# Patient Record
Sex: FEMALE | Race: BLACK OR AFRICAN AMERICAN | NOT HISPANIC OR LATINO | Employment: FULL TIME | URBAN - METROPOLITAN AREA
[De-identification: names, ages, dates, MRNs, and addresses within clinical notes are randomized per-mention and may not be internally consistent; named-entity substitution may affect disease eponyms.]

---

## 2017-01-24 ENCOUNTER — HISTORICAL (OUTPATIENT)
Dept: LAB | Facility: HOSPITAL | Age: 57
End: 2017-01-24

## 2017-01-26 ENCOUNTER — HISTORICAL (OUTPATIENT)
Dept: ADMINISTRATIVE | Facility: HOSPITAL | Age: 57
End: 2017-01-26

## 2017-02-07 ENCOUNTER — HISTORICAL (OUTPATIENT)
Dept: RADIATION THERAPY | Facility: HOSPITAL | Age: 57
End: 2017-02-07

## 2017-02-15 ENCOUNTER — HISTORICAL (OUTPATIENT)
Dept: RADIATION THERAPY | Facility: HOSPITAL | Age: 57
End: 2017-02-15

## 2017-02-24 ENCOUNTER — HISTORICAL (OUTPATIENT)
Dept: ANESTHESIOLOGY | Facility: HOSPITAL | Age: 57
End: 2017-02-24

## 2017-03-01 ENCOUNTER — HISTORICAL (OUTPATIENT)
Dept: RADIATION THERAPY | Facility: HOSPITAL | Age: 57
End: 2017-03-01

## 2017-03-06 ENCOUNTER — HISTORICAL (OUTPATIENT)
Dept: CARDIOLOGY | Facility: HOSPITAL | Age: 57
End: 2017-03-06

## 2017-03-23 ENCOUNTER — HISTORICAL (OUTPATIENT)
Dept: ADMINISTRATIVE | Facility: HOSPITAL | Age: 57
End: 2017-03-23

## 2017-04-03 ENCOUNTER — HISTORICAL (OUTPATIENT)
Dept: INFUSION THERAPY | Facility: HOSPITAL | Age: 57
End: 2017-04-03

## 2017-04-17 ENCOUNTER — HISTORICAL (OUTPATIENT)
Dept: INFUSION THERAPY | Facility: HOSPITAL | Age: 57
End: 2017-04-17

## 2017-05-01 ENCOUNTER — HISTORICAL (OUTPATIENT)
Dept: INFUSION THERAPY | Facility: HOSPITAL | Age: 57
End: 2017-05-01

## 2017-05-01 LAB
ABS NEUT (OLG): 7.8 X10(3)/MCL (ref 2.1–9.2)
ANION GAP SERPL CALC-SCNC: 18 MMOL/L
BASOPHILS # BLD AUTO: 0.1 X10(3)/MCL (ref 0–0.2)
BASOPHILS NFR BLD AUTO: 0.9 %
BUN SERPL-MCNC: 10 MG/DL (ref 7–18)
CHLORIDE SERPL-SCNC: 98 MMOL/L (ref 98–109)
CREAT SERPL-MCNC: 0.6 MG/DL (ref 0.6–1.3)
EOSINOPHIL # BLD AUTO: 0 X10(3)/MCL (ref 0–0.9)
EOSINOPHIL NFR BLD AUTO: 0.1 %
ERYTHROCYTE [DISTWIDTH] IN BLOOD BY AUTOMATED COUNT: 12.7 % (ref 11.5–17)
GLUCOSE SERPL-MCNC: 360 MG/DL (ref 70–105)
HCT VFR BLD AUTO: 30.6 % (ref 37–47)
HCT VFR BLD CALC: 31 % (ref 38–51)
HGB BLD-MCNC: 10.5 MG/DL (ref 12–17)
HGB BLD-MCNC: 9.7 GM/DL (ref 12–16)
LYMPHOCYTES # BLD AUTO: 0.9 X10(3)/MCL (ref 0.6–4.6)
LYMPHOCYTES NFR BLD AUTO: 9.6 %
MCH RBC QN AUTO: 30.9 PG (ref 27–31)
MCHC RBC AUTO-ENTMCNC: 31.7 GM/DL (ref 33–36)
MCV RBC AUTO: 97.5 FL (ref 80–94)
MONOCYTES # BLD AUTO: 0.6 X10(3)/MCL (ref 0.1–1.3)
MONOCYTES NFR BLD AUTO: 6.2 %
NEUTROPHILS # BLD AUTO: 7.8 X10(3)/MCL (ref 2.1–9.2)
NEUTROPHILS NFR BLD AUTO: 83.2 %
PLATELET # BLD AUTO: 152 X10(3)/MCL (ref 130–400)
PMV BLD AUTO: 11 FL (ref 9.4–12.4)
POC IONIZED CALCIUM: 1.17 MMOL/L (ref 1.12–1.32)
POC TCO2: 27 MMOL/L (ref 22–27)
POTASSIUM BLD-SCNC: 4.1 MMOL/L (ref 3.5–4.9)
RBC # BLD AUTO: 3.14 X10(6)/MCL (ref 4.2–5.4)
SODIUM BLD-SCNC: 138 MMOL/L (ref 138–146)
WBC # SPEC AUTO: 9.4 X10(3)/MCL (ref 4.5–11.5)

## 2017-05-08 ENCOUNTER — HISTORICAL (OUTPATIENT)
Dept: ADMINISTRATIVE | Facility: HOSPITAL | Age: 57
End: 2017-05-08

## 2017-05-08 LAB
ABS NEUT (OLG): 5.27 X10(3)/MCL (ref 2.1–9.2)
ALBUMIN SERPL-MCNC: 3.4 GM/DL (ref 3.4–5)
ALBUMIN/GLOB SERPL: 1.1 RATIO (ref 1.1–2)
ALP SERPL-CCNC: 222 UNIT/L (ref 38–126)
ALT SERPL-CCNC: 21 UNIT/L (ref 12–78)
AST SERPL-CCNC: 8 UNIT/L (ref 15–37)
BASOPHILS # BLD AUTO: 0 X10(3)/MCL (ref 0–0.2)
BASOPHILS NFR BLD AUTO: 0.3 %
BILIRUB SERPL-MCNC: 0.4 MG/DL (ref 0.2–1)
BILIRUBIN DIRECT+TOT PNL SERPL-MCNC: 0.1 MG/DL (ref 0–0.5)
BILIRUBIN DIRECT+TOT PNL SERPL-MCNC: 0.3 MG/DL (ref 0–0.8)
BUN SERPL-MCNC: 14 MG/DL (ref 7–18)
CALCIUM SERPL-MCNC: 9.2 MG/DL (ref 8.5–10.1)
CHLORIDE SERPL-SCNC: 102 MMOL/L (ref 98–107)
CO2 SERPL-SCNC: 31 MMOL/L (ref 21–32)
CREAT SERPL-MCNC: 0.73 MG/DL (ref 0.55–1.02)
EOSINOPHIL # BLD AUTO: 0 X10(3)/MCL (ref 0–0.9)
EOSINOPHIL NFR BLD AUTO: 0.5 %
ERYTHROCYTE [DISTWIDTH] IN BLOOD BY AUTOMATED COUNT: 12.7 % (ref 11.5–17)
GLOBULIN SER-MCNC: 3.1 GM/DL (ref 2.4–3.5)
GLUCOSE SERPL-MCNC: 326 MG/DL (ref 74–106)
HCT VFR BLD AUTO: 30.2 % (ref 37–47)
HGB BLD-MCNC: 9.8 GM/DL (ref 12–16)
LYMPHOCYTES # BLD AUTO: 0.6 X10(3)/MCL (ref 0.6–4.6)
LYMPHOCYTES NFR BLD AUTO: 9.6 %
MCH RBC QN AUTO: 31.2 PG (ref 27–31)
MCHC RBC AUTO-ENTMCNC: 32.5 GM/DL (ref 33–36)
MCV RBC AUTO: 96.2 FL (ref 80–94)
MONOCYTES # BLD AUTO: 0.5 X10(3)/MCL (ref 0.1–1.3)
MONOCYTES NFR BLD AUTO: 7.6 %
NEUTROPHILS # BLD AUTO: 5.3 X10(3)/MCL (ref 2.1–9.2)
NEUTROPHILS NFR BLD AUTO: 82 %
PLATELET # BLD AUTO: 88 X10(3)/MCL (ref 130–400)
PMV BLD AUTO: 12 FL (ref 9.4–12.4)
POTASSIUM SERPL-SCNC: 4.2 MMOL/L (ref 3.5–5.1)
PROT SERPL-MCNC: 6.5 GM/DL (ref 6.4–8.2)
RBC # BLD AUTO: 3.14 X10(6)/MCL (ref 4.2–5.4)
SODIUM SERPL-SCNC: 141 MMOL/L (ref 136–145)
WBC # SPEC AUTO: 6.4 X10(3)/MCL (ref 4.5–11.5)

## 2017-05-15 ENCOUNTER — HISTORICAL (OUTPATIENT)
Dept: INFUSION THERAPY | Facility: HOSPITAL | Age: 57
End: 2017-05-15

## 2017-05-15 LAB
ABS NEUT (OLG): 5.96 X10(3)/MCL (ref 2.1–9.2)
ALBUMIN SERPL-MCNC: 3.7 GM/DL (ref 3.4–5)
ALP SERPL-CCNC: 191 UNIT/L (ref 38–126)
ALT SERPL-CCNC: 23 UNIT/L (ref 12–78)
ANION GAP SERPL CALC-SCNC: 19 MMOL/L
AST SERPL-CCNC: 14 UNIT/L (ref 15–37)
BASOPHILS # BLD AUTO: 0.1 X10(3)/MCL (ref 0–0.2)
BASOPHILS NFR BLD AUTO: 1.1 %
BILIRUB SERPL-MCNC: 0.5 MG/DL (ref 0.2–1)
BILIRUBIN DIRECT+TOT PNL SERPL-MCNC: 0.1 MG/DL (ref 0–0.5)
BILIRUBIN DIRECT+TOT PNL SERPL-MCNC: 0.4 MG/DL (ref 0–0.8)
BUN SERPL-MCNC: 7 MG/DL (ref 7–18)
CHLORIDE SERPL-SCNC: 102 MMOL/L (ref 98–109)
CREAT SERPL-MCNC: 0.5 MG/DL (ref 0.6–1.3)
EOSINOPHIL # BLD AUTO: 0 X10(3)/MCL (ref 0–0.9)
EOSINOPHIL NFR BLD AUTO: 0.1 %
ERYTHROCYTE [DISTWIDTH] IN BLOOD BY AUTOMATED COUNT: 14 % (ref 11.5–17)
GLUCOSE SERPL-MCNC: 333 MG/DL (ref 70–105)
HCT VFR BLD AUTO: 29.9 % (ref 37–47)
HCT VFR BLD CALC: 30 % (ref 38–51)
HGB BLD-MCNC: 10.2 MG/DL (ref 12–17)
HGB BLD-MCNC: 9.6 GM/DL (ref 12–16)
LIVER PROFILE INTERP: ABNORMAL
LYMPHOCYTES # BLD AUTO: 0.7 X10(3)/MCL (ref 0.6–4.6)
LYMPHOCYTES NFR BLD AUTO: 9.4 %
MCH RBC QN AUTO: 31.4 PG (ref 27–31)
MCHC RBC AUTO-ENTMCNC: 32.1 GM/DL (ref 33–36)
MCV RBC AUTO: 97.7 FL (ref 80–94)
MONOCYTES # BLD AUTO: 0.6 X10(3)/MCL (ref 0.1–1.3)
MONOCYTES NFR BLD AUTO: 8.2 %
NEUTROPHILS # BLD AUTO: 6 X10(3)/MCL (ref 2.1–9.2)
NEUTROPHILS NFR BLD AUTO: 81.2 %
PLATELET # BLD AUTO: 180 X10(3)/MCL (ref 130–400)
PMV BLD AUTO: 11.2 FL (ref 9.4–12.4)
POC IONIZED CALCIUM: 1.15 MMOL/L (ref 1.12–1.32)
POC TCO2: 24 MMOL/L (ref 22–27)
POTASSIUM BLD-SCNC: 4 MMOL/L (ref 3.5–4.9)
PROT SERPL-MCNC: 6.5 GM/DL (ref 6.4–8.2)
RBC # BLD AUTO: 3.06 X10(6)/MCL (ref 4.2–5.4)
SODIUM BLD-SCNC: 139 MMOL/L (ref 138–146)
WBC # SPEC AUTO: 7.3 X10(3)/MCL (ref 4.5–11.5)

## 2017-05-22 ENCOUNTER — HISTORICAL (OUTPATIENT)
Dept: ADMINISTRATIVE | Facility: HOSPITAL | Age: 57
End: 2017-05-22

## 2017-05-22 LAB
ABS NEUT (OLG): 6.28 X10(3)/MCL (ref 2.1–9.2)
ALBUMIN SERPL-MCNC: 3.5 GM/DL (ref 3.4–5)
ALBUMIN/GLOB SERPL: 1.1 RATIO (ref 1.1–2)
ALP SERPL-CCNC: 212 UNIT/L (ref 38–126)
ALT SERPL-CCNC: 17 UNIT/L (ref 12–78)
AST SERPL-CCNC: 9 UNIT/L (ref 15–37)
BASOPHILS # BLD AUTO: 0 X10(3)/MCL (ref 0–0.2)
BASOPHILS NFR BLD AUTO: 0.4 %
BILIRUB SERPL-MCNC: 0.3 MG/DL (ref 0.2–1)
BILIRUBIN DIRECT+TOT PNL SERPL-MCNC: 0.1 MG/DL (ref 0–0.5)
BILIRUBIN DIRECT+TOT PNL SERPL-MCNC: 0.2 MG/DL (ref 0–0.8)
BUN SERPL-MCNC: 7 MG/DL (ref 7–18)
CALCIUM SERPL-MCNC: 9.7 MG/DL (ref 8.5–10.1)
CHLORIDE SERPL-SCNC: 101 MMOL/L (ref 98–107)
CO2 SERPL-SCNC: 29 MMOL/L (ref 21–32)
CREAT SERPL-MCNC: 0.67 MG/DL (ref 0.55–1.02)
EOSINOPHIL # BLD AUTO: 0 X10(3)/MCL (ref 0–0.9)
EOSINOPHIL NFR BLD AUTO: 0.4 %
ERYTHROCYTE [DISTWIDTH] IN BLOOD BY AUTOMATED COUNT: 13.5 % (ref 11.5–17)
GLOBULIN SER-MCNC: 3.1 GM/DL (ref 2.4–3.5)
GLUCOSE SERPL-MCNC: 223 MG/DL (ref 74–106)
HCT VFR BLD AUTO: 29.1 % (ref 37–47)
HGB BLD-MCNC: 9.5 GM/DL (ref 12–16)
LYMPHOCYTES # BLD AUTO: 0.5 X10(3)/MCL (ref 0.6–4.6)
LYMPHOCYTES NFR BLD AUTO: 6.5 %
MCH RBC QN AUTO: 31.4 PG (ref 27–31)
MCHC RBC AUTO-ENTMCNC: 32.6 GM/DL (ref 33–36)
MCV RBC AUTO: 96 FL (ref 80–94)
MONOCYTES # BLD AUTO: 0.7 X10(3)/MCL (ref 0.1–1.3)
MONOCYTES NFR BLD AUTO: 8.9 %
NEUTROPHILS # BLD AUTO: 6.3 X10(3)/MCL (ref 2.1–9.2)
NEUTROPHILS NFR BLD AUTO: 83.8 %
PLATELET # BLD AUTO: 180 X10(3)/MCL (ref 130–400)
PMV BLD AUTO: 10.6 FL (ref 9.4–12.4)
POTASSIUM SERPL-SCNC: 4.4 MMOL/L (ref 3.5–5.1)
PROT SERPL-MCNC: 6.6 GM/DL (ref 6.4–8.2)
RBC # BLD AUTO: 3.03 X10(6)/MCL (ref 4.2–5.4)
SODIUM SERPL-SCNC: 141 MMOL/L (ref 136–145)
WBC # SPEC AUTO: 7.5 X10(3)/MCL (ref 4.5–11.5)

## 2017-05-30 ENCOUNTER — HISTORICAL (OUTPATIENT)
Dept: INFUSION THERAPY | Facility: HOSPITAL | Age: 57
End: 2017-05-30

## 2017-05-30 LAB
ABS NEUT (OLG): 8.11 X10(3)/MCL (ref 2.1–9.2)
ANION GAP SERPL CALC-SCNC: 20 MMOL/L
BASOPHILS # BLD AUTO: 0.1 X10(3)/MCL (ref 0–0.2)
BASOPHILS NFR BLD AUTO: 1 %
BUN SERPL-MCNC: 7 MG/DL (ref 7–18)
CHLORIDE SERPL-SCNC: 99 MMOL/L (ref 98–109)
CREAT SERPL-MCNC: 0.6 MG/DL (ref 0.6–1.3)
EOSINOPHIL # BLD AUTO: 0 X10(3)/MCL (ref 0–0.9)
EOSINOPHIL NFR BLD AUTO: 0.1 %
ERYTHROCYTE [DISTWIDTH] IN BLOOD BY AUTOMATED COUNT: 14.2 % (ref 11.5–17)
GLUCOSE SERPL-MCNC: 350 MG/DL (ref 70–105)
HCT VFR BLD AUTO: 28.5 % (ref 37–47)
HCT VFR BLD CALC: 27 % (ref 38–51)
HGB BLD-MCNC: 9.1 GM/DL (ref 12–16)
HGB BLD-MCNC: 9.2 MG/DL (ref 12–17)
LYMPHOCYTES # BLD AUTO: 0.7 X10(3)/MCL (ref 0.6–4.6)
LYMPHOCYTES NFR BLD AUTO: 6.8 %
MCH RBC QN AUTO: 31.5 PG (ref 27–31)
MCHC RBC AUTO-ENTMCNC: 31.9 GM/DL (ref 33–36)
MCV RBC AUTO: 98.6 FL (ref 80–94)
MONOCYTES # BLD AUTO: 0.9 X10(3)/MCL (ref 0.1–1.3)
MONOCYTES NFR BLD AUTO: 9.1 %
NEUTROPHILS # BLD AUTO: 8.1 X10(3)/MCL (ref 2.1–9.2)
NEUTROPHILS NFR BLD AUTO: 83 %
PLATELET # BLD AUTO: 159 X10(3)/MCL (ref 130–400)
PMV BLD AUTO: 11 FL (ref 9.4–12.4)
POC IONIZED CALCIUM: 1.22 MMOL/L (ref 1.12–1.32)
POC TCO2: 24 MMOL/L (ref 22–27)
POTASSIUM BLD-SCNC: 4.2 MMOL/L (ref 3.5–4.9)
RBC # BLD AUTO: 2.89 X10(6)/MCL (ref 4.2–5.4)
SODIUM BLD-SCNC: 138 MMOL/L (ref 138–146)
WBC # SPEC AUTO: 9.8 X10(3)/MCL (ref 4.5–11.5)

## 2017-06-06 ENCOUNTER — HISTORICAL (OUTPATIENT)
Dept: INFUSION THERAPY | Facility: HOSPITAL | Age: 57
End: 2017-06-06

## 2017-06-06 LAB
ABS NEUT (OLG): 5.07 X10(3)/MCL (ref 2.1–9.2)
ALBUMIN SERPL-MCNC: 3.9 GM/DL (ref 3.4–5)
ALBUMIN/GLOB SERPL: 1.4 {RATIO}
ALP SERPL-CCNC: 154 UNIT/L (ref 38–126)
ALT SERPL-CCNC: 36 UNIT/L (ref 12–78)
AST SERPL-CCNC: 21 UNIT/L (ref 15–37)
BASOPHILS # BLD AUTO: 0.1 X10(3)/MCL (ref 0–0.2)
BASOPHILS NFR BLD AUTO: 1.2 %
BILIRUB SERPL-MCNC: 0.7 MG/DL (ref 0.2–1)
BILIRUBIN DIRECT+TOT PNL SERPL-MCNC: 0.1 MG/DL (ref 0–0.2)
BILIRUBIN DIRECT+TOT PNL SERPL-MCNC: 0.6 MG/DL (ref 0–0.8)
BUN SERPL-MCNC: 14 MG/DL (ref 7–18)
CALCIUM SERPL-MCNC: 9.3 MG/DL (ref 8.5–10.1)
CHLORIDE SERPL-SCNC: 101 MMOL/L (ref 98–107)
CO2 SERPL-SCNC: 26 MMOL/L (ref 21–32)
CREAT SERPL-MCNC: 0.74 MG/DL (ref 0.55–1.02)
EOSINOPHIL # BLD AUTO: 0.1 X10(3)/MCL (ref 0–0.9)
EOSINOPHIL NFR BLD AUTO: 1.2 %
ERYTHROCYTE [DISTWIDTH] IN BLOOD BY AUTOMATED COUNT: 14.6 % (ref 11.5–17)
GLOBULIN SER-MCNC: 2.7 GM/DL (ref 2.4–3.5)
GLUCOSE SERPL-MCNC: 178 MG/DL (ref 74–106)
HCT VFR BLD AUTO: 27.1 % (ref 37–47)
HGB BLD-MCNC: 8.7 GM/DL (ref 12–16)
LYMPHOCYTES # BLD AUTO: 0.3 X10(3)/MCL (ref 0.6–4.6)
LYMPHOCYTES NFR BLD AUTO: 4.4 %
MCH RBC QN AUTO: 31.5 PG (ref 27–31)
MCHC RBC AUTO-ENTMCNC: 32.1 GM/DL (ref 33–36)
MCV RBC AUTO: 98.2 FL (ref 80–94)
MONOCYTES # BLD AUTO: 1 X10(3)/MCL (ref 0.1–1.3)
MONOCYTES NFR BLD AUTO: 15.7 %
NEUTROPHILS # BLD AUTO: 5.1 X10(3)/MCL (ref 2.1–9.2)
NEUTROPHILS NFR BLD AUTO: 77.5 %
PLATELET # BLD AUTO: 357 X10(3)/MCL (ref 130–400)
PMV BLD AUTO: 9.7 FL (ref 9.4–12.4)
POTASSIUM SERPL-SCNC: 4.2 MMOL/L (ref 3.5–5.1)
PROT SERPL-MCNC: 6.6 GM/DL (ref 6.4–8.2)
RBC # BLD AUTO: 2.76 X10(6)/MCL (ref 4.2–5.4)
SODIUM SERPL-SCNC: 137 MMOL/L (ref 136–145)
WBC # SPEC AUTO: 6.6 X10(3)/MCL (ref 4.5–11.5)

## 2017-06-12 LAB
ABS NEUT (OLG): 3.93 X10(3)/MCL (ref 2.1–9.2)
ALBUMIN SERPL-MCNC: 3.6 GM/DL (ref 3.4–5)
ALBUMIN/GLOB SERPL: 1.2 RATIO (ref 1.1–2)
ALP SERPL-CCNC: 122 UNIT/L (ref 38–126)
ALT SERPL-CCNC: 33 UNIT/L (ref 12–78)
AST SERPL-CCNC: 21 UNIT/L (ref 15–37)
BASOPHILS # BLD AUTO: 0.1 X10(3)/MCL (ref 0–0.2)
BASOPHILS NFR BLD AUTO: 1.1 %
BILIRUB SERPL-MCNC: 0.3 MG/DL (ref 0.2–1)
BILIRUBIN DIRECT+TOT PNL SERPL-MCNC: 0.1 MG/DL (ref 0–0.5)
BILIRUBIN DIRECT+TOT PNL SERPL-MCNC: 0.2 MG/DL (ref 0–0.8)
BUN SERPL-MCNC: 13 MG/DL (ref 7–18)
CALCIUM SERPL-MCNC: 9.5 MG/DL (ref 8.5–10.1)
CHLORIDE SERPL-SCNC: 107 MMOL/L (ref 98–107)
CO2 SERPL-SCNC: 28 MMOL/L (ref 21–32)
CREAT SERPL-MCNC: 0.6 MG/DL (ref 0.55–1.02)
CROSSMATCH INTERPRETATION: NORMAL
EOSINOPHIL # BLD AUTO: 0.1 X10(3)/MCL (ref 0–0.9)
EOSINOPHIL NFR BLD AUTO: 2.1 %
ERYTHROCYTE [DISTWIDTH] IN BLOOD BY AUTOMATED COUNT: 15 % (ref 11.5–17)
GLOBULIN SER-MCNC: 3 GM/DL (ref 2.4–3.5)
GLUCOSE SERPL-MCNC: 117 MG/DL (ref 74–106)
GROUP & RH: NORMAL
HCT VFR BLD AUTO: 25.9 % (ref 37–47)
HGB BLD-MCNC: 8.2 GM/DL (ref 12–16)
LYMPHOCYTES # BLD AUTO: 0.4 X10(3)/MCL (ref 0.6–4.6)
LYMPHOCYTES NFR BLD AUTO: 8.5 %
MCH RBC QN AUTO: 32 PG (ref 27–31)
MCHC RBC AUTO-ENTMCNC: 31.7 GM/DL (ref 33–36)
MCV RBC AUTO: 101.2 FL (ref 80–94)
MONOCYTES # BLD AUTO: 0.8 X10(3)/MCL (ref 0.1–1.3)
MONOCYTES NFR BLD AUTO: 14.2 %
NEUTROPHILS # BLD AUTO: 3.9 X10(3)/MCL (ref 2.1–9.2)
NEUTROPHILS NFR BLD AUTO: 74.1 %
PLATELET # BLD AUTO: 320 X10(3)/MCL (ref 130–400)
PMV BLD AUTO: 9.7 FL (ref 9.4–12.4)
POTASSIUM SERPL-SCNC: 4 MMOL/L (ref 3.5–5.1)
PRODUCT READY: NORMAL
PROT SERPL-MCNC: 6.6 GM/DL (ref 6.4–8.2)
RBC # BLD AUTO: 2.56 X10(6)/MCL (ref 4.2–5.4)
SODIUM SERPL-SCNC: 144 MMOL/L (ref 136–145)
WBC # SPEC AUTO: 5.3 X10(3)/MCL (ref 4.5–11.5)

## 2017-06-13 ENCOUNTER — HISTORICAL (OUTPATIENT)
Dept: INFUSION THERAPY | Facility: HOSPITAL | Age: 57
End: 2017-06-13

## 2017-06-20 ENCOUNTER — HISTORICAL (OUTPATIENT)
Dept: INFUSION THERAPY | Facility: HOSPITAL | Age: 57
End: 2017-06-20

## 2017-06-20 LAB
ABS NEUT (OLG): 5.46 X10(3)/MCL (ref 2.1–9.2)
ANION GAP SERPL CALC-SCNC: 17 MMOL/L
BASOPHILS # BLD AUTO: 0.1 X10(3)/MCL (ref 0–0.2)
BASOPHILS NFR BLD AUTO: 1 %
BUN SERPL-MCNC: 10 MG/DL (ref 7–18)
CHLORIDE SERPL-SCNC: 106 MMOL/L (ref 98–109)
CREAT SERPL-MCNC: 0.5 MG/DL (ref 0.6–1.3)
EOSINOPHIL # BLD AUTO: 0.3 X10(3)/MCL (ref 0–0.9)
EOSINOPHIL NFR BLD AUTO: 3.8 %
ERYTHROCYTE [DISTWIDTH] IN BLOOD BY AUTOMATED COUNT: 14.8 % (ref 11.5–17)
GLUCOSE SERPL-MCNC: 153 MG/DL (ref 70–105)
HCT VFR BLD AUTO: 34.5 % (ref 37–47)
HCT VFR BLD CALC: 33 % (ref 38–51)
HGB BLD-MCNC: 11.1 GM/DL (ref 12–16)
HGB BLD-MCNC: 11.2 MG/DL (ref 12–17)
LYMPHOCYTES # BLD AUTO: 0.4 X10(3)/MCL (ref 0.6–4.6)
LYMPHOCYTES NFR BLD AUTO: 5.3 %
MCH RBC QN AUTO: 31.4 PG (ref 27–31)
MCHC RBC AUTO-ENTMCNC: 32.2 GM/DL (ref 33–36)
MCV RBC AUTO: 97.7 FL (ref 80–94)
MONOCYTES # BLD AUTO: 0.6 X10(3)/MCL (ref 0.1–1.3)
MONOCYTES NFR BLD AUTO: 9.3 %
NEUTROPHILS # BLD AUTO: 5.5 X10(3)/MCL (ref 2.1–9.2)
NEUTROPHILS NFR BLD AUTO: 80.6 %
PLATELET # BLD AUTO: 272 X10(3)/MCL (ref 130–400)
PMV BLD AUTO: 10.2 FL (ref 9.4–12.4)
POC IONIZED CALCIUM: 1.23 MMOL/L (ref 1.12–1.32)
POC TCO2: 25 MMOL/L (ref 22–27)
POTASSIUM BLD-SCNC: 4 MMOL/L (ref 3.5–4.9)
RBC # BLD AUTO: 3.53 X10(6)/MCL (ref 4.2–5.4)
SODIUM BLD-SCNC: 142 MMOL/L (ref 138–146)
WBC # SPEC AUTO: 6.8 X10(3)/MCL (ref 4.5–11.5)

## 2017-06-26 ENCOUNTER — HISTORICAL (OUTPATIENT)
Dept: ADMINISTRATIVE | Facility: HOSPITAL | Age: 57
End: 2017-06-26

## 2017-06-27 ENCOUNTER — HISTORICAL (OUTPATIENT)
Dept: INFUSION THERAPY | Facility: HOSPITAL | Age: 57
End: 2017-06-27

## 2017-06-27 LAB
ABS NEUT (OLG): 5.42 X10(3)/MCL (ref 2.1–9.2)
ALBUMIN SERPL-MCNC: 3.7 GM/DL (ref 3.4–5)
ALBUMIN/GLOB SERPL: 1.2 RATIO (ref 1.1–2)
ALP SERPL-CCNC: 119 UNIT/L (ref 38–126)
ALT SERPL-CCNC: 31 UNIT/L (ref 12–78)
AST SERPL-CCNC: 23 UNIT/L (ref 15–37)
BASOPHILS # BLD AUTO: 0 X10(3)/MCL (ref 0–0.2)
BASOPHILS NFR BLD AUTO: 0.6 %
BILIRUB SERPL-MCNC: 0.4 MG/DL (ref 0.2–1)
BILIRUBIN DIRECT+TOT PNL SERPL-MCNC: 0.1 MG/DL (ref 0–0.5)
BILIRUBIN DIRECT+TOT PNL SERPL-MCNC: 0.3 MG/DL (ref 0–0.8)
BUN SERPL-MCNC: 10 MG/DL (ref 7–18)
CALCIUM SERPL-MCNC: 9.7 MG/DL (ref 8.5–10.1)
CHLORIDE SERPL-SCNC: 104 MMOL/L (ref 98–107)
CO2 SERPL-SCNC: 27 MMOL/L (ref 21–32)
CREAT SERPL-MCNC: 0.74 MG/DL (ref 0.55–1.02)
EOSINOPHIL # BLD AUTO: 0.2 X10(3)/MCL (ref 0–0.9)
EOSINOPHIL NFR BLD AUTO: 3.6 %
ERYTHROCYTE [DISTWIDTH] IN BLOOD BY AUTOMATED COUNT: 14.7 % (ref 11.5–17)
GLOBULIN SER-MCNC: 3.2 GM/DL (ref 2.4–3.5)
GLUCOSE SERPL-MCNC: 160 MG/DL (ref 74–106)
HCT VFR BLD AUTO: 33.1 % (ref 37–47)
HGB BLD-MCNC: 10.5 GM/DL (ref 12–16)
LYMPHOCYTES # BLD AUTO: 0.4 X10(3)/MCL (ref 0.6–4.6)
LYMPHOCYTES NFR BLD AUTO: 5.5 %
MCH RBC QN AUTO: 31.5 PG (ref 27–31)
MCHC RBC AUTO-ENTMCNC: 31.7 GM/DL (ref 33–36)
MCV RBC AUTO: 99.4 FL (ref 80–94)
MONOCYTES # BLD AUTO: 0.6 X10(3)/MCL (ref 0.1–1.3)
MONOCYTES NFR BLD AUTO: 9.1 %
NEUTROPHILS # BLD AUTO: 5.4 X10(3)/MCL (ref 2.1–9.2)
NEUTROPHILS NFR BLD AUTO: 81.2 %
PLATELET # BLD AUTO: 273 X10(3)/MCL (ref 130–400)
PMV BLD AUTO: 10.3 FL (ref 9.4–12.4)
POTASSIUM SERPL-SCNC: 4.4 MMOL/L (ref 3.5–5.1)
PROT SERPL-MCNC: 6.9 GM/DL (ref 6.4–8.2)
RBC # BLD AUTO: 3.33 X10(6)/MCL (ref 4.2–5.4)
SODIUM SERPL-SCNC: 142 MMOL/L (ref 136–145)
WBC # SPEC AUTO: 6.7 X10(3)/MCL (ref 4.5–11.5)

## 2017-07-03 ENCOUNTER — HISTORICAL (OUTPATIENT)
Dept: ADMINISTRATIVE | Facility: HOSPITAL | Age: 57
End: 2017-07-03

## 2017-07-03 LAB
ABS NEUT (OLG): 3.85 X10(3)/MCL (ref 2.1–9.2)
ALBUMIN SERPL-MCNC: 3.7 GM/DL (ref 3.4–5)
ALBUMIN/GLOB SERPL: 1.1 RATIO (ref 1.1–2)
ALP SERPL-CCNC: 121 UNIT/L (ref 38–126)
ALT SERPL-CCNC: 34 UNIT/L (ref 12–78)
AST SERPL-CCNC: 26 UNIT/L (ref 15–37)
BASOPHILS # BLD AUTO: 0 X10(3)/MCL (ref 0–0.2)
BASOPHILS NFR BLD AUTO: 1 %
BILIRUB SERPL-MCNC: 0.5 MG/DL (ref 0.2–1)
BILIRUBIN DIRECT+TOT PNL SERPL-MCNC: 0.1 MG/DL (ref 0–0.5)
BILIRUBIN DIRECT+TOT PNL SERPL-MCNC: 0.4 MG/DL (ref 0–0.8)
BUN SERPL-MCNC: 13 MG/DL (ref 7–18)
CALCIUM SERPL-MCNC: 9.5 MG/DL (ref 8.5–10.1)
CHLORIDE SERPL-SCNC: 105 MMOL/L (ref 98–107)
CO2 SERPL-SCNC: 28 MMOL/L (ref 21–32)
CREAT SERPL-MCNC: 0.71 MG/DL (ref 0.55–1.02)
EOSINOPHIL # BLD AUTO: 0.2 X10(3)/MCL (ref 0–0.9)
EOSINOPHIL NFR BLD AUTO: 4.2 %
ERYTHROCYTE [DISTWIDTH] IN BLOOD BY AUTOMATED COUNT: 14.5 % (ref 11.5–17)
GLOBULIN SER-MCNC: 3.4 GM/DL (ref 2.4–3.5)
GLUCOSE SERPL-MCNC: 116 MG/DL (ref 74–106)
HCT VFR BLD AUTO: 33.4 % (ref 37–47)
HGB BLD-MCNC: 10.5 GM/DL (ref 12–16)
LYMPHOCYTES # BLD AUTO: 0.4 X10(3)/MCL (ref 0.6–4.6)
LYMPHOCYTES NFR BLD AUTO: 8.4 %
MCH RBC QN AUTO: 31.1 PG (ref 27–31)
MCHC RBC AUTO-ENTMCNC: 31.4 GM/DL (ref 33–36)
MCV RBC AUTO: 98.8 FL (ref 80–94)
MONOCYTES # BLD AUTO: 0.5 X10(3)/MCL (ref 0.1–1.3)
MONOCYTES NFR BLD AUTO: 9.2 %
NEUTROPHILS # BLD AUTO: 3.8 X10(3)/MCL (ref 2.1–9.2)
NEUTROPHILS NFR BLD AUTO: 77.2 %
PLATELET # BLD AUTO: 357 X10(3)/MCL (ref 130–400)
PMV BLD AUTO: 9.8 FL (ref 9.4–12.4)
POTASSIUM SERPL-SCNC: 4.7 MMOL/L (ref 3.5–5.1)
PROT SERPL-MCNC: 7.1 GM/DL (ref 6.4–8.2)
RBC # BLD AUTO: 3.38 X10(6)/MCL (ref 4.2–5.4)
SODIUM SERPL-SCNC: 141 MMOL/L (ref 136–145)
WBC # SPEC AUTO: 5 X10(3)/MCL (ref 4.5–11.5)

## 2017-07-05 ENCOUNTER — HISTORICAL (OUTPATIENT)
Dept: INFUSION THERAPY | Facility: HOSPITAL | Age: 57
End: 2017-07-05

## 2017-07-11 ENCOUNTER — HISTORICAL (OUTPATIENT)
Dept: INFUSION THERAPY | Facility: HOSPITAL | Age: 57
End: 2017-07-11

## 2017-07-11 LAB
ABS NEUT (OLG): 4.42 X10(3)/MCL (ref 2.1–9.2)
ANION GAP SERPL CALC-SCNC: 15 MMOL/L
BASOPHILS # BLD AUTO: 0 X10(3)/MCL (ref 0–0.2)
BASOPHILS NFR BLD AUTO: 0.9 %
BUN SERPL-MCNC: 11 MG/DL (ref 7–18)
CHLORIDE SERPL-SCNC: 103 MMOL/L (ref 98–109)
CREAT SERPL-MCNC: 0.7 MG/DL (ref 0.6–1.3)
EOSINOPHIL # BLD AUTO: 0.2 X10(3)/MCL (ref 0–0.9)
EOSINOPHIL NFR BLD AUTO: 3.7 %
ERYTHROCYTE [DISTWIDTH] IN BLOOD BY AUTOMATED COUNT: 14.1 % (ref 11.5–17)
GLUCOSE SERPL-MCNC: 161 MG/DL (ref 70–105)
HCT VFR BLD AUTO: 31.9 % (ref 37–47)
HCT VFR BLD CALC: 30 % (ref 38–51)
HGB BLD-MCNC: 10 GM/DL (ref 12–16)
HGB BLD-MCNC: 10.2 MG/DL (ref 12–17)
LYMPHOCYTES # BLD AUTO: 0.5 X10(3)/MCL (ref 0.6–4.6)
LYMPHOCYTES NFR BLD AUTO: 8.9 %
MCH RBC QN AUTO: 31.1 PG (ref 27–31)
MCHC RBC AUTO-ENTMCNC: 31.3 GM/DL (ref 33–36)
MCV RBC AUTO: 99.1 FL (ref 80–94)
MONOCYTES # BLD AUTO: 0.4 X10(3)/MCL (ref 0.1–1.3)
MONOCYTES NFR BLD AUTO: 7.7 %
NEUTROPHILS # BLD AUTO: 4.4 X10(3)/MCL (ref 2.1–9.2)
NEUTROPHILS NFR BLD AUTO: 78.8 %
PLATELET # BLD AUTO: 361 X10(3)/MCL (ref 130–400)
PMV BLD AUTO: 10.1 FL (ref 9.4–12.4)
POC IONIZED CALCIUM: 1.16 MMOL/L (ref 1.12–1.32)
POC TCO2: 27 MMOL/L (ref 22–27)
POTASSIUM BLD-SCNC: 4.6 MMOL/L (ref 3.5–4.9)
RBC # BLD AUTO: 3.22 X10(6)/MCL (ref 4.2–5.4)
SODIUM BLD-SCNC: 139 MMOL/L (ref 138–146)
WBC # SPEC AUTO: 5.6 X10(3)/MCL (ref 4.5–11.5)

## 2017-07-17 ENCOUNTER — HISTORICAL (OUTPATIENT)
Dept: ADMINISTRATIVE | Facility: HOSPITAL | Age: 57
End: 2017-07-17

## 2017-07-17 LAB
ABS NEUT (OLG): 3.66 X10(3)/MCL (ref 2.1–9.2)
ALBUMIN SERPL-MCNC: 3.6 GM/DL (ref 3.4–5)
ALBUMIN/GLOB SERPL: 1.2 {RATIO}
ALP SERPL-CCNC: 123 UNIT/L (ref 38–126)
ALT SERPL-CCNC: 29 UNIT/L (ref 12–78)
AST SERPL-CCNC: 20 UNIT/L (ref 15–37)
BASOPHILS # BLD AUTO: 0 X10(3)/MCL (ref 0–0.2)
BASOPHILS NFR BLD AUTO: 1.1 %
BILIRUB SERPL-MCNC: 0.4 MG/DL (ref 0.2–1)
BILIRUBIN DIRECT+TOT PNL SERPL-MCNC: 0.1 MG/DL (ref 0–0.2)
BILIRUBIN DIRECT+TOT PNL SERPL-MCNC: 0.3 MG/DL (ref 0–0.8)
BUN SERPL-MCNC: 10 MG/DL (ref 7–18)
CALCIUM SERPL-MCNC: 9.5 MG/DL (ref 8.5–10.1)
CHLORIDE SERPL-SCNC: 105 MMOL/L (ref 98–107)
CO2 SERPL-SCNC: 26 MMOL/L (ref 21–32)
CREAT SERPL-MCNC: 0.66 MG/DL (ref 0.55–1.02)
EOSINOPHIL # BLD AUTO: 0.1 X10(3)/MCL (ref 0–0.9)
EOSINOPHIL NFR BLD AUTO: 2.4 %
ERYTHROCYTE [DISTWIDTH] IN BLOOD BY AUTOMATED COUNT: 14.3 % (ref 11.5–17)
GLOBULIN SER-MCNC: 3.1 GM/DL (ref 2.4–3.5)
GLUCOSE SERPL-MCNC: 151 MG/DL (ref 74–106)
HCT VFR BLD AUTO: 30.5 % (ref 37–47)
HGB BLD-MCNC: 9.7 GM/DL (ref 12–16)
LYMPHOCYTES # BLD AUTO: 0.4 X10(3)/MCL (ref 0.6–4.6)
LYMPHOCYTES NFR BLD AUTO: 8.5 %
MCH RBC QN AUTO: 31.6 PG (ref 27–31)
MCHC RBC AUTO-ENTMCNC: 31.8 GM/DL (ref 33–36)
MCV RBC AUTO: 99.3 FL (ref 80–94)
MONOCYTES # BLD AUTO: 0.4 X10(3)/MCL (ref 0.1–1.3)
MONOCYTES NFR BLD AUTO: 8.1 %
NEUTROPHILS # BLD AUTO: 3.7 X10(3)/MCL (ref 2.1–9.2)
NEUTROPHILS NFR BLD AUTO: 79.9 %
PLATELET # BLD AUTO: 352 X10(3)/MCL (ref 130–400)
PMV BLD AUTO: 9.9 FL (ref 9.4–12.4)
POTASSIUM SERPL-SCNC: 4.2 MMOL/L (ref 3.5–5.1)
PROT SERPL-MCNC: 6.7 GM/DL (ref 6.4–8.2)
RBC # BLD AUTO: 3.07 X10(6)/MCL (ref 4.2–5.4)
SODIUM SERPL-SCNC: 140 MMOL/L (ref 136–145)
WBC # SPEC AUTO: 4.6 X10(3)/MCL (ref 4.5–11.5)

## 2017-07-18 ENCOUNTER — HISTORICAL (OUTPATIENT)
Dept: INFUSION THERAPY | Facility: HOSPITAL | Age: 57
End: 2017-07-18

## 2017-07-24 ENCOUNTER — HISTORICAL (OUTPATIENT)
Dept: ADMINISTRATIVE | Facility: HOSPITAL | Age: 57
End: 2017-07-24

## 2017-07-24 LAB
ABS NEUT (OLG): 2.34 X10(3)/MCL (ref 2.1–9.2)
ALBUMIN SERPL-MCNC: 3.5 GM/DL (ref 3.4–5)
ALBUMIN/GLOB SERPL: 1.1 RATIO (ref 1.1–2)
ALP SERPL-CCNC: 112 UNIT/L (ref 38–126)
ALT SERPL-CCNC: 27 UNIT/L (ref 12–78)
ANISOCYTOSIS BLD QL SMEAR: 1
AST SERPL-CCNC: 19 UNIT/L (ref 15–37)
BASOPHILS NFR BLD MANUAL: 6 % (ref 0–2)
BILIRUB SERPL-MCNC: 0.4 MG/DL (ref 0.2–1)
BILIRUBIN DIRECT+TOT PNL SERPL-MCNC: 0.1 MG/DL (ref 0–0.5)
BILIRUBIN DIRECT+TOT PNL SERPL-MCNC: 0.3 MG/DL (ref 0–0.8)
BUN SERPL-MCNC: 11 MG/DL (ref 7–18)
CALCIUM SERPL-MCNC: 9.1 MG/DL (ref 8.5–10.1)
CHLORIDE SERPL-SCNC: 107 MMOL/L (ref 98–107)
CHOLEST SERPL-MCNC: 182 MG/DL (ref 0–200)
CHOLEST/HDLC SERPL: 3.4 {RATIO} (ref 0–4)
CO2 SERPL-SCNC: 25 MMOL/L (ref 21–32)
CREAT SERPL-MCNC: 0.6 MG/DL (ref 0.55–1.02)
CREAT UR-MCNC: 288 MG/DL
ERYTHROCYTE [DISTWIDTH] IN BLOOD BY AUTOMATED COUNT: 14.6 % (ref 11.5–17)
EST. AVERAGE GLUCOSE BLD GHB EST-MCNC: 137 MG/DL
GLOBULIN SER-MCNC: 3.1 GM/DL (ref 2.4–3.5)
GLUCOSE SERPL-MCNC: 95 MG/DL (ref 74–106)
HBA1C MFR BLD: 6.4 % (ref 4.2–6.3)
HCT VFR BLD AUTO: 29.2 % (ref 37–47)
HDLC SERPL-MCNC: 54 MG/DL (ref 35–60)
HGB BLD-MCNC: 9.2 GM/DL (ref 12–16)
LDLC SERPL CALC-MCNC: 93 MG/DL (ref 0–129)
LYMPHOCYTES NFR BLD MANUAL: 10 % (ref 13–40)
LYMPHOCYTES NFR BLD MANUAL: 2 %
MCH RBC QN AUTO: 31.3 PG (ref 27–31)
MCHC RBC AUTO-ENTMCNC: 31.5 GM/DL (ref 33–36)
MCV RBC AUTO: 99.3 FL (ref 80–94)
MICROALBUMIN UR-MCNC: 2.8 MG/DL
MICROALBUMIN/CREAT RATIO PNL UR: 9.7 MG/GM CR (ref 0–30)
MONOCYTES NFR BLD MANUAL: 9 % (ref 2–11)
NEUTROPHILS NFR BLD MANUAL: 73 % (ref 47–80)
PLATELET # BLD AUTO: 296 X10(3)/MCL (ref 130–400)
PLATELET # BLD EST: NORMAL 10*3/UL
PMV BLD AUTO: 10.6 FL (ref 7.4–10.4)
POIKILOCYTOSIS BLD QL SMEAR: 1
POTASSIUM SERPL-SCNC: 4.4 MMOL/L (ref 3.5–5.1)
PROT SERPL-MCNC: 6.6 GM/DL (ref 6.4–8.2)
RBC # BLD AUTO: 2.94 X10(6)/MCL (ref 4.2–5.4)
SODIUM SERPL-SCNC: 142 MMOL/L (ref 136–145)
TRIGL SERPL-MCNC: 176 MG/DL (ref 30–150)
VLDLC SERPL CALC-MCNC: 35 MG/DL
WBC # SPEC AUTO: 3.3 X10(3)/MCL (ref 4.5–11.5)

## 2017-07-25 ENCOUNTER — HISTORICAL (OUTPATIENT)
Dept: INFUSION THERAPY | Facility: HOSPITAL | Age: 57
End: 2017-07-25

## 2017-08-01 ENCOUNTER — HISTORICAL (OUTPATIENT)
Dept: RADIOLOGY | Facility: HOSPITAL | Age: 57
End: 2017-08-01

## 2017-08-01 ENCOUNTER — HISTORICAL (OUTPATIENT)
Dept: INFUSION THERAPY | Facility: HOSPITAL | Age: 57
End: 2017-08-01

## 2017-08-01 LAB
ABS NEUT (OLG): 2.12 X10(3)/MCL (ref 2.1–9.2)
ALBUMIN SERPL-MCNC: 3.9 GM/DL (ref 3.4–5)
ALBUMIN/GLOB SERPL: 1.3 {RATIO}
ALP SERPL-CCNC: 118 UNIT/L (ref 38–126)
ALT SERPL-CCNC: 27 UNIT/L (ref 12–78)
AST SERPL-CCNC: 17 UNIT/L (ref 15–37)
BASOPHILS # BLD AUTO: 0 X10(3)/MCL (ref 0–0.2)
BASOPHILS NFR BLD AUTO: 1.3 %
BILIRUB SERPL-MCNC: 0.3 MG/DL (ref 0.2–1)
BILIRUBIN DIRECT+TOT PNL SERPL-MCNC: 0.1 MG/DL (ref 0–0.2)
BILIRUBIN DIRECT+TOT PNL SERPL-MCNC: 0.2 MG/DL (ref 0–0.8)
BUN SERPL-MCNC: 13 MG/DL (ref 7–18)
CALCIUM SERPL-MCNC: 9.5 MG/DL (ref 8.5–10.1)
CHLORIDE SERPL-SCNC: 105 MMOL/L (ref 98–107)
CO2 SERPL-SCNC: 30 MMOL/L (ref 21–32)
CREAT SERPL-MCNC: 0.56 MG/DL (ref 0.55–1.02)
EOSINOPHIL # BLD AUTO: 0 X10(3)/MCL (ref 0–0.9)
EOSINOPHIL NFR BLD AUTO: 1.6 %
ERYTHROCYTE [DISTWIDTH] IN BLOOD BY AUTOMATED COUNT: 15.3 % (ref 11.5–17)
GLOBULIN SER-MCNC: 2.9 GM/DL (ref 2.4–3.5)
GLUCOSE SERPL-MCNC: 114 MG/DL (ref 74–106)
HCT VFR BLD AUTO: 31.5 % (ref 37–47)
HGB BLD-MCNC: 9.9 GM/DL (ref 12–16)
LYMPHOCYTES # BLD AUTO: 0.5 X10(3)/MCL (ref 0.6–4.6)
LYMPHOCYTES NFR BLD AUTO: 15.8 %
MCH RBC QN AUTO: 31.7 PG (ref 27–31)
MCHC RBC AUTO-ENTMCNC: 31.4 GM/DL (ref 33–36)
MCV RBC AUTO: 101 FL (ref 80–94)
MONOCYTES # BLD AUTO: 0.5 X10(3)/MCL (ref 0.1–1.3)
MONOCYTES NFR BLD AUTO: 14.5 %
NEUTROPHILS # BLD AUTO: 2.1 X10(3)/MCL (ref 2.1–9.2)
NEUTROPHILS NFR BLD AUTO: 66.8 %
PLATELET # BLD AUTO: 312 X10(3)/MCL (ref 130–400)
PMV BLD AUTO: 9.9 FL (ref 9.4–12.4)
POTASSIUM SERPL-SCNC: 4.2 MMOL/L (ref 3.5–5.1)
PROT SERPL-MCNC: 6.8 GM/DL (ref 6.4–8.2)
RBC # BLD AUTO: 3.12 X10(6)/MCL (ref 4.2–5.4)
SODIUM SERPL-SCNC: 140 MMOL/L (ref 136–145)
WBC # SPEC AUTO: 3.2 X10(3)/MCL (ref 4.5–11.5)

## 2017-08-07 ENCOUNTER — HISTORICAL (OUTPATIENT)
Dept: ADMINISTRATIVE | Facility: HOSPITAL | Age: 57
End: 2017-08-07

## 2017-08-07 LAB
ABS NEUT (OLG): 2.96 X10(3)/MCL (ref 2.1–9.2)
ALBUMIN SERPL-MCNC: 4.1 GM/DL (ref 3.4–5)
ALBUMIN/GLOB SERPL: 1.2 {RATIO}
ALP SERPL-CCNC: 117 UNIT/L (ref 38–126)
ALT SERPL-CCNC: 29 UNIT/L (ref 12–78)
AST SERPL-CCNC: 17 UNIT/L (ref 15–37)
BASOPHILS # BLD AUTO: 0 X10(3)/MCL (ref 0–0.2)
BASOPHILS NFR BLD AUTO: 1 %
BILIRUB SERPL-MCNC: 0.4 MG/DL (ref 0.2–1)
BILIRUBIN DIRECT+TOT PNL SERPL-MCNC: 0.1 MG/DL (ref 0–0.2)
BILIRUBIN DIRECT+TOT PNL SERPL-MCNC: 0.3 MG/DL (ref 0–0.8)
BUN SERPL-MCNC: 14 MG/DL (ref 7–18)
CALCIUM SERPL-MCNC: 9.9 MG/DL (ref 8.5–10.1)
CHLORIDE SERPL-SCNC: 105 MMOL/L (ref 98–107)
CO2 SERPL-SCNC: 29 MMOL/L (ref 21–32)
CREAT SERPL-MCNC: 0.68 MG/DL (ref 0.55–1.02)
EOSINOPHIL # BLD AUTO: 0.1 X10(3)/MCL (ref 0–0.9)
EOSINOPHIL NFR BLD AUTO: 2 %
ERYTHROCYTE [DISTWIDTH] IN BLOOD BY AUTOMATED COUNT: 14.9 % (ref 11.5–17)
GLOBULIN SER-MCNC: 3.3 GM/DL (ref 2.4–3.5)
GLUCOSE SERPL-MCNC: 97 MG/DL (ref 74–106)
HCT VFR BLD AUTO: 32.7 % (ref 37–47)
HGB BLD-MCNC: 10.5 GM/DL (ref 12–16)
LYMPHOCYTES # BLD AUTO: 0.6 X10(3)/MCL (ref 0.6–4.6)
LYMPHOCYTES NFR BLD AUTO: 14.4 %
MCH RBC QN AUTO: 32.2 PG (ref 27–31)
MCHC RBC AUTO-ENTMCNC: 32.1 GM/DL (ref 33–36)
MCV RBC AUTO: 100.3 FL (ref 80–94)
MONOCYTES # BLD AUTO: 0.3 X10(3)/MCL (ref 0.1–1.3)
MONOCYTES NFR BLD AUTO: 8.1 %
NEUTROPHILS # BLD AUTO: 3 X10(3)/MCL (ref 2.1–9.2)
NEUTROPHILS NFR BLD AUTO: 74.5 %
PLATELET # BLD AUTO: 314 X10(3)/MCL (ref 130–400)
PMV BLD AUTO: 9.8 FL (ref 9.4–12.4)
POTASSIUM SERPL-SCNC: 4.6 MMOL/L (ref 3.5–5.1)
PROT SERPL-MCNC: 7.4 GM/DL (ref 6.4–8.2)
RBC # BLD AUTO: 3.26 X10(6)/MCL (ref 4.2–5.4)
SODIUM SERPL-SCNC: 140 MMOL/L (ref 136–145)
WBC # SPEC AUTO: 4 X10(3)/MCL (ref 4.5–11.5)

## 2017-08-08 ENCOUNTER — HISTORICAL (OUTPATIENT)
Dept: INFUSION THERAPY | Facility: HOSPITAL | Age: 57
End: 2017-08-08

## 2017-08-15 ENCOUNTER — HISTORICAL (OUTPATIENT)
Dept: INFUSION THERAPY | Facility: HOSPITAL | Age: 57
End: 2017-08-15

## 2017-08-15 LAB
ABS NEUT (OLG): 2.81 X10(3)/MCL (ref 2.1–9.2)
ALBUMIN SERPL-MCNC: 4 GM/DL (ref 3.4–5)
ALBUMIN/GLOB SERPL: 1.4 {RATIO}
ALP SERPL-CCNC: 104 UNIT/L (ref 38–126)
ALT SERPL-CCNC: 24 UNIT/L (ref 12–78)
AST SERPL-CCNC: 15 UNIT/L (ref 15–37)
BASOPHILS # BLD AUTO: 0 X10(3)/MCL (ref 0–0.2)
BASOPHILS NFR BLD AUTO: 1 %
BILIRUB SERPL-MCNC: 0.3 MG/DL (ref 0.2–1)
BILIRUBIN DIRECT+TOT PNL SERPL-MCNC: 0.1 MG/DL (ref 0–0.2)
BILIRUBIN DIRECT+TOT PNL SERPL-MCNC: 0.2 MG/DL (ref 0–0.8)
BUN SERPL-MCNC: 13 MG/DL (ref 7–18)
CALCIUM SERPL-MCNC: 9.8 MG/DL (ref 8.5–10.1)
CHLORIDE SERPL-SCNC: 106 MMOL/L (ref 98–107)
CO2 SERPL-SCNC: 29 MMOL/L (ref 21–32)
CREAT SERPL-MCNC: 0.65 MG/DL (ref 0.55–1.02)
EOSINOPHIL # BLD AUTO: 0.1 X10(3)/MCL (ref 0–0.9)
EOSINOPHIL NFR BLD AUTO: 1.8 %
ERYTHROCYTE [DISTWIDTH] IN BLOOD BY AUTOMATED COUNT: 15.5 % (ref 11.5–17)
GLOBULIN SER-MCNC: 2.9 GM/DL (ref 2.4–3.5)
GLUCOSE SERPL-MCNC: 116 MG/DL (ref 74–106)
HCT VFR BLD AUTO: 32.4 % (ref 37–47)
HGB BLD-MCNC: 10.3 GM/DL (ref 12–16)
LYMPHOCYTES # BLD AUTO: 0.6 X10(3)/MCL (ref 0.6–4.6)
LYMPHOCYTES NFR BLD AUTO: 15.3 %
MCH RBC QN AUTO: 32.2 PG (ref 27–31)
MCHC RBC AUTO-ENTMCNC: 31.8 GM/DL (ref 33–36)
MCV RBC AUTO: 101.3 FL (ref 80–94)
MONOCYTES # BLD AUTO: 0.4 X10(3)/MCL (ref 0.1–1.3)
MONOCYTES NFR BLD AUTO: 10.2 %
NEUTROPHILS # BLD AUTO: 2.8 X10(3)/MCL (ref 2.1–9.2)
NEUTROPHILS NFR BLD AUTO: 71.7 %
PLATELET # BLD AUTO: 295 X10(3)/MCL (ref 130–400)
PMV BLD AUTO: 10.6 FL (ref 9.4–12.4)
POTASSIUM SERPL-SCNC: 4.4 MMOL/L (ref 3.5–5.1)
PROT SERPL-MCNC: 6.9 GM/DL (ref 6.4–8.2)
RBC # BLD AUTO: 3.2 X10(6)/MCL (ref 4.2–5.4)
SODIUM SERPL-SCNC: 142 MMOL/L (ref 136–145)
WBC # SPEC AUTO: 3.9 X10(3)/MCL (ref 4.5–11.5)

## 2017-08-16 ENCOUNTER — HISTORICAL (OUTPATIENT)
Dept: RADIATION THERAPY | Facility: HOSPITAL | Age: 57
End: 2017-08-16

## 2017-08-22 ENCOUNTER — HISTORICAL (OUTPATIENT)
Dept: HEMATOLOGY/ONCOLOGY | Facility: CLINIC | Age: 57
End: 2017-08-22

## 2017-08-22 LAB
ABS NEUT (OLG): 2.98 X10(3)/MCL (ref 2.1–9.2)
ANION GAP SERPL CALC-SCNC: 16 MMOL/L
BASOPHILS # BLD AUTO: 0 X10(3)/MCL (ref 0–0.2)
BASOPHILS NFR BLD AUTO: 1 %
BUN SERPL-MCNC: 5 MG/DL (ref 7–18)
CHLORIDE SERPL-SCNC: 103 MMOL/L (ref 98–109)
CREAT SERPL-MCNC: 0.6 MG/DL (ref 0.6–1.3)
EOSINOPHIL # BLD AUTO: 0 X10(3)/MCL (ref 0–0.9)
EOSINOPHIL NFR BLD AUTO: 0.8 %
ERYTHROCYTE [DISTWIDTH] IN BLOOD BY AUTOMATED COUNT: 15.3 % (ref 11.5–17)
GLUCOSE SERPL-MCNC: 177 MG/DL (ref 70–105)
HCT VFR BLD AUTO: 31.4 % (ref 37–47)
HCT VFR BLD CALC: 30 % (ref 38–51)
HGB BLD-MCNC: 10.1 GM/DL (ref 12–16)
HGB BLD-MCNC: 10.2 MG/DL (ref 12–17)
LYMPHOCYTES # BLD AUTO: 0.6 X10(3)/MCL (ref 0.6–4.6)
LYMPHOCYTES NFR BLD AUTO: 14.6 %
MCH RBC QN AUTO: 32.3 PG (ref 27–31)
MCHC RBC AUTO-ENTMCNC: 32.2 GM/DL (ref 33–36)
MCV RBC AUTO: 100.3 FL (ref 80–94)
MONOCYTES # BLD AUTO: 0.2 X10(3)/MCL (ref 0.1–1.3)
MONOCYTES NFR BLD AUTO: 6 %
NEUTROPHILS # BLD AUTO: 3 X10(3)/MCL (ref 2.1–9.2)
NEUTROPHILS NFR BLD AUTO: 77.6 %
PLATELET # BLD AUTO: 274 X10(3)/MCL (ref 130–400)
PMV BLD AUTO: 10.1 FL (ref 9.4–12.4)
POC IONIZED CALCIUM: 1.3 MMOL/L (ref 1.12–1.32)
POC TCO2: 27 MMOL/L (ref 22–27)
POTASSIUM BLD-SCNC: 4.1 MMOL/L (ref 3.5–4.9)
RBC # BLD AUTO: 3.13 X10(6)/MCL (ref 4.2–5.4)
SODIUM BLD-SCNC: 141 MMOL/L (ref 138–146)
WBC # SPEC AUTO: 3.8 X10(3)/MCL (ref 4.5–11.5)

## 2017-08-24 ENCOUNTER — HISTORICAL (OUTPATIENT)
Dept: RADIATION THERAPY | Facility: HOSPITAL | Age: 57
End: 2017-08-24

## 2017-08-28 ENCOUNTER — HISTORICAL (OUTPATIENT)
Dept: ADMINISTRATIVE | Facility: HOSPITAL | Age: 57
End: 2017-08-28

## 2017-08-28 LAB
ABS NEUT (OLG): 2.79 X10(3)/MCL (ref 2.1–9.2)
ALBUMIN SERPL-MCNC: 3.9 GM/DL (ref 3.4–5)
ALP SERPL-CCNC: 119 UNIT/L (ref 38–126)
ALT SERPL-CCNC: 39 UNIT/L (ref 12–78)
ANION GAP SERPL CALC-SCNC: 16 MMOL/L
AST SERPL-CCNC: 28 UNIT/L (ref 15–37)
BASOPHILS # BLD AUTO: 0 X10(3)/MCL (ref 0–0.2)
BASOPHILS NFR BLD AUTO: 0.7 %
BILIRUB SERPL-MCNC: 0.2 MG/DL (ref 0.2–1)
BILIRUBIN DIRECT+TOT PNL SERPL-MCNC: 0.1 MG/DL (ref 0–0.5)
BILIRUBIN DIRECT+TOT PNL SERPL-MCNC: 0.1 MG/DL (ref 0–0.8)
BUN SERPL-MCNC: 6 MG/DL (ref 7–18)
CHLORIDE SERPL-SCNC: 102 MMOL/L (ref 98–109)
CREAT SERPL-MCNC: 0.6 MG/DL (ref 0.6–1.3)
EOSINOPHIL # BLD AUTO: 0.1 X10(3)/MCL (ref 0–0.9)
EOSINOPHIL NFR BLD AUTO: 1.9 %
ERYTHROCYTE [DISTWIDTH] IN BLOOD BY AUTOMATED COUNT: 14.8 % (ref 11.5–17)
GLUCOSE SERPL-MCNC: 128 MG/DL (ref 70–105)
HCT VFR BLD AUTO: 33.5 % (ref 37–47)
HCT VFR BLD CALC: 34 % (ref 38–51)
HGB BLD-MCNC: 10.9 GM/DL (ref 12–16)
HGB BLD-MCNC: 11.6 MG/DL (ref 12–17)
LIVER PROFILE INTERP: NORMAL
LYMPHOCYTES # BLD AUTO: 0.8 X10(3)/MCL (ref 0.6–4.6)
LYMPHOCYTES NFR BLD AUTO: 19.4 %
MCH RBC QN AUTO: 32.3 PG (ref 27–31)
MCHC RBC AUTO-ENTMCNC: 32.5 GM/DL (ref 33–36)
MCV RBC AUTO: 99.4 FL (ref 80–94)
MONOCYTES # BLD AUTO: 0.5 X10(3)/MCL (ref 0.1–1.3)
MONOCYTES NFR BLD AUTO: 11.8 %
NEUTROPHILS # BLD AUTO: 2.8 X10(3)/MCL (ref 2.1–9.2)
NEUTROPHILS NFR BLD AUTO: 66.2 %
PLATELET # BLD AUTO: 265 X10(3)/MCL (ref 130–400)
PMV BLD AUTO: 10.4 FL (ref 9.4–12.4)
POC IONIZED CALCIUM: 1.28 MMOL/L (ref 1.12–1.32)
POC TCO2: 27 MMOL/L (ref 22–27)
POTASSIUM BLD-SCNC: 4.2 MMOL/L (ref 3.5–4.9)
PROT SERPL-MCNC: 6.9 GM/DL (ref 6.4–8.2)
RBC # BLD AUTO: 3.37 X10(6)/MCL (ref 4.2–5.4)
SODIUM BLD-SCNC: 140 MMOL/L (ref 138–146)
WBC # SPEC AUTO: 4.2 X10(3)/MCL (ref 4.5–11.5)

## 2017-08-30 ENCOUNTER — HISTORICAL (OUTPATIENT)
Dept: RADIATION THERAPY | Facility: HOSPITAL | Age: 57
End: 2017-08-30

## 2017-08-31 ENCOUNTER — HISTORICAL (OUTPATIENT)
Dept: RADIATION THERAPY | Facility: HOSPITAL | Age: 57
End: 2017-08-31

## 2017-09-01 ENCOUNTER — HISTORICAL (OUTPATIENT)
Dept: RADIATION THERAPY | Facility: HOSPITAL | Age: 57
End: 2017-09-01

## 2017-09-05 ENCOUNTER — HISTORICAL (OUTPATIENT)
Dept: RADIATION THERAPY | Facility: HOSPITAL | Age: 57
End: 2017-09-05

## 2017-09-06 ENCOUNTER — HISTORICAL (OUTPATIENT)
Dept: RADIATION THERAPY | Facility: HOSPITAL | Age: 57
End: 2017-09-06

## 2017-09-07 ENCOUNTER — HISTORICAL (OUTPATIENT)
Dept: RADIATION THERAPY | Facility: HOSPITAL | Age: 57
End: 2017-09-07

## 2017-09-08 ENCOUNTER — HISTORICAL (OUTPATIENT)
Dept: RADIATION THERAPY | Facility: HOSPITAL | Age: 57
End: 2017-09-08

## 2017-09-11 ENCOUNTER — HISTORICAL (OUTPATIENT)
Dept: RADIATION THERAPY | Facility: HOSPITAL | Age: 57
End: 2017-09-11

## 2017-09-12 ENCOUNTER — HISTORICAL (OUTPATIENT)
Dept: RADIATION THERAPY | Facility: HOSPITAL | Age: 57
End: 2017-09-12

## 2017-09-13 ENCOUNTER — HISTORICAL (OUTPATIENT)
Dept: RADIATION THERAPY | Facility: HOSPITAL | Age: 57
End: 2017-09-13

## 2017-09-14 ENCOUNTER — HISTORICAL (OUTPATIENT)
Dept: RADIATION THERAPY | Facility: HOSPITAL | Age: 57
End: 2017-09-14

## 2017-09-15 ENCOUNTER — HISTORICAL (OUTPATIENT)
Dept: RADIATION THERAPY | Facility: HOSPITAL | Age: 57
End: 2017-09-15

## 2017-09-18 ENCOUNTER — HISTORICAL (OUTPATIENT)
Dept: RADIATION THERAPY | Facility: HOSPITAL | Age: 57
End: 2017-09-18

## 2017-09-19 ENCOUNTER — HISTORICAL (OUTPATIENT)
Dept: RADIATION THERAPY | Facility: HOSPITAL | Age: 57
End: 2017-09-19

## 2017-09-20 ENCOUNTER — HISTORICAL (OUTPATIENT)
Dept: RADIATION THERAPY | Facility: HOSPITAL | Age: 57
End: 2017-09-20

## 2017-09-21 ENCOUNTER — HISTORICAL (OUTPATIENT)
Dept: RADIATION THERAPY | Facility: HOSPITAL | Age: 57
End: 2017-09-21

## 2017-09-22 ENCOUNTER — HISTORICAL (OUTPATIENT)
Dept: RADIATION THERAPY | Facility: HOSPITAL | Age: 57
End: 2017-09-22

## 2017-09-25 ENCOUNTER — HISTORICAL (OUTPATIENT)
Dept: RADIATION THERAPY | Facility: HOSPITAL | Age: 57
End: 2017-09-25

## 2017-09-26 ENCOUNTER — HISTORICAL (OUTPATIENT)
Dept: RADIATION THERAPY | Facility: HOSPITAL | Age: 57
End: 2017-09-26

## 2017-09-27 ENCOUNTER — HISTORICAL (OUTPATIENT)
Dept: RADIATION THERAPY | Facility: HOSPITAL | Age: 57
End: 2017-09-27

## 2017-09-28 ENCOUNTER — HISTORICAL (OUTPATIENT)
Dept: RADIATION THERAPY | Facility: HOSPITAL | Age: 57
End: 2017-09-28

## 2017-10-09 ENCOUNTER — HISTORICAL (OUTPATIENT)
Dept: INFUSION THERAPY | Facility: HOSPITAL | Age: 57
End: 2017-10-09

## 2017-10-09 LAB
ABS NEUT (OLG): 2.39 X10(3)/MCL (ref 2.1–9.2)
ALBUMIN SERPL-MCNC: 3.7 GM/DL (ref 3.4–5)
ALBUMIN/GLOB SERPL: 1.2 {RATIO}
ALP SERPL-CCNC: 118 UNIT/L (ref 38–126)
ALT SERPL-CCNC: 22 UNIT/L (ref 12–78)
AST SERPL-CCNC: 17 UNIT/L (ref 15–37)
BASOPHILS # BLD AUTO: 0 X10(3)/MCL (ref 0–0.2)
BASOPHILS NFR BLD AUTO: 0.8 %
BILIRUB SERPL-MCNC: 0.3 MG/DL (ref 0.2–1)
BILIRUBIN DIRECT+TOT PNL SERPL-MCNC: 0.1 MG/DL (ref 0–0.2)
BILIRUBIN DIRECT+TOT PNL SERPL-MCNC: 0.2 MG/DL (ref 0–0.8)
BUN SERPL-MCNC: 12 MG/DL (ref 7–18)
CALCIUM SERPL-MCNC: 9.4 MG/DL (ref 8.5–10.1)
CHLORIDE SERPL-SCNC: 108 MMOL/L (ref 98–107)
CO2 SERPL-SCNC: 26 MMOL/L (ref 21–32)
CREAT SERPL-MCNC: 0.55 MG/DL (ref 0.55–1.02)
EOSINOPHIL # BLD AUTO: 0.1 X10(3)/MCL (ref 0–0.9)
EOSINOPHIL NFR BLD AUTO: 2.2 %
EOSINOPHIL NFR BLD MANUAL: 2 % (ref 0–8)
ERYTHROCYTE [DISTWIDTH] IN BLOOD BY AUTOMATED COUNT: 12.8 % (ref 11.5–17)
GLOBULIN SER-MCNC: 3 GM/DL (ref 2.4–3.5)
GLUCOSE SERPL-MCNC: 81 MG/DL (ref 74–106)
HCT VFR BLD AUTO: 33.3 % (ref 37–47)
HGB BLD-MCNC: 10.7 GM/DL (ref 12–16)
LYMPHOCYTES # BLD AUTO: 0.7 X10(3)/MCL (ref 0.6–4.6)
LYMPHOCYTES NFR BLD AUTO: 20.2 %
LYMPHOCYTES NFR BLD MANUAL: 1 %
LYMPHOCYTES NFR BLD MANUAL: 20 % (ref 13–40)
MCH RBC QN AUTO: 32.1 PG (ref 27–31)
MCHC RBC AUTO-ENTMCNC: 32.1 GM/DL (ref 33–36)
MCV RBC AUTO: 100 FL (ref 80–94)
MONOCYTES # BLD AUTO: 0.4 X10(3)/MCL (ref 0.1–1.3)
MONOCYTES NFR BLD AUTO: 10.5 %
MONOCYTES NFR BLD MANUAL: 7 % (ref 2–11)
NEUTROPHILS # BLD AUTO: 2.4 X10(3)/MCL (ref 2.1–9.2)
NEUTROPHILS NFR BLD AUTO: 66.3 %
NEUTROPHILS NFR BLD MANUAL: 71 % (ref 47–80)
PLATELET # BLD AUTO: 208 X10(3)/MCL (ref 130–400)
PLATELET # BLD EST: NORMAL 10*3/UL
PMV BLD AUTO: 10.5 FL (ref 9.4–12.4)
POTASSIUM SERPL-SCNC: 4 MMOL/L (ref 3.5–5.1)
PROT SERPL-MCNC: 6.7 GM/DL (ref 6.4–8.2)
RBC # BLD AUTO: 3.33 X10(6)/MCL (ref 4.2–5.4)
RBC MORPH BLD: NORMAL
SODIUM SERPL-SCNC: 143 MMOL/L (ref 136–145)
WBC # SPEC AUTO: 3.6 X10(3)/MCL (ref 4.5–11.5)

## 2017-10-24 ENCOUNTER — HISTORICAL (OUTPATIENT)
Dept: RADIOLOGY | Facility: HOSPITAL | Age: 57
End: 2017-10-24

## 2017-11-01 ENCOUNTER — HISTORICAL (OUTPATIENT)
Dept: RADIOLOGY | Facility: HOSPITAL | Age: 57
End: 2017-11-01

## 2017-11-17 ENCOUNTER — HISTORICAL (OUTPATIENT)
Dept: HEMATOLOGY/ONCOLOGY | Facility: CLINIC | Age: 57
End: 2017-11-17

## 2017-11-17 LAB
ABS NEUT (OLG): 2.27 X10(3)/MCL (ref 2.1–9.2)
ALBUMIN SERPL-MCNC: 3.9 GM/DL (ref 3.4–5)
ALP SERPL-CCNC: 139 UNIT/L (ref 38–126)
ALT SERPL-CCNC: 33 UNIT/L (ref 12–78)
ANION GAP SERPL CALC-SCNC: 15 MMOL/L
AST SERPL-CCNC: 23 UNIT/L (ref 15–37)
BASOPHILS # BLD AUTO: 0 X10(3)/MCL (ref 0–0.2)
BASOPHILS NFR BLD AUTO: 0.5 %
BILIRUB SERPL-MCNC: 0.3 MG/DL (ref 0.2–1)
BILIRUBIN DIRECT+TOT PNL SERPL-MCNC: 0.1 MG/DL (ref 0–0.5)
BILIRUBIN DIRECT+TOT PNL SERPL-MCNC: 0.2 MG/DL (ref 0–0.8)
BUN SERPL-MCNC: 13 MG/DL (ref 7–18)
CHLORIDE SERPL-SCNC: 102 MMOL/L (ref 98–109)
CREAT SERPL-MCNC: 0.7 MG/DL (ref 0.6–1.3)
EOSINOPHIL # BLD AUTO: 0.1 X10(3)/MCL (ref 0–0.9)
EOSINOPHIL NFR BLD AUTO: 3 %
ERYTHROCYTE [DISTWIDTH] IN BLOOD BY AUTOMATED COUNT: 12 % (ref 11.5–17)
FERRITIN SERPL-MCNC: 236.5 NG/ML (ref 8–388)
FOLATE SERPL-MCNC: 41.9 NG/ML (ref 3.1–17.5)
GLUCOSE SERPL-MCNC: 116 MG/DL (ref 70–105)
HCT VFR BLD AUTO: 34.9 % (ref 37–47)
HCT VFR BLD CALC: 30 % (ref 38–51)
HGB BLD-MCNC: 10.2 MG/DL (ref 12–17)
HGB BLD-MCNC: 11.6 GM/DL (ref 12–16)
IRON SATN MFR SERPL: 22.3 % (ref 20–50)
IRON SERPL-MCNC: 75 MCG/DL (ref 50–175)
LIVER PROFILE INTERP: ABNORMAL
LYMPHOCYTES # BLD AUTO: 0.9 X10(3)/MCL (ref 0.6–4.6)
LYMPHOCYTES NFR BLD AUTO: 25.5 %
MCH RBC QN AUTO: 32 PG (ref 27–31)
MCHC RBC AUTO-ENTMCNC: 33.2 GM/DL (ref 33–36)
MCV RBC AUTO: 96.1 FL (ref 80–94)
MONOCYTES # BLD AUTO: 0.3 X10(3)/MCL (ref 0.1–1.3)
MONOCYTES NFR BLD AUTO: 8.8 %
NEUTROPHILS # BLD AUTO: 2.3 X10(3)/MCL (ref 2.1–9.2)
NEUTROPHILS NFR BLD AUTO: 62.2 %
PLATELET # BLD AUTO: 220 X10(3)/MCL (ref 130–400)
PMV BLD AUTO: 10.4 FL (ref 9.4–12.4)
POC IONIZED CALCIUM: 1.29 MMOL/L (ref 1.12–1.32)
POC TCO2: 28 MMOL/L (ref 22–27)
POTASSIUM BLD-SCNC: 4.4 MMOL/L (ref 3.5–4.9)
PROT SERPL-MCNC: 7.5 GM/DL (ref 6.4–8.2)
RBC # BLD AUTO: 3.63 X10(6)/MCL (ref 4.2–5.4)
SODIUM BLD-SCNC: 140 MMOL/L (ref 138–146)
TIBC SERPL-MCNC: 337 MCG/DL (ref 250–450)
TRANSFERRIN SERPL-MCNC: 256 MG/DL (ref 200–360)
VIT B12 SERPL-MCNC: 1121 PG/ML (ref 193–986)
WBC # SPEC AUTO: 3.6 X10(3)/MCL (ref 4.5–11.5)

## 2017-12-04 ENCOUNTER — HISTORICAL (OUTPATIENT)
Dept: INFUSION THERAPY | Facility: HOSPITAL | Age: 57
End: 2017-12-04

## 2018-01-08 ENCOUNTER — HISTORICAL (OUTPATIENT)
Dept: RADIATION THERAPY | Facility: HOSPITAL | Age: 58
End: 2018-01-08

## 2018-01-30 ENCOUNTER — HISTORICAL (OUTPATIENT)
Dept: INFUSION THERAPY | Facility: HOSPITAL | Age: 58
End: 2018-01-30

## 2018-02-16 ENCOUNTER — HISTORICAL (OUTPATIENT)
Dept: HEMATOLOGY/ONCOLOGY | Facility: CLINIC | Age: 58
End: 2018-02-16

## 2018-02-16 LAB
ABS NEUT (OLG): 2.28 X10(3)/MCL (ref 2.1–9.2)
ALBUMIN SERPL-MCNC: 3.8 GM/DL (ref 3.4–5)
ALBUMIN/GLOB SERPL: 1.1 {RATIO}
ALP SERPL-CCNC: 133 UNIT/L (ref 38–126)
ALT SERPL-CCNC: 21 UNIT/L (ref 12–78)
AST SERPL-CCNC: 12 UNIT/L (ref 15–37)
BASOPHILS # BLD AUTO: 0 X10(3)/MCL (ref 0–0.2)
BASOPHILS NFR BLD AUTO: 0.7 %
BILIRUB SERPL-MCNC: 0.6 MG/DL (ref 0.2–1)
BILIRUBIN DIRECT+TOT PNL SERPL-MCNC: 0.1 MG/DL (ref 0–0.2)
BILIRUBIN DIRECT+TOT PNL SERPL-MCNC: 0.5 MG/DL (ref 0–0.8)
BUN SERPL-MCNC: 17 MG/DL (ref 7–18)
CALCIUM SERPL-MCNC: 9.9 MG/DL (ref 8.5–10.1)
CHLORIDE SERPL-SCNC: 105 MMOL/L (ref 98–107)
CO2 SERPL-SCNC: 29 MMOL/L (ref 21–32)
CREAT SERPL-MCNC: 0.68 MG/DL (ref 0.55–1.02)
EOSINOPHIL # BLD AUTO: 0.1 X10(3)/MCL (ref 0–0.9)
EOSINOPHIL NFR BLD AUTO: 2.7 %
ERYTHROCYTE [DISTWIDTH] IN BLOOD BY AUTOMATED COUNT: 11.9 % (ref 11.5–17)
GLOBULIN SER-MCNC: 3.5 GM/DL (ref 2.4–3.5)
GLUCOSE SERPL-MCNC: 116 MG/DL (ref 74–106)
HCT VFR BLD AUTO: 33.6 % (ref 37–47)
HGB BLD-MCNC: 11 GM/DL (ref 12–16)
LYMPHOCYTES # BLD AUTO: 1.3 X10(3)/MCL (ref 0.6–4.6)
LYMPHOCYTES NFR BLD AUTO: 30.8 %
MCH RBC QN AUTO: 32.6 PG (ref 27–31)
MCHC RBC AUTO-ENTMCNC: 32.7 GM/DL (ref 33–36)
MCV RBC AUTO: 99.7 FL (ref 80–94)
MONOCYTES # BLD AUTO: 0.4 X10(3)/MCL (ref 0.1–1.3)
MONOCYTES NFR BLD AUTO: 10.4 %
NEUTROPHILS # BLD AUTO: 2.3 X10(3)/MCL (ref 2.1–9.2)
NEUTROPHILS NFR BLD AUTO: 55.4 %
PLATELET # BLD AUTO: 198 X10(3)/MCL (ref 130–400)
PMV BLD AUTO: 10.5 FL (ref 9.4–12.4)
POTASSIUM SERPL-SCNC: 4.1 MMOL/L (ref 3.5–5.1)
PROT SERPL-MCNC: 7.3 GM/DL (ref 6.4–8.2)
RBC # BLD AUTO: 3.37 X10(6)/MCL (ref 4.2–5.4)
SODIUM SERPL-SCNC: 141 MMOL/L (ref 136–145)
WBC # SPEC AUTO: 4.1 X10(3)/MCL (ref 4.5–11.5)

## 2018-03-29 ENCOUNTER — HISTORICAL (OUTPATIENT)
Dept: INFUSION THERAPY | Facility: HOSPITAL | Age: 58
End: 2018-03-29

## 2018-03-29 ENCOUNTER — HISTORICAL (OUTPATIENT)
Dept: ADMINISTRATIVE | Facility: HOSPITAL | Age: 58
End: 2018-03-29

## 2018-03-29 LAB
ALBUMIN SERPL-MCNC: 4 GM/DL (ref 3.4–5)
ALBUMIN/GLOB SERPL: 1.2 RATIO (ref 1.1–2)
ALP SERPL-CCNC: 144 UNIT/L (ref 38–126)
ALT SERPL-CCNC: 24 UNIT/L (ref 12–78)
AST SERPL-CCNC: 17 UNIT/L (ref 15–37)
BILIRUB SERPL-MCNC: 0.3 MG/DL (ref 0.2–1)
BILIRUBIN DIRECT+TOT PNL SERPL-MCNC: 0.1 MG/DL (ref 0–0.5)
BILIRUBIN DIRECT+TOT PNL SERPL-MCNC: 0.2 MG/DL (ref 0–0.8)
BUN SERPL-MCNC: 16 MG/DL (ref 7–18)
CALCIUM SERPL-MCNC: 9.9 MG/DL (ref 8.5–10.1)
CHLORIDE SERPL-SCNC: 106 MMOL/L (ref 98–107)
CHOLEST SERPL-MCNC: 225 MG/DL (ref 0–200)
CHOLEST/HDLC SERPL: 2.8 {RATIO} (ref 0–4)
CO2 SERPL-SCNC: 29 MMOL/L (ref 21–32)
CREAT SERPL-MCNC: 0.74 MG/DL (ref 0.55–1.02)
CREAT UR-MCNC: 136 MG/DL
EST. AVERAGE GLUCOSE BLD GHB EST-MCNC: 134 MG/DL
GLOBULIN SER-MCNC: 3.4 GM/DL (ref 2.4–3.5)
GLUCOSE SERPL-MCNC: 124 MG/DL (ref 74–106)
HBA1C MFR BLD: 6.3 % (ref 4.2–6.3)
HDLC SERPL-MCNC: 80 MG/DL (ref 35–60)
LDLC SERPL CALC-MCNC: 133 MG/DL (ref 0–129)
MICROALBUMIN UR-MCNC: 1.2 MG/DL
MICROALBUMIN/CREAT RATIO PNL UR: 8.8 MG/GM CR (ref 0–30)
POTASSIUM SERPL-SCNC: 4.2 MMOL/L (ref 3.5–5.1)
PROT SERPL-MCNC: 7.4 GM/DL (ref 6.4–8.2)
SODIUM SERPL-SCNC: 141 MMOL/L (ref 136–145)
TRIGL SERPL-MCNC: 60 MG/DL (ref 30–150)
VLDLC SERPL CALC-MCNC: 12 MG/DL

## 2018-05-07 ENCOUNTER — HISTORICAL (OUTPATIENT)
Dept: RADIOLOGY | Facility: HOSPITAL | Age: 58
End: 2018-05-07

## 2018-05-25 ENCOUNTER — HISTORICAL (OUTPATIENT)
Dept: INFUSION THERAPY | Facility: HOSPITAL | Age: 58
End: 2018-05-25

## 2018-05-25 LAB
ABS NEUT (OLG): 2.16 X10(3)/MCL (ref 2.1–9.2)
ALBUMIN SERPL-MCNC: 3.9 GM/DL (ref 3.4–5)
ALP SERPL-CCNC: 147 UNIT/L (ref 38–126)
ALT SERPL-CCNC: 28 UNIT/L (ref 12–78)
ANION GAP SERPL CALC-SCNC: 14 MMOL/L
AST SERPL-CCNC: 20 UNIT/L (ref 15–37)
BASOPHILS # BLD AUTO: 0 X10(3)/MCL (ref 0–0.2)
BASOPHILS NFR BLD AUTO: 0.5 %
BILIRUB SERPL-MCNC: 0.3 MG/DL (ref 0.2–1)
BILIRUBIN DIRECT+TOT PNL SERPL-MCNC: 0.1 MG/DL (ref 0–0.5)
BILIRUBIN DIRECT+TOT PNL SERPL-MCNC: 0.2 MG/DL (ref 0–0.8)
BUN SERPL-MCNC: 17 MG/DL (ref 7–18)
CHLORIDE SERPL-SCNC: 104 MMOL/L (ref 98–109)
CREAT SERPL-MCNC: 0.7 MG/DL (ref 0.6–1.3)
EOSINOPHIL # BLD AUTO: 0.1 X10(3)/MCL (ref 0–0.9)
EOSINOPHIL NFR BLD AUTO: 3.4 %
ERYTHROCYTE [DISTWIDTH] IN BLOOD BY AUTOMATED COUNT: 12.1 % (ref 11.5–17)
GLUCOSE SERPL-MCNC: 118 MG/DL (ref 70–105)
HCT VFR BLD AUTO: 35.2 % (ref 37–47)
HCT VFR BLD CALC: 33 % (ref 38–51)
HGB BLD-MCNC: 11.2 MG/DL (ref 12–17)
HGB BLD-MCNC: 11.6 GM/DL (ref 12–16)
LIVER PROFILE INTERP: ABNORMAL
LYMPHOCYTES # BLD AUTO: 1.4 X10(3)/MCL (ref 0.6–4.6)
LYMPHOCYTES NFR BLD AUTO: 34.9 %
MCH RBC QN AUTO: 33 PG (ref 27–31)
MCHC RBC AUTO-ENTMCNC: 33 GM/DL (ref 33–36)
MCV RBC AUTO: 100.3 FL (ref 80–94)
MONOCYTES # BLD AUTO: 0.4 X10(3)/MCL (ref 0.1–1.3)
MONOCYTES NFR BLD AUTO: 9.2 %
NEUTROPHILS # BLD AUTO: 2.2 X10(3)/MCL (ref 2.1–9.2)
NEUTROPHILS NFR BLD AUTO: 52 %
PLATELET # BLD AUTO: 197 X10(3)/MCL (ref 130–400)
PMV BLD AUTO: 10.7 FL (ref 9.4–12.4)
POC IONIZED CALCIUM: 1.23 MMOL/L (ref 1.12–1.32)
POC TCO2: 29 MMOL/L (ref 22–27)
POTASSIUM BLD-SCNC: 4.5 MMOL/L (ref 3.5–4.9)
PROT SERPL-MCNC: 7.4 GM/DL (ref 6.4–8.2)
RBC # BLD AUTO: 3.51 X10(6)/MCL (ref 4.2–5.4)
SODIUM BLD-SCNC: 141 MMOL/L (ref 138–146)
WBC # SPEC AUTO: 4.2 X10(3)/MCL (ref 4.5–11.5)

## 2018-06-15 ENCOUNTER — HISTORICAL (OUTPATIENT)
Dept: INFUSION THERAPY | Facility: HOSPITAL | Age: 58
End: 2018-06-15

## 2018-07-11 ENCOUNTER — HISTORICAL (OUTPATIENT)
Dept: RADIATION THERAPY | Facility: HOSPITAL | Age: 58
End: 2018-07-11

## 2018-08-01 ENCOUNTER — HISTORICAL (OUTPATIENT)
Dept: INFUSION THERAPY | Facility: HOSPITAL | Age: 58
End: 2018-08-01

## 2018-08-09 ENCOUNTER — HISTORICAL (OUTPATIENT)
Dept: ADMINISTRATIVE | Facility: HOSPITAL | Age: 58
End: 2018-08-09

## 2018-08-09 LAB
ABS NEUT (OLG): 2.49 X10(3)/MCL (ref 2.1–9.2)
ALBUMIN SERPL-MCNC: 3.9 GM/DL (ref 3.4–5)
ALBUMIN/GLOB SERPL: 1.1 RATIO (ref 1.1–2)
ALP SERPL-CCNC: 106 UNIT/L (ref 38–126)
ALT SERPL-CCNC: 31 UNIT/L (ref 12–78)
AST SERPL-CCNC: 18 UNIT/L (ref 15–37)
BASOPHILS # BLD AUTO: 0 X10(3)/MCL (ref 0–0.2)
BASOPHILS NFR BLD AUTO: 0.4 %
BILIRUB SERPL-MCNC: 0.8 MG/DL (ref 0.2–1)
BILIRUBIN DIRECT+TOT PNL SERPL-MCNC: 0.1 MG/DL (ref 0–0.5)
BILIRUBIN DIRECT+TOT PNL SERPL-MCNC: 0.7 MG/DL (ref 0–0.8)
BUN SERPL-MCNC: 16 MG/DL (ref 7–18)
CALCIUM SERPL-MCNC: 9.5 MG/DL (ref 8.5–10.1)
CHLORIDE SERPL-SCNC: 105 MMOL/L (ref 98–107)
CO2 SERPL-SCNC: 29 MMOL/L (ref 21–32)
CREAT SERPL-MCNC: 0.8 MG/DL (ref 0.55–1.02)
EOSINOPHIL # BLD AUTO: 0.2 X10(3)/MCL (ref 0–0.9)
EOSINOPHIL NFR BLD AUTO: 3.3 %
ERYTHROCYTE [DISTWIDTH] IN BLOOD BY AUTOMATED COUNT: 11.8 % (ref 11.5–17)
FERRITIN SERPL-MCNC: 160.9 NG/ML (ref 8–388)
FOLATE SERPL-MCNC: 41.9 NG/ML (ref 3.1–17.5)
GLOBULIN SER-MCNC: 3.5 GM/DL (ref 2.4–3.5)
GLUCOSE SERPL-MCNC: 188 MG/DL (ref 74–106)
HCT VFR BLD AUTO: 35.4 % (ref 37–47)
HGB BLD-MCNC: 11.5 GM/DL (ref 12–16)
IRON SATN MFR SERPL: 27.2 % (ref 20–50)
IRON SERPL-MCNC: 90 MCG/DL (ref 50–175)
LYMPHOCYTES # BLD AUTO: 1.4 X10(3)/MCL (ref 0.6–4.6)
LYMPHOCYTES NFR BLD AUTO: 31.3 %
MCH RBC QN AUTO: 32.2 PG (ref 27–31)
MCHC RBC AUTO-ENTMCNC: 32.5 GM/DL (ref 33–36)
MCV RBC AUTO: 99.2 FL (ref 80–94)
MONOCYTES # BLD AUTO: 0.4 X10(3)/MCL (ref 0.1–1.3)
MONOCYTES NFR BLD AUTO: 9.4 %
NEUTROPHILS # BLD AUTO: 2.5 X10(3)/MCL (ref 2.1–9.2)
NEUTROPHILS NFR BLD AUTO: 55.6 %
PLATELET # BLD AUTO: 187 X10(3)/MCL (ref 130–400)
PMV BLD AUTO: 10.9 FL (ref 9.4–12.4)
POTASSIUM SERPL-SCNC: 5 MMOL/L (ref 3.5–5.1)
PROT SERPL-MCNC: 7.4 GM/DL (ref 6.4–8.2)
RBC # BLD AUTO: 3.57 X10(6)/MCL (ref 4.2–5.4)
SODIUM SERPL-SCNC: 141 MMOL/L (ref 136–145)
TIBC SERPL-MCNC: 331 MCG/DL (ref 250–450)
TRANSFERRIN SERPL-MCNC: 281 MG/DL (ref 200–360)
VIT B12 SERPL-MCNC: 1415 PG/ML (ref 193–986)
WBC # SPEC AUTO: 4.5 X10(3)/MCL (ref 4.5–11.5)

## 2018-09-26 ENCOUNTER — HISTORICAL (OUTPATIENT)
Dept: INFUSION THERAPY | Facility: HOSPITAL | Age: 58
End: 2018-09-26

## 2018-11-09 ENCOUNTER — HISTORICAL (OUTPATIENT)
Dept: INFUSION THERAPY | Facility: HOSPITAL | Age: 58
End: 2018-11-09

## 2018-11-13 ENCOUNTER — HISTORICAL (OUTPATIENT)
Dept: RADIOLOGY | Facility: HOSPITAL | Age: 58
End: 2018-11-13

## 2018-11-19 ENCOUNTER — HISTORICAL (OUTPATIENT)
Dept: ADMINISTRATIVE | Facility: HOSPITAL | Age: 58
End: 2018-11-19

## 2018-11-19 LAB
ABS NEUT (OLG): 2.68 X10(3)/MCL (ref 2.1–9.2)
ALBUMIN SERPL-MCNC: 3.8 GM/DL (ref 3.4–5)
ALP SERPL-CCNC: 109 UNIT/L (ref 38–126)
ALT SERPL-CCNC: 30 UNIT/L (ref 12–78)
ANION GAP SERPL CALC-SCNC: 15 MMOL/L
ANISOCYTOSIS BLD QL SMEAR: 0
AST SERPL-CCNC: 16 UNIT/L (ref 15–37)
BASOPHILS # BLD AUTO: 0 X10(3)/MCL (ref 0–0.2)
BASOPHILS NFR BLD AUTO: 0.8 %
BILIRUB SERPL-MCNC: 0.3 MG/DL (ref 0.2–1)
BILIRUBIN DIRECT+TOT PNL SERPL-MCNC: 0.1 MG/DL (ref 0–0.2)
BILIRUBIN DIRECT+TOT PNL SERPL-MCNC: 0.2 MG/DL (ref 0–0.8)
BUN SERPL-MCNC: 12 MG/DL (ref 8–26)
CHLORIDE SERPL-SCNC: 101 MMOL/L (ref 98–109)
CREAT SERPL-MCNC: 0.7 MG/DL (ref 0.6–1.3)
EOSINOPHIL # BLD AUTO: 0.2 X10(3)/MCL (ref 0–0.9)
EOSINOPHIL NFR BLD AUTO: 3.2 %
EOSINOPHIL NFR BLD MANUAL: 3 % (ref 0–8)
ERYTHROCYTE [DISTWIDTH] IN BLOOD BY AUTOMATED COUNT: 11.6 % (ref 11.5–17)
GLUCOSE SERPL-MCNC: 248 MG/DL (ref 70–105)
HCT VFR BLD AUTO: 36.8 % (ref 37–47)
HCT VFR BLD CALC: 36 % (ref 38–51)
HGB BLD-MCNC: 11.6 GM/DL (ref 12–16)
HGB BLD-MCNC: 12.2 MG/DL (ref 12–17)
HYPOCHROMIA BLD QL SMEAR: 0
LIVER PROFILE INTERP: NORMAL
LYMPHOCYTES # BLD AUTO: 1.7 X10(3)/MCL (ref 0.6–4.6)
LYMPHOCYTES NFR BLD AUTO: 34.4 %
LYMPHOCYTES NFR BLD MANUAL: 34 % (ref 13–40)
MACROCYTES BLD QL SMEAR: 0
MCH RBC QN AUTO: 32.1 PG (ref 27–31)
MCHC RBC AUTO-ENTMCNC: 31.5 GM/DL (ref 33–36)
MCV RBC AUTO: 101.9 FL (ref 80–94)
MICROCYTES BLD QL SMEAR: 0
MONOCYTES # BLD AUTO: 0.4 X10(3)/MCL (ref 0.1–1.3)
MONOCYTES NFR BLD AUTO: 7.2 %
MONOCYTES NFR BLD MANUAL: 2 % (ref 2–11)
NEUTROPHILS # BLD AUTO: 2.7 X10(3)/MCL (ref 2.1–9.2)
NEUTROPHILS NFR BLD AUTO: 54 %
NEUTROPHILS NFR BLD MANUAL: 60 % (ref 47–80)
PLATELET # BLD AUTO: 216 X10(3)/MCL (ref 130–400)
PLATELET # BLD EST: NORMAL 10*3/UL
PMV BLD AUTO: 11 FL (ref 9.4–12.4)
POC IONIZED CALCIUM: 1.25 MMOL/L (ref 1.12–1.32)
POC TCO2: 29 MMOL/L (ref 24–29)
POIKILOCYTOSIS BLD QL SMEAR: 0
POLYCHROMASIA BLD QL SMEAR: 0
POTASSIUM BLD-SCNC: 4.3 MMOL/L (ref 3.5–4.9)
PROT SERPL-MCNC: 7.1 GM/DL (ref 6.4–8.2)
RBC # BLD AUTO: 3.61 X10(6)/MCL (ref 4.2–5.4)
SCHISTOCYTES BLD QL AUTO: 0
SODIUM BLD-SCNC: 140 MMOL/L (ref 138–146)
SPHEROCYTES BLD QL SMEAR: 0
TARGETS BLD QL SMEAR: 0
WBC # SPEC AUTO: 5 X10(3)/MCL (ref 4.5–11.5)

## 2018-12-17 ENCOUNTER — HISTORICAL (OUTPATIENT)
Dept: INFUSION THERAPY | Facility: HOSPITAL | Age: 58
End: 2018-12-17

## 2019-01-04 ENCOUNTER — HISTORICAL (OUTPATIENT)
Dept: INFUSION THERAPY | Facility: HOSPITAL | Age: 59
End: 2019-01-04

## 2019-01-16 ENCOUNTER — HISTORICAL (OUTPATIENT)
Dept: ADMINISTRATIVE | Facility: HOSPITAL | Age: 59
End: 2019-01-16

## 2019-01-16 LAB
CHOLEST SERPL-MCNC: 202 MG/DL (ref 0–200)
CHOLEST/HDLC SERPL: 2.5 {RATIO} (ref 0–4)
HDLC SERPL-MCNC: 80 MG/DL (ref 35–60)
LDLC SERPL CALC-MCNC: 94 MG/DL (ref 0–129)
TRIGL SERPL-MCNC: 141 MG/DL (ref 30–150)
VLDLC SERPL CALC-MCNC: 28 MG/DL

## 2019-02-05 LAB — RAPID GROUP A STREP (OHS): NEGATIVE

## 2019-02-19 ENCOUNTER — HISTORICAL (OUTPATIENT)
Dept: ADMINISTRATIVE | Facility: HOSPITAL | Age: 59
End: 2019-02-19

## 2019-02-19 LAB
ABS NEUT (OLG): 1.95 X10(3)/MCL (ref 2.1–9.2)
ALBUMIN SERPL-MCNC: 3.7 GM/DL (ref 3.4–5)
ALP SERPL-CCNC: 107 UNIT/L (ref 38–126)
ALT SERPL-CCNC: 27 UNIT/L (ref 12–78)
ANION GAP SERPL CALC-SCNC: 19 MMOL/L
AST SERPL-CCNC: 17 UNIT/L (ref 15–37)
BASOPHILS # BLD AUTO: 0 X10(3)/MCL (ref 0–0.2)
BASOPHILS NFR BLD AUTO: 0.7 %
BILIRUB SERPL-MCNC: 0.3 MG/DL (ref 0.2–1)
BILIRUBIN DIRECT+TOT PNL SERPL-MCNC: 0.1 MG/DL (ref 0–0.5)
BILIRUBIN DIRECT+TOT PNL SERPL-MCNC: 0.2 MG/DL (ref 0–0.8)
BUN SERPL-MCNC: 15 MG/DL (ref 8–26)
CHLORIDE SERPL-SCNC: 97 MMOL/L (ref 98–109)
CREAT SERPL-MCNC: 0.7 MG/DL (ref 0.6–1.3)
EOSINOPHIL # BLD AUTO: 0.1 X10(3)/MCL (ref 0–0.9)
EOSINOPHIL NFR BLD AUTO: 2.1 %
ERYTHROCYTE [DISTWIDTH] IN BLOOD BY AUTOMATED COUNT: 11.2 % (ref 11.5–17)
GLUCOSE SERPL-MCNC: 285 MG/DL (ref 70–105)
HCT VFR BLD AUTO: 33.9 % (ref 37–47)
HCT VFR BLD CALC: 34 % (ref 38–51)
HGB BLD-MCNC: 11.1 GM/DL (ref 12–16)
HGB BLD-MCNC: 11.6 MG/DL (ref 12–17)
LIVER PROFILE INTERP: NORMAL
LYMPHOCYTES # BLD AUTO: 1.9 X10(3)/MCL (ref 0.6–4.6)
LYMPHOCYTES NFR BLD AUTO: 44.3 %
MCH RBC QN AUTO: 31.9 PG (ref 27–31)
MCHC RBC AUTO-ENTMCNC: 32.7 GM/DL (ref 33–36)
MCV RBC AUTO: 97.4 FL (ref 80–94)
MONOCYTES # BLD AUTO: 0.4 X10(3)/MCL (ref 0.1–1.3)
MONOCYTES NFR BLD AUTO: 8.2 %
NEUTROPHILS # BLD AUTO: 2 X10(3)/MCL (ref 2.1–9.2)
NEUTROPHILS NFR BLD AUTO: 44.5 %
PLATELET # BLD AUTO: 196 X10(3)/MCL (ref 130–400)
PMV BLD AUTO: 10.7 FL (ref 9.4–12.4)
POC IONIZED CALCIUM: 1.25 MMOL/L (ref 1.12–1.32)
POC TCO2: 26 MMOL/L (ref 24–29)
POTASSIUM BLD-SCNC: 4.5 MMOL/L (ref 3.5–4.9)
PROT SERPL-MCNC: 6.9 GM/DL (ref 6.4–8.2)
RBC # BLD AUTO: 3.48 X10(6)/MCL (ref 4.2–5.4)
SODIUM BLD-SCNC: 136 MMOL/L (ref 138–146)
WBC # SPEC AUTO: 4.4 X10(3)/MCL (ref 4.5–11.5)

## 2019-04-10 ENCOUNTER — HISTORICAL (OUTPATIENT)
Dept: RADIATION THERAPY | Facility: HOSPITAL | Age: 59
End: 2019-04-10

## 2019-05-17 ENCOUNTER — HISTORICAL (OUTPATIENT)
Dept: HEMATOLOGY/ONCOLOGY | Facility: CLINIC | Age: 59
End: 2019-05-17

## 2019-05-17 LAB
ABS NEUT (OLG): 1.14 X10(3)/MCL (ref 2.1–9.2)
ALBUMIN SERPL-MCNC: 4 GM/DL (ref 3.4–5)
ALBUMIN/GLOB SERPL: 1.1 RATIO (ref 1.1–2)
ALP SERPL-CCNC: 121 UNIT/L (ref 38–126)
ALT SERPL-CCNC: 38 UNIT/L (ref 12–78)
AST SERPL-CCNC: 20 UNIT/L (ref 15–37)
BASOPHILS # BLD AUTO: 0 X10(3)/MCL (ref 0–0.2)
BASOPHILS NFR BLD AUTO: 0.4 %
BILIRUB SERPL-MCNC: 0.4 MG/DL (ref 0.2–1)
BILIRUBIN DIRECT+TOT PNL SERPL-MCNC: 0.2 MG/DL (ref 0–0.5)
BILIRUBIN DIRECT+TOT PNL SERPL-MCNC: 0.2 MG/DL (ref 0–0.8)
BUN SERPL-MCNC: 17 MG/DL (ref 7–18)
CALCIUM SERPL-MCNC: 9.9 MG/DL (ref 8.5–10.1)
CHLORIDE SERPL-SCNC: 106 MMOL/L (ref 98–107)
CO2 SERPL-SCNC: 30 MMOL/L (ref 21–32)
CREAT SERPL-MCNC: 0.92 MG/DL (ref 0.55–1.02)
EOSINOPHIL # BLD AUTO: 0.1 X10(3)/MCL (ref 0–0.9)
EOSINOPHIL NFR BLD AUTO: 1.3 %
ERYTHROCYTE [DISTWIDTH] IN BLOOD BY AUTOMATED COUNT: 11.9 % (ref 11.5–17)
GLOBULIN SER-MCNC: 3.7 GM/DL (ref 2.4–3.5)
GLUCOSE SERPL-MCNC: 172 MG/DL (ref 74–106)
HCT VFR BLD AUTO: 35.5 % (ref 37–47)
HGB BLD-MCNC: 11.3 GM/DL (ref 12–16)
LYMPHOCYTES # BLD AUTO: 2.8 X10(3)/MCL (ref 0.6–4.6)
LYMPHOCYTES NFR BLD AUTO: 63.3 %
MCH RBC QN AUTO: 31.3 PG (ref 27–31)
MCHC RBC AUTO-ENTMCNC: 31.8 GM/DL (ref 33–36)
MCV RBC AUTO: 98.3 FL (ref 80–94)
MONOCYTES # BLD AUTO: 0.4 X10(3)/MCL (ref 0.1–1.3)
MONOCYTES NFR BLD AUTO: 9.4 %
NEUTROPHILS # BLD AUTO: 1.1 X10(3)/MCL (ref 2.1–9.2)
NEUTROPHILS NFR BLD AUTO: 25.4 %
PLATELET # BLD AUTO: 184 X10(3)/MCL (ref 130–400)
PMV BLD AUTO: 10.1 FL (ref 9.4–12.4)
POTASSIUM SERPL-SCNC: 4.7 MMOL/L (ref 3.5–5.1)
PROT SERPL-MCNC: 7.7 GM/DL (ref 6.4–8.2)
RBC # BLD AUTO: 3.61 X10(6)/MCL (ref 4.2–5.4)
SODIUM SERPL-SCNC: 142 MMOL/L (ref 136–145)
WBC # SPEC AUTO: 4.5 X10(3)/MCL (ref 4.5–11.5)

## 2019-05-28 ENCOUNTER — HISTORICAL (OUTPATIENT)
Dept: INFUSION THERAPY | Facility: HOSPITAL | Age: 59
End: 2019-05-28

## 2019-06-10 ENCOUNTER — HISTORICAL (OUTPATIENT)
Dept: ADMINISTRATIVE | Facility: HOSPITAL | Age: 59
End: 2019-06-10

## 2019-06-10 LAB
CHOLEST SERPL-MCNC: 197 MG/DL (ref 0–200)
CHOLEST/HDLC SERPL: 2.9 {RATIO} (ref 0–4)
HDLC SERPL-MCNC: 68 MG/DL (ref 35–60)
LDLC SERPL CALC-MCNC: 107 MG/DL (ref 0–129)
TRIGL SERPL-MCNC: 111 MG/DL (ref 30–150)
VLDLC SERPL CALC-MCNC: 22 MG/DL

## 2019-06-17 ENCOUNTER — HISTORICAL (OUTPATIENT)
Dept: INFUSION THERAPY | Facility: HOSPITAL | Age: 59
End: 2019-06-17

## 2019-07-18 ENCOUNTER — HISTORICAL (OUTPATIENT)
Dept: INFUSION THERAPY | Facility: HOSPITAL | Age: 59
End: 2019-07-18

## 2019-08-16 ENCOUNTER — HISTORICAL (OUTPATIENT)
Dept: HEMATOLOGY/ONCOLOGY | Facility: CLINIC | Age: 59
End: 2019-08-16

## 2019-08-16 LAB
ABS NEUT (OLG): 1.8 X10(3)/MCL (ref 2.1–9.2)
ALBUMIN SERPL-MCNC: 4.1 GM/DL (ref 3.4–5)
ALBUMIN/GLOB SERPL: 1.1 {RATIO}
ALP SERPL-CCNC: 116 UNIT/L (ref 38–126)
ALT SERPL-CCNC: 22 UNIT/L (ref 12–78)
AST SERPL-CCNC: 16 UNIT/L (ref 15–37)
BASOPHILS # BLD AUTO: 0 X10(3)/MCL (ref 0–0.2)
BASOPHILS NFR BLD AUTO: 0.6 %
BILIRUB SERPL-MCNC: 0.3 MG/DL (ref 0.2–1)
BILIRUBIN DIRECT+TOT PNL SERPL-MCNC: 0.1 MG/DL (ref 0–0.2)
BILIRUBIN DIRECT+TOT PNL SERPL-MCNC: 0.2 MG/DL (ref 0–0.8)
BUN SERPL-MCNC: 17 MG/DL (ref 7–18)
CALCIUM SERPL-MCNC: 10.4 MG/DL (ref 8.5–10.1)
CHLORIDE SERPL-SCNC: 108 MMOL/L (ref 98–107)
CO2 SERPL-SCNC: 29 MMOL/L (ref 21–32)
CREAT SERPL-MCNC: 1.12 MG/DL (ref 0.55–1.02)
EOSINOPHIL # BLD AUTO: 0.2 X10(3)/MCL (ref 0–0.9)
EOSINOPHIL NFR BLD AUTO: 2.9 %
ERYTHROCYTE [DISTWIDTH] IN BLOOD BY AUTOMATED COUNT: 12.2 % (ref 11.5–17)
FERRITIN SERPL-MCNC: 116.3 NG/ML (ref 8–388)
GLOBULIN SER-MCNC: 3.9 GM/DL (ref 2.4–3.5)
GLUCOSE SERPL-MCNC: 115 MG/DL (ref 74–106)
HCT VFR BLD AUTO: 39.8 % (ref 37–47)
HGB BLD-MCNC: 12.5 GM/DL (ref 12–16)
IRON SATN MFR SERPL: 19.6 % (ref 20–50)
IRON SERPL-MCNC: 67 MCG/DL (ref 50–175)
LYMPHOCYTES # BLD AUTO: 2.7 X10(3)/MCL (ref 0.6–4.6)
LYMPHOCYTES NFR BLD AUTO: 53.6 %
MCH RBC QN AUTO: 31.3 PG (ref 27–31)
MCHC RBC AUTO-ENTMCNC: 31.4 GM/DL (ref 33–36)
MCV RBC AUTO: 99.5 FL (ref 80–94)
MONOCYTES # BLD AUTO: 0.4 X10(3)/MCL (ref 0.1–1.3)
MONOCYTES NFR BLD AUTO: 7.6 %
NEUTROPHILS # BLD AUTO: 1.8 X10(3)/MCL (ref 2.1–9.2)
NEUTROPHILS NFR BLD AUTO: 35.3 %
PLATELET # BLD AUTO: 209 X10(3)/MCL (ref 130–400)
PMV BLD AUTO: 11.2 FL (ref 9.4–12.4)
POTASSIUM SERPL-SCNC: 4.5 MMOL/L (ref 3.5–5.1)
PROT SERPL-MCNC: 8 GM/DL (ref 6.4–8.2)
RBC # BLD AUTO: 4 X10(6)/MCL (ref 4.2–5.4)
SODIUM SERPL-SCNC: 141 MMOL/L (ref 136–145)
TIBC SERPL-MCNC: 341 MCG/DL (ref 250–450)
TRANSFERRIN SERPL-MCNC: 290 MG/DL (ref 200–360)
VIT B12 SERPL-MCNC: 2716 PG/ML (ref 193–986)
WBC # SPEC AUTO: 5.1 X10(3)/MCL (ref 4.5–11.5)

## 2019-09-12 ENCOUNTER — HISTORICAL (OUTPATIENT)
Dept: INFUSION THERAPY | Facility: HOSPITAL | Age: 59
End: 2019-09-12

## 2019-11-07 ENCOUNTER — HISTORICAL (OUTPATIENT)
Dept: INFUSION THERAPY | Facility: HOSPITAL | Age: 59
End: 2019-11-07

## 2019-11-21 ENCOUNTER — HISTORICAL (OUTPATIENT)
Dept: RADIOLOGY | Facility: HOSPITAL | Age: 59
End: 2019-11-21

## 2019-11-21 LAB — BMD RECOMMENDATION EXT: NORMAL

## 2019-11-26 ENCOUNTER — HISTORICAL (OUTPATIENT)
Dept: ADMINISTRATIVE | Facility: HOSPITAL | Age: 59
End: 2019-11-26

## 2019-11-26 LAB
ABS NEUT (OLG): 2.53 X10(3)/MCL (ref 2.1–9.2)
ALBUMIN SERPL-MCNC: 3.9 GM/DL (ref 3.4–5)
ALBUMIN/GLOB SERPL: 1 RATIO (ref 1.1–2)
ALP SERPL-CCNC: 106 UNIT/L (ref 38–126)
ALT SERPL-CCNC: 28 UNIT/L (ref 12–78)
AST SERPL-CCNC: 17 UNIT/L (ref 15–37)
BASOPHILS # BLD AUTO: 0 X10(3)/MCL (ref 0–0.2)
BASOPHILS NFR BLD AUTO: 0.7 %
BILIRUB SERPL-MCNC: 0.3 MG/DL (ref 0.2–1)
BILIRUBIN DIRECT+TOT PNL SERPL-MCNC: 0.1 MG/DL (ref 0–0.5)
BILIRUBIN DIRECT+TOT PNL SERPL-MCNC: 0.2 MG/DL (ref 0–0.8)
BUN SERPL-MCNC: 17 MG/DL (ref 7–18)
CALCIUM SERPL-MCNC: 10 MG/DL (ref 8.5–10.1)
CHLORIDE SERPL-SCNC: 108 MMOL/L (ref 98–107)
CO2 SERPL-SCNC: 30 MMOL/L (ref 21–32)
CREAT SERPL-MCNC: 0.81 MG/DL (ref 0.55–1.02)
EOSINOPHIL # BLD AUTO: 0.1 X10(3)/MCL (ref 0–0.9)
EOSINOPHIL NFR BLD AUTO: 1.9 %
ERYTHROCYTE [DISTWIDTH] IN BLOOD BY AUTOMATED COUNT: 11.8 % (ref 11.5–17)
GLOBULIN SER-MCNC: 3.8 GM/DL (ref 2.4–3.5)
GLUCOSE SERPL-MCNC: 103 MG/DL (ref 74–106)
HCT VFR BLD AUTO: 36.2 % (ref 37–47)
HGB BLD-MCNC: 11.7 GM/DL (ref 12–16)
LYMPHOCYTES # BLD AUTO: 2.5 X10(3)/MCL (ref 0.6–4.6)
LYMPHOCYTES NFR BLD AUTO: 43.9 %
MCH RBC QN AUTO: 32.1 PG (ref 27–31)
MCHC RBC AUTO-ENTMCNC: 32.3 GM/DL (ref 33–36)
MCV RBC AUTO: 99.5 FL (ref 80–94)
MONOCYTES # BLD AUTO: 0.5 X10(3)/MCL (ref 0.1–1.3)
MONOCYTES NFR BLD AUTO: 8.5 %
NEUTROPHILS # BLD AUTO: 2.5 X10(3)/MCL (ref 2.1–9.2)
NEUTROPHILS NFR BLD AUTO: 44.8 %
PLATELET # BLD AUTO: 205 X10(3)/MCL (ref 130–400)
PMV BLD AUTO: 10.7 FL (ref 9.4–12.4)
POTASSIUM SERPL-SCNC: 4.4 MMOL/L (ref 3.5–5.1)
PROT SERPL-MCNC: 7.7 GM/DL (ref 6.4–8.2)
RBC # BLD AUTO: 3.64 X10(6)/MCL (ref 4.2–5.4)
SODIUM SERPL-SCNC: 142 MMOL/L (ref 136–145)
WBC # SPEC AUTO: 5.6 X10(3)/MCL (ref 4.5–11.5)

## 2019-12-19 ENCOUNTER — HISTORICAL (OUTPATIENT)
Dept: INFUSION THERAPY | Facility: HOSPITAL | Age: 59
End: 2019-12-19

## 2020-05-05 ENCOUNTER — HISTORICAL (OUTPATIENT)
Dept: LAB | Facility: HOSPITAL | Age: 60
End: 2020-05-05

## 2020-05-26 ENCOUNTER — HISTORICAL (OUTPATIENT)
Dept: HEMATOLOGY/ONCOLOGY | Facility: CLINIC | Age: 60
End: 2020-05-26

## 2020-05-26 LAB
ABS NEUT (OLG): 3.02 X10(3)/MCL (ref 2.1–9.2)
ALBUMIN SERPL-MCNC: 4.4 GM/DL (ref 3.4–4.8)
ALBUMIN/GLOB SERPL: 1.3 RATIO (ref 1.1–2)
ALP SERPL-CCNC: 108 UNIT/L (ref 40–150)
ALT SERPL-CCNC: 19 UNIT/L (ref 0–55)
AST SERPL-CCNC: 20 UNIT/L (ref 5–34)
BASOPHILS # BLD AUTO: 0.1 X10(3)/MCL (ref 0–0.2)
BASOPHILS NFR BLD AUTO: 1 %
BILIRUB SERPL-MCNC: 0.4 MG/DL
BILIRUBIN DIRECT+TOT PNL SERPL-MCNC: 0.2 MG/DL (ref 0–0.5)
BILIRUBIN DIRECT+TOT PNL SERPL-MCNC: 0.2 MG/DL (ref 0–0.8)
BUN SERPL-MCNC: 12.2 MG/DL (ref 9.8–20.1)
CALCIUM SERPL-MCNC: 9.8 MG/DL (ref 8.4–10.2)
CHLORIDE SERPL-SCNC: 104 MMOL/L (ref 98–107)
CO2 SERPL-SCNC: 29 MMOL/L (ref 23–31)
CREAT SERPL-MCNC: 0.88 MG/DL (ref 0.55–1.02)
EOSINOPHIL # BLD AUTO: 0.1 X10(3)/MCL (ref 0–0.9)
EOSINOPHIL NFR BLD AUTO: 1.4 %
ERYTHROCYTE [DISTWIDTH] IN BLOOD BY AUTOMATED COUNT: 11.8 % (ref 11.5–17)
GLOBULIN SER-MCNC: 3.3 GM/DL (ref 2.4–3.5)
GLUCOSE SERPL-MCNC: 125 MG/DL (ref 82–115)
HCT VFR BLD AUTO: 38.3 % (ref 37–47)
HGB BLD-MCNC: 12.3 GM/DL (ref 12–16)
LYMPHOCYTES # BLD AUTO: 3.5 X10(3)/MCL (ref 0.6–4.6)
LYMPHOCYTES NFR BLD AUTO: 48.9 %
MCH RBC QN AUTO: 32.1 PG (ref 27–31)
MCHC RBC AUTO-ENTMCNC: 32.1 GM/DL (ref 33–36)
MCV RBC AUTO: 100 FL (ref 80–94)
MONOCYTES # BLD AUTO: 0.4 X10(3)/MCL (ref 0.1–1.3)
MONOCYTES NFR BLD AUTO: 5.7 %
NEUTROPHILS # BLD AUTO: 3 X10(3)/MCL (ref 2.1–9.2)
NEUTROPHILS NFR BLD AUTO: 42.7 %
PLATELET # BLD AUTO: 242 X10(3)/MCL (ref 130–400)
PMV BLD AUTO: 11.1 FL (ref 9.4–12.4)
POTASSIUM SERPL-SCNC: 4.6 MMOL/L (ref 3.5–5.1)
PROT SERPL-MCNC: 7.7 GM/DL (ref 5.8–7.6)
RBC # BLD AUTO: 3.83 X10(6)/MCL (ref 4.2–5.4)
SODIUM SERPL-SCNC: 141 MMOL/L (ref 136–145)
WBC # SPEC AUTO: 7.1 X10(3)/MCL (ref 4.5–11.5)

## 2020-06-25 ENCOUNTER — HISTORICAL (OUTPATIENT)
Dept: INFUSION THERAPY | Facility: HOSPITAL | Age: 60
End: 2020-06-25

## 2020-09-04 ENCOUNTER — HISTORICAL (OUTPATIENT)
Dept: ADMINISTRATIVE | Facility: HOSPITAL | Age: 60
End: 2020-09-04

## 2020-09-04 LAB
ABS NEUT (OLG): 2.1 X10(3)/MCL (ref 2.1–9.2)
ALBUMIN SERPL-MCNC: 4 GM/DL (ref 3.4–4.8)
ALBUMIN/GLOB SERPL: 1.3 RATIO (ref 1.1–2)
ALP SERPL-CCNC: 102 UNIT/L (ref 40–150)
ALT SERPL-CCNC: 17 UNIT/L (ref 0–55)
AST SERPL-CCNC: 17 UNIT/L (ref 5–34)
BASOPHILS # BLD AUTO: 0.1 X10(3)/MCL (ref 0–0.2)
BASOPHILS NFR BLD AUTO: 1 %
BILIRUB SERPL-MCNC: 0.4 MG/DL
BILIRUBIN DIRECT+TOT PNL SERPL-MCNC: 0.2 MG/DL (ref 0–0.5)
BILIRUBIN DIRECT+TOT PNL SERPL-MCNC: 0.2 MG/DL (ref 0–0.8)
BUN SERPL-MCNC: 12.4 MG/DL (ref 9.8–20.1)
CALCIUM SERPL-MCNC: 9.8 MG/DL (ref 8.4–10.2)
CHLORIDE SERPL-SCNC: 100 MMOL/L (ref 98–107)
CO2 SERPL-SCNC: 30 MMOL/L (ref 23–31)
CREAT SERPL-MCNC: 0.72 MG/DL (ref 0.55–1.02)
EOSINOPHIL # BLD AUTO: 0.1 X10(3)/MCL (ref 0–0.9)
EOSINOPHIL NFR BLD AUTO: 2 %
ERYTHROCYTE [DISTWIDTH] IN BLOOD BY AUTOMATED COUNT: 12 % (ref 11.5–17)
EST. AVERAGE GLUCOSE BLD GHB EST-MCNC: 154.2 MG/DL
GLOBULIN SER-MCNC: 3.1 GM/DL (ref 2.4–3.5)
GLUCOSE SERPL-MCNC: 122 MG/DL (ref 82–115)
HBA1C MFR BLD: 7 %
HCT VFR BLD AUTO: 36.8 % (ref 37–47)
HGB BLD-MCNC: 11.8 GM/DL (ref 12–16)
LYMPHOCYTES # BLD AUTO: 2.8 X10(3)/MCL (ref 0.6–4.6)
LYMPHOCYTES NFR BLD AUTO: 50 %
MCH RBC QN AUTO: 32 PG (ref 27–31)
MCHC RBC AUTO-ENTMCNC: 32.1 GM/DL (ref 33–36)
MCV RBC AUTO: 99.7 FL (ref 80–94)
MONOCYTES # BLD AUTO: 0.4 X10(3)/MCL (ref 0.1–1.3)
MONOCYTES NFR BLD AUTO: 8 %
NEUTROPHILS # BLD AUTO: 2.1 X10(3)/MCL (ref 2.1–9.2)
NEUTROPHILS NFR BLD AUTO: 38 %
PLATELET # BLD AUTO: 199 X10(3)/MCL (ref 130–400)
PMV BLD AUTO: 11.6 FL (ref 9.4–12.4)
POTASSIUM SERPL-SCNC: 4.3 MMOL/L (ref 3.5–5.1)
PROT SERPL-MCNC: 7.1 GM/DL (ref 5.8–7.6)
RBC # BLD AUTO: 3.69 X10(6)/MCL (ref 4.2–5.4)
SODIUM SERPL-SCNC: 142 MMOL/L (ref 136–145)
WBC # SPEC AUTO: 5.5 X10(3)/MCL (ref 4.5–11.5)

## 2020-09-14 ENCOUNTER — HISTORICAL (OUTPATIENT)
Dept: ENDOSCOPY | Facility: HOSPITAL | Age: 60
End: 2020-09-14

## 2020-09-14 LAB — CRC RECOMMENDATION EXT: NORMAL

## 2020-09-30 ENCOUNTER — HISTORICAL (OUTPATIENT)
Dept: RADIATION THERAPY | Facility: HOSPITAL | Age: 60
End: 2020-09-30

## 2020-11-19 ENCOUNTER — HISTORICAL (OUTPATIENT)
Dept: HEMATOLOGY/ONCOLOGY | Facility: CLINIC | Age: 60
End: 2020-11-19

## 2020-11-19 LAB
ABS NEUT (OLG): 2.84 X10(3)/MCL (ref 2.1–9.2)
ALBUMIN SERPL-MCNC: 3.9 GM/DL (ref 3.4–4.8)
ALBUMIN/GLOB SERPL: 1.1 RATIO (ref 1.1–2)
ALP SERPL-CCNC: 107 UNIT/L (ref 40–150)
ALT SERPL-CCNC: 15 UNIT/L (ref 0–55)
AST SERPL-CCNC: 17 UNIT/L (ref 5–34)
BASOPHILS # BLD AUTO: 0 X10(3)/MCL (ref 0–0.2)
BASOPHILS NFR BLD AUTO: 0.5 %
BILIRUB SERPL-MCNC: 0.3 MG/DL
BILIRUBIN DIRECT+TOT PNL SERPL-MCNC: 0.1 MG/DL (ref 0–0.5)
BILIRUBIN DIRECT+TOT PNL SERPL-MCNC: 0.2 MG/DL (ref 0–0.8)
BUN SERPL-MCNC: 12.8 MG/DL (ref 9.8–20.1)
CALCIUM SERPL-MCNC: 9.5 MG/DL (ref 8.4–10.2)
CHLORIDE SERPL-SCNC: 106 MMOL/L (ref 98–107)
CO2 SERPL-SCNC: 23 MMOL/L (ref 23–31)
CREAT SERPL-MCNC: 0.74 MG/DL (ref 0.55–1.02)
EOSINOPHIL # BLD AUTO: 0.2 X10(3)/MCL (ref 0–0.9)
EOSINOPHIL NFR BLD AUTO: 2.7 %
ERYTHROCYTE [DISTWIDTH] IN BLOOD BY AUTOMATED COUNT: 11.9 % (ref 11.5–17)
GLOBULIN SER-MCNC: 3.6 GM/DL (ref 2.4–3.5)
GLUCOSE SERPL-MCNC: 210 MG/DL (ref 82–115)
HCT VFR BLD AUTO: 36.6 % (ref 37–47)
HGB BLD-MCNC: 12.1 GM/DL (ref 12–16)
LYMPHOCYTES # BLD AUTO: 2.1 X10(3)/MCL (ref 0.6–4.6)
LYMPHOCYTES NFR BLD AUTO: 39.1 %
MCH RBC QN AUTO: 32.4 PG (ref 27–31)
MCHC RBC AUTO-ENTMCNC: 33.1 GM/DL (ref 33–36)
MCV RBC AUTO: 97.9 FL (ref 80–94)
MONOCYTES # BLD AUTO: 0.3 X10(3)/MCL (ref 0.1–1.3)
MONOCYTES NFR BLD AUTO: 5.3 %
NEUTROPHILS # BLD AUTO: 2.8 X10(3)/MCL (ref 2.1–9.2)
NEUTROPHILS NFR BLD AUTO: 52 %
PLATELET # BLD AUTO: 222 X10(3)/MCL (ref 130–400)
PMV BLD AUTO: 11.1 FL (ref 9.4–12.4)
POTASSIUM SERPL-SCNC: 4.3 MMOL/L (ref 3.5–5.1)
PROT SERPL-MCNC: 7.5 GM/DL (ref 5.8–7.6)
RBC # BLD AUTO: 3.74 X10(6)/MCL (ref 4.2–5.4)
SODIUM SERPL-SCNC: 141 MMOL/L (ref 136–145)
WBC # SPEC AUTO: 5.5 X10(3)/MCL (ref 4.5–11.5)

## 2020-11-23 ENCOUNTER — HISTORICAL (OUTPATIENT)
Dept: RADIOLOGY | Facility: HOSPITAL | Age: 60
End: 2020-11-23

## 2020-12-28 ENCOUNTER — HISTORICAL (OUTPATIENT)
Dept: INFUSION THERAPY | Facility: HOSPITAL | Age: 60
End: 2020-12-28

## 2021-05-21 ENCOUNTER — HISTORICAL (OUTPATIENT)
Dept: HEMATOLOGY/ONCOLOGY | Facility: CLINIC | Age: 61
End: 2021-05-21

## 2021-05-21 LAB
ABS NEUT (OLG): 2.46 X10(3)/MCL (ref 2.1–9.2)
ALBUMIN SERPL-MCNC: 4.1 GM/DL (ref 3.4–4.8)
ALBUMIN/GLOB SERPL: 1.2 RATIO (ref 1.1–2)
ALP SERPL-CCNC: 105 UNIT/L (ref 40–150)
ALT SERPL-CCNC: 16 UNIT/L (ref 0–55)
AST SERPL-CCNC: 18 UNIT/L (ref 5–34)
BASOPHILS # BLD AUTO: 0 X10(3)/MCL (ref 0–0.2)
BASOPHILS NFR BLD AUTO: 1 %
BILIRUB SERPL-MCNC: 0.3 MG/DL
BILIRUBIN DIRECT+TOT PNL SERPL-MCNC: 0.1 MG/DL (ref 0–0.5)
BILIRUBIN DIRECT+TOT PNL SERPL-MCNC: 0.2 MG/DL (ref 0–0.8)
BUN SERPL-MCNC: 10.9 MG/DL (ref 9.8–20.1)
CALCIUM SERPL-MCNC: 10.5 MG/DL (ref 8.4–10.2)
CHLORIDE SERPL-SCNC: 104 MMOL/L (ref 98–107)
CO2 SERPL-SCNC: 29 MMOL/L (ref 23–31)
CREAT SERPL-MCNC: 0.75 MG/DL (ref 0.55–1.02)
EOSINOPHIL # BLD AUTO: 0.2 X10(3)/MCL (ref 0–0.9)
EOSINOPHIL NFR BLD AUTO: 3 %
ERYTHROCYTE [DISTWIDTH] IN BLOOD BY AUTOMATED COUNT: 11.4 % (ref 11.5–17)
GLOBULIN SER-MCNC: 3.4 GM/DL (ref 2.4–3.5)
GLUCOSE SERPL-MCNC: 129 MG/DL (ref 82–115)
HCT VFR BLD AUTO: 33.7 % (ref 37–47)
HGB BLD-MCNC: 11.4 GM/DL (ref 12–16)
LYMPHOCYTES # BLD AUTO: 3.1 X10(3)/MCL (ref 0.6–4.6)
LYMPHOCYTES NFR BLD AUTO: 49.5 %
MCH RBC QN AUTO: 33 PG (ref 27–31)
MCHC RBC AUTO-ENTMCNC: 33.8 GM/DL (ref 33–36)
MCV RBC AUTO: 97.7 FL (ref 80–94)
MONOCYTES # BLD AUTO: 0.5 X10(3)/MCL (ref 0.1–1.3)
MONOCYTES NFR BLD AUTO: 8 %
NEUTROPHILS # BLD AUTO: 2.5 X10(3)/MCL (ref 2.1–9.2)
NEUTROPHILS NFR BLD AUTO: 38 %
PLATELET # BLD AUTO: 247 X10(3)/MCL (ref 130–400)
PMV BLD AUTO: 10.7 FL (ref 9.4–12.4)
POTASSIUM SERPL-SCNC: 4.3 MMOL/L (ref 3.5–5.1)
PROT SERPL-MCNC: 7.5 GM/DL (ref 5.8–7.6)
RBC # BLD AUTO: 3.45 X10(6)/MCL (ref 4.2–5.4)
SODIUM SERPL-SCNC: 143 MMOL/L (ref 136–145)
WBC # SPEC AUTO: 6.3 X10(3)/MCL (ref 4.5–11.5)

## 2021-06-11 ENCOUNTER — HISTORICAL (OUTPATIENT)
Dept: ADMINISTRATIVE | Facility: HOSPITAL | Age: 61
End: 2021-06-11

## 2021-06-11 LAB
ABS NEUT (OLG): 2.3 X10(3)/MCL (ref 2.1–9.2)
ALBUMIN SERPL-MCNC: 3.5 GM/DL (ref 3.4–4.8)
ALBUMIN/GLOB SERPL: 1 RATIO (ref 1.1–2)
ALP SERPL-CCNC: 104 UNIT/L (ref 40–150)
ALT SERPL-CCNC: 14 UNIT/L (ref 0–55)
AST SERPL-CCNC: 15 UNIT/L (ref 5–34)
BASOPHILS # BLD AUTO: 0 X10(3)/MCL (ref 0–0.2)
BASOPHILS NFR BLD AUTO: 1 %
BILIRUB SERPL-MCNC: 0.2 MG/DL
BILIRUBIN DIRECT+TOT PNL SERPL-MCNC: 0.1 MG/DL (ref 0–0.5)
BILIRUBIN DIRECT+TOT PNL SERPL-MCNC: 0.1 MG/DL (ref 0–0.8)
BUN SERPL-MCNC: 11.9 MG/DL (ref 9.8–20.1)
CALCIUM SERPL-MCNC: 9.7 MG/DL (ref 8.4–10.2)
CHLORIDE SERPL-SCNC: 104 MMOL/L (ref 98–107)
CHOLEST SERPL-MCNC: 185 MG/DL
CHOLEST/HDLC SERPL: 3 {RATIO} (ref 0–5)
CO2 SERPL-SCNC: 27 MMOL/L (ref 23–31)
CREAT SERPL-MCNC: 0.76 MG/DL (ref 0.55–1.02)
CREAT UR-MCNC: 69.3 MG/DL (ref 45–106)
EOSINOPHIL # BLD AUTO: 0.1 X10(3)/MCL (ref 0–0.9)
EOSINOPHIL NFR BLD AUTO: 3 %
ERYTHROCYTE [DISTWIDTH] IN BLOOD BY AUTOMATED COUNT: 11.5 % (ref 11.5–17)
EST. AVERAGE GLUCOSE BLD GHB EST-MCNC: 194.4 MG/DL
GLOBULIN SER-MCNC: 3.4 GM/DL (ref 2.4–3.5)
GLUCOSE SERPL-MCNC: 172 MG/DL (ref 82–115)
HBA1C MFR BLD: 8.4 %
HCT VFR BLD AUTO: 32.1 % (ref 37–47)
HDLC SERPL-MCNC: 62 MG/DL (ref 35–60)
HGB BLD-MCNC: 10.7 GM/DL (ref 12–16)
LDLC SERPL CALC-MCNC: 105 MG/DL (ref 50–140)
LYMPHOCYTES # BLD AUTO: 1.8 X10(3)/MCL (ref 0.6–4.6)
LYMPHOCYTES NFR BLD AUTO: 38 %
MCH RBC QN AUTO: 32.5 PG (ref 27–31)
MCHC RBC AUTO-ENTMCNC: 33.3 GM/DL (ref 33–36)
MCV RBC AUTO: 97.6 FL (ref 80–94)
MICROALBUMIN UR-MCNC: 7.8 UG/ML
MICROALBUMIN/CREAT RATIO PNL UR: 11.3 MG/GM CR (ref 0–30)
MONOCYTES # BLD AUTO: 0.5 X10(3)/MCL (ref 0.1–1.3)
MONOCYTES NFR BLD AUTO: 10 %
NEUTROPHILS # BLD AUTO: 2.3 X10(3)/MCL (ref 2.1–9.2)
NEUTROPHILS NFR BLD AUTO: 49 %
PLATELET # BLD AUTO: 202 X10(3)/MCL (ref 130–400)
PMV BLD AUTO: 12 FL (ref 9.4–12.4)
POTASSIUM SERPL-SCNC: 4.2 MMOL/L (ref 3.5–5.1)
PROT SERPL-MCNC: 6.9 GM/DL (ref 5.8–7.6)
RBC # BLD AUTO: 3.29 X10(6)/MCL (ref 4.2–5.4)
SODIUM SERPL-SCNC: 142 MMOL/L (ref 136–145)
TRIGL SERPL-MCNC: 89 MG/DL (ref 37–140)
VLDLC SERPL CALC-MCNC: 18 MG/DL
WBC # SPEC AUTO: 4.7 X10(3)/MCL (ref 4.5–11.5)

## 2021-06-30 ENCOUNTER — HISTORICAL (OUTPATIENT)
Dept: INFUSION THERAPY | Facility: HOSPITAL | Age: 61
End: 2021-06-30

## 2021-11-24 ENCOUNTER — HISTORICAL (OUTPATIENT)
Dept: RADIOLOGY | Facility: HOSPITAL | Age: 61
End: 2021-11-24

## 2021-11-24 LAB — BCS RECOMMENDATION EXT: NORMAL

## 2021-12-02 ENCOUNTER — HISTORICAL (OUTPATIENT)
Dept: HEMATOLOGY/ONCOLOGY | Facility: CLINIC | Age: 61
End: 2021-12-02

## 2021-12-02 LAB
ABS NEUT (OLG): 2.33 X10(3)/MCL (ref 2.1–9.2)
ALBUMIN SERPL-MCNC: 3.9 GM/DL (ref 3.4–4.8)
ALBUMIN/GLOB SERPL: 1.2 RATIO (ref 1.1–2)
ALP SERPL-CCNC: 86 UNIT/L (ref 40–150)
ALT SERPL-CCNC: 15 UNIT/L (ref 0–55)
AST SERPL-CCNC: 17 UNIT/L (ref 5–34)
BASOPHILS # BLD AUTO: 0 X10(3)/MCL (ref 0–0.2)
BASOPHILS NFR BLD AUTO: 0.9 %
BILIRUB SERPL-MCNC: 0.3 MG/DL
BILIRUBIN DIRECT+TOT PNL SERPL-MCNC: 0.1 MG/DL (ref 0–0.5)
BILIRUBIN DIRECT+TOT PNL SERPL-MCNC: 0.2 MG/DL (ref 0–0.8)
BUN SERPL-MCNC: 11.4 MG/DL (ref 9.8–20.1)
CALCIUM SERPL-MCNC: 10.2 MG/DL (ref 8.7–10.5)
CHLORIDE SERPL-SCNC: 105 MMOL/L (ref 98–107)
CO2 SERPL-SCNC: 29 MMOL/L (ref 23–31)
CREAT SERPL-MCNC: 0.79 MG/DL (ref 0.55–1.02)
EOSINOPHIL # BLD AUTO: 0.2 X10(3)/MCL (ref 0–0.9)
EOSINOPHIL NFR BLD AUTO: 3 %
ERYTHROCYTE [DISTWIDTH] IN BLOOD BY AUTOMATED COUNT: 11.9 % (ref 11.5–17)
EST. AVERAGE GLUCOSE BLD GHB EST-MCNC: 134.1 MG/DL
FERRITIN SERPL-MCNC: 192.28 NG/ML (ref 4.63–204)
GLOBULIN SER-MCNC: 3.3 GM/DL (ref 2.4–3.5)
GLUCOSE SERPL-MCNC: 201 MG/DL (ref 82–115)
HBA1C MFR BLD: 6.3 %
HCT VFR BLD AUTO: 36.3 % (ref 37–47)
HGB BLD-MCNC: 11.8 GM/DL (ref 12–16)
IRON SATN MFR SERPL: 18 % (ref 20–50)
IRON SERPL-MCNC: 51 UG/DL (ref 50–170)
LYMPHOCYTES # BLD AUTO: 2.5 X10(3)/MCL (ref 0.6–4.6)
LYMPHOCYTES NFR BLD AUTO: 46.4 %
MCH RBC QN AUTO: 31.9 PG (ref 27–31)
MCHC RBC AUTO-ENTMCNC: 32.5 GM/DL (ref 33–36)
MCV RBC AUTO: 98.1 FL (ref 80–94)
MONOCYTES # BLD AUTO: 0.4 X10(3)/MCL (ref 0.1–1.3)
MONOCYTES NFR BLD AUTO: 6.5 %
NEUTROPHILS # BLD AUTO: 2.3 X10(3)/MCL (ref 2.1–9.2)
NEUTROPHILS NFR BLD AUTO: 43 %
PLATELET # BLD AUTO: 205 X10(3)/MCL (ref 130–400)
PMV BLD AUTO: 10.9 FL (ref 9.4–12.4)
POTASSIUM SERPL-SCNC: 4.3 MMOL/L (ref 3.5–5.1)
PROT SERPL-MCNC: 7.2 GM/DL (ref 5.8–7.6)
RBC # BLD AUTO: 3.7 X10(6)/MCL (ref 4.2–5.4)
SODIUM SERPL-SCNC: 142 MMOL/L (ref 136–145)
TIBC SERPL-MCNC: 233 UG/DL (ref 70–310)
TIBC SERPL-MCNC: 284 UG/DL (ref 250–450)
TRANSFERRIN SERPL-MCNC: 247 MG/DL (ref 173–360)
WBC # SPEC AUTO: 5.4 X10(3)/MCL (ref 4.5–11.5)

## 2021-12-06 LAB
PAP RECOMMENDATION EXT: NORMAL
PAP SMEAR: NORMAL

## 2021-12-20 ENCOUNTER — HISTORICAL (OUTPATIENT)
Dept: RADIOLOGY | Facility: HOSPITAL | Age: 61
End: 2021-12-20

## 2021-12-21 ENCOUNTER — HISTORICAL (OUTPATIENT)
Dept: RADIOLOGY | Facility: HOSPITAL | Age: 61
End: 2021-12-21

## 2021-12-30 ENCOUNTER — HISTORICAL (OUTPATIENT)
Dept: INFUSION THERAPY | Facility: HOSPITAL | Age: 61
End: 2021-12-30

## 2022-01-26 ENCOUNTER — HISTORICAL (OUTPATIENT)
Dept: RADIOLOGY | Facility: HOSPITAL | Age: 62
End: 2022-01-26

## 2022-01-26 ENCOUNTER — HISTORICAL (OUTPATIENT)
Dept: ADMINISTRATIVE | Facility: HOSPITAL | Age: 62
End: 2022-01-26

## 2022-02-01 ENCOUNTER — HISTORICAL (OUTPATIENT)
Dept: ADMINISTRATIVE | Facility: HOSPITAL | Age: 62
End: 2022-02-01

## 2022-02-01 LAB
ABS NEUT (OLG): 2.69 (ref 2.1–9.2)
ALBUMIN SERPL-MCNC: 4.3 G/DL (ref 3.4–4.8)
ALBUMIN/GLOB SERPL: 1.2 {RATIO} (ref 1.1–2)
ALP SERPL-CCNC: 97 U/L (ref 40–150)
ALT SERPL-CCNC: 21 U/L (ref 0–55)
AST SERPL-CCNC: 21 U/L (ref 5–34)
BASOPHILS # BLD AUTO: 0.1 10*3/UL (ref 0–0.2)
BASOPHILS NFR BLD AUTO: 1 %
BILIRUB SERPL-MCNC: 0.2 MG/DL
BILIRUBIN DIRECT+TOT PNL SERPL-MCNC: 0.1 (ref 0–0.5)
BILIRUBIN DIRECT+TOT PNL SERPL-MCNC: 0.1 (ref 0–0.8)
BUN SERPL-MCNC: 13.2 MG/DL (ref 9.8–20.1)
CALCIUM SERPL-MCNC: 10.6 MG/DL (ref 8.7–10.5)
CHLORIDE SERPL-SCNC: 103 MMOL/L (ref 98–107)
CO2 SERPL-SCNC: 26 MMOL/L (ref 23–31)
CREAT SERPL-MCNC: 0.87 MG/DL (ref 0.55–1.02)
EOSINOPHIL # BLD AUTO: 0.2 10*3/UL (ref 0–0.9)
EOSINOPHIL NFR BLD AUTO: 2.7 %
ERYTHROCYTE [DISTWIDTH] IN BLOOD BY AUTOMATED COUNT: 12 % (ref 11.5–17)
GLOBULIN SER-MCNC: 3.7 G/DL (ref 2.4–3.5)
GLUCOSE SERPL-MCNC: 143 MG/DL (ref 82–115)
HCT VFR BLD AUTO: 38.1 % (ref 37–47)
HEMOLYSIS INTERF INDEX SERPL-ACNC: 10
HGB BLD-MCNC: 12.1 G/DL (ref 12–16)
ICTERIC INTERF INDEX SERPL-ACNC: 0
LIPEMIC INTERF INDEX SERPL-ACNC: 13
LYMPHOCYTES # BLD AUTO: 2.7 10*3/UL (ref 0.6–4.6)
LYMPHOCYTES NFR BLD AUTO: 45.1 %
MANUAL DIFF? (OHS): NO
MCH RBC QN AUTO: 32.3 PG (ref 27–31)
MCHC RBC AUTO-ENTMCNC: 31.8 G/DL (ref 33–36)
MCV RBC AUTO: 101.6 FL (ref 80–94)
MONOCYTES # BLD AUTO: 0.3 10*3/UL (ref 0.1–1.3)
MONOCYTES NFR BLD AUTO: 5.7 %
NEUTROPHILS # BLD AUTO: 2.7 10*3/UL (ref 2.1–9.2)
NEUTROPHILS NFR BLD AUTO: 45.3 %
PLATELET # BLD AUTO: 221 10*3/UL (ref 130–400)
PMV BLD AUTO: 11 FL (ref 9.4–12.4)
POTASSIUM SERPL-SCNC: 3.8 MMOL/L (ref 3.5–5.1)
PROT SERPL-MCNC: 8 G/DL (ref 5.8–7.6)
RBC # BLD AUTO: 3.75 10*6/UL (ref 4.2–5.4)
SODIUM SERPL-SCNC: 140 MMOL/L (ref 136–145)
WBC # SPEC AUTO: 5.9 10*3/UL (ref 4.5–11.5)

## 2022-02-03 ENCOUNTER — HISTORICAL (OUTPATIENT)
Dept: ADMINISTRATIVE | Facility: HOSPITAL | Age: 62
End: 2022-02-03

## 2022-02-03 ENCOUNTER — HISTORICAL (OUTPATIENT)
Dept: PREADMISSION TESTING | Facility: HOSPITAL | Age: 62
End: 2022-02-03

## 2022-02-04 LAB — SARS-COV-2 RNA RESP QL NAA+PROBE: NOT DETECTED

## 2022-02-07 ENCOUNTER — HISTORICAL (OUTPATIENT)
Dept: SURGERY | Facility: HOSPITAL | Age: 62
End: 2022-02-07

## 2022-02-07 LAB
CBG: 66 (ref 70–115)
CBG: 71 (ref 70–115)

## 2022-02-10 ENCOUNTER — HISTORICAL (OUTPATIENT)
Dept: INFUSION THERAPY | Facility: HOSPITAL | Age: 62
End: 2022-02-10

## 2022-02-11 PROBLEM — E78.5 HYPERLIPIDEMIA: Status: ACTIVE | Noted: 2022-02-11

## 2022-02-11 PROBLEM — M85.80 OSTEOPENIA: Status: ACTIVE | Noted: 2022-02-11

## 2022-02-11 PROBLEM — C50.319 MALIGNANT NEOPLASM OF LOWER-INNER QUADRANT OF FEMALE BREAST: Status: ACTIVE | Noted: 2022-02-11

## 2022-02-11 PROBLEM — I10 ESSENTIAL HYPERTENSION: Status: ACTIVE | Noted: 2022-02-11

## 2022-02-11 PROBLEM — E11.65 TYPE 2 DIABETES MELLITUS WITH HYPERGLYCEMIA, WITHOUT LONG-TERM CURRENT USE OF INSULIN: Status: ACTIVE | Noted: 2022-02-11

## 2022-02-11 PROBLEM — Z85.3 HISTORY OF BREAST CANCER: Status: ACTIVE | Noted: 2022-02-11

## 2022-02-17 ENCOUNTER — HISTORICAL (OUTPATIENT)
Dept: INFUSION THERAPY | Facility: HOSPITAL | Age: 62
End: 2022-02-17

## 2022-02-17 LAB
ABS NEUT (OLG): 6.62 (ref 2.1–9.2)
ALBUMIN SERPL-MCNC: 3.1 G/DL (ref 3.4–4.8)
ALBUMIN/GLOB SERPL: 0.8 {RATIO} (ref 1.1–2)
ALP SERPL-CCNC: 94 U/L (ref 40–150)
ALT SERPL-CCNC: 23 U/L (ref 0–55)
AST SERPL-CCNC: 21 U/L (ref 5–34)
BASOPHILS # BLD AUTO: 0 10*3/UL (ref 0–0.2)
BASOPHILS NFR BLD AUTO: 0.1 %
BILIRUB SERPL-MCNC: 0.4 MG/DL
BILIRUBIN DIRECT+TOT PNL SERPL-MCNC: 0.2 (ref 0–0.5)
BILIRUBIN DIRECT+TOT PNL SERPL-MCNC: 0.2 (ref 0–0.8)
BUN SERPL-MCNC: 26.8 MG/DL (ref 9.8–20.1)
CALCIUM SERPL-MCNC: 9.7 MG/DL (ref 8.7–10.5)
CHLORIDE SERPL-SCNC: 101 MMOL/L (ref 98–107)
CO2 SERPL-SCNC: 20 MMOL/L (ref 23–31)
CREAT SERPL-MCNC: 0.96 MG/DL (ref 0.55–1.02)
EOSINOPHIL # BLD AUTO: 0 10*3/UL (ref 0–0.9)
EOSINOPHIL NFR BLD AUTO: 0.2 %
ERYTHROCYTE [DISTWIDTH] IN BLOOD BY AUTOMATED COUNT: 11.8 % (ref 11.5–17)
GLOBULIN SER-MCNC: 3.7 G/DL (ref 2.4–3.5)
GLUCOSE SERPL-MCNC: 245 MG/DL (ref 82–115)
HCT VFR BLD AUTO: 35.6 % (ref 37–47)
HEMOLYSIS INTERF INDEX SERPL-ACNC: 4
HGB BLD-MCNC: 11.8 G/DL (ref 12–16)
ICTERIC INTERF INDEX SERPL-ACNC: 1
LIPEMIC INTERF INDEX SERPL-ACNC: 2
LYMPHOCYTES # BLD AUTO: 1.2 10*3/UL (ref 0.6–4.6)
LYMPHOCYTES NFR BLD AUTO: 11.9 %
MANUAL DIFF? (OHS): YES
MCH RBC QN AUTO: 32.5 PG (ref 27–31)
MCHC RBC AUTO-ENTMCNC: 33.1 G/DL (ref 33–36)
MCV RBC AUTO: 98.1 FL (ref 80–94)
MONOCYTES # BLD AUTO: 1.7 10*3/UL (ref 0.1–1.3)
MONOCYTES NFR BLD AUTO: 16.5 %
NEUTROPHILS # BLD AUTO: 6.6 10*3/UL (ref 2.1–9.2)
NEUTROPHILS NFR BLD AUTO: 63.5 %
PLATELET # BLD AUTO: 197 10*3/UL (ref 130–400)
PMV BLD AUTO: 12.2 FL (ref 9.4–12.4)
POTASSIUM SERPL-SCNC: 4.6 MMOL/L (ref 3.5–5.1)
PROT SERPL-MCNC: 6.8 G/DL (ref 5.8–7.6)
RBC # BLD AUTO: 3.63 10*6/UL (ref 4.2–5.4)
SODIUM SERPL-SCNC: 137 MMOL/L (ref 136–145)
WBC # SPEC AUTO: 10.4 10*3/UL (ref 4.5–11.5)

## 2022-02-24 ENCOUNTER — HISTORICAL (OUTPATIENT)
Dept: HEMATOLOGY/ONCOLOGY | Facility: CLINIC | Age: 62
End: 2022-02-24

## 2022-02-24 LAB
ABS NEUT (OLG): 11.18 (ref 2.1–9.2)
ALBUMIN SERPL-MCNC: 3.2 G/DL (ref 3.4–4.8)
ALBUMIN/GLOB SERPL: 1 {RATIO} (ref 1.1–2)
ALP SERPL-CCNC: 147 U/L (ref 40–150)
ALT SERPL-CCNC: 23 U/L (ref 0–55)
AST SERPL-CCNC: 17 U/L (ref 5–34)
BASOPHILS # BLD AUTO: 0.1 10*3/UL (ref 0–0.2)
BASOPHILS NFR BLD AUTO: 0.5 %
BILIRUB SERPL-MCNC: 0.2 MG/DL
BILIRUBIN DIRECT+TOT PNL SERPL-MCNC: 0.1 (ref 0–0.5)
BILIRUBIN DIRECT+TOT PNL SERPL-MCNC: 0.1 (ref 0–0.8)
BUN SERPL-MCNC: 8.9 MG/DL (ref 9.8–20.1)
CALCIUM SERPL-MCNC: 10.2 MG/DL (ref 8.7–10.5)
CHLORIDE SERPL-SCNC: 105 MMOL/L (ref 98–107)
CO2 SERPL-SCNC: 27 MMOL/L (ref 23–31)
CREAT SERPL-MCNC: 0.69 MG/DL (ref 0.55–1.02)
EOSINOPHIL # BLD AUTO: 0 10*3/UL (ref 0–0.9)
EOSINOPHIL NFR BLD AUTO: 0 %
ERYTHROCYTE [DISTWIDTH] IN BLOOD BY AUTOMATED COUNT: 11.8 % (ref 11.5–17)
GLOBULIN SER-MCNC: 3.1 G/DL (ref 2.4–3.5)
GLUCOSE SERPL-MCNC: 161 MG/DL (ref 82–115)
HCT VFR BLD AUTO: 31.6 % (ref 37–47)
HEMOLYSIS INTERF INDEX SERPL-ACNC: 2
HGB BLD-MCNC: 10.2 G/DL (ref 12–16)
ICTERIC INTERF INDEX SERPL-ACNC: 0
LIPEMIC INTERF INDEX SERPL-ACNC: 9
LYMPHOCYTES # BLD AUTO: 2.2 10*3/UL (ref 0.6–4.6)
LYMPHOCYTES NFR BLD AUTO: 14.7 %
MANUAL DIFF? (OHS): NO
MCH RBC QN AUTO: 32.3 PG (ref 27–31)
MCHC RBC AUTO-ENTMCNC: 32.3 G/DL (ref 33–36)
MCV RBC AUTO: 100 FL (ref 80–94)
MONOCYTES # BLD AUTO: 0.8 10*3/UL (ref 0.1–1.3)
MONOCYTES NFR BLD AUTO: 5.1 %
NEUTROPHILS # BLD AUTO: 11.2 10*3/UL (ref 2.1–9.2)
NEUTROPHILS NFR BLD AUTO: 75.6 %
PLATELET # BLD AUTO: 184 10*3/UL (ref 130–400)
PMV BLD AUTO: 10.9 FL (ref 9.4–12.4)
POTASSIUM SERPL-SCNC: 4.7 MMOL/L (ref 3.5–5.1)
PROT SERPL-MCNC: 6.3 G/DL (ref 5.8–7.6)
RBC # BLD AUTO: 3.16 10*6/UL (ref 4.2–5.4)
SODIUM SERPL-SCNC: 140 MMOL/L (ref 136–145)
WBC # SPEC AUTO: 14.8 10*3/UL (ref 4.5–11.5)

## 2022-03-03 ENCOUNTER — HISTORICAL (OUTPATIENT)
Dept: INFUSION THERAPY | Facility: HOSPITAL | Age: 62
End: 2022-03-03

## 2022-03-03 LAB
ABS NEUT (OLG): 13.22 (ref 2.1–9.2)
ANION GAP SERPL CALC-SCNC: 16 MMOL/L
BASOPHILS # BLD AUTO: 0 10*3/UL (ref 0–0.2)
BASOPHILS NFR BLD AUTO: 0.1 %
BUN SERPL-MCNC: 15 MG/DL (ref 8–26)
CHLORIDE SERPL-SCNC: 102 MMOL/L (ref 98–109)
CREAT SERPL-MCNC: 0.6 MG/DL (ref 0.6–1.3)
EOSINOPHIL # BLD AUTO: 0 10*3/UL (ref 0–0.9)
EOSINOPHIL NFR BLD AUTO: 0 %
ERYTHROCYTE [DISTWIDTH] IN BLOOD BY AUTOMATED COUNT: 12.3 % (ref 11.5–17)
GLUCOSE SERPL-MCNC: 188 MG/DL (ref 70–105)
HCT VFR BLD AUTO: 32.8 % (ref 37–47)
HCT VFR BLD CALC: 34 % (ref 38–51)
HGB BLD-MCNC: 10.6 G/DL (ref 12–16)
HGB BLD-MCNC: 11.6 G/DL (ref 12–17)
LYMPHOCYTES # BLD AUTO: 1.3 10*3/UL (ref 0.6–4.6)
LYMPHOCYTES NFR BLD AUTO: 8.2 %
MANUAL DIFF? (OHS): NO
MCH RBC QN AUTO: 32.3 PG (ref 27–31)
MCHC RBC AUTO-ENTMCNC: 32.3 G/DL (ref 33–36)
MCV RBC AUTO: 100 FL (ref 80–94)
MONOCYTES # BLD AUTO: 0.8 10*3/UL (ref 0.1–1.3)
MONOCYTES NFR BLD AUTO: 4.9 %
NEUTROPHILS # BLD AUTO: 13.2 10*3/UL (ref 2.1–9.2)
NEUTROPHILS NFR BLD AUTO: 86 %
PLATELET # BLD AUTO: 426 10*3/UL (ref 130–400)
PMV BLD AUTO: 10.4 FL (ref 9.4–12.4)
POC IONIZED CALCIUM: 1.36 (ref 1.12–1.32)
POC TCO2: 25 (ref 24–29)
POTASSIUM BLD-SCNC: 4.4 MMOL/L (ref 3.5–4.9)
RBC # BLD AUTO: 3.28 10*6/UL (ref 4.2–5.4)
SODIUM BLD-SCNC: 137 MMOL/L (ref 138–146)
WBC # SPEC AUTO: 15.4 10*3/UL (ref 4.5–11.5)

## 2022-03-10 ENCOUNTER — HISTORICAL (OUTPATIENT)
Dept: ADMINISTRATIVE | Facility: HOSPITAL | Age: 62
End: 2022-03-10

## 2022-03-10 LAB
ABS NEUT (OLG): 4.78 (ref 2.1–9.2)
ALBUMIN SERPL-MCNC: 3.4 G/DL (ref 3.4–4.8)
ALBUMIN/GLOB SERPL: 1.1 {RATIO} (ref 1.1–2)
ALP SERPL-CCNC: 97 U/L (ref 40–150)
ALT SERPL-CCNC: 14 U/L (ref 0–55)
AST SERPL-CCNC: 16 U/L (ref 5–34)
BASOPHILS # BLD AUTO: 0 10*3/UL (ref 0–0.2)
BASOPHILS NFR BLD AUTO: 0.4 %
BILIRUB SERPL-MCNC: 0.4 MG/DL
BILIRUBIN DIRECT+TOT PNL SERPL-MCNC: 0.2 (ref 0–0.5)
BILIRUBIN DIRECT+TOT PNL SERPL-MCNC: 0.2 (ref 0–0.8)
BUN SERPL-MCNC: 11.1 MG/DL (ref 9.8–20.1)
CALCIUM SERPL-MCNC: 9.5 MG/DL (ref 8.7–10.5)
CHLORIDE SERPL-SCNC: 100 MMOL/L (ref 98–107)
CO2 SERPL-SCNC: 30 MMOL/L (ref 23–31)
CREAT SERPL-MCNC: 0.82 MG/DL (ref 0.55–1.02)
EOSINOPHIL # BLD AUTO: 0 10*3/UL (ref 0–0.9)
EOSINOPHIL NFR BLD AUTO: 0.3 %
ERYTHROCYTE [DISTWIDTH] IN BLOOD BY AUTOMATED COUNT: 12.3 % (ref 11.5–17)
GLOBULIN SER-MCNC: 3 G/DL (ref 2.4–3.5)
GLUCOSE SERPL-MCNC: 228 MG/DL (ref 82–115)
HCT VFR BLD AUTO: 32.5 % (ref 37–47)
HEMOLYSIS INTERF INDEX SERPL-ACNC: 10
HGB BLD-MCNC: 10.2 G/DL (ref 12–16)
ICTERIC INTERF INDEX SERPL-ACNC: 1
LIPEMIC INTERF INDEX SERPL-ACNC: 2
LYMPHOCYTES # BLD AUTO: 1.4 10*3/UL (ref 0.6–4.6)
LYMPHOCYTES NFR BLD AUTO: 15.3 %
MANUAL DIFF? (OHS): YES
MCH RBC QN AUTO: 31.7 PG (ref 27–31)
MCHC RBC AUTO-ENTMCNC: 31.4 G/DL (ref 33–36)
MCV RBC AUTO: 100.9 FL (ref 80–94)
MONOCYTES # BLD AUTO: 1.9 10*3/UL (ref 0.1–1.3)
MONOCYTES NFR BLD AUTO: 20.7 %
NEUTROPHILS # BLD AUTO: 4.8 10*3/UL (ref 2.1–9.2)
NEUTROPHILS NFR BLD AUTO: 52 %
PLATELET # BLD AUTO: 121 10*3/UL (ref 130–400)
PMV BLD AUTO: 12 FL (ref 9.4–12.4)
POTASSIUM SERPL-SCNC: 4.3 MMOL/L (ref 3.5–5.1)
PROT SERPL-MCNC: 6.4 G/DL (ref 5.8–7.6)
RBC # BLD AUTO: 3.22 10*6/UL (ref 4.2–5.4)
SODIUM SERPL-SCNC: 140 MMOL/L (ref 136–145)
WBC # SPEC AUTO: 9.2 10*3/UL (ref 4.5–11.5)

## 2022-03-11 DIAGNOSIS — Z17.0 MALIGNANT NEOPLASM OF LOWER-INNER QUADRANT OF RIGHT BREAST OF FEMALE, ESTROGEN RECEPTOR POSITIVE: ICD-10-CM

## 2022-03-11 DIAGNOSIS — C50.311 MALIGNANT NEOPLASM OF LOWER-INNER QUADRANT OF RIGHT BREAST OF FEMALE, ESTROGEN RECEPTOR POSITIVE: ICD-10-CM

## 2022-03-17 ENCOUNTER — HISTORICAL (OUTPATIENT)
Dept: HEMATOLOGY/ONCOLOGY | Facility: CLINIC | Age: 62
End: 2022-03-17

## 2022-03-17 LAB
ABS NEUT (OLG): 9.87 (ref 2.1–9.2)
ALBUMIN SERPL-MCNC: 3.6 G/DL (ref 3.4–4.8)
ALBUMIN/GLOB SERPL: 1.2 {RATIO} (ref 1.1–2)
ALP SERPL-CCNC: 152 U/L (ref 40–150)
ALT SERPL-CCNC: 19 U/L (ref 0–55)
AST SERPL-CCNC: 14 U/L (ref 5–34)
BASOPHILS # BLD AUTO: 0.1 10*3/UL (ref 0–0.2)
BASOPHILS NFR BLD AUTO: 0.8 %
BILIRUB SERPL-MCNC: 0.3 MG/DL
BILIRUBIN DIRECT+TOT PNL SERPL-MCNC: 0.1 (ref 0–0.5)
BILIRUBIN DIRECT+TOT PNL SERPL-MCNC: 0.2 (ref 0–0.8)
BUN SERPL-MCNC: 6.6 MG/DL (ref 9.8–20.1)
CALCIUM SERPL-MCNC: 9.1 MG/DL (ref 8.7–10.5)
CHLORIDE SERPL-SCNC: 106 MMOL/L (ref 98–107)
CO2 SERPL-SCNC: 26 MMOL/L (ref 23–31)
CREAT SERPL-MCNC: 0.73 MG/DL (ref 0.55–1.02)
EOSINOPHIL # BLD AUTO: 0 10*3/UL (ref 0–0.9)
EOSINOPHIL NFR BLD AUTO: 0.1 %
ERYTHROCYTE [DISTWIDTH] IN BLOOD BY AUTOMATED COUNT: 12.8 % (ref 11.5–17)
GLOBULIN SER-MCNC: 3.1 G/DL (ref 2.4–3.5)
GLUCOSE SERPL-MCNC: 168 MG/DL (ref 82–115)
HCT VFR BLD AUTO: 31 % (ref 37–47)
HEMOLYSIS INTERF INDEX SERPL-ACNC: 4
HGB BLD-MCNC: 9.7 G/DL (ref 12–16)
ICTERIC INTERF INDEX SERPL-ACNC: 0
LIPEMIC INTERF INDEX SERPL-ACNC: 2
LYMPHOCYTES # BLD AUTO: 1.7 10*3/UL (ref 0.6–4.6)
LYMPHOCYTES NFR BLD AUTO: 13.8 %
MANUAL DIFF? (OHS): NO
MCH RBC QN AUTO: 32.4 PG (ref 27–31)
MCHC RBC AUTO-ENTMCNC: 31.3 G/DL (ref 33–36)
MCV RBC AUTO: 103.7 FL (ref 80–94)
MONOCYTES # BLD AUTO: 0.6 10*3/UL (ref 0.1–1.3)
MONOCYTES NFR BLD AUTO: 4.6 %
NEUTROPHILS # BLD AUTO: 9.9 10*3/UL (ref 2.1–9.2)
NEUTROPHILS NFR BLD AUTO: 79.4 %
PLATELET # BLD AUTO: 160 10*3/UL (ref 130–400)
PMV BLD AUTO: 10.9 FL (ref 9.4–12.4)
POTASSIUM SERPL-SCNC: 4.4 MMOL/L (ref 3.5–5.1)
PROT SERPL-MCNC: 6.7 G/DL (ref 5.8–7.6)
RBC # BLD AUTO: 2.99 10*6/UL (ref 4.2–5.4)
SODIUM SERPL-SCNC: 141 MMOL/L (ref 136–145)
WBC # SPEC AUTO: 12.4 10*3/UL (ref 4.5–11.5)

## 2022-03-24 ENCOUNTER — HISTORICAL (OUTPATIENT)
Dept: INFUSION THERAPY | Facility: HOSPITAL | Age: 62
End: 2022-03-24

## 2022-03-24 LAB
ABS NEUT (OLG): 15 (ref 2.1–9.2)
ANION GAP SERPL CALC-SCNC: 16 MMOL/L
BASOPHILS # BLD AUTO: 0 10*3/UL (ref 0–0.2)
BASOPHILS NFR BLD AUTO: 0.1 %
BUN SERPL-MCNC: 16 MG/DL (ref 8–26)
CHLORIDE SERPL-SCNC: 103 MMOL/L (ref 98–109)
CREAT SERPL-MCNC: 0.6 MG/DL (ref 0.6–1.3)
EOSINOPHIL # BLD AUTO: 0 10*3/UL (ref 0–0.9)
EOSINOPHIL NFR BLD AUTO: 0 %
ERYTHROCYTE [DISTWIDTH] IN BLOOD BY AUTOMATED COUNT: 14.2 % (ref 11.5–17)
GLUCOSE SERPL-MCNC: 255 MG/DL (ref 70–105)
HCT VFR BLD AUTO: 31 % (ref 37–47)
HCT VFR BLD CALC: 32 % (ref 38–51)
HGB BLD-MCNC: 10.9 G/DL (ref 12–17)
HGB BLD-MCNC: 9.9 G/DL (ref 12–16)
LYMPHOCYTES # BLD AUTO: 1.2 10*3/UL (ref 0.6–4.6)
LYMPHOCYTES NFR BLD AUTO: 6.9 %
MANUAL DIFF? (OHS): NO
MCH RBC QN AUTO: 32.6 PG (ref 27–31)
MCHC RBC AUTO-ENTMCNC: 31.9 G/DL (ref 33–36)
MCV RBC AUTO: 102 FL (ref 80–94)
MONOCYTES # BLD AUTO: 1 10*3/UL (ref 0.1–1.3)
MONOCYTES NFR BLD AUTO: 5.5 %
NEUTROPHILS # BLD AUTO: 15 10*3/UL (ref 2.1–9.2)
NEUTROPHILS NFR BLD AUTO: 87 %
PLATELET # BLD AUTO: 382 10*3/UL (ref 130–400)
PMV BLD AUTO: 10.3 FL (ref 9.4–12.4)
POC IONIZED CALCIUM: 1.31 (ref 1.12–1.32)
POC TCO2: 25 (ref 24–29)
POTASSIUM BLD-SCNC: 4.3 MMOL/L (ref 3.5–4.9)
RBC # BLD AUTO: 3.04 10*6/UL (ref 4.2–5.4)
SODIUM BLD-SCNC: 139 MMOL/L (ref 138–146)
WBC # SPEC AUTO: 17.2 10*3/UL (ref 4.5–11.5)

## 2022-03-30 ENCOUNTER — HISTORICAL (OUTPATIENT)
Dept: RADIOLOGY | Facility: HOSPITAL | Age: 62
End: 2022-03-30

## 2022-03-30 ENCOUNTER — HISTORICAL (OUTPATIENT)
Dept: ADMINISTRATIVE | Facility: HOSPITAL | Age: 62
End: 2022-03-30

## 2022-03-30 LAB
BCS RECOMMENDATION EXT: NORMAL
BCS RECOMMENDATION EXT: NORMAL

## 2022-03-31 ENCOUNTER — HISTORICAL (OUTPATIENT)
Dept: ADMINISTRATIVE | Facility: HOSPITAL | Age: 62
End: 2022-03-31

## 2022-03-31 LAB
ABS NEUT (OLG): 7.37 (ref 2.1–9.2)
ALBUMIN SERPL-MCNC: 3.6 G/DL (ref 3.4–4.8)
ALBUMIN/GLOB SERPL: 1.2 {RATIO} (ref 1.1–2)
ALP SERPL-CCNC: 120 U/L (ref 40–150)
ALT SERPL-CCNC: 16 U/L (ref 0–55)
AST SERPL-CCNC: 15 U/L (ref 5–34)
BASOPHILS # BLD AUTO: 0 10*3/UL (ref 0–0.2)
BASOPHILS NFR BLD AUTO: 0.2 %
BILIRUB SERPL-MCNC: 0.3 MG/DL
BILIRUBIN DIRECT+TOT PNL SERPL-MCNC: 0.1 (ref 0–0.5)
BILIRUBIN DIRECT+TOT PNL SERPL-MCNC: 0.2 (ref 0–0.8)
BUN SERPL-MCNC: 10.6 MG/DL (ref 9.8–20.1)
CALCIUM SERPL-MCNC: 9.8 MG/DL (ref 8.7–10.5)
CHLORIDE SERPL-SCNC: 101 MMOL/L (ref 98–107)
CO2 SERPL-SCNC: 26 MMOL/L (ref 23–31)
CREAT SERPL-MCNC: 0.81 MG/DL (ref 0.55–1.02)
EOSINOPHIL # BLD AUTO: 0 10*3/UL (ref 0–0.9)
EOSINOPHIL NFR BLD AUTO: 0.2 %
ERYTHROCYTE [DISTWIDTH] IN BLOOD BY AUTOMATED COUNT: 14.2 % (ref 11.5–17)
GLOBULIN SER-MCNC: 2.9 G/DL (ref 2.4–3.5)
GLUCOSE SERPL-MCNC: 243 MG/DL (ref 82–115)
HCT VFR BLD AUTO: 30.4 % (ref 37–47)
HEMOLYSIS INTERF INDEX SERPL-ACNC: 4
HGB BLD-MCNC: 9.6 G/DL (ref 12–16)
ICTERIC INTERF INDEX SERPL-ACNC: 1
LIPEMIC INTERF INDEX SERPL-ACNC: 7
LYMPHOCYTES # BLD AUTO: 1.8 10*3/UL (ref 0.6–4.6)
LYMPHOCYTES NFR BLD AUTO: 13.4 %
MANUAL DIFF? (OHS): NO
MCH RBC QN AUTO: 32.4 PG (ref 27–31)
MCHC RBC AUTO-ENTMCNC: 31.6 G/DL (ref 33–36)
MCV RBC AUTO: 102.7 FL (ref 80–94)
MONOCYTES # BLD AUTO: 2.8 10*3/UL (ref 0.1–1.3)
MONOCYTES NFR BLD AUTO: 20.9 %
NEUTROPHILS # BLD AUTO: 7.4 10*3/UL (ref 2.1–9.2)
NEUTROPHILS NFR BLD AUTO: 55.1 %
PLATELET # BLD AUTO: 161 10*3/UL (ref 130–400)
PMV BLD AUTO: 11.8 FL (ref 9.4–12.4)
POTASSIUM SERPL-SCNC: 4.4 MMOL/L (ref 3.5–5.1)
PROT SERPL-MCNC: 6.5 G/DL (ref 5.8–7.6)
RBC # BLD AUTO: 2.96 10*6/UL (ref 4.2–5.4)
SODIUM SERPL-SCNC: 138 MMOL/L (ref 136–145)
WBC # SPEC AUTO: 13.4 10*3/UL (ref 4.5–11.5)

## 2022-04-07 ENCOUNTER — HISTORICAL (OUTPATIENT)
Dept: HEMATOLOGY/ONCOLOGY | Facility: CLINIC | Age: 62
End: 2022-04-07

## 2022-04-07 LAB
ABS NEUT (OLG): 7.08 (ref 2.1–9.2)
ALBUMIN SERPL-MCNC: 3.6 G/DL (ref 3.4–4.8)
ALBUMIN/GLOB SERPL: 1.2 {RATIO} (ref 1.1–2)
ALP SERPL-CCNC: 167 U/L (ref 40–150)
ALT SERPL-CCNC: 17 U/L (ref 0–55)
AST SERPL-CCNC: 15 U/L (ref 5–34)
BASOPHILS # BLD AUTO: 0.1 10*3/UL (ref 0–0.2)
BASOPHILS NFR BLD AUTO: 0.8 %
BILIRUB SERPL-MCNC: 0.3 MG/DL
BILIRUBIN DIRECT+TOT PNL SERPL-MCNC: 0.1 (ref 0–0.5)
BILIRUBIN DIRECT+TOT PNL SERPL-MCNC: 0.2 (ref 0–0.8)
BUN SERPL-MCNC: 5.9 MG/DL (ref 9.8–20.1)
CALCIUM SERPL-MCNC: 9.3 MG/DL (ref 8.7–10.5)
CHLORIDE SERPL-SCNC: 102 MMOL/L (ref 98–107)
CO2 SERPL-SCNC: 27 MMOL/L (ref 23–31)
CREAT SERPL-MCNC: 0.72 MG/DL (ref 0.55–1.02)
EOSINOPHIL # BLD AUTO: 0 10*3/UL (ref 0–0.9)
EOSINOPHIL NFR BLD AUTO: 0.1 %
ERYTHROCYTE [DISTWIDTH] IN BLOOD BY AUTOMATED COUNT: 14.5 % (ref 11.5–17)
GLOBULIN SER-MCNC: 2.9 G/DL (ref 2.4–3.5)
GLUCOSE SERPL-MCNC: 168 MG/DL (ref 82–115)
HCT VFR BLD AUTO: 28.7 % (ref 37–47)
HEMOLYSIS INTERF INDEX SERPL-ACNC: <0
HGB BLD-MCNC: 9.1 G/DL (ref 12–16)
ICTERIC INTERF INDEX SERPL-ACNC: 1
LIPEMIC INTERF INDEX SERPL-ACNC: 2
LYMPHOCYTES # BLD AUTO: 1.4 10*3/UL (ref 0.6–4.6)
LYMPHOCYTES NFR BLD AUTO: 15 %
MANUAL DIFF? (OHS): NO
MCH RBC QN AUTO: 32.6 PG (ref 27–31)
MCHC RBC AUTO-ENTMCNC: 31.7 G/DL (ref 33–36)
MCV RBC AUTO: 102.9 FL (ref 80–94)
MONOCYTES # BLD AUTO: 0.5 10*3/UL (ref 0.1–1.3)
MONOCYTES NFR BLD AUTO: 5.8 %
NEUTROPHILS # BLD AUTO: 7.1 10*3/UL (ref 2.1–9.2)
NEUTROPHILS NFR BLD AUTO: 77.2 %
PLATELET # BLD AUTO: 159 10*3/UL (ref 130–400)
PMV BLD AUTO: 10.3 FL (ref 9.4–12.4)
POTASSIUM SERPL-SCNC: 4.3 MMOL/L (ref 3.5–5.1)
PROT SERPL-MCNC: 6.5 G/DL (ref 5.8–7.6)
RBC # BLD AUTO: 2.79 10*6/UL (ref 4.2–5.4)
SODIUM SERPL-SCNC: 139 MMOL/L (ref 136–145)
WBC # SPEC AUTO: 9.2 10*3/UL (ref 4.5–11.5)

## 2022-04-11 ENCOUNTER — HISTORICAL (OUTPATIENT)
Dept: ADMINISTRATIVE | Facility: HOSPITAL | Age: 62
End: 2022-04-11
Payer: COMMERCIAL

## 2022-04-14 ENCOUNTER — HISTORICAL (OUTPATIENT)
Dept: INFUSION THERAPY | Facility: HOSPITAL | Age: 62
End: 2022-04-14
Payer: COMMERCIAL

## 2022-04-14 LAB
ABS NEUT (OLG): 7.46 (ref 2.1–9.2)
ANION GAP SERPL CALC-SCNC: 16 MMOL/L
BASOPHILS # BLD AUTO: 0 10*3/UL (ref 0–0.2)
BASOPHILS NFR BLD AUTO: 0.1 %
BUN SERPL-MCNC: 12 MG/DL (ref 8–26)
CHLORIDE SERPL-SCNC: 105 MMOL/L (ref 98–109)
CREAT SERPL-MCNC: 0.6 MG/DL (ref 0.6–1.3)
EOSINOPHIL # BLD AUTO: 0 10*3/UL (ref 0–0.9)
EOSINOPHIL NFR BLD AUTO: 0 %
ERYTHROCYTE [DISTWIDTH] IN BLOOD BY AUTOMATED COUNT: 15.6 % (ref 11.5–17)
GLUCOSE SERPL-MCNC: 268 MG/DL (ref 70–105)
HCT VFR BLD AUTO: 44.1 % (ref 37–47)
HCT VFR BLD CALC: 29 % (ref 38–51)
HGB BLD-MCNC: 13.8 G/DL (ref 12–16)
HGB BLD-MCNC: 9.9 G/DL (ref 12–17)
LYMPHOCYTES # BLD AUTO: 0.7 10*3/UL (ref 0.6–4.6)
LYMPHOCYTES NFR BLD AUTO: 8.5 %
MANUAL DIFF? (OHS): NO
MCH RBC QN AUTO: 32.1 PG (ref 27–31)
MCHC RBC AUTO-ENTMCNC: 31.3 G/DL (ref 33–36)
MCV RBC AUTO: 102.6 FL (ref 80–94)
MONOCYTES # BLD AUTO: 0.4 10*3/UL (ref 0.1–1.3)
MONOCYTES NFR BLD AUTO: 5.2 %
NEUTROPHILS # BLD AUTO: 7.5 10*3/UL (ref 2.1–9.2)
NEUTROPHILS NFR BLD AUTO: 85.6 %
PLATELET # BLD AUTO: 240 10*3/UL (ref 130–400)
PMV BLD AUTO: 10.3 FL (ref 9.4–12.4)
POC IONIZED CALCIUM: 1.29 (ref 1.12–1.32)
POC TCO2: 24 (ref 24–29)
POTASSIUM BLD-SCNC: 4.1 MMOL/L (ref 3.5–4.9)
RBC # BLD AUTO: 4.3 10*6/UL (ref 4.2–5.4)
SODIUM BLD-SCNC: 139 MMOL/L (ref 138–146)
WBC # SPEC AUTO: 8.7 10*3/UL (ref 4.5–11.5)

## 2022-04-21 ENCOUNTER — HISTORICAL (OUTPATIENT)
Dept: ADMINISTRATIVE | Facility: HOSPITAL | Age: 62
End: 2022-04-21
Payer: COMMERCIAL

## 2022-04-21 LAB
ABS NEUT (OLG): 5.31 (ref 2.1–9.2)
ALBUMIN SERPL-MCNC: 3.6 G/DL (ref 3.4–4.8)
ALBUMIN/GLOB SERPL: 1.3 {RATIO} (ref 1.1–2)
ALP SERPL-CCNC: 112 U/L (ref 40–150)
ALT SERPL-CCNC: 16 U/L (ref 0–55)
AST SERPL-CCNC: 14 U/L (ref 5–34)
BASOPHILS # BLD AUTO: 0 10*3/UL (ref 0–0.2)
BASOPHILS NFR BLD AUTO: 0.4 %
BILIRUB SERPL-MCNC: 0.3 MG/DL
BILIRUBIN DIRECT+TOT PNL SERPL-MCNC: 0.1 (ref 0–0.5)
BILIRUBIN DIRECT+TOT PNL SERPL-MCNC: 0.2 (ref 0–0.8)
BUN SERPL-MCNC: 8.4 MG/DL (ref 9.8–20.1)
CALCIUM SERPL-MCNC: 9.8 MG/DL (ref 8.7–10.5)
CHLORIDE SERPL-SCNC: 102 MMOL/L (ref 98–107)
CO2 SERPL-SCNC: 29 MMOL/L (ref 23–31)
CREAT SERPL-MCNC: 0.74 MG/DL (ref 0.55–1.02)
EOSINOPHIL # BLD AUTO: 0 10*3/UL (ref 0–0.9)
EOSINOPHIL NFR BLD AUTO: 0.3 %
ERYTHROCYTE [DISTWIDTH] IN BLOOD BY AUTOMATED COUNT: 14.9 % (ref 11.5–17)
GLOBULIN SER-MCNC: 2.7 G/DL (ref 2.4–3.5)
GLUCOSE SERPL-MCNC: 156 MG/DL (ref 82–115)
HCT VFR BLD AUTO: 28.1 % (ref 37–47)
HEMOLYSIS INTERF INDEX SERPL-ACNC: <0
HGB BLD-MCNC: 8.8 G/DL (ref 12–16)
ICTERIC INTERF INDEX SERPL-ACNC: 0
LIPEMIC INTERF INDEX SERPL-ACNC: 1
LYMPHOCYTES # BLD AUTO: 1.4 10*3/UL (ref 0.6–4.6)
LYMPHOCYTES NFR BLD AUTO: 14 %
MANUAL DIFF? (OHS): NO
MCH RBC QN AUTO: 32.2 PG (ref 27–31)
MCHC RBC AUTO-ENTMCNC: 31.3 G/DL (ref 33–36)
MCV RBC AUTO: 102.9 FL (ref 80–94)
MONOCYTES # BLD AUTO: 2.5 10*3/UL (ref 0.1–1.3)
MONOCYTES NFR BLD AUTO: 24.1 %
NEUTROPHILS # BLD AUTO: 5.3 10*3/UL (ref 2.1–9.2)
NEUTROPHILS NFR BLD AUTO: 51.7 %
PLATELET # BLD AUTO: 161 10*3/UL (ref 130–400)
PMV BLD AUTO: 11 FL (ref 9.4–12.4)
POTASSIUM SERPL-SCNC: 4.1 MMOL/L (ref 3.5–5.1)
PROT SERPL-MCNC: 6.3 G/DL (ref 5.8–7.6)
RBC # BLD AUTO: 2.73 10*6/UL (ref 4.2–5.4)
SODIUM SERPL-SCNC: 140 MMOL/L (ref 136–145)
WBC # SPEC AUTO: 10.3 10*3/UL (ref 4.5–11.5)

## 2022-04-27 VITALS
SYSTOLIC BLOOD PRESSURE: 153 MMHG | BODY MASS INDEX: 32.09 KG/M2 | OXYGEN SATURATION: 99 % | WEIGHT: 174.38 LBS | DIASTOLIC BLOOD PRESSURE: 80 MMHG | HEIGHT: 62 IN

## 2022-04-28 ENCOUNTER — HISTORICAL (OUTPATIENT)
Dept: HEMATOLOGY/ONCOLOGY | Facility: CLINIC | Age: 62
End: 2022-04-28
Payer: COMMERCIAL

## 2022-04-28 LAB
ABS NEUT (OLG): 7.59 (ref 2.1–9.2)
ALBUMIN SERPL-MCNC: 3.7 G/DL (ref 3.4–4.8)
ALBUMIN/GLOB SERPL: 1.3 {RATIO} (ref 1.1–2)
ALP SERPL-CCNC: 154 U/L (ref 40–150)
ALT SERPL-CCNC: 17 U/L (ref 0–55)
AST SERPL-CCNC: 14 U/L (ref 5–34)
BASOPHILS # BLD AUTO: 0.1 10*3/UL (ref 0–0.2)
BASOPHILS NFR BLD AUTO: 0.7 %
BILIRUB SERPL-MCNC: 0.3 MG/DL
BILIRUBIN DIRECT+TOT PNL SERPL-MCNC: 0.1 (ref 0–0.5)
BILIRUBIN DIRECT+TOT PNL SERPL-MCNC: 0.2 (ref 0–0.8)
BUN SERPL-MCNC: 7.5 MG/DL (ref 9.8–20.1)
CALCIUM SERPL-MCNC: 9.1 MG/DL (ref 8.7–10.5)
CHLORIDE SERPL-SCNC: 106 MMOL/L (ref 98–107)
CO2 SERPL-SCNC: 27 MMOL/L (ref 23–31)
CREAT SERPL-MCNC: 0.76 MG/DL (ref 0.55–1.02)
EOSINOPHIL # BLD AUTO: 0 10*3/UL (ref 0–0.9)
EOSINOPHIL NFR BLD AUTO: 0.1 %
ERYTHROCYTE [DISTWIDTH] IN BLOOD BY AUTOMATED COUNT: 15 % (ref 11.5–17)
GLOBULIN SER-MCNC: 2.8 G/DL (ref 2.4–3.5)
GLUCOSE SERPL-MCNC: 154 MG/DL (ref 82–115)
HCT VFR BLD AUTO: 28.7 % (ref 37–47)
HEMOLYSIS INTERF INDEX SERPL-ACNC: 2
HGB BLD-MCNC: 8.9 G/DL (ref 12–16)
ICTERIC INTERF INDEX SERPL-ACNC: 1
LIPEMIC INTERF INDEX SERPL-ACNC: 4
LYMPHOCYTES # BLD AUTO: 1.5 10*3/UL (ref 0.6–4.6)
LYMPHOCYTES NFR BLD AUTO: 14.9 %
MANUAL DIFF? (OHS): NO
MCH RBC QN AUTO: 32.8 PG (ref 27–31)
MCHC RBC AUTO-ENTMCNC: 31 G/DL (ref 33–36)
MCV RBC AUTO: 105.9 FL (ref 80–94)
MONOCYTES # BLD AUTO: 0.6 10*3/UL (ref 0.1–1.3)
MONOCYTES NFR BLD AUTO: 6.5 %
NEUTROPHILS # BLD AUTO: 7.6 10*3/UL (ref 2.1–9.2)
NEUTROPHILS NFR BLD AUTO: 76.7 %
PLATELET # BLD AUTO: 171 10*3/UL (ref 130–400)
PMV BLD AUTO: 10.5 FL (ref 9.4–12.4)
POTASSIUM SERPL-SCNC: 4.3 MMOL/L (ref 3.5–5.1)
PROT SERPL-MCNC: 6.5 G/DL (ref 5.8–7.6)
RBC # BLD AUTO: 2.71 10*6/UL (ref 4.2–5.4)
SODIUM SERPL-SCNC: 141 MMOL/L (ref 136–145)
WBC # SPEC AUTO: 9.9 10*3/UL (ref 4.5–11.5)

## 2022-04-29 DIAGNOSIS — Z17.0 MALIGNANT NEOPLASM OF LOWER-INNER QUADRANT OF RIGHT BREAST OF FEMALE, ESTROGEN RECEPTOR POSITIVE: Primary | ICD-10-CM

## 2022-04-29 DIAGNOSIS — C50.311 MALIGNANT NEOPLASM OF LOWER-INNER QUADRANT OF RIGHT BREAST OF FEMALE, ESTROGEN RECEPTOR POSITIVE: Primary | ICD-10-CM

## 2022-04-29 RX ORDER — HEPARIN 100 UNIT/ML
500 SYRINGE INTRAVENOUS
Status: CANCELLED | OUTPATIENT
Start: 2022-05-05

## 2022-04-29 RX ORDER — SODIUM CHLORIDE 0.9 % (FLUSH) 0.9 %
10 SYRINGE (ML) INJECTION
Status: CANCELLED | OUTPATIENT
Start: 2022-05-05

## 2022-04-29 RX ORDER — FAMOTIDINE 10 MG/ML
20 INJECTION INTRAVENOUS
Status: CANCELLED
Start: 2022-05-05

## 2022-04-29 RX ORDER — DIPHENHYDRAMINE HYDROCHLORIDE 50 MG/ML
25 INJECTION INTRAMUSCULAR; INTRAVENOUS
Status: CANCELLED
Start: 2022-05-05

## 2022-04-30 NOTE — PROGRESS NOTES
Patient:   Delia Scruggs            MRN: [id number]            FIN: [id number]               Age:   56 years     Sex:  Female     :  1960   Associated Diagnoses:   Alkaline phosphatase elevation; Anemia; Estrogen receptor positive; Malignant neoplasm of lower-inner quadrant of right female breast; Chemotherapy induced neutropenia   Author:   Jorge Herman      Visit Information      Surgeon: Dr. Sd Baltazar  Radiation oncologist: Dr. Browne Prellop    Right Breast Cancer Stage IIA (T2N0M0) diagnosed 17  Biopsy/pathology: Right breast mass biopsy done 17--invasive ductal carcinoma, grade III with focal DCIS, ER 25.72%, [place] 0.28% negative, Her2 1+ negative by IHC. Oncotype DX testing showed recurrence score of 43 (distant recurrence risk 40% with Tamoxifen alone and 12% Tamoxifen + chemo), PCR done for breast panel showed triple negative.  Surgery/pathology: Right breast needle localization lumpectomy and SLN biopsy done 17--invasive mammary carcinoma with papillary and ductal features, grade III, 2.2cm, no lymphovascular or perineural invasion, margins clear, few foci of non-invasive predominantly solid papillary carcinoma also identified, 2 sentinel lymph nodes negative, closest margin medial, re-excision of medial margin with focal residual invasive papillary carcinoma <0.1cm, now free.  Imagin. Screening MMG 16--asymmetry medially right breast.  2. Diagnostic MMG right breast 16--asymmetrical density located posteriorly in lower inner quadrant of right breast, suggestion of underlying clearly circumscribed 10mm rounded density w/n area on spot compression.  3. US right breast 16--focal mass 4:00 8cm from nipple hypoechoic and hetrogensous with irregular margins 1.7X1.1X1.4cm BIRADS 5.  4. CT A/P 17--fluid in resection cavity right breast, no evidence for metastatic disease, vague area of enhancement right hepatic low, not typical of  metastatic disease, consider liver mass protocol MRI.  5. MRI abdomen/liver protocol done 3/7/17--hepatic lesion in question is most c/w focal nodular hyperplasia.    2DECHO 3/6/17--EF 60-65%.  Mediport placement by Dr. Baltazar on 3/23/17    Genetic testing on 3/21/17 was negative.    Treatment History:   1. DDAC x 4 doses. Started 4/3/17. Completed on 5/15/17.     Treatment plan:    1. Weekly Taxol x 12. Started on 5/30/17.   Due for Cycle # 3 on 6/13/17. Dexamethasone reduced to 4mg s/t hyperglycemia.   2. Adjuvant radiation therapy.   3. Adjuvant endocrine therapy--low ER positivity.      Visit type:  Scheduled follow-up.    Accompanied by:  No one.       Chief Complaint   6/12/2017 13:23 CDT      No c/o        Interval History   Current complaint: Patient presents for follow-up. Started Taxol weekly on 5/30/17 and is due for Cycle # 3 tomorrow. Overall is tolerating treatment well aside from fatigue, poor taste, and decreased appetite. Patient has lost 15 lbs since starting treatment. She has some hyperpigmented areas on her tongue and some nail changes but theses are not bothersome. Her dexamethasone was decreased with each treatment cycle due to hyperglycemia. States her PCP started her on a new medication for her DM and her blood sugar levels have been much better. CMP is pending for today. Labs today show worsening anemia and she admits to worsening fatigue and HOGUE. Denies any n/v/c/d.      Review of Systems   Constitutional:  Fatigue, Weight loss, No fever, No chills, No weakness.    Eye:  No recent visual problem.    Ear/Nose/Mouth/Throat:  No decreased hearing, No nasal congestion, No sore throat.    Respiratory:  Exertional dyspnea, No shortness of breath, No cough, No wheezing.    Cardiovascular:  No chest pain, No palpitations, No peripheral edema.    Breast:  right breast s/p lumpectomy.    Gastrointestinal:  No nausea, No vomiting, No diarrhea, No constipation, No abdominal pain.     Hematology/Lymphatics:  No bruising tendency, No bleeding tendency.    Immunologic:  Chemotherapy.    Musculoskeletal:  No back pain, No joint pain.    Integumentary:  Hyperpigmentation to nails and face, No rash, No skin lesion.    Neurologic:  Alert and oriented X4, No confusion, No headache.    Psychiatric:  No anxiety, No depression.    All other systems are negative      Health Status   Allergies:    Allergic Reactions (Selected)  No Known Allergies   Current medications:  (Selected)   Documented Medications  Documented  Glyxambi 25 mg-5 mg oral tablet: 1 tab(s), Oral, Once, # 30 tab(s), 0 Refill(s)  LORazepam 1 mg oral tablet: See Instructions, 1 tab(s) Oral night before and morning of chemotherapy and every 8 hours PRN nausea or anxiety., 1 Refill(s)  Phenergan 25 mg Tab: 25 mg = 1 tab(s), Oral, q4hr, PRN PRN for nausea/vomiting, # 30 tab(s), 1 Refill(s)  Vitamin B Complex oral capsule: 1 cap(s), Oral, Daily, 0 Refill(s)  Vitamin B6 250 mg oral capsule: 1, Oral, Daily, 0 Refill(s)  Zofran 8 mg oral tablet: See Instructions, 1 tab(s) Oral TID for 3 days after chemotherapy, # 30, 4 Refill(s)  amlodipine-olmesartan 10 mg-40 mg oral tablet: 1 tab(s), Oral, Daily, # 30 tab(s), 0 Refill(s)  aspirin 81 mg oral tablet, CHEWABLE: 81 mg = 1 tab(s), Oral, Daily, # 30 tab(s), 0 Refill(s)  glutamine oral powder for reconstitution: 20 gm =, Oral, Daily, 0 Refill(s)  metformin 500 mg tablet: 500 mg = 1 tab(s), Oral, BID  metoprolol TARTrate 50 mg oral tablet (for Lopressor): 50 mg = 1 tab(s), Oral, Daily, # 60 tab(s), 0 Refill(s)  pravastatin 40 mg oral tablet: 40 mg = 1 tab(s), Oral, Daily, # 30 tab(s), 0 Refill(s)      Histories   Past Medical History:    No active or resolved past medical history items have been selected or recorded.   Family History:    Primary malignant neoplasm of breast  Sister  Diabetes mellitus type 2  Mother  Stroke  Father  CHF (congestive heart failure)....  Mother  CAD (coronary artery  disease)....  Father  Hypertension.  Mother  Sister     Procedure history:    Catheter Insertion Mediport (.) on 3/23/2017 at 56 Years.  Comments:  3/23/2017 08:03 - Jeffrey Montiel RN  auto-populated from documented surgical case  Biopsy Sentinal Node (Right) on 1/26/2017 at 56 Years.  Comments:  1/26/2017 10:55 - Day Berger RN  auto-populated from documented surgical case  Lumpectomy With Needle Placement (Right) on 1/26/2017 at 56 Years.  Comments:  1/26/2017 10:55 - Day Berger RN  auto-populated from documented surgical case  lanced boil on buttock.  colonoscopy.   Social History        Alcohol  Use: Current  Type: Wine    Employment/School  Status: Employed  Description: Phlebotomist at [employer]    Home/Environment  Lives with: Spouse  Comment: Pt is  and live at home with spouse.     Substance Abuse  Denies Substance Abuse    Tobacco  Denies Tobacco Use  Use: Never smoker.        Physical Examination   Vital Signs   6/12/2017 13:23 CDT      Temperature Oral          37.1 DegC                             Peripheral Pulse Rate     99 bpm                             SpO2                      95 %                             Systolic Blood Pressure   125 mmHg                             Diastolic Blood Pressure  70 mmHg     General:  Alert and oriented, No acute distress, very pleasant black female.    Eye:  Normal conjunctiva, Vision unchanged.    HENT:  Normocephalic, Normal hearing, Oral mucosa is moist.    Neck:  Supple, Non-tender, No jugular venous distention, No lymphadenopathy.    Respiratory:  Lungs are clear to auscultation, Respirations are non-labored, Breath sounds are equal.    Cardiovascular:  Normal rate, Regular rhythm, No gallop, Normal peripheral perfusion, No edema.         Systolic murmur: Consistent with an ejection, Intensity ( Grade II ).    Gastrointestinal:  Soft, Non-tender, Non-distended, Normal bowel sounds, No organomegaly.    Lymphatics:  No lymphadenopathy neck,  axilla, groin.    Musculoskeletal:  Normal range of motion, Normal strength, No deformity, Normal gait.    Integumentary:  Warm, Dry, Intact, Alopecia, Hyperpigmentation to nails, hands, and face.    Neurologic:  Alert, Oriented, Normal sensory, Normal motor function.    Psychiatric:  Cooperative, Appropriate mood & affect.    Breast:  Right breast with incision lower inner quadrant, with scar from wound now closed. Right axillary incision healed well. Left breast normal. No palpable masses. No axillary adenopathy palpable bilaterally.    Cognition and Speech:  Oriented, Speech clear and coherent.    ECOG Performance Scale: 1- Strenuous physical activity restricted; fully ambulatory and able to carry out light work.      Review / Management   Results review:  Lab results   6/12/2017 13:00 CDT      WBC                       5.3 x10(3)/mcL                             RBC                       2.56 x10(6)/mcL  LOW                             Hgb                       8.2 gm/dL  LOW                             Hct                       25.9 %  LOW                             Platelet                  320 x10(3)/mcL                             MCV                       101.2 fL  HI                             MCH                       32.0 pg  HI                             MCHC                      31.7 gm/dL  LOW                             RDW                       15.0 %                             MPV                       9.7 fL                             Abs Neut                  3.93 x10(3)/mcL                             NEUT%                     74.1 %  NA                             NEUT#                     3.9 x10(3)/mcL                             LYMPH%                    8.5 %  NA                             LYMPH#                    0.4 x10(3)/mcL  LOW                             MONO%                     14.2 %  NA                             MONO#                     0.8 x10(3)/mcL                              EOS%                      2.1 %  NA                             EOS#                      0.1 x10(3)/mcL                             BASO%                     1.1 %  NA                             BASO#                     0.1 x10(3)/mcL  .       Impression and Plan   Diagnosis     Alkaline phosphatase elevation (OQB74-PE R74.8).     Anemia (LRQ45-WX D64.9).     Estrogen receptor positive (ZCD61-MM Z17.0).     Malignant neoplasm of lower-inner quadrant of right female breast (PPV96-ZW C50.311).     Chemotherapy induced neutropenia (BHP84-JJ D70.1).     Plan:  Patient with breast cancer stage IIA, 2.2cm tumor, high grade, ER weakly positive 25% s/p right breast lumpectomy on 1/26/17. Lymph node negative.  Per NCCN guidelines, oncotype DX testing recommended.  Oncotype showed tumor is high risk and PCR breast panel showing triple negative.    Dr. Lala recommended treatment with dose dense AC x 4 followed by weekly Taxol x 12.  Wound was still not completely healed when treatment was started. Patient has high risk disease and was more than 2 months out from surgery. Delayed chemotherapy has been associated with worse outcomes in stage II and III disease as well as triple negative disease so decision was made to start treatment.  Her wound was cultured on 3/23/17 and the results were positive for Actinomyces. She was continued on her Amoxicillin x 2 more weeks and wound healed.    Patient completed her 4th cycle of DDAC on 5/15/17. Tolerated treatment well.   Started her 1st cycle of Taxol weekly on 5/30/17. Scheduled for Cycle # 3 tomorrow.   Labs show anemia worse. CMP pending. Patient becoming more symptomatic. Will type/crossmatch today and plan to transfuse 2 units of PRBCs tomorrow with treatment.    Will continue with decreased Dexamethasone with weekly Taxol due to hyperglycemia.  RTC for labs and treatment only on 6/20/17 (Cycle # 4) and 6/27/17 (Cycle # 5).  Follow-up in 3 weeks with labs prior to  Cycle # 6.   All questions answered at this time.     Patient was told to contact me with any problems before return to clinic.        RON Boswell

## 2022-04-30 NOTE — OP NOTE
Patient:   Delia Scruggs            MRN: [id number]            FIN: [id number]               Age:   60 years     Sex:  Female     :  1960   Associated Diagnoses:   None   Author:   Alejandrina Schmitt MD      Operative Note   Operative Information   Date/ Time:  2020 11:53:00.     Procedures Performed: Colonoscopy, high-risk.     Preoperative Diagnosis: Family history of colon cancer.     Postoperative Diagnosis: 1. Scattered diverticula, transverse and left colon.  2. Internal hemorrhoids, grade 2. Otherwise normal exam. .     Surgeon: Alejandrina Schmitt MD.     Assistant: GI staff.     Anesthesia: per anaesthesia service.     Esimated blood loss: No blood loss.     Description of Procedure/Findings/    Complications: History and physical as per pre-operative note. Informed consent was obtained. Patient was placed in left lateral position. Sedation per anaesthesia service. Rectal exam was unremarkable. Olympus video colonoscope was introduced into the rectum and was carefully advanced to the cecum. The quality of the preparation was satisfactory after irrigation. Patient tolerated the procedure well without any complications..     Findings: Scattered diverticula noted in transverse and left colon.  Photo documentation was obtained.  Cecum and ascending colon appeared normal.  Terminal ileum was intubated during procedure and appeared to be unremarkable.  Rectal mucosa appeared unremarkable except grade 2 internal hemorrhoids noted on withdrawal of the scope.  Estimated withdrawal time from cecum to rectum was 7 minutes and 52 seconds..     Complications: None.     Recommendations:  1.  High-fiber diet.    2.  Repeat colonoscopy in 5 years in view of family history of colon cancer..

## 2022-05-03 DIAGNOSIS — D70.1 LEUKOPENIA DUE TO ANTINEOPLASTIC CHEMOTHERAPY: ICD-10-CM

## 2022-05-03 DIAGNOSIS — T45.1X5A LEUKOPENIA DUE TO ANTINEOPLASTIC CHEMOTHERAPY: ICD-10-CM

## 2022-05-03 DIAGNOSIS — C50.919 MALIGNANT NEOPLASM OF FEMALE BREAST, UNSPECIFIED ESTROGEN RECEPTOR STATUS, UNSPECIFIED LATERALITY, UNSPECIFIED SITE OF BREAST: Primary | ICD-10-CM

## 2022-05-05 ENCOUNTER — LAB VISIT (OUTPATIENT)
Dept: LAB | Facility: HOSPITAL | Age: 62
End: 2022-05-05
Attending: INTERNAL MEDICINE
Payer: COMMERCIAL

## 2022-05-05 ENCOUNTER — INFUSION (OUTPATIENT)
Dept: INFUSION THERAPY | Facility: HOSPITAL | Age: 62
End: 2022-05-05
Attending: INTERNAL MEDICINE
Payer: COMMERCIAL

## 2022-05-05 VITALS
DIASTOLIC BLOOD PRESSURE: 69 MMHG | HEIGHT: 62 IN | WEIGHT: 168.88 LBS | HEART RATE: 96 BPM | SYSTOLIC BLOOD PRESSURE: 142 MMHG | BODY MASS INDEX: 31.08 KG/M2 | TEMPERATURE: 99 F | OXYGEN SATURATION: 99 % | RESPIRATION RATE: 20 BRPM

## 2022-05-05 DIAGNOSIS — T45.1X5A LEUKOPENIA DUE TO ANTINEOPLASTIC CHEMOTHERAPY: ICD-10-CM

## 2022-05-05 DIAGNOSIS — D70.1 LEUKOPENIA DUE TO ANTINEOPLASTIC CHEMOTHERAPY: ICD-10-CM

## 2022-05-05 DIAGNOSIS — Z17.0 MALIGNANT NEOPLASM OF LOWER-INNER QUADRANT OF RIGHT BREAST OF FEMALE, ESTROGEN RECEPTOR POSITIVE: Primary | ICD-10-CM

## 2022-05-05 DIAGNOSIS — C50.919 MALIGNANT NEOPLASM OF FEMALE BREAST, UNSPECIFIED ESTROGEN RECEPTOR STATUS, UNSPECIFIED LATERALITY, UNSPECIFIED SITE OF BREAST: ICD-10-CM

## 2022-05-05 DIAGNOSIS — C50.311 MALIGNANT NEOPLASM OF LOWER-INNER QUADRANT OF RIGHT BREAST OF FEMALE, ESTROGEN RECEPTOR POSITIVE: Primary | ICD-10-CM

## 2022-05-05 LAB
BASOPHILS # BLD AUTO: 0.02 X10(3)/MCL (ref 0–0.2)
BASOPHILS NFR BLD AUTO: 0.1 %
EOSINOPHIL # BLD AUTO: 0 X10(3)/MCL (ref 0–0.9)
EOSINOPHIL NFR BLD AUTO: 0 %
ERYTHROCYTE [DISTWIDTH] IN BLOOD BY AUTOMATED COUNT: 15.5 % (ref 11.5–17)
HCT VFR BLD AUTO: 29 % (ref 37–47)
HGB BLD-MCNC: 9.3 GM/DL (ref 12–16)
IMM GRANULOCYTES # BLD AUTO: 0.07 X10(3)/MCL (ref 0–0.02)
IMM GRANULOCYTES NFR BLD AUTO: 0.5 % (ref 0–0.43)
LYMPHOCYTES # BLD AUTO: 1.04 X10(3)/MCL (ref 0.6–4.6)
LYMPHOCYTES NFR BLD AUTO: 7.3 %
MCH RBC QN AUTO: 33.1 PG (ref 27–31)
MCHC RBC AUTO-ENTMCNC: 32.1 MG/DL (ref 33–36)
MCV RBC AUTO: 103.2 FL (ref 80–94)
MONOCYTES # BLD AUTO: 1.32 X10(3)/MCL (ref 0.1–1.3)
MONOCYTES NFR BLD AUTO: 9.2 %
NEUTROPHILS # BLD AUTO: 11.9 X10(3)/MCL (ref 2.1–9.2)
NEUTROPHILS NFR BLD AUTO: 82.9 %
PLATELET # BLD AUTO: 378 X10(3)/MCL (ref 130–400)
PMV BLD AUTO: 10 FL (ref 9.4–12.4)
RBC # BLD AUTO: 2.81 X10(6)/MCL (ref 4.2–5.4)
WBC # SPEC AUTO: 14.3 X10(3)/MCL (ref 4.5–11.5)

## 2022-05-05 PROCEDURE — 96368 THER/DIAG CONCURRENT INF: CPT

## 2022-05-05 PROCEDURE — 96374 THER/PROPH/DIAG INJ IV PUSH: CPT

## 2022-05-05 PROCEDURE — A4216 STERILE WATER/SALINE, 10 ML: HCPCS | Performed by: INTERNAL MEDICINE

## 2022-05-05 PROCEDURE — 36415 COLL VENOUS BLD VENIPUNCTURE: CPT

## 2022-05-05 PROCEDURE — 25000003 PHARM REV CODE 250: Performed by: INTERNAL MEDICINE

## 2022-05-05 PROCEDURE — 96417 CHEMO IV INFUS EACH ADDL SEQ: CPT

## 2022-05-05 PROCEDURE — 63600175 PHARM REV CODE 636 W HCPCS: Mod: JG | Performed by: INTERNAL MEDICINE

## 2022-05-05 PROCEDURE — 96413 CHEMO IV INFUSION 1 HR: CPT

## 2022-05-05 PROCEDURE — 96377 APPLICATON ON-BODY INJECTOR: CPT

## 2022-05-05 PROCEDURE — 85025 COMPLETE CBC W/AUTO DIFF WBC: CPT

## 2022-05-05 RX ORDER — DIPHENHYDRAMINE HYDROCHLORIDE 50 MG/ML
25 INJECTION INTRAMUSCULAR; INTRAVENOUS
Status: COMPLETED | OUTPATIENT
Start: 2022-05-05 | End: 2022-05-05

## 2022-05-05 RX ORDER — HEPARIN 100 UNIT/ML
500 SYRINGE INTRAVENOUS
Status: DISCONTINUED | OUTPATIENT
Start: 2022-05-05 | End: 2022-05-05 | Stop reason: HOSPADM

## 2022-05-05 RX ORDER — FAMOTIDINE 10 MG/ML
20 INJECTION INTRAVENOUS
Status: COMPLETED | OUTPATIENT
Start: 2022-05-05 | End: 2022-05-05

## 2022-05-05 RX ORDER — SODIUM CHLORIDE 0.9 % (FLUSH) 0.9 %
10 SYRINGE (ML) INJECTION
Status: DISCONTINUED | OUTPATIENT
Start: 2022-05-05 | End: 2022-05-05 | Stop reason: HOSPADM

## 2022-05-05 RX ADMIN — FOSAPREPITANT DIMEGLUMINE 150 MG: 150 INJECTION, POWDER, LYOPHILIZED, FOR SOLUTION INTRAVENOUS at 12:05

## 2022-05-05 RX ADMIN — DIPHENHYDRAMINE HYDROCHLORIDE 25 MG: 50 INJECTION INTRAMUSCULAR; INTRAVENOUS at 12:05

## 2022-05-05 RX ADMIN — Medication 10 ML: at 03:05

## 2022-05-05 RX ADMIN — HEPARIN 500 UNITS: 100 SYRINGE at 03:05

## 2022-05-05 RX ADMIN — FAMOTIDINE 20 MG: 10 INJECTION, SOLUTION INTRAVENOUS at 12:05

## 2022-05-05 RX ADMIN — PALONOSETRON 0.25 MG: 0.25 INJECTION, SOLUTION INTRAVENOUS at 12:05

## 2022-05-05 RX ADMIN — CYCLOPHOSPHAMIDE 1100 MG: 200 INJECTION, SOLUTION INTRAVENOUS at 01:05

## 2022-05-05 RX ADMIN — SODIUM CHLORIDE 1000 ML: 9 INJECTION, SOLUTION INTRAVENOUS at 12:05

## 2022-05-05 RX ADMIN — SODIUM CHLORIDE 135 MG: 0.9 INJECTION, SOLUTION INTRAVENOUS at 02:05

## 2022-05-05 RX ADMIN — PEGFILGRASTIM 6 MG: KIT SUBCUTANEOUS at 02:05

## 2022-05-06 PROBLEM — C50.319 MALIGNANT NEOPLASM OF LOWER-INNER QUADRANT OF FEMALE BREAST: Status: RESOLVED | Noted: 2022-02-11 | Resolved: 2022-05-06

## 2022-05-06 PROBLEM — T45.1X5A LEUKOPENIA DUE TO ANTINEOPLASTIC CHEMOTHERAPY: Status: ACTIVE | Noted: 2022-05-06

## 2022-05-06 PROBLEM — D70.1 LEUKOPENIA DUE TO ANTINEOPLASTIC CHEMOTHERAPY: Status: ACTIVE | Noted: 2022-05-06

## 2022-05-06 LAB
ANION GAP SERPL CALC-SCNC: 15 MMOL/L (ref 8–16)
BUN SERPL-MCNC: 17 MG/DL (ref 6–30)
CHLORIDE SERPL-SCNC: 102 MMOL/L (ref 95–110)
CREAT SERPL-MCNC: 0.6 MG/DL (ref 0.5–1.4)
GLUCOSE SERPL-MCNC: 210 MG/DL (ref 70–110)
HCT VFR BLD CALC: 28 %PCV (ref 36–54)
HGB BLD-MCNC: 10 G/DL
POC IONIZED CALCIUM: 1.27 MMOL/L (ref 1.06–1.42)
POC TCO2 (MEASURED): 25 MMOL/L (ref 23–29)
POTASSIUM BLD-SCNC: 4.4 MMOL/L (ref 3.5–5.1)
SAMPLE: ABNORMAL
SODIUM BLD-SCNC: 137 MMOL/L (ref 136–145)

## 2022-05-06 NOTE — PROGRESS NOTES
Subjective:       Patient ID: Delia Scruggs is a 61 y.o. female.  Surgeon: Dr. Sd Baltazar  Radiation oncologist: Dr. Browne Prellop    Recurrence vs. Second Primary Right Breast AJCC Anatomic and Clinical Prognostic Stage IA ([id number])--Diagnosed 21  Right Breast Cancer Stage IIA ([id number]) diagnosed 17  Biopsy/pathology:   1. Right breast mass biopsy done 17--invasive ductal carcinoma, grade III with focal DCIS, ER 25.72%, NE 0.28% negative, Her2 1+ negative by IHC. Oncotype DX testing showed recurrence score of 43 (distant recurrence risk 40% with Tamoxifen alone and 12% Tamoxifen + chemo), PCR done for breast panel showed triple negative.  2. Right breast biopsy mass at 6:00 done 21--invasive ductal carcinoma, Grade 2, ER 29%, NE 0%, Her2 0 by IHC, Ki67 80% high.  Surgery/pathology: Right breast needle localization lumpectomy and SLN biopsy done 17--invasive mammary carcinoma with papillary and ductal features, grade III, 2.2cm, no lymphovascular or perineural invasion, margins clear, few foci of non-invasive predominantly solid papillary carcinoma also identified, 2 sentinel lymph nodes negative, closest margin medial, re-excision of medial margin with focal residual invasive papillary carcinoma <0.1cm, now free.  Imagin. Screening MMG 16--asymmetry medially right breast.  2. Diagnostic MMG right breast 16--asymmetrical density located posteriorly in lower inner quadrant of right breast, suggestion of underlying clearly circumscribed 10mm rounded density w/n area on spot compression.  3. US right breast 16--focal mass 4:00 8cm from nipple hypoechoic and hetrogensous with irregular margins 1.7X1.1X1.4cm BIRADS 5.  4. CT A/P 17--fluid in resection cavity right breast, no evidence for metastatic disease, vague area of enhancement right hepatic low, not typical of metastatic disease, consider liver mass protocol MRI.  5. Bone scan 17--DJD right knee.  No evidence of any metastatic bone lesions.  6. MRI abdomen/liver protocol done 3/7/17--hepatic lesion in question is most c/w focal nodular hyperplasia.  7. Screening MMG done 11/24/21--asymmetry in the area of the lumpectomy site in the inner right breast posterior depth on the CC view needs further evaluation.    8. MMG/US right breast done 12/20/21--Findings concerning for new or recurrent malignancy at the site of prior lumpectomy in this patient with a personal history of right breast cancer. The three adjacent (one dominant 1.8cm, two satellite 5mm and 4mm) irregular hypoechoic masses in the right breast 6:00 axis 4 cm FN position are highly suggestive of malignancy. BI-RADS 5: Highly suggestive of malignancy. No suspicious right axillary adenopathy.  9. MRI bilateral breasts done 1/26/22--Right breast 6:00 irregular heterogeneously enhancing mass containing a biopsy clip, measuring 1.6 m x 0.9 cm x 1.5 cm, is consistent with the known biopsy-proven right breast invasive malignancy. Multiple additional irregular heterogeneously enhancing masses with irregular margins within the lumpectomy bed and anterior to the lumpectomy bed in the 4:00 to 6:00 right breast middle to posterior depths, in total spanning 5.7 cm x 2.8 cm x 2.2 cm, are also highly suggestive of malignancy. The posterior aspect of the suspicious enhancement is 1 cm anterior to the pectoralis major muscle. Although there is slight tenting and mild enhancement of the pectoralis major muscle, this is most likely related to adherent post lumpectomy scarring and post radiation change, no suspicious enhancement in the left breast. No significant internal mammary or axillary adenopathy.  10. Diagnostic MMG/US 3/30/22--Mild-moderate partial response to neoadjuvant chemotherapy, evidenced mammographically by decreased density and conspicuity of the known malignant masses in the right breast 6:00 axis posterior depth at the site of prior lumpectomy. The  previously biopsied dominant mass has decreased in size as measured sonographically, now 1.1 x 0.6 x 1.6 cm, previously 1.8 x 0.9 x 1.7 cm. Also, the two satellite masses previously identified on sonography have resolved. BI-RADS 6: Known biopsy-proven malignancy.      2DECHO 3/6/17--EF 60-65%.  Mediport placement by Dr. Baltazar on 3/23/17--removed 6/2020.  Mediport placed by Dr. Baltazar on 2/7/22.     Genetic testing on 3/21/17 was negative.    DEXA:   10/24/17--L1-L4 T = 1.3, Left femur neck -1.1, right femur neck -2.1, total left femur 0.0, right -0.7 c/w osteopenia.  11/21/19--L1-L4 T = 1.7, Left femur neck -1.3, right femur neck -2.0, total left femur -0.3, right -0.7 (mixed response).  11/24/21--L1-L4 T = 2.0, Left femur neck -1.2, right femur neck -2.0, total left femur -0.1, right -0.7 (improved spine, left hip, right stable)    Treatment History:   1. DDAC x 4 doses. Started 4/3/17. Completed on 5/15/17.    2. Weekly Taxol X 12 completed 5/30/17--8/15/17.   3. Adjuvant XRT completed 8/30/17 - 9/28/17.    4. Femara 10/11/17--stopped 2/1/22.   5. Prolia 6/15/18--12/30/21 (on hold).     Treatment plan:   1. Neoadjuvant Taxotere/Cytoxan X 6 cycles. Started on 2/10/22.   Cycle # 6 due on 5/26/22.     2. Bilateral mastectomies  3. Adjuvant Aromasin and consider adding Verzenio X 2 years.      Chief Complaint: Pt reports no new concerns today.     Patient presents for follow-up of breast cancer. She continues on treatment. Completed cycle 5 of TC given last week. She complains of fatigue but otherwise doing good. Occasional tingling to bilateral finger tips but no pain or burning. Still able to perform ADLs. Her anemia continues, discussed progressive anemia and potential for blood transfusion if drops further. No other complaints.     Past Medical History:   Diagnosis Date    Diabetes mellitus, type 2     Hyperlipidemia     Hypertension       Review of patient's allergies indicates:  No Known Allergies   Current  Outpatient Medications on File Prior to Visit   Medication Sig Dispense Refill    amLODIPine-benazepriL (LOTREL) 10-40 mg per capsule Take 1 capsule by mouth once daily.      ascorbic acid, vitamin C, (VITAMIN C) 500 MG tablet Take 500 mg by mouth.      b complex vitamins tablet Take 1 tablet by mouth once daily.      calcium carbonate-vitamin D3 600 mg-20 mcg (800 unit) Chew Take 1 tablet by mouth once daily.      empagliflozin (JARDIANCE) 25 mg tablet Take 1 tablet by mouth once daily.      ferrous gluconate (FERGON) 240 (27 FE) MG tablet Take 240 mg by mouth.      GLUTAMINE ORAL Take 1 Scoop by mouth as needed.      HYDROcodone-acetaminophen (NORCO) 5-325 mg per tablet Take 1 tablet by mouth every 6 (six) hours as needed.      loratadine 10 mg Cap Take 10 mg by mouth as needed.      LORazepam (ATIVAN) 1 MG tablet Take 1 mg by mouth 2 (two) times daily. as needed for anxiety.      magnesium 250 mg Tab Take 1 tablet by mouth once daily.      metFORMIN (GLUCOPHAGE) 500 MG tablet Take 500 mg by mouth.      metoprolol succinate (TOPROL-XL) 50 MG 24 hr tablet Take 1 tablet by mouth once daily.      multivitamin-minerals-lutein Tab Take 1 tablet by mouth once daily at 6am.      omega-3 fatty acids-fish oil 360-1,200 mg Cap Take 1 capsule by mouth once daily.      ondansetron (ZOFRAN) 8 MG tablet Take 8 mg by mouth as needed.      pravastatin (PRAVACHOL) 40 MG tablet Take 1 tablet by mouth once daily.      pyridoxine, vitamin B6, (B-6) 100 MG Tab Take 100 mg by mouth.      SITagliptin (JANUVIA) 100 MG Tab Take 1 tablet by mouth once daily.      aspirin (ECOTRIN) 81 MG EC tablet Take 81 mg by mouth.       No current facility-administered medications on file prior to visit.      Review of Systems   Constitutional: Positive for fatigue. Negative for activity change, appetite change, fever and unexpected weight change.   HENT: Negative for mouth sores.    Eyes: Negative for visual disturbance.    Respiratory: Negative for cough and shortness of breath.    Cardiovascular: Negative for chest pain.   Gastrointestinal: Negative for abdominal pain, blood in stool, constipation, diarrhea, nausea and vomiting.   Genitourinary: Negative for difficulty urinating and frequency.   Musculoskeletal: Negative for back pain.   Integumentary:  Negative for rash.   Neurological: Negative for dizziness, weakness and headaches.   Hematological: Negative for adenopathy.   Psychiatric/Behavioral: Negative for behavioral problems and suicidal ideas. The patient is nervous/anxious.             Vitals:    05/11/22 1416   BP: 97/61   Pulse: 87   Resp: 14   Temp: 97.9 °F (36.6 °C)      Physical Exam  Vitals reviewed.   Constitutional:       Appearance: Normal appearance. She is normal weight.   HENT:      Head: Normocephalic.   Eyes:      General: Lids are normal. Vision grossly intact.      Extraocular Movements: Extraocular movements intact.      Conjunctiva/sclera: Conjunctivae normal.   Cardiovascular:      Rate and Rhythm: Normal rate and regular rhythm.      Pulses: Normal pulses.      Heart sounds: S1 normal and S2 normal. Murmur heard.    Systolic murmur is present with a grade of 2/6.  Pulmonary:      Effort: Pulmonary effort is normal.      Breath sounds: Normal breath sounds.   Chest:   Breasts:      Left: Normal.        Comments: Right breast with incision lower inner quadrant, with scar well-healed. +palpable scar tissue present. Skin thickening from XRT. Left breast normal. No palpable masses. No axillary adenopathy palpable bilaterally.  Abdominal:      General: Abdomen is flat. Bowel sounds are normal.      Palpations: Abdomen is soft.   Musculoskeletal:      Cervical back: Normal, normal range of motion and neck supple.      Thoracic back: Normal.      Lumbar back: Normal.      Right lower leg: No edema.      Left lower leg: No edema.   Feet:      Right foot:      Skin integrity: Skin integrity normal.    Lymphadenopathy:      Comments: No palpable adenopathy   Skin:     General: Skin is warm.      Capillary Refill: Capillary refill takes less than 2 seconds.      Comments: Left CW mediport intact   Neurological:      Mental Status: She is alert and oriented to person, place, and time.   Psychiatric:         Attention and Perception: Attention normal.         Mood and Affect: Mood normal.         Speech: Speech normal.         Behavior: Behavior normal. Behavior is cooperative.         Judgment: Judgment normal.       ECOG SCORE    1 - Restricted in strenuous activity-ambulatory and able to carry out work of a light nature       Lab Results   Component Value Date    WBC 3.4 (L) 05/11/2022    RBC 2.77 (L) 05/11/2022    HGB 9.0 (L) 05/11/2022    HCT 28.7 (L) 05/11/2022    .6 (H) 05/11/2022    MCH 32.5 (H) 05/11/2022    MCHC 31.4 (L) 05/11/2022    RDW 14.7 05/11/2022     05/11/2022    MPV 10.9 05/11/2022    CMP       Assessment:       Problem List Items Addressed This Visit        Oncology    Malignant neoplasm of lower-inner quadrant of right female breast - Primary    Relevant Orders    CBC Auto Differential    POCT Chemistry Panel    Hepatic Function Panel    Leukopenia due to antineoplastic chemotherapy    Relevant Orders    CBC Auto Differential    POCT Chemistry Panel    Hepatic Function Panel             Plan:     Patient with breast cancer stage IIA, 2.2cm tumor, high grade, ER weakly positive 25%. S/p right breast lumpectomy on 1/26/17. Lymph node negative.  Per NCCN guidelines, oncotype DX testing recommended.  Oncotype showed tumor high risk and PCR breast panel showing triple negative.  Treatment recommended with dose dense AC x 4 followed by weekly Taxol x 12.  Treatment was delayed due to non-healing breast wound.  Patient completed 4 cycles DDAC on 5/15/17 and 12 cycles of weekly Taxol on 8/15/17.  Adjuvant XRT completed on 9/28/17 and patient tolerated well.   Patient started Femara on  10/11/17.  Also started on Prolia for osteopenia, last dose in 12/2021.    Screening MMG from 11/2021 showed an asymmetry in area of lumpectomy site in right breast.  Diagnostic MMG/US 12/2021 showed suspicious dominant mass 1.8cm 6:00 with 2 additional satellite lesions.  s/p biopsy 12/21/21 pathology c/w IDCA Grade 2, weakly ER+ 29%, ME and Her2 negative with high Ki67 80%, same breast profile as original cancer.  This represents a recurrence vs. new primary which developed while on AI.  MRI 1/26/22 shows multiple masses, spanning area of 5.7cm wtih dominant mass 1.6cm, no suspicious nodes or enhancing masses in left breast.   Patient has met with Dr. Baltazar and surgical plan is for bilateral mastectomies.  Plan for neoadjuvant chemotherapy due to concerns for wound healing following surgery and need for chemotherapy given recurrence.    Treatment recommended with Taxotere/Cytoxan x 6 cycles. Started on 2/10/22.  Neulasta support given. IV fluids added on day of treatment with cycle 2.  MMG/US done after cycle 3 showed decrease in dominant mass and resolution of satellite masses c/w good partial response.  Patient is s/p cycle 5 given last week and continues tolerating well.  Labs today show anemia, worse with Hgb of 9.0 g/dL. Will continue to monitor and she may need blood transfusion.  Mild neutropenia noted as well with WBC of 3.4 and ANC 1700. CMP pending.   Labs only next week.  Schedule labs and treatment cycle 6 in 2 weeks on 5/26/22.  Will notify Dr. Baltazar of last chemotherapy treatment.   Follow-up in 3 weeks with labs/toxicity check.    Following mastectomy, will plan to change to different AI, likely Aromasin. Also will consider adding Verzenio X 2 years due to high risk, although realizing that node negative tumors were not included in the [employer] trial.   All questions answered at this time.        Jorge Garcia ISELA

## 2022-05-11 ENCOUNTER — LAB VISIT (OUTPATIENT)
Dept: LAB | Facility: HOSPITAL | Age: 62
End: 2022-05-11
Attending: INTERNAL MEDICINE
Payer: COMMERCIAL

## 2022-05-11 ENCOUNTER — OFFICE VISIT (OUTPATIENT)
Dept: HEMATOLOGY/ONCOLOGY | Facility: CLINIC | Age: 62
End: 2022-05-11
Payer: COMMERCIAL

## 2022-05-11 VITALS
OXYGEN SATURATION: 99 % | HEIGHT: 63 IN | BODY MASS INDEX: 29.8 KG/M2 | TEMPERATURE: 98 F | SYSTOLIC BLOOD PRESSURE: 97 MMHG | WEIGHT: 168.19 LBS | RESPIRATION RATE: 14 BRPM | HEART RATE: 87 BPM | DIASTOLIC BLOOD PRESSURE: 61 MMHG

## 2022-05-11 DIAGNOSIS — D70.1 LEUKOPENIA DUE TO ANTINEOPLASTIC CHEMOTHERAPY: ICD-10-CM

## 2022-05-11 DIAGNOSIS — C50.311 MALIGNANT NEOPLASM OF LOWER-INNER QUADRANT OF RIGHT BREAST OF FEMALE, ESTROGEN RECEPTOR POSITIVE: ICD-10-CM

## 2022-05-11 DIAGNOSIS — C50.311 MALIGNANT NEOPLASM OF LOWER-INNER QUADRANT OF RIGHT BREAST OF FEMALE, ESTROGEN RECEPTOR POSITIVE: Primary | ICD-10-CM

## 2022-05-11 DIAGNOSIS — T45.1X5A ANEMIA DUE TO ANTINEOPLASTIC CHEMOTHERAPY: ICD-10-CM

## 2022-05-11 DIAGNOSIS — Z17.0 MALIGNANT NEOPLASM OF LOWER-INNER QUADRANT OF RIGHT BREAST OF FEMALE, ESTROGEN RECEPTOR POSITIVE: Primary | ICD-10-CM

## 2022-05-11 DIAGNOSIS — D64.81 ANEMIA DUE TO ANTINEOPLASTIC CHEMOTHERAPY: ICD-10-CM

## 2022-05-11 DIAGNOSIS — T45.1X5A LEUKOPENIA DUE TO ANTINEOPLASTIC CHEMOTHERAPY: ICD-10-CM

## 2022-05-11 DIAGNOSIS — Z17.0 MALIGNANT NEOPLASM OF LOWER-INNER QUADRANT OF RIGHT BREAST OF FEMALE, ESTROGEN RECEPTOR POSITIVE: ICD-10-CM

## 2022-05-11 LAB
ALBUMIN SERPL-MCNC: 3.6 GM/DL (ref 3.4–4.8)
ALBUMIN/GLOB SERPL: 1.3 RATIO (ref 1.1–2)
ALP SERPL-CCNC: 117 UNIT/L (ref 40–150)
ALT SERPL-CCNC: 21 UNIT/L (ref 0–55)
AST SERPL-CCNC: 12 UNIT/L (ref 5–34)
BASOPHILS # BLD AUTO: 0.01 X10(3)/MCL (ref 0–0.2)
BASOPHILS NFR BLD AUTO: 0.3 %
BILIRUBIN DIRECT+TOT PNL SERPL-MCNC: 0.2 MG/DL (ref 0–0.5)
BILIRUBIN DIRECT+TOT PNL SERPL-MCNC: 0.2 MG/DL (ref 0–0.8)
BILIRUBIN DIRECT+TOT PNL SERPL-MCNC: 0.4 MG/DL
BUN SERPL-MCNC: 11 MG/DL (ref 9.8–20.1)
CALCIUM SERPL-MCNC: 10.2 MG/DL (ref 8.4–10.2)
CHLORIDE SERPL-SCNC: 103 MMOL/L (ref 98–107)
CO2 SERPL-SCNC: 31 MMOL/L (ref 23–31)
CREAT SERPL-MCNC: 0.71 MG/DL (ref 0.55–1.02)
EOSINOPHIL # BLD AUTO: 0.03 X10(3)/MCL (ref 0–0.9)
EOSINOPHIL NFR BLD AUTO: 0.9 %
ERYTHROCYTE [DISTWIDTH] IN BLOOD BY AUTOMATED COUNT: 14.7 % (ref 11.5–17)
GLOBULIN SER-MCNC: 2.7 GM/DL (ref 2.4–3.5)
GLUCOSE SERPL-MCNC: 101 MG/DL (ref 82–115)
HCT VFR BLD AUTO: 28.7 % (ref 37–47)
HGB BLD-MCNC: 9 GM/DL (ref 12–16)
IMM GRANULOCYTES # BLD AUTO: 0.02 X10(3)/MCL (ref 0–0.02)
IMM GRANULOCYTES NFR BLD AUTO: 0.6 % (ref 0–0.43)
LYMPHOCYTES # BLD AUTO: 0.83 X10(3)/MCL (ref 0.6–4.6)
LYMPHOCYTES NFR BLD AUTO: 24.3 %
MCH RBC QN AUTO: 32.5 PG (ref 27–31)
MCHC RBC AUTO-ENTMCNC: 31.4 MG/DL (ref 33–36)
MCV RBC AUTO: 103.6 FL (ref 80–94)
MONOCYTES # BLD AUTO: 0.76 X10(3)/MCL (ref 0.1–1.3)
MONOCYTES NFR BLD AUTO: 22.2 %
NEUTROPHILS # BLD AUTO: 1.8 X10(3)/MCL (ref 2.1–9.2)
NEUTROPHILS NFR BLD AUTO: 51.7 %
PLATELET # BLD AUTO: 145 X10(3)/MCL (ref 130–400)
PMV BLD AUTO: 10.9 FL (ref 9.4–12.4)
POTASSIUM SERPL-SCNC: 4.2 MMOL/L (ref 3.5–5.1)
PROT SERPL-MCNC: 6.3 GM/DL (ref 5.8–7.6)
RBC # BLD AUTO: 2.77 X10(6)/MCL (ref 4.2–5.4)
SODIUM SERPL-SCNC: 141 MMOL/L (ref 136–145)
WBC # SPEC AUTO: 3.4 X10(3)/MCL (ref 4.5–11.5)

## 2022-05-11 PROCEDURE — 3078F DIAST BP <80 MM HG: CPT | Mod: CPTII,S$GLB,, | Performed by: NURSE PRACTITIONER

## 2022-05-11 PROCEDURE — 99999 PR PBB SHADOW E&M-EST. PATIENT-LVL V: ICD-10-PCS | Mod: PBBFAC,,, | Performed by: NURSE PRACTITIONER

## 2022-05-11 PROCEDURE — 1160F RVW MEDS BY RX/DR IN RCRD: CPT | Mod: CPTII,S$GLB,, | Performed by: NURSE PRACTITIONER

## 2022-05-11 PROCEDURE — 99999 PR PBB SHADOW E&M-EST. PATIENT-LVL V: CPT | Mod: PBBFAC,,, | Performed by: NURSE PRACTITIONER

## 2022-05-11 PROCEDURE — 85025 COMPLETE CBC W/AUTO DIFF WBC: CPT

## 2022-05-11 PROCEDURE — 36415 COLL VENOUS BLD VENIPUNCTURE: CPT

## 2022-05-11 PROCEDURE — 1159F PR MEDICATION LIST DOCUMENTED IN MEDICAL RECORD: ICD-10-PCS | Mod: CPTII,S$GLB,, | Performed by: NURSE PRACTITIONER

## 2022-05-11 PROCEDURE — 99214 PR OFFICE/OUTPT VISIT, EST, LEVL IV, 30-39 MIN: ICD-10-PCS | Mod: S$GLB,,, | Performed by: NURSE PRACTITIONER

## 2022-05-11 PROCEDURE — 4010F ACE/ARB THERAPY RXD/TAKEN: CPT | Mod: CPTII,S$GLB,, | Performed by: NURSE PRACTITIONER

## 2022-05-11 PROCEDURE — 1159F MED LIST DOCD IN RCRD: CPT | Mod: CPTII,S$GLB,, | Performed by: NURSE PRACTITIONER

## 2022-05-11 PROCEDURE — 4010F PR ACE/ARB THEARPY RXD/TAKEN: ICD-10-PCS | Mod: CPTII,S$GLB,, | Performed by: NURSE PRACTITIONER

## 2022-05-11 PROCEDURE — 3078F PR MOST RECENT DIASTOLIC BLOOD PRESSURE < 80 MM HG: ICD-10-PCS | Mod: CPTII,S$GLB,, | Performed by: NURSE PRACTITIONER

## 2022-05-11 PROCEDURE — 1160F PR REVIEW ALL MEDS BY PRESCRIBER/CLIN PHARMACIST DOCUMENTED: ICD-10-PCS | Mod: CPTII,S$GLB,, | Performed by: NURSE PRACTITIONER

## 2022-05-11 PROCEDURE — 99214 OFFICE O/P EST MOD 30 MIN: CPT | Mod: S$GLB,,, | Performed by: NURSE PRACTITIONER

## 2022-05-11 PROCEDURE — 3008F PR BODY MASS INDEX (BMI) DOCUMENTED: ICD-10-PCS | Mod: CPTII,S$GLB,, | Performed by: NURSE PRACTITIONER

## 2022-05-11 PROCEDURE — 3074F SYST BP LT 130 MM HG: CPT | Mod: CPTII,S$GLB,, | Performed by: NURSE PRACTITIONER

## 2022-05-11 PROCEDURE — 3008F BODY MASS INDEX DOCD: CPT | Mod: CPTII,S$GLB,, | Performed by: NURSE PRACTITIONER

## 2022-05-11 PROCEDURE — 3074F PR MOST RECENT SYSTOLIC BLOOD PRESSURE < 130 MM HG: ICD-10-PCS | Mod: CPTII,S$GLB,, | Performed by: NURSE PRACTITIONER

## 2022-05-11 PROCEDURE — 80053 COMPREHEN METABOLIC PANEL: CPT

## 2022-05-11 RX ORDER — HEPARIN 100 UNIT/ML
500 SYRINGE INTRAVENOUS
Status: CANCELLED | OUTPATIENT
Start: 2022-05-26

## 2022-05-11 RX ORDER — FAMOTIDINE 10 MG/ML
20 INJECTION INTRAVENOUS
Status: CANCELLED
Start: 2022-05-26

## 2022-05-11 RX ORDER — DIPHENHYDRAMINE HYDROCHLORIDE 50 MG/ML
25 INJECTION INTRAMUSCULAR; INTRAVENOUS
Status: CANCELLED
Start: 2022-05-26

## 2022-05-11 RX ORDER — HYDROCODONE BITARTRATE AND ACETAMINOPHEN 5; 325 MG/1; MG/1
1 TABLET ORAL EVERY 6 HOURS PRN
COMMUNITY
Start: 2022-02-17 | End: 2023-01-23

## 2022-05-11 RX ORDER — MULTIVIT WITH MINERALS/HERBS
1 TABLET ORAL DAILY
COMMUNITY

## 2022-05-11 RX ORDER — VIT C/E/ZN/COPPR/LUTEIN/ZEAXAN 250MG-90MG
1 CAPSULE ORAL DAILY
COMMUNITY

## 2022-05-11 RX ORDER — ONDANSETRON HYDROCHLORIDE 8 MG/1
8 TABLET, FILM COATED ORAL
COMMUNITY
Start: 2022-02-03 | End: 2022-09-12

## 2022-05-11 RX ORDER — MAGNESIUM 250 MG
1 TABLET ORAL DAILY
COMMUNITY

## 2022-05-11 RX ORDER — SODIUM CHLORIDE 0.9 % (FLUSH) 0.9 %
10 SYRINGE (ML) INJECTION
Status: CANCELLED | OUTPATIENT
Start: 2022-05-26

## 2022-05-11 RX ORDER — QUINIDINE GLUCONATE 324 MG
240 TABLET, EXTENDED RELEASE ORAL DAILY
COMMUNITY
Start: 2022-02-03

## 2022-05-11 RX ORDER — LORAZEPAM 1 MG/1
1 TABLET ORAL EVERY 12 HOURS PRN
COMMUNITY
Start: 2022-05-02 | End: 2023-07-19

## 2022-05-11 RX ORDER — PYRIDOXINE HCL (VITAMIN B6) 100 MG
100 TABLET ORAL DAILY
COMMUNITY

## 2022-05-14 NOTE — PROGRESS NOTES
Patient:   Delia Scruggs            MRN: [id number]            FIN: [id number]               Age:   61 years     Sex:  Female     :  1960   Associated Diagnoses:   Anemia; Estrogen receptor positive; Malignant neoplasm of lower-inner quadrant of right female breast   Author:   Jorge Herman      Visit Information      Surgeon: Dr. Sd Baltazar  Radiation oncologist: Dr. Browne Prellop    Recurrence vs. Second Primary Right Breast AJCC Anatomic and Clinical Prognostic Stage IA ([id number])--Diagnosed 21  Right Breast Cancer Stage IIA (T2N0M0) diagnosed 17  Biopsy/pathology:   1. Right breast mass biopsy done 17--invasive ductal carcinoma, grade III with focal DCIS, ER 25.72%, WI 0.28% negative, Her2 1+ negative by IHC. Oncotype DX testing showed recurrence score of 43 (distant recurrence risk 40% with Tamoxifen alone and 12% Tamoxifen + chemo), PCR done for breast panel showed triple negative.  2. Right breast biopsy mass at 6:00 done 21--invasive ductal carcinoma, Grade 2, ER 29%, WI 0%, Her2 0 by IHC, Ki67 80% high.  Surgery/pathology: Right breast needle localization lumpectomy and SLN biopsy done 17--invasive mammary carcinoma with papillary and ductal features, grade III, 2.2cm, no lymphovascular or perineural invasion, margins clear, few foci of non-invasive predominantly solid papillary carcinoma also identified, 2 sentinel lymph nodes negative, closest margin medial, re-excision of medial margin with focal residual invasive papillary carcinoma <0.1cm, now free.  Imagin. Screening MMG 16--asymmetry medially right breast.  2. Diagnostic MMG right breast 16--asymmetrical density located posteriorly in lower inner quadrant of right breast, suggestion of underlying clearly circumscribed 10mm rounded density w/n area on spot compression.  3. US right breast 16--focal mass 4:00 8cm from nipple hypoechoic and hetrogensous with irregular  margins 1.7X1.1X1.4cm BIRADS 5.  4. CT A/P 2/24/17--fluid in resection cavity right breast, no evidence for metastatic disease, vague area of enhancement right hepatic low, not typical of metastatic disease, consider liver mass protocol MRI.  5. Bone scan 2/24/17--DJD right knee. No evidence of any metastatic bone lesions.  6. MRI abdomen/liver protocol done 3/7/17--hepatic lesion in question is most c/w focal nodular hyperplasia.  7. Screening MMG done 11/24/21--asymmetry in the area of the lumpectomy site in the inner right breast posterior depth on the CC view needs further evaluation.    8. MMG/US right breast done 12/20/21--Findings concerning for new or recurrent malignancy at the site of prior lumpectomy in this patient with a personal history of right breast cancer. The three adjacent (one dominant 1.8cm, two satellite 5mm and 4mm) irregular hypoechoic masses in the right breast 6:00 axis 4 cm FN position are highly suggestive of malignancy. BI-RADS 5: Highly suggestive of malignancy. No suspicious right axillary adenopathy.  9. MRI bilateral breasts done 1/26/22--Right breast 6:00 irregular heterogeneously enhancing mass containing a biopsy clip, measuring 1.6 m x 0.9 cm x 1.5 cm, is consistent with the known biopsy-proven right breast invasive malignancy. Multiple additional irregular heterogeneously enhancing masses with irregular margins within the lumpectomy bed and anterior to the lumpectomy bed in the 4:00 to 6:00 right breast middle to posterior depths, in total spanning 5.7 cm x 2.8 cm x 2.2 cm, are also highly suggestive of malignancy. The posterior aspect of the suspicious enhancement is 1 cm anterior to the pectoralis major muscle. Although there is slight tenting and mild enhancement of the pectoralis major muscle, this is most likely related to adherent post lumpectomy scarring and post radiation change, no suspicious enhancement in the left breast. No significant internal mammary or axillary  "adenopathy.      2DECHO 3/6/17--EF 60-65%.  Mediport placement by Dr. Baltazar on 3/23/17--removed 6/2020.  Mediport placed by Dr. Baltazar on 2/7/22.     Genetic testing on 3/21/17 was negative.    DEXA:   10/24/17--L1-L4 T = 1.3, Left femur neck -1.1, right femur neck -2.1, total left femur 0.0, right -0.7 c/w osteopenia.  11/21/19--L1-L4 T = 1.7, Left femur neck -1.3, right femur neck -2.0, total left femur -0.3, right -0.7 (mixed response).  11/24/21--L1-L4 T = 2.0, Left femur neck -1.2, right femur neck -2.0, total left femur -0.1, right -0.7 (improved spine, left hip, right stable)    Treatment History:   1. DDAC x 4 doses. Started 4/3/17. Completed on 5/15/17.    2. Weekly Taxol X 12 completed 5/30/17--8/15/17.   3. Adjuvant XRT completed 8/30/17 - 9/28/17.    4. Femara 10/11/17--stopped 2/1/22.   5. Prolia 6/15/18--12/30/21 (on hold).     Treatment plan:   1. Neoadjuvant Taxotere/Cytoxan X 6 cycles. Started on 2/10/22.   Cycle # 2 due on 3/3/22.     2. Bilateral mastectomies  3. Adjuvant Aromasin and consider adding Verzenio X 2 years.      Visit type:  Scheduled follow-up.    Accompanied by:  No one.       Chief Complaint   2/17/2022 13:24 CST      Pt reports after first treatment on 2/10 started feeling fatigue, nausea, ears feel sensitive, and abd cramping two days after.        Interval History   Current complaint.   Patient presents for follow-up of breast cancer. She started her first cycle of Neoadjuvant Taxotere/Cytoxan last week. Neulasta given. Tolerated okay. Complains of fatigue, nausea and abdominal cramping that started on Sunday. Nausea relieved with medications. Describes her abdominal cramping as pain similar to "contractions". She is passing gas and having regular bowel movements. Pain comes and goes, sharp and will radiate to the lower back. Possibly from the Neulasta? Otherwise, she tolerated treatment very well. Noted taste alterations with 5 lb weight loss since last week. Denies any " neuropathy. States her ears feel sensitive. Denies any mouth sores. No other problems reported today.      Review of Systems   Constitutional:  Fatigue, Loss of appetite, Weight loss, No fever, No chills, No weakness.    Eye:  No recent visual problem.    Ear/Nose/Mouth/Throat:  ears feel sensitive, No decreased hearing, No nasal congestion, No sore throat.    Respiratory:  No shortness of breath, No cough, No wheezing.    Cardiovascular:  No chest pain, No palpitations, No peripheral edema.    Breast:  s/p right lumpectomy, +recurrence of breast cancer right breast.    Gastrointestinal:  Nausea, No vomiting, No diarrhea, No constipation.         Abdominal pain: Bilateral, Lower quadrant, The severity is moderate, Characterized as ( Cramping/colicky, Intermittent, Radiating ).    Hematology/Lymphatics:  No bruising tendency, No bleeding tendency.    Immunologic:  Chemotherapy.    Musculoskeletal:  No back pain, No joint pain.    Integumentary:  Hair thinning, No rash, No skin lesion.    Neurologic:  Alert and oriented X4, No confusion, No numbness, No tingling, No headache.    Psychiatric:  No anxiety, No depression.    All other systems are negative      Health Status   Allergies:    Allergic Reactions (Selected)  No Known Allergies   Current medications:  (Selected)   Documented Medications  Documented  Ativan 1 mg oral tablet: 1 mg = 1 tab(s), Oral, Daily, 0 Refill(s)  Caltrate 600 + D oral tablet: 1 tab(s), Oral, Daily, 0 Refill(s)  Centrum Silver oral tablet: 1 tab(s), Oral, Daily, # 30 tab(s), 0 Refill(s)  Fergon 240 mg (27 mg elemental iron) oral tablet: 240 mg = 1 tab(s), Oral, Daily, 0 Refill(s)  Fish Oil oral capsule: 1 cap(s), Oral, Daily, 0 Refill(s)  Hair, Skin & Nails: 1 tab, Oral, Daily, 220mg, 0 Refill(s)  Januvia 100 mg oral tablet: 100 mg = 1 tab(s), Oral, Daily  Jardiance 25 mg oral tablet: 25 mg = 1 tab(s), Oral, Daily  TNF Medl (Template NonFormulary): 500 mg =, Oral, Daily, 0 Refill(s)  Vitamin  B Complex oral capsule: 1 cap(s), Oral, Daily, 0 Refill(s)  Vitamin B6 250 mg oral capsule: 1 tab, Oral, Daily, 0 Refill(s)  Vitamin C 100 mg oral tablet, chewable: 0 Refill(s)  Vitamin D3: 2000IU, Oral, Daily, 0 Refill(s)  Zofran 8 mg oral tablet: See Instructions, PRN PRN as needed for nausea/vomiting, 1 tab(s) Oral BID for 3 days after chemotherapy and q8hr, # 30, 0 Refill(s)  amlodipine-benazepril 10 mg-40 mg oral capsule: 1 cap(s), Oral, Daily  aspirin 81 mg oral tablet, CHEWABLE: 81 mg = 1 tab(s), Oral, Daily, # 30 tab(s), 0 Refill(s)  glutamine oral powder for reconstitution: 10 gms, Oral, Daily, 0 Refill(s)  loratadine 10 mg oral capsule: 10 mg = 1 cap(s), Oral, Daily, 0 Refill(s)  magnesium oxide 250 mg oral tablet: 250 mg = 1 tab(s), Oral, Daily, 0 Refill(s)  metformin 500 mg tablet: 500 mg = 1 tab(s), Oral, BID  metoprolol TARTrate 50 mg oral tablet (for Lopressor): 50 mg = 1 tab(s), Oral, Daily, # 60 tab(s), 0 Refill(s)  naproxen sodium 220 mg oral capsule: See Instructions, 2 cap(s) Oral BID for 3 days after chemotherapy, 0 Refill(s)  pravastatin 40 mg oral tablet: 40 mg = 1 tab(s), Oral, Daily, # 30 tab(s), 0 Refill(s)  prochlorperazine 5 mg oral tablet: 5 mg = 1 tab(s), Oral, TID, PRN PRN as needed for nausea/vomiting, # 30 tab(s), 0 Refill(s)      Histories   Past Medical History:    No active or resolved past medical history items have been selected or recorded.   Family History:    Primary malignant neoplasm of breast  Sister  Diabetes mellitus type 2  Mother  Stroke  Father  CHF (congestive heart failure)....  Mother  CAD (coronary artery disease)....  Father  Hypertension.  Mother  Sister     Procedure history:    Catheter Insertion Mediport (None) on 2/7/2022 at 61 Years.  Comments:  2/7/2022 13:35 CST - César HARVEY, Claudia SON  auto-populated from documented surgical case  Mammogram on 11/23/2020 at 60 Years.  Colonoscopy on 9/14/2020 at 60 Years.  Comments:  9/17/2020 14:17 EBENEZER - Rubens HARVEY, Lauren  B.  auto-populated from documented surgical case  Remove Mediport in 2020 at 60 Years.  Mammogram ([id number]) on 11/1/2019 at 59 Years.  Catheter Insertion Mediport (.) on 3/23/2017 at 56 Years.  Comments:  3/23/2017 8:03 EBENEZER - Jeffrey Montiel RN  auto-populated from documented surgical case  Biopsy Sentinal Node (Right) on 1/26/2017 at 56 Years.  Comments:  1/26/2017 10:55 Day Dowell RN.  auto-populated from documented surgical case  Lumpectomy With Needle Placement (Right) on 1/26/2017 at 56 Years.  Comments:  1/26/2017 10:55 Day Dowell RN.  auto-populated from documented surgical case  colonoscopy on 1/1/2010 at 49 Years.  lanced boil on buttock.   Social History     Alcohol  Use: Current  Type: Wine    Employment/School  Status: Employed  Description: Phlebotomist at [employer]    Exercise  Times per week: Daily  Exercise type: Walking    Home/Environment  Lives with: Spouse  Comment: Pt is  and lives at home with spouse.     Substance Abuse  Denies Substance Abuse    Tobacco  Use: Never smoker   .        Physical Examination   Vital Signs   2/17/2022 13:27 CST      Temperature Oral          36.8 DegC                             Temperature Oral (calculated)             98.24 DegF                             Peripheral Pulse Rate     169 bpm  HI                             Respiratory Rate          14 br/min                             SpO2                      97 %                             Oxygen Therapy            Room air                             Systolic Blood Pressure   98 mmHg                             Diastolic Blood Pressure  64 mmHg                             Blood Pressure Location   Right arm                             Manual Cuff BP            No                             O2 SAT at rest            97 %     correction:  HR is not 169 bpm! It was 100 bpm apical   General:  Alert and oriented, No acute distress, very pleasant black female.    Eye:  Normal conjunctiva,  Vision unchanged.    HENT:  Normocephalic, Normal hearing, Oral mucosa is moist, +hair thinning, wearing a hat today.    Neck:  Supple, Non-tender, No jugular venous distention, No lymphadenopathy.    Respiratory:  Lungs are clear to auscultation, Respirations are non-labored, Breath sounds are equal.    Cardiovascular:  Normal rate, Regular rhythm, No gallop, Normal peripheral perfusion, No edema.         Systolic murmur: Consistent with an ejection, Intensity ( Grade II ).    Gastrointestinal:  Soft, Non-tender, Non-distended, Normal bowel sounds, No organomegaly.    Lymphatics:  No lymphadenopathy neck, axilla, groin.    Musculoskeletal:  Normal range of motion, Normal strength, No deformity, Normal gait.    Integumentary:  Warm, Dry, Intact, No rash, Left CW mediport - incision intact. Mild bruising.    Neurologic:  Alert, Oriented, Normal sensory, Normal motor function.    Psychiatric:  Cooperative, Appropriate mood & affect.    Breast:  Right breast with incision lower inner quadrant, with scar well-healed. +palpable scar tissue present. Skin thickening from XRT. Left breast normal. No palpable masses. No axillary adenopathy palpable bilaterally.    Cognition and Speech:  Oriented, Speech clear and coherent, Functional cognition intact.    ECOG Performance Scale: 2 - Capable of all self-care but unable to carry out any work activities. Up and about greater than 50 percent of waking hours.      Review / Management   Results review:  Lab results   2/17/2022 13:16 CST      WBC                       10.4 x10(3)/mcL                             RBC                       3.63 x10(6)/mcL  LOW                             Hgb                       11.8 gm/dL  LOW                             Hct                       35.6 %  LOW                             Platelet                  197 x10(3)/mcL                             MCV                       98.1 fL  HI                             MCH                       32.5  pg  HI                             MCHC                      33.1 gm/dL                             RDW                       11.8 %                             MPV                       12.2 fL                             Abs Neut                  6.62 x10(3)/mcL                             NEUT%                     63.5 %  NA                             NEUT#                     6.6 x10(3)/mcL                             LYMPH%                    11.9 %  NA                             LYMPH#                    1.2 x10(3)/mcL                             MONO%                     16.5 %  NA                             MONO#                     1.7 x10(3)/mcL  HI                             EOS%                      0.2 %  NA                             EOS#                      0.0 x10(3)/mcL                             BASO%                     0.1 %  NA                             BASO#                     0.0 x10(3)/mcL  .       Impression and Plan   Diagnosis     Anemia (CBC26-BW D64.9).     Estrogen receptor positive (DSG87-IS Z17.0).     Malignant neoplasm of lower-inner quadrant of right female breast (PVE48-ID C50.311).     Plan:  Patient with breast cancer stage IIA, 2.2cm tumor, high grade, ER weakly positive 25%. S/p right breast lumpectomy on 1/26/17. Lymph node negative.  Per NCCN guidelines, oncotype DX testing recommended.  Oncotype showed tumor high risk and PCR breast panel showing triple negative.  Treatment recommended with dose dense AC x 4 followed by weekly Taxol x 12.  Treatment was delayed due to non-healing breast wound.  Patient completed 4 cycles DDAC on 5/15/17 and 12 cycles of weekly Taxol on 8/15/17.  Adjuvant XRT completed on 9/28/17 and patient tolerated well.   Patient started Femara on 10/11/17.  Also started on Prolia for osteopenia, last dose in 12/2021.    Screening MMG from 11/2021 showed an asymmetry in area of lumpectomy site in right breast.  Diagnostic MMG/US 12/2021 showed  suspicious dominant mass 1.8cm 6:00 with 2 additional satellite lesions.  s/p biopsy 12/21/21 pathology c/w IDCA Grade 2, weakly ER+ 29%, AK and Her2 negative with high Ki67 80%, same breast profile as original cancer.  This represents a recurrence vs. new primary which developed while on AI.  MRI 1/26/22 shows multiple masses, spanning area of 5.7cm wtih dominant mass 1.6cm, no suspicious nodes or enhancing masses in left breast.   Patient has met with Dr. Baltazar and surgical plan is for bilateral mastectomies.  Plan for neoadjuvant chemotherapy due to concerns for wound healing following surgery and patient will need chemotherapy given recurrence.    Treatment recommended with Taxotere/Cytoxan x 6 cycles. Started on 2/10/22.  Neulasta support given.   Patient tolerated fairly well. Having taste alterations and weight loss. Also with abdominal cramping that is similar to menstrual cramps and contractions. ? Neulasta? She is having regular bowel movements and passing gas.   CBC shows mild anemia with Hgb of 11.8 g/dL. WBC and platelets are normal. CMP is pending.   Continue with weekly labs.   BP is low today at 98/42. Agrees with IV hydration with 1 liter of Normal Saline today.   RTC for labs and treatment only on 3/3/22 for Cycle # 2. Will add IV fluids.   Follow-up in 3 weeks with albs.   Will send prescription for Norco 5/325mg prn pain to her pharmacy.   Plan to repeat MMG/US after 3 cycles since the breast lesion is not palpable.    Following mastectomy, will plan to change to different AI, likely Aromasin. Also will consider adding Verzenio X 2 years due to high risk, although realizing that node negative tumors were not included in the [facility] trial.   All questions answered at this time.        Jorge Garcia, ISELA

## 2022-05-14 NOTE — H&P
Patient:   Delia Scruggs            MRN: [id number]            FIN: [id number]               Age:   61 years     Sex:  Female     :  1960   Associated Diagnoses:   None   Author:   Lorna Easton      Health Status   The H&P was reviewed, the patient was examined, and there are no changes to the patient's condition..

## 2022-05-14 NOTE — OP NOTE
Sd Baltazar MD      Patient:   Delia Scruggs            MRN: [id number]            FIN: [id number]               Age:   61 years     Sex:  Female     :  1960   Associated Diagnoses:   None   Author:   Sd Baltazar MD      Surgeon: Sd Baltazar MD    Assistant: None    Preoperative Diagnosis:   right breast cancer    Postoperative Diagnosis:  Same    Procedure: Left subclavian Mediport insertion with fluoroscopic guidance    Anesthesia: Local/MAC    Estimated Blood Loss: Less than 15 cc    Intra-operative Findings:  8Fr powerport placed without difficulty.  Final fluoroscopy revealed the tip of the catheter within the superior vena cava.  The port flushed and aspirated freely.    Procedure in Detail:  After informed consent was obtained patient was brought to the operating room and placed in the supine position.  Sedation was administered by anesthesia.  The upper chest and neck were prepped and draped in sterile fashion.  After infiltration with local anesthesia, the left subclavian vein was cannulated with a large-bore needle and a guidewire was placed under fluoroscopic guidance into the superior vena cava.  An incision was made below the wire insertion site and a pocket was created for the port over the pectoralis fascia.  A PowerPort was soaked in antimicrobial solution, it was assembled and flushed.  It was placed in the pocket and the catheter was tunneled to the wire insertion site without difficulty.  The catheter was cut to size using fluoroscopic guidance.  An introducer dilator was placed over the guidewire under fluoroscopic vision and the catheter was threaded through the peel-away introducer without difficulty.  Final fluoroscopy revealed the tip of the catheter in good position.  The port flushed and aspirated freely, a final heparin solution was instilled.  The port pocket was irrigated with antimicrobial solution, meticulous hemostasis was achieved, it was closed in  multiple layers with absorbable suture.  Sterile dressings were applied.  The patient tolerated the procedure well.

## 2022-05-18 ENCOUNTER — LAB VISIT (OUTPATIENT)
Dept: LAB | Facility: HOSPITAL | Age: 62
End: 2022-05-18
Attending: INTERNAL MEDICINE
Payer: COMMERCIAL

## 2022-05-18 DIAGNOSIS — C50.311 MALIGNANT NEOPLASM OF LOWER-INNER QUADRANT OF RIGHT BREAST OF FEMALE, ESTROGEN RECEPTOR POSITIVE: ICD-10-CM

## 2022-05-18 DIAGNOSIS — Z17.0 MALIGNANT NEOPLASM OF LOWER-INNER QUADRANT OF RIGHT BREAST OF FEMALE, ESTROGEN RECEPTOR POSITIVE: ICD-10-CM

## 2022-05-18 LAB
ALBUMIN SERPL-MCNC: 3.6 GM/DL (ref 3.4–4.8)
ALBUMIN/GLOB SERPL: 1.4 RATIO (ref 1.1–2)
ALP SERPL-CCNC: 162 UNIT/L (ref 40–150)
ALT SERPL-CCNC: 19 UNIT/L (ref 0–55)
AST SERPL-CCNC: 17 UNIT/L (ref 5–34)
BASOPHILS # BLD AUTO: 0.06 X10(3)/MCL (ref 0–0.2)
BASOPHILS NFR BLD AUTO: 0.5 %
BILIRUBIN DIRECT+TOT PNL SERPL-MCNC: 0.3 MG/DL
BUN SERPL-MCNC: 9.6 MG/DL (ref 9.8–20.1)
CALCIUM SERPL-MCNC: 9.1 MG/DL (ref 8.4–10.2)
CHLORIDE SERPL-SCNC: 109 MMOL/L (ref 98–107)
CO2 SERPL-SCNC: 26 MMOL/L (ref 23–31)
CREAT SERPL-MCNC: 0.72 MG/DL (ref 0.55–1.02)
EOSINOPHIL # BLD AUTO: 0.01 X10(3)/MCL (ref 0–0.9)
EOSINOPHIL NFR BLD AUTO: 0.1 %
ERYTHROCYTE [DISTWIDTH] IN BLOOD BY AUTOMATED COUNT: 15.3 % (ref 11.5–17)
GLOBULIN SER-MCNC: 2.5 GM/DL (ref 2.4–3.5)
GLUCOSE SERPL-MCNC: 148 MG/DL (ref 82–115)
HCT VFR BLD AUTO: 28.5 % (ref 37–47)
HGB BLD-MCNC: 8.6 GM/DL (ref 12–16)
IMM GRANULOCYTES # BLD AUTO: 0.24 X10(3)/MCL (ref 0–0.02)
IMM GRANULOCYTES NFR BLD AUTO: 2.2 % (ref 0–0.43)
LYMPHOCYTES # BLD AUTO: 1.32 X10(3)/MCL (ref 0.6–4.6)
LYMPHOCYTES NFR BLD AUTO: 11.9 %
MCH RBC QN AUTO: 32.6 PG (ref 27–31)
MCHC RBC AUTO-ENTMCNC: 30.2 MG/DL (ref 33–36)
MCV RBC AUTO: 108 FL (ref 80–94)
MONOCYTES # BLD AUTO: 0.68 X10(3)/MCL (ref 0.1–1.3)
MONOCYTES NFR BLD AUTO: 6.1 %
NEUTROPHILS # BLD AUTO: 8.8 X10(3)/MCL (ref 2.1–9.2)
NEUTROPHILS NFR BLD AUTO: 79.2 %
PLATELET # BLD AUTO: 184 X10(3)/MCL (ref 130–400)
PMV BLD AUTO: 10.5 FL (ref 9.4–12.4)
POTASSIUM SERPL-SCNC: 4.6 MMOL/L (ref 3.5–5.1)
PROT SERPL-MCNC: 6.1 GM/DL (ref 5.8–7.6)
RBC # BLD AUTO: 2.64 X10(6)/MCL (ref 4.2–5.4)
SODIUM SERPL-SCNC: 143 MMOL/L (ref 136–145)
WBC # SPEC AUTO: 11.1 X10(3)/MCL (ref 4.5–11.5)

## 2022-05-18 PROCEDURE — 36415 COLL VENOUS BLD VENIPUNCTURE: CPT

## 2022-05-18 PROCEDURE — 80053 COMPREHEN METABOLIC PANEL: CPT

## 2022-05-18 PROCEDURE — 85025 COMPLETE CBC W/AUTO DIFF WBC: CPT

## 2022-05-26 ENCOUNTER — INFUSION (OUTPATIENT)
Dept: INFUSION THERAPY | Facility: HOSPITAL | Age: 62
End: 2022-05-26
Attending: INTERNAL MEDICINE
Payer: COMMERCIAL

## 2022-05-26 ENCOUNTER — LAB VISIT (OUTPATIENT)
Dept: LAB | Facility: HOSPITAL | Age: 62
End: 2022-05-26
Attending: INTERNAL MEDICINE
Payer: COMMERCIAL

## 2022-05-26 VITALS
SYSTOLIC BLOOD PRESSURE: 140 MMHG | TEMPERATURE: 99 F | RESPIRATION RATE: 18 BRPM | BODY MASS INDEX: 29.8 KG/M2 | DIASTOLIC BLOOD PRESSURE: 68 MMHG | HEART RATE: 115 BPM | HEIGHT: 63 IN | WEIGHT: 168.19 LBS

## 2022-05-26 DIAGNOSIS — Z17.0 MALIGNANT NEOPLASM OF LOWER-INNER QUADRANT OF RIGHT BREAST OF FEMALE, ESTROGEN RECEPTOR POSITIVE: Primary | ICD-10-CM

## 2022-05-26 DIAGNOSIS — C50.311 MALIGNANT NEOPLASM OF LOWER-INNER QUADRANT OF RIGHT BREAST OF FEMALE, ESTROGEN RECEPTOR POSITIVE: ICD-10-CM

## 2022-05-26 DIAGNOSIS — C50.311 MALIGNANT NEOPLASM OF LOWER-INNER QUADRANT OF RIGHT BREAST OF FEMALE, ESTROGEN RECEPTOR POSITIVE: Primary | ICD-10-CM

## 2022-05-26 DIAGNOSIS — Z17.0 MALIGNANT NEOPLASM OF LOWER-INNER QUADRANT OF RIGHT BREAST OF FEMALE, ESTROGEN RECEPTOR POSITIVE: ICD-10-CM

## 2022-05-26 LAB
ALBUMIN SERPL-MCNC: 4.1 GM/DL (ref 3.4–4.8)
ALBUMIN/GLOB SERPL: 1.4 RATIO (ref 1.1–2)
ALP SERPL-CCNC: 132 UNIT/L (ref 40–150)
ALT SERPL-CCNC: 18 UNIT/L (ref 0–55)
AST SERPL-CCNC: 14 UNIT/L (ref 5–34)
BASOPHILS # BLD AUTO: 0.02 X10(3)/MCL (ref 0–0.2)
BASOPHILS NFR BLD AUTO: 0.1 %
BILIRUBIN DIRECT+TOT PNL SERPL-MCNC: 0.3 MG/DL
BUN SERPL-MCNC: 17.2 MG/DL (ref 9.8–20.1)
CALCIUM SERPL-MCNC: 10.1 MG/DL (ref 8.4–10.2)
CHLORIDE SERPL-SCNC: 106 MMOL/L (ref 98–107)
CO2 SERPL-SCNC: 22 MMOL/L (ref 23–31)
CREAT SERPL-MCNC: 0.83 MG/DL (ref 0.55–1.02)
EOSINOPHIL # BLD AUTO: 0 X10(3)/MCL (ref 0–0.9)
EOSINOPHIL NFR BLD AUTO: 0 %
ERYTHROCYTE [DISTWIDTH] IN BLOOD BY AUTOMATED COUNT: 15.4 % (ref 11.5–17)
GLOBULIN SER-MCNC: 2.9 GM/DL (ref 2.4–3.5)
GLUCOSE SERPL-MCNC: 307 MG/DL (ref 82–115)
HCT VFR BLD AUTO: 30.2 % (ref 37–47)
HGB BLD-MCNC: 9.4 GM/DL (ref 12–16)
IMM GRANULOCYTES # BLD AUTO: 0.1 X10(3)/MCL (ref 0–0.02)
IMM GRANULOCYTES NFR BLD AUTO: 0.7 % (ref 0–0.43)
LYMPHOCYTES # BLD AUTO: 0.87 X10(3)/MCL (ref 0.6–4.6)
LYMPHOCYTES NFR BLD AUTO: 6.4 %
MCH RBC QN AUTO: 32.6 PG (ref 27–31)
MCHC RBC AUTO-ENTMCNC: 31.1 MG/DL (ref 33–36)
MCV RBC AUTO: 104.9 FL (ref 80–94)
MONOCYTES # BLD AUTO: 0.42 X10(3)/MCL (ref 0.1–1.3)
MONOCYTES NFR BLD AUTO: 3.1 %
NEUTROPHILS # BLD AUTO: 12.1 X10(3)/MCL (ref 2.1–9.2)
NEUTROPHILS NFR BLD AUTO: 89.7 %
PLATELET # BLD AUTO: 368 X10(3)/MCL (ref 130–400)
PMV BLD AUTO: 9.5 FL (ref 9.4–12.4)
POTASSIUM SERPL-SCNC: 4.3 MMOL/L (ref 3.5–5.1)
PROT SERPL-MCNC: 7 GM/DL (ref 5.8–7.6)
RBC # BLD AUTO: 2.88 X10(6)/MCL (ref 4.2–5.4)
SODIUM SERPL-SCNC: 141 MMOL/L (ref 136–145)
WBC # SPEC AUTO: 13.5 X10(3)/MCL (ref 4.5–11.5)

## 2022-05-26 PROCEDURE — 85025 COMPLETE CBC W/AUTO DIFF WBC: CPT

## 2022-05-26 PROCEDURE — 96375 TX/PRO/DX INJ NEW DRUG ADDON: CPT

## 2022-05-26 PROCEDURE — 36415 COLL VENOUS BLD VENIPUNCTURE: CPT

## 2022-05-26 PROCEDURE — 96413 CHEMO IV INFUSION 1 HR: CPT

## 2022-05-26 PROCEDURE — 80053 COMPREHEN METABOLIC PANEL: CPT

## 2022-05-26 PROCEDURE — 63600175 PHARM REV CODE 636 W HCPCS: Performed by: NURSE PRACTITIONER

## 2022-05-26 PROCEDURE — 25000003 PHARM REV CODE 250: Performed by: NURSE PRACTITIONER

## 2022-05-26 PROCEDURE — 96417 CHEMO IV INFUS EACH ADDL SEQ: CPT

## 2022-05-26 PROCEDURE — 96367 TX/PROPH/DG ADDL SEQ IV INF: CPT

## 2022-05-26 RX ORDER — FAMOTIDINE 10 MG/ML
20 INJECTION INTRAVENOUS
Status: COMPLETED | OUTPATIENT
Start: 2022-05-26 | End: 2022-05-26

## 2022-05-26 RX ORDER — HEPARIN 100 UNIT/ML
500 SYRINGE INTRAVENOUS
Status: DISCONTINUED | OUTPATIENT
Start: 2022-05-26 | End: 2022-05-26 | Stop reason: HOSPADM

## 2022-05-26 RX ORDER — DIPHENHYDRAMINE HYDROCHLORIDE 50 MG/ML
25 INJECTION INTRAMUSCULAR; INTRAVENOUS
Status: COMPLETED | OUTPATIENT
Start: 2022-05-26 | End: 2022-05-26

## 2022-05-26 RX ORDER — SODIUM CHLORIDE 0.9 % (FLUSH) 0.9 %
10 SYRINGE (ML) INJECTION
Status: DISCONTINUED | OUTPATIENT
Start: 2022-05-26 | End: 2022-05-26 | Stop reason: HOSPADM

## 2022-05-26 RX ADMIN — DOCETAXEL 135 MG: 20 INJECTION, SOLUTION, CONCENTRATE INTRAVENOUS at 09:05

## 2022-05-26 RX ADMIN — PALONOSETRON 0.25 MG: 0.25 INJECTION, SOLUTION INTRAVENOUS at 08:05

## 2022-05-26 RX ADMIN — FOSAPREPITANT DIMEGLUMINE 150 MG: 150 INJECTION, POWDER, LYOPHILIZED, FOR SOLUTION INTRAVENOUS at 08:05

## 2022-05-26 RX ADMIN — PEGFILGRASTIM 6 MG: KIT SUBCUTANEOUS at 11:05

## 2022-05-26 RX ADMIN — SODIUM CHLORIDE 1000 ML: 9 INJECTION, SOLUTION INTRAVENOUS at 08:05

## 2022-05-26 RX ADMIN — FAMOTIDINE 20 MG: 10 INJECTION, SOLUTION INTRAVENOUS at 08:05

## 2022-05-26 RX ADMIN — DIPHENHYDRAMINE HYDROCHLORIDE 25 MG: 50 INJECTION INTRAMUSCULAR; INTRAVENOUS at 08:05

## 2022-05-26 RX ADMIN — HEPARIN 500 UNITS: 100 SYRINGE at 11:05

## 2022-05-26 RX ADMIN — CYCLOPHOSPHAMIDE 1100 MG: 200 INJECTION, SOLUTION INTRAVENOUS at 10:05

## 2022-05-31 DIAGNOSIS — C50.311 MALIGNANT NEOPLASM OF LOWER-INNER QUADRANT OF RIGHT FEMALE BREAST, UNSPECIFIED ESTROGEN RECEPTOR STATUS: Primary | ICD-10-CM

## 2022-06-01 ENCOUNTER — LAB VISIT (OUTPATIENT)
Dept: LAB | Facility: HOSPITAL | Age: 62
End: 2022-06-01
Attending: INTERNAL MEDICINE
Payer: COMMERCIAL

## 2022-06-01 ENCOUNTER — OFFICE VISIT (OUTPATIENT)
Dept: HEMATOLOGY/ONCOLOGY | Facility: CLINIC | Age: 62
End: 2022-06-01
Payer: COMMERCIAL

## 2022-06-01 VITALS
TEMPERATURE: 98 F | DIASTOLIC BLOOD PRESSURE: 65 MMHG | BODY MASS INDEX: 31.2 KG/M2 | SYSTOLIC BLOOD PRESSURE: 105 MMHG | WEIGHT: 169.56 LBS | OXYGEN SATURATION: 99 % | HEART RATE: 88 BPM | RESPIRATION RATE: 14 BRPM | HEIGHT: 62 IN

## 2022-06-01 DIAGNOSIS — C50.311 MALIGNANT NEOPLASM OF LOWER-INNER QUADRANT OF RIGHT BREAST OF FEMALE, ESTROGEN RECEPTOR POSITIVE: ICD-10-CM

## 2022-06-01 DIAGNOSIS — T45.1X5A LEUKOPENIA DUE TO ANTINEOPLASTIC CHEMOTHERAPY: ICD-10-CM

## 2022-06-01 DIAGNOSIS — C50.311 MALIGNANT NEOPLASM OF LOWER-INNER QUADRANT OF RIGHT FEMALE BREAST, UNSPECIFIED ESTROGEN RECEPTOR STATUS: Primary | ICD-10-CM

## 2022-06-01 DIAGNOSIS — Z17.0 MALIGNANT NEOPLASM OF LOWER-INNER QUADRANT OF RIGHT BREAST OF FEMALE, ESTROGEN RECEPTOR POSITIVE: ICD-10-CM

## 2022-06-01 DIAGNOSIS — D70.1 LEUKOPENIA DUE TO ANTINEOPLASTIC CHEMOTHERAPY: ICD-10-CM

## 2022-06-01 DIAGNOSIS — C50.311 MALIGNANT NEOPLASM OF LOWER-INNER QUADRANT OF RIGHT FEMALE BREAST, UNSPECIFIED ESTROGEN RECEPTOR STATUS: ICD-10-CM

## 2022-06-01 LAB
ALBUMIN SERPL-MCNC: 3.6 GM/DL (ref 3.4–4.8)
ALBUMIN/GLOB SERPL: 1.5 RATIO (ref 1.1–2)
ALP SERPL-CCNC: 118 UNIT/L (ref 40–150)
ALT SERPL-CCNC: 14 UNIT/L (ref 0–55)
AST SERPL-CCNC: 11 UNIT/L (ref 5–34)
BASOPHILS # BLD AUTO: 0.02 X10(3)/MCL (ref 0–0.2)
BASOPHILS NFR BLD AUTO: 0.7 %
BILIRUBIN DIRECT+TOT PNL SERPL-MCNC: 0.3 MG/DL
BUN SERPL-MCNC: 6.2 MG/DL (ref 9.8–20.1)
CALCIUM SERPL-MCNC: 9.3 MG/DL (ref 8.4–10.2)
CHLORIDE SERPL-SCNC: 106 MMOL/L (ref 98–107)
CO2 SERPL-SCNC: 29 MMOL/L (ref 23–31)
CREAT SERPL-MCNC: 0.7 MG/DL (ref 0.55–1.02)
EOSINOPHIL # BLD AUTO: 0.03 X10(3)/MCL (ref 0–0.9)
EOSINOPHIL NFR BLD AUTO: 1.1 %
ERYTHROCYTE [DISTWIDTH] IN BLOOD BY AUTOMATED COUNT: 14.7 % (ref 11.5–17)
GLOBULIN SER-MCNC: 2.4 GM/DL (ref 2.4–3.5)
GLUCOSE SERPL-MCNC: 161 MG/DL (ref 82–115)
HCT VFR BLD AUTO: 27.1 % (ref 37–47)
HGB BLD-MCNC: 8.5 GM/DL (ref 12–16)
IMM GRANULOCYTES # BLD AUTO: 0.16 X10(3)/MCL (ref 0–0.02)
IMM GRANULOCYTES NFR BLD AUTO: 5.7 % (ref 0–0.43)
LYMPHOCYTES # BLD AUTO: 0.72 X10(3)/MCL (ref 0.6–4.6)
LYMPHOCYTES NFR BLD AUTO: 25.8 %
MCH RBC QN AUTO: 33.1 PG (ref 27–31)
MCHC RBC AUTO-ENTMCNC: 31.4 MG/DL (ref 33–36)
MCV RBC AUTO: 105.4 FL (ref 80–94)
MONOCYTES # BLD AUTO: 0.82 X10(3)/MCL (ref 0.1–1.3)
MONOCYTES NFR BLD AUTO: 29.4 %
NEUTROPHILS # BLD AUTO: 1 X10(3)/MCL (ref 2.1–9.2)
NEUTROPHILS NFR BLD AUTO: 37.3 %
PLATELET # BLD AUTO: 81 X10(3)/MCL (ref 130–400)
PMV BLD AUTO: 11.8 FL (ref 9.4–12.4)
POTASSIUM SERPL-SCNC: 4.1 MMOL/L (ref 3.5–5.1)
PROT SERPL-MCNC: 6 GM/DL (ref 5.8–7.6)
RBC # BLD AUTO: 2.57 X10(6)/MCL (ref 4.2–5.4)
SODIUM SERPL-SCNC: 142 MMOL/L (ref 136–145)
WBC # SPEC AUTO: 2.8 X10(3)/MCL (ref 4.5–11.5)

## 2022-06-01 PROCEDURE — 4010F PR ACE/ARB THEARPY RXD/TAKEN: ICD-10-PCS | Mod: CPTII,S$GLB,, | Performed by: INTERNAL MEDICINE

## 2022-06-01 PROCEDURE — 99999 PR PBB SHADOW E&M-EST. PATIENT-LVL V: ICD-10-PCS | Mod: PBBFAC,,, | Performed by: INTERNAL MEDICINE

## 2022-06-01 PROCEDURE — 3074F PR MOST RECENT SYSTOLIC BLOOD PRESSURE < 130 MM HG: ICD-10-PCS | Mod: CPTII,S$GLB,, | Performed by: INTERNAL MEDICINE

## 2022-06-01 PROCEDURE — 1160F RVW MEDS BY RX/DR IN RCRD: CPT | Mod: CPTII,S$GLB,, | Performed by: INTERNAL MEDICINE

## 2022-06-01 PROCEDURE — 99999 PR PBB SHADOW E&M-EST. PATIENT-LVL V: CPT | Mod: PBBFAC,,, | Performed by: INTERNAL MEDICINE

## 2022-06-01 PROCEDURE — 3078F DIAST BP <80 MM HG: CPT | Mod: CPTII,S$GLB,, | Performed by: INTERNAL MEDICINE

## 2022-06-01 PROCEDURE — 4010F ACE/ARB THERAPY RXD/TAKEN: CPT | Mod: CPTII,S$GLB,, | Performed by: INTERNAL MEDICINE

## 2022-06-01 PROCEDURE — 99214 PR OFFICE/OUTPT VISIT, EST, LEVL IV, 30-39 MIN: ICD-10-PCS | Mod: S$GLB,,, | Performed by: INTERNAL MEDICINE

## 2022-06-01 PROCEDURE — 85025 COMPLETE CBC W/AUTO DIFF WBC: CPT

## 2022-06-01 PROCEDURE — 36415 COLL VENOUS BLD VENIPUNCTURE: CPT

## 2022-06-01 PROCEDURE — 80053 COMPREHEN METABOLIC PANEL: CPT

## 2022-06-01 PROCEDURE — 3078F PR MOST RECENT DIASTOLIC BLOOD PRESSURE < 80 MM HG: ICD-10-PCS | Mod: CPTII,S$GLB,, | Performed by: INTERNAL MEDICINE

## 2022-06-01 PROCEDURE — 1159F PR MEDICATION LIST DOCUMENTED IN MEDICAL RECORD: ICD-10-PCS | Mod: CPTII,S$GLB,, | Performed by: INTERNAL MEDICINE

## 2022-06-01 PROCEDURE — 1160F PR REVIEW ALL MEDS BY PRESCRIBER/CLIN PHARMACIST DOCUMENTED: ICD-10-PCS | Mod: CPTII,S$GLB,, | Performed by: INTERNAL MEDICINE

## 2022-06-01 PROCEDURE — 1159F MED LIST DOCD IN RCRD: CPT | Mod: CPTII,S$GLB,, | Performed by: INTERNAL MEDICINE

## 2022-06-01 PROCEDURE — 3008F PR BODY MASS INDEX (BMI) DOCUMENTED: ICD-10-PCS | Mod: CPTII,S$GLB,, | Performed by: INTERNAL MEDICINE

## 2022-06-01 PROCEDURE — 3074F SYST BP LT 130 MM HG: CPT | Mod: CPTII,S$GLB,, | Performed by: INTERNAL MEDICINE

## 2022-06-01 PROCEDURE — 99214 OFFICE O/P EST MOD 30 MIN: CPT | Mod: S$GLB,,, | Performed by: INTERNAL MEDICINE

## 2022-06-01 PROCEDURE — 3008F BODY MASS INDEX DOCD: CPT | Mod: CPTII,S$GLB,, | Performed by: INTERNAL MEDICINE

## 2022-06-01 NOTE — PROGRESS NOTES
Subjective:       Patient ID: Delia Scruggs is a 61 y.o. female.  Surgeon: Dr. Sd Baltazar  Radiation oncologist: Dr. Browne Prellop    Recurrence vs. Second Primary Right Breast AJCC Anatomic and Clinical Prognostic Stage IA (O3xT2V9)--Diagnosed 21  Right Breast Cancer Stage IIA (T2N0M0) diagnosed 17  Biopsy/pathology:   1. Right breast mass biopsy done 17--invasive ductal carcinoma, grade III with focal DCIS, ER 25.72%, NH 0.28% negative, Her2 1+ negative by IHC. Oncotype DX testing showed recurrence score of 43 (distant recurrence risk 40% with Tamoxifen alone and 12% Tamoxifen + chemo), PCR done for breast panel showed triple negative.  2. Right breast biopsy mass at 6:00 done 21--invasive ductal carcinoma, Grade 2, ER 29%, NH 0%, Her2 0 by IHC, Ki67 80% high.  Surgery/pathology: Right breast needle localization lumpectomy and SLN biopsy done 17--invasive mammary carcinoma with papillary and ductal features, grade III, 2.2cm, no lymphovascular or perineural invasion, margins clear, few foci of non-invasive predominantly solid papillary carcinoma also identified, 2 sentinel lymph nodes negative, closest margin medial, re-excision of medial margin with focal residual invasive papillary carcinoma <0.1cm, now free.  Imagin. Screening MMG 16--asymmetry medially right breast.  2. Diagnostic MMG right breast 16--asymmetrical density located posteriorly in lower inner quadrant of right breast, suggestion of underlying clearly circumscribed 10mm rounded density w/n area on spot compression.  3. US right breast 16--focal mass 4:00 8cm from nipple hypoechoic and hetrogensous with irregular margins 1.7X1.1X1.4cm BIRADS 5.  4. CT A/P 17--fluid in resection cavity right breast, no evidence for metastatic disease, vague area of enhancement right hepatic low, not typical of metastatic disease, consider liver mass protocol MRI.  5. Bone scan 17--DJD right knee.  No evidence of any metastatic bone lesions.  6. MRI abdomen/liver protocol done 3/7/17--hepatic lesion in question is most c/w focal nodular hyperplasia.  7. Screening MMG done 11/24/21--asymmetry in the area of the lumpectomy site in the inner right breast posterior depth on the CC view needs further evaluation.    8. MMG/US right breast done 12/20/21--Findings concerning for new or recurrent malignancy at the site of prior lumpectomy in this patient with a personal history of right breast cancer. The three adjacent (one dominant 1.8cm, two satellite 5mm and 4mm) irregular hypoechoic masses in the right breast 6:00 axis 4 cm FN position are highly suggestive of malignancy. BI-RADS 5: Highly suggestive of malignancy. No suspicious right axillary adenopathy.  9. MRI bilateral breasts done 1/26/22--Right breast 6:00 irregular heterogeneously enhancing mass containing a biopsy clip, measuring 1.6 m x 0.9 cm x 1.5 cm, is consistent with the known biopsy-proven right breast invasive malignancy. Multiple additional irregular heterogeneously enhancing masses with irregular margins within the lumpectomy bed and anterior to the lumpectomy bed in the 4:00 to 6:00 right breast middle to posterior depths, in total spanning 5.7 cm x 2.8 cm x 2.2 cm, are also highly suggestive of malignancy. The posterior aspect of the suspicious enhancement is 1 cm anterior to the pectoralis major muscle. Although there is slight tenting and mild enhancement of the pectoralis major muscle, this is most likely related to adherent post lumpectomy scarring and post radiation change, no suspicious enhancement in the left breast. No significant internal mammary or axillary adenopathy.  10. Diagnostic MMG/US 3/30/22--Mild-moderate partial response to neoadjuvant chemotherapy, evidenced mammographically by decreased density and conspicuity of the known malignant masses in the right breast 6:00 axis posterior depth at the site of prior lumpectomy. The  previously biopsied dominant mass has decreased in size as measured sonographically, now 1.1 x 0.6 x 1.6 cm, previously 1.8 x 0.9 x 1.7 cm. Also, the two satellite masses previously identified on sonography have resolved. BI-RADS 6: Known biopsy-proven malignancy.      2DECHO 3/6/17--EF 60-65%.  Mediport placement by Dr. Baltazar on 3/23/17--removed 6/2020.  Mediport placed by Dr. Baltazar on 2/7/22.     Genetic testing on 3/21/17 was negative.    DEXA:   10/24/17--L1-L4 T = 1.3, Left femur neck -1.1, right femur neck -2.1, total left femur 0.0, right -0.7 c/w osteopenia.  11/21/19--L1-L4 T = 1.7, Left femur neck -1.3, right femur neck -2.0, total left femur -0.3, right -0.7 (mixed response).  11/24/21--L1-L4 T = 2.0, Left femur neck -1.2, right femur neck -2.0, total left femur -0.1, right -0.7 (improved spine, left hip, right stable)    Treatment History:   1. DDAC x 4 doses. Started 4/3/17. Completed on 5/15/17.    2. Weekly Taxol X 12 completed 5/30/17--8/15/17.   3. Adjuvant XRT completed 8/30/17 - 9/28/17.    4. Femara 10/11/17--stopped 2/1/22.   5. Prolia 6/15/18--12/30/21 (on hold).    6. Neoadjuvant Taxotere/Cytoxan X 6 cycles completed 2/10/22--5/26/22.    Treatment plan:   1. Bilateral mastectomies planned  2. Adjuvant Aromasin and consider adding Verzenio X 2 years.  3. Zometa every 6 months adjvuvant treatment      Chief Complaint: Other Misc (Pt reports no new concerns today.)    HPI   Patient presents for follow-up of breast cancer. She is doing good. Completed treatment last week. Reports fatigue, but otherwise doing okay. Labs show anemia worse and ANC 1000 but no fever and platelets 81K. She is scheduled to see Dr. Baltazar in 2 weeks to plan for surgery.     Past Medical History:   Diagnosis Date    Diabetes mellitus, type 2     Hyperlipidemia     Hypertension       Review of patient's allergies indicates:  No Known Allergies   Current Outpatient Medications on File Prior to Visit   Medication Sig  Dispense Refill    amLODIPine-benazepriL (LOTREL) 10-40 mg per capsule Take 1 capsule by mouth once daily.      ascorbic acid, vitamin C, (VITAMIN C) 500 MG tablet Take 500 mg by mouth.      b complex vitamins tablet Take 1 tablet by mouth once daily.      calcium carbonate-vitamin D3 600 mg-20 mcg (800 unit) Chew Take 1 tablet by mouth once daily.      empagliflozin (JARDIANCE) 25 mg tablet Take 1 tablet by mouth once daily.      ferrous gluconate (FERGON) 240 (27 FE) MG tablet Take 240 mg by mouth.      GLUTAMINE ORAL Take 1 Scoop by mouth as needed.      HYDROcodone-acetaminophen (NORCO) 5-325 mg per tablet Take 1 tablet by mouth every 6 (six) hours as needed.      loratadine 10 mg Cap Take 10 mg by mouth as needed.      LORazepam (ATIVAN) 1 MG tablet Take 1 mg by mouth 2 (two) times daily. as needed for anxiety.      magnesium 250 mg Tab Take 1 tablet by mouth once daily.      metFORMIN (GLUCOPHAGE) 500 MG tablet Take 500 mg by mouth.      metoprolol succinate (TOPROL-XL) 50 MG 24 hr tablet Take 1 tablet by mouth once daily.      multivitamin-minerals-lutein Tab Take 1 tablet by mouth once daily at 6am.      omega-3 fatty acids-fish oil 360-1,200 mg Cap Take 1 capsule by mouth once daily.      ondansetron (ZOFRAN) 8 MG tablet Take 8 mg by mouth as needed.      pravastatin (PRAVACHOL) 40 MG tablet Take 1 tablet by mouth once daily.      pyridoxine, vitamin B6, (B-6) 100 MG Tab Take 100 mg by mouth.      SITagliptin (JANUVIA) 100 MG Tab Take 1 tablet by mouth once daily.      aspirin (ECOTRIN) 81 MG EC tablet Take 81 mg by mouth.       No current facility-administered medications on file prior to visit.      Review of Systems   Constitutional: Positive for fatigue. Negative for activity change, appetite change, fever and unexpected weight change.   HENT: Negative for mouth sores.    Eyes: Negative for visual disturbance.   Respiratory: Negative for cough and shortness of breath.     Cardiovascular: Negative for chest pain.   Gastrointestinal: Negative for abdominal pain, blood in stool, constipation, diarrhea, nausea and vomiting.   Genitourinary: Negative for difficulty urinating and frequency.   Musculoskeletal: Negative for back pain.   Integumentary:  Negative for rash.   Neurological: Negative for dizziness, weakness and headaches.   Hematological: Negative for adenopathy.   Psychiatric/Behavioral: Negative for behavioral problems and suicidal ideas. The patient is not nervous/anxious.             Vitals:    06/01/22 1358   BP: 105/65   Pulse: 88   Resp: 14   Temp: 98.4 °F (36.9 °C)      Physical Exam  Vitals reviewed.   Constitutional:       Appearance: Normal appearance. She is normal weight.   HENT:      Head: Normocephalic.   Eyes:      General: Lids are normal. Vision grossly intact.      Extraocular Movements: Extraocular movements intact.      Conjunctiva/sclera: Conjunctivae normal.   Cardiovascular:      Rate and Rhythm: Normal rate and regular rhythm.      Pulses: Normal pulses.      Heart sounds: S1 normal and S2 normal. Murmur heard.    Systolic murmur is present with a grade of 2/6.  Pulmonary:      Effort: Pulmonary effort is normal.      Breath sounds: Normal breath sounds.   Chest:   Breasts:      Left: Normal.        Comments: Right breast with incision lower inner quadrant, with scar well-healed. +palpable scar tissue present. Skin thickening from XRT. Left breast normal. No palpable masses. No axillary adenopathy palpable bilaterally.  Abdominal:      General: Abdomen is flat. Bowel sounds are normal.      Palpations: Abdomen is soft.   Musculoskeletal:      Cervical back: Normal, normal range of motion and neck supple.      Thoracic back: Normal.      Lumbar back: Normal.      Right lower leg: No edema.      Left lower leg: No edema.   Feet:      Right foot:      Skin integrity: Skin integrity normal.   Lymphadenopathy:      Comments: No palpable adenopathy   Skin:      General: Skin is warm.      Capillary Refill: Capillary refill takes less than 2 seconds.      Comments: Left CW mediport intact   Neurological:      Mental Status: She is alert and oriented to person, place, and time.   Psychiatric:         Attention and Perception: Attention normal.         Mood and Affect: Mood normal.         Speech: Speech normal.         Behavior: Behavior normal. Behavior is cooperative.         Judgment: Judgment normal.           No visits with results within 1 Day(s) from this visit.   Latest known visit with results is:   Lab Visit on 05/26/2022   Component Date Value Ref Range Status    Sodium Level 05/26/2022 141  136 - 145 mmol/L Final    Potassium Level 05/26/2022 4.3  3.5 - 5.1 mmol/L Final    Chloride 05/26/2022 106  98 - 107 mmol/L Final    Carbon Dioxide 05/26/2022 22 (A) 23 - 31 mmol/L Final    Glucose Level 05/26/2022 307 (A) 82 - 115 mg/dL Final    Blood Urea Nitrogen 05/26/2022 17.2  9.8 - 20.1 mg/dL Final    Creatinine 05/26/2022 0.83  0.55 - 1.02 mg/dL Final    Calcium Level Total 05/26/2022 10.1  8.4 - 10.2 mg/dL Final    Protein Total 05/26/2022 7.0  5.8 - 7.6 gm/dL Final    Albumin Level 05/26/2022 4.1  3.4 - 4.8 gm/dL Final    Globulin 05/26/2022 2.9  2.4 - 3.5 gm/dL Final    Albumin/Globulin Ratio 05/26/2022 1.4  1.1 - 2.0 ratio Final    Bilirubin Total 05/26/2022 0.3  <=1.5 mg/dL Final    Alkaline Phosphatase 05/26/2022 132  40 - 150 unit/L Final    Alanine Aminotransferase 05/26/2022 18  0 - 55 unit/L Final    Aspartate Aminotransferase 05/26/2022 14  5 - 34 unit/L Final    Estimated GFR- 05/26/2022 >60  mls/min/1.73/m2 Final    WBC 05/26/2022 13.5 (A) 4.5 - 11.5 x10(3)/mcL Final    RBC 05/26/2022 2.88 (A) 4.20 - 5.40 x10(6)/mcL Final    Hgb 05/26/2022 9.4 (A) 12.0 - 16.0 gm/dL Final    Hct 05/26/2022 30.2 (A) 37.0 - 47.0 % Final    MCV 05/26/2022 104.9 (A) 80.0 - 94.0 fL Final    MCH 05/26/2022 32.6 (A) 27.0 - 31.0 pg Final     MCHC 05/26/2022 31.1 (A) 33.0 - 36.0 mg/dL Final    RDW 05/26/2022 15.4  11.5 - 17.0 % Final    Platelet 05/26/2022 368  130 - 400 x10(3)/mcL Final    MPV 05/26/2022 9.5  9.4 - 12.4 fL Final    Neut % 05/26/2022 89.7  % Final    Lymph % 05/26/2022 6.4  % Final    Mono % 05/26/2022 3.1  % Final    Eos % 05/26/2022 0.0  % Final    Basophil % 05/26/2022 0.1  % Final    Lymph # 05/26/2022 0.87  0.6 - 4.6 x10(3)/mcL Final    Neut # 05/26/2022 12.1 (A) 2.1 - 9.2 x10(3)/mcL Final    Mono # 05/26/2022 0.42  0.1 - 1.3 x10(3)/mcL Final    Eos # 05/26/2022 0.00  0 - 0.9 x10(3)/mcL Final    Baso # 05/26/2022 0.02  0 - 0.2 x10(3)/mcL Final    IG# 05/26/2022 0.10 (A) 0 - 0.0155 x10(3)/mcL Final    IG% 05/26/2022 0.7 (A) 0 - 0.43 % Final       Assessment:            1. Malignant neoplasm of lower-inner quadrant of right female breast, unspecified estrogen receptor status        Plan:     Patient with breast cancer stage IIA, 2.2cm tumor, high grade, ER weakly positive 25%. S/p right breast lumpectomy on 1/26/17. Lymph node negative.  Per NCCN guidelines, oncotype DX testing recommended.  Oncotype showed tumor high risk and PCR breast panel showing triple negative.  Treatment recommended with dose dense AC x 4 followed by weekly Taxol x 12.  Treatment was delayed due to non-healing breast wound.  Patient completed 4 cycles DDAC on 5/15/17 and 12 cycles of weekly Taxol on 8/15/17.  Adjuvant XRT completed on 9/28/17 and patient tolerated well.   Patient started Femara on 10/11/17.  Also started on Prolia for osteopenia, last dose in 12/2021.    Screening MMG from 11/2021 showed an asymmetry in area of lumpectomy site in right breast.  Diagnostic MMG/US 12/2021 showed suspicious dominant mass 1.8cm 6:00 with 2 additional satellite lesions.  s/p biopsy 12/21/21 pathology c/w IDCA Grade 2, weakly ER+ 29%, [place] and Her2 negative with high Ki67 80%, same breast profile as original cancer.  This represents a recurrence vs. new  primary which developed while on AI.  MRI 1/26/22 shows multiple masses, spanning area of 5.7cm wtih dominant mass 1.6cm, no suspicious nodes or enhancing masses in left breast.   Patient has met with Dr. Baltazar and surgical plan is for bilateral mastectomies.  Recommended neoadjuvant chemotherapy due to concerns for wound healing following surgery and need for chemotherapy given recurrence.  Patient completed 6 cycles Taxotere/Cytoxan 2/10/22--5/26/22.   MMG/US done after cycle 3 showed decrease in dominant mass and resolution of satellite masses c/w good partial response.    Labs today show anemia, worse with Hgb of 8.5 g/dL. ANC 1000, no fever and platelets 81K.  Labs only next week to be sure counts are improving.  She will meet with Dr. Baltazar in 2 weeks to plan surgery. Likely will have delayed reconstruction.    RTC 7 weeks for follow-up EOV with labs.     Following mastectomy, will plan to change to different AI, likely Aromasin and plan to add Verzenio X 2 years due to high risk, although realizing that node negative tumors were not included in the [facility] trial.   Also plan to change from Prolia to adjuvant Zometa every 6 months.    All questions answered at this time.        Tia Lala MD

## 2022-06-08 ENCOUNTER — LAB VISIT (OUTPATIENT)
Dept: LAB | Facility: HOSPITAL | Age: 62
End: 2022-06-08
Attending: INTERNAL MEDICINE
Payer: COMMERCIAL

## 2022-06-08 DIAGNOSIS — C50.311 MALIGNANT NEOPLASM OF LOWER-INNER QUADRANT OF RIGHT FEMALE BREAST, UNSPECIFIED ESTROGEN RECEPTOR STATUS: ICD-10-CM

## 2022-06-08 LAB
ALBUMIN SERPL-MCNC: 3.6 GM/DL (ref 3.4–4.8)
ALBUMIN/GLOB SERPL: 1.4 RATIO (ref 1.1–2)
ALP SERPL-CCNC: 159 UNIT/L (ref 40–150)
ALT SERPL-CCNC: 14 UNIT/L (ref 0–55)
AST SERPL-CCNC: 14 UNIT/L (ref 5–34)
BASOPHILS # BLD AUTO: 0.04 X10(3)/MCL (ref 0–0.2)
BASOPHILS NFR BLD AUTO: 0.5 %
BILIRUBIN DIRECT+TOT PNL SERPL-MCNC: 0.2 MG/DL
BUN SERPL-MCNC: 7.7 MG/DL (ref 9.8–20.1)
CALCIUM SERPL-MCNC: 9.1 MG/DL (ref 8.4–10.2)
CHLORIDE SERPL-SCNC: 107 MMOL/L (ref 98–107)
CO2 SERPL-SCNC: 25 MMOL/L (ref 23–31)
CREAT SERPL-MCNC: 0.71 MG/DL (ref 0.55–1.02)
EOSINOPHIL # BLD AUTO: 0.01 X10(3)/MCL (ref 0–0.9)
EOSINOPHIL NFR BLD AUTO: 0.1 %
ERYTHROCYTE [DISTWIDTH] IN BLOOD BY AUTOMATED COUNT: 15.1 % (ref 11.5–17)
GLOBULIN SER-MCNC: 2.5 GM/DL (ref 2.4–3.5)
GLUCOSE SERPL-MCNC: 150 MG/DL (ref 82–115)
HCT VFR BLD AUTO: 28.5 % (ref 37–47)
HGB BLD-MCNC: 8.8 GM/DL (ref 12–16)
IMM GRANULOCYTES # BLD AUTO: 0.12 X10(3)/MCL (ref 0–0.02)
IMM GRANULOCYTES NFR BLD AUTO: 1.4 % (ref 0–0.43)
LYMPHOCYTES # BLD AUTO: 1 X10(3)/MCL (ref 0.6–4.6)
LYMPHOCYTES NFR BLD AUTO: 11.4 %
MCH RBC QN AUTO: 32.7 PG (ref 27–31)
MCHC RBC AUTO-ENTMCNC: 30.9 MG/DL (ref 33–36)
MCV RBC AUTO: 105.9 FL (ref 80–94)
MONOCYTES # BLD AUTO: 0.58 X10(3)/MCL (ref 0.1–1.3)
MONOCYTES NFR BLD AUTO: 6.6 %
NEUTROPHILS # BLD AUTO: 7.1 X10(3)/MCL (ref 2.1–9.2)
NEUTROPHILS NFR BLD AUTO: 80 %
PLATELET # BLD AUTO: 152 X10(3)/MCL (ref 130–400)
PMV BLD AUTO: 10.8 FL (ref 9.4–12.4)
POTASSIUM SERPL-SCNC: 4.2 MMOL/L (ref 3.5–5.1)
PROT SERPL-MCNC: 6.1 GM/DL (ref 5.8–7.6)
RBC # BLD AUTO: 2.69 X10(6)/MCL (ref 4.2–5.4)
SODIUM SERPL-SCNC: 141 MMOL/L (ref 136–145)
WBC # SPEC AUTO: 8.8 X10(3)/MCL (ref 4.5–11.5)

## 2022-06-08 PROCEDURE — 36415 COLL VENOUS BLD VENIPUNCTURE: CPT

## 2022-06-08 PROCEDURE — 80053 COMPREHEN METABOLIC PANEL: CPT

## 2022-06-08 PROCEDURE — 85025 COMPLETE CBC W/AUTO DIFF WBC: CPT

## 2022-06-15 ENCOUNTER — OFFICE VISIT (OUTPATIENT)
Dept: SURGICAL ONCOLOGY | Facility: CLINIC | Age: 62
End: 2022-06-15
Payer: COMMERCIAL

## 2022-06-15 VITALS
WEIGHT: 172 LBS | SYSTOLIC BLOOD PRESSURE: 144 MMHG | BODY MASS INDEX: 31.65 KG/M2 | HEART RATE: 102 BPM | DIASTOLIC BLOOD PRESSURE: 71 MMHG | HEIGHT: 62 IN

## 2022-06-15 DIAGNOSIS — C50.919 BREAST CANCER: ICD-10-CM

## 2022-06-15 DIAGNOSIS — Z17.0 MALIGNANT NEOPLASM OF OVERLAPPING SITES OF RIGHT BREAST IN FEMALE, ESTROGEN RECEPTOR POSITIVE: Primary | ICD-10-CM

## 2022-06-15 DIAGNOSIS — C50.811 MALIGNANT NEOPLASM OF OVERLAPPING SITES OF RIGHT BREAST IN FEMALE, ESTROGEN RECEPTOR POSITIVE: Primary | ICD-10-CM

## 2022-06-15 PROCEDURE — 1159F PR MEDICATION LIST DOCUMENTED IN MEDICAL RECORD: ICD-10-PCS | Mod: CPTII,S$GLB,, | Performed by: SURGERY

## 2022-06-15 PROCEDURE — 3008F BODY MASS INDEX DOCD: CPT | Mod: CPTII,S$GLB,, | Performed by: SURGERY

## 2022-06-15 PROCEDURE — 3078F PR MOST RECENT DIASTOLIC BLOOD PRESSURE < 80 MM HG: ICD-10-PCS | Mod: CPTII,S$GLB,, | Performed by: SURGERY

## 2022-06-15 PROCEDURE — 99999 PR PBB SHADOW E&M-EST. PATIENT-LVL IV: CPT | Mod: PBBFAC,,, | Performed by: SURGERY

## 2022-06-15 PROCEDURE — 1159F MED LIST DOCD IN RCRD: CPT | Mod: CPTII,S$GLB,, | Performed by: SURGERY

## 2022-06-15 PROCEDURE — 99999 PR PBB SHADOW E&M-EST. PATIENT-LVL IV: ICD-10-PCS | Mod: PBBFAC,,, | Performed by: SURGERY

## 2022-06-15 PROCEDURE — 99215 PR OFFICE/OUTPT VISIT, EST, LEVL V, 40-54 MIN: ICD-10-PCS | Mod: S$GLB,,, | Performed by: SURGERY

## 2022-06-15 PROCEDURE — 4010F ACE/ARB THERAPY RXD/TAKEN: CPT | Mod: CPTII,S$GLB,, | Performed by: SURGERY

## 2022-06-15 PROCEDURE — 3008F PR BODY MASS INDEX (BMI) DOCUMENTED: ICD-10-PCS | Mod: CPTII,S$GLB,, | Performed by: SURGERY

## 2022-06-15 PROCEDURE — 3078F DIAST BP <80 MM HG: CPT | Mod: CPTII,S$GLB,, | Performed by: SURGERY

## 2022-06-15 PROCEDURE — 4010F PR ACE/ARB THEARPY RXD/TAKEN: ICD-10-PCS | Mod: CPTII,S$GLB,, | Performed by: SURGERY

## 2022-06-15 PROCEDURE — 3077F PR MOST RECENT SYSTOLIC BLOOD PRESSURE >= 140 MM HG: ICD-10-PCS | Mod: CPTII,S$GLB,, | Performed by: SURGERY

## 2022-06-15 PROCEDURE — 99215 OFFICE O/P EST HI 40 MIN: CPT | Mod: S$GLB,,, | Performed by: SURGERY

## 2022-06-15 PROCEDURE — 3077F SYST BP >= 140 MM HG: CPT | Mod: CPTII,S$GLB,, | Performed by: SURGERY

## 2022-06-15 RX ORDER — ENOXAPARIN SODIUM 100 MG/ML
30 INJECTION SUBCUTANEOUS EVERY 24 HOURS
Status: CANCELLED | OUTPATIENT
Start: 2022-06-15

## 2022-06-15 NOTE — PROGRESS NOTES
Chief complaint:  Recurrent breast cancer     HPI:  61-year-old female known to me for previous right breast cancer in 2017, she underwent lumpectomy followed by chemotherapy and radiation.  She continues to be followed by Dr. Lala with Medical Oncology.  Recent screening mammography revealed an asymmetry in the area of the lumpectomy site in the right breast.  Diagnostic imaging revealed a 1.8 cm spiculated mass suggestive of new malignancy.  Core needle biopsy was performed and pathology revealed invasive ductal carcinoma, ER/LA positive, HER2 negative breast MRI revealed multiple masses spanning 5.7 cm.  After discussion with Medical Oncology we agree that neoadjuvant therapy was indicated.  She also visited with Plastic surgery who recommended delayed reconstruction.  I reviewed all past medical records, medical oncology consultations and progress notes, plastic surgery consultation note, all excess of 40 pages records.  I reviewed all past imaging studies and personally interpreted the images.  The patient completed neoadjuvant chemotherapy on May 26, 2022 and tolerated this well.  Follow-up imaging shows mild decrease in size of the primary lesion as well as resolution of satellite lesions.  She is ready to proceed with surgical intervention.  She desires prophylactic contralateral mastectomy    Greater than 60 minutes was required for complete chart review, imaging review including interpretation of imaging, coordination with referring/other physicians, patient counseling regarding diagnosis/treatment plan, answering questions, medical decision making, and documentation.          Past Medical and Surgical History  Allergies :   Patient has no known allergies.    @[facility]@  Medical :   She has a past medical history of Diabetes mellitus, type 2, Hyperlipidemia, and Hypertension.    Surgical :   She has a past surgical history that includes Breast lumpectomy (Right).     Family History  Her family history  includes Cancer in her sister; Diabetes in her mother; Heart failure in her father and mother; Hypertension in her mother and sister; Stroke in her father.    Social History  She reports that she has never smoked. She has never used smokeless tobacco. She reports previous alcohol use. She reports that she does not use drugs.     Review of Systems   Constitutional: Negative for appetite change, chills, diaphoresis and fever.   HENT: Negative for congestion, drooling, ear discharge, ear pain and hearing loss.    Eyes: Negative for discharge.   Respiratory: Negative for apnea, cough, choking, chest tightness, shortness of breath and stridor.    Cardiovascular: Negative for chest pain, palpitations and leg swelling.   Endocrine: Negative for cold intolerance and heat intolerance.   Genitourinary: Negative for difficulty urinating, dyspareunia, dysuria and hematuria.   Musculoskeletal: Negative for arthralgias, gait problem and joint swelling.   Skin: Negative for color change and rash.   Neurological: Negative for dizziness, tremors, seizures, syncope, facial asymmetry, speech difficulty, light-headedness, numbness and headaches.   Psychiatric/Behavioral: Negative for agitation and confusion.        Objective   Physical Exam  Constitutional:       General: She is not in acute distress.     Appearance: Normal appearance. She is normal weight. She is not toxic-appearing.   HENT:      Head: Normocephalic and atraumatic.      Right Ear: External ear normal.      Left Ear: External ear normal.      Nose: Nose normal.      Mouth/Throat:      Mouth: Mucous membranes are moist.      Pharynx: Oropharynx is clear.   Eyes:      General: No scleral icterus.     Conjunctiva/sclera: Conjunctivae normal.      Pupils: Pupils are equal, round, and reactive to light.   Cardiovascular:      Rate and Rhythm: Normal rate and regular rhythm.      Pulses: Normal pulses.      Heart sounds: Normal heart sounds. No murmur heard.    No gallop.  "  Pulmonary:      Effort: Pulmonary effort is normal.      Breath sounds: Normal breath sounds. No stridor. No wheezing or rhonchi.   Chest:      Chest wall: No tenderness.   Breasts:      Right: No swelling, bleeding, inverted nipple, mass, nipple discharge, skin change, tenderness, axillary adenopathy or supraclavicular adenopathy.      Left: No swelling, bleeding, inverted nipple, mass, nipple discharge, skin change, tenderness, axillary adenopathy or supraclavicular adenopathy.       Musculoskeletal:         General: No swelling, tenderness, deformity or signs of injury. Normal range of motion.      Cervical back: Normal range of motion and neck supple.      Right lower leg: No edema.      Left lower leg: No edema.   Lymphadenopathy:      Upper Body:      Right upper body: No supraclavicular or axillary adenopathy.      Left upper body: No supraclavicular or axillary adenopathy.   Skin:     Capillary Refill: Capillary refill takes less than 2 seconds.      Coloration: Skin is not jaundiced or pale.      Findings: No erythema.   Neurological:      General: No focal deficit present.      Mental Status: She is alert and oriented to person, place, and time.      Cranial Nerves: No cranial nerve deficit.      Motor: No weakness.      Gait: Gait normal.   Psychiatric:         Mood and Affect: Mood normal.         Behavior: Behavior normal.       VITAL SIGNS: 24 HR MIN & MAX LAST    @FLOWSTAT(6:24::1)@           @FLOWSTAT(5:24::1)@  (!) 144/71     @FLOWSTAT(8:24::1)@  102     @FLOWSTAT(9:24::1)@       @FLOWSTAT(10:24::1)@         HT: 5' 2" (157.5 cm)  WT: 78 kg (172 lb)  BMI: 31.5       Assessment & Plan     Diagnosis, staging, prognosis and treatment guidelines for recurrent cancer were discussed with the patient in detail, all questions were addressed.  Mastectomy necessary due to her previous history of breast conservation therapy in the same breast, as well as her extent of disease plastic surgery has recommended " delayed reconstruction.Using visual aids and drawings, I described the anatomy and potential surgical procedures.    Proceed with right mastectomy, sentinel lymph node mapping and biopsy, possible axillary lymph node dissection as well as prophylactic left simple mastectomy    The risks and benefits of the procedure were explained in detail, questions were addressed, the patient gives consent to proceed

## 2022-06-17 ENCOUNTER — TELEPHONE (OUTPATIENT)
Dept: HEMATOLOGY/ONCOLOGY | Facility: CLINIC | Age: 62
End: 2022-06-17
Payer: COMMERCIAL

## 2022-06-17 NOTE — TELEPHONE ENCOUNTER
Pt called bc she accidentally threw away her paper to get the temporary handicap sticker. I checked  to see if it was scanned into her chart but I did not see it. Is it possible to fill out another one?

## 2022-06-23 ENCOUNTER — LAB VISIT (OUTPATIENT)
Dept: LAB | Facility: HOSPITAL | Age: 62
End: 2022-06-23
Attending: INTERNAL MEDICINE
Payer: COMMERCIAL

## 2022-06-23 DIAGNOSIS — E11.69 DIABETES MELLITUS ASSOCIATED WITH HORMONAL ETIOLOGY: Primary | ICD-10-CM

## 2022-06-23 LAB
CHOLEST SERPL-MCNC: 196 MG/DL
CHOLEST/HDLC SERPL: 3 {RATIO} (ref 0–5)
CREAT UR-MCNC: 100.4 MG/DL (ref 47–110)
EST. AVERAGE GLUCOSE BLD GHB EST-MCNC: 148.5 MG/DL
HBA1C MFR BLD: 6.8 %
HDLC SERPL-MCNC: 62 MG/DL (ref 35–60)
LDLC SERPL CALC-MCNC: 114 MG/DL (ref 50–140)
MICROALBUMIN UR-MCNC: 6.8 UG/ML
MICROALBUMIN/CREAT RATIO PNL UR: 6.8 MG/GM CR (ref 0–30)
TRIGL SERPL-MCNC: 99 MG/DL (ref 37–140)
VLDLC SERPL CALC-MCNC: 20 MG/DL

## 2022-06-23 PROCEDURE — 36415 COLL VENOUS BLD VENIPUNCTURE: CPT

## 2022-06-23 PROCEDURE — 83036 HEMOGLOBIN GLYCOSYLATED A1C: CPT

## 2022-06-23 PROCEDURE — 82043 UR ALBUMIN QUANTITATIVE: CPT

## 2022-06-23 PROCEDURE — 87522 HEPATITIS C REVRS TRNSCRPJ: CPT

## 2022-06-23 PROCEDURE — 80061 LIPID PANEL: CPT

## 2022-06-24 ENCOUNTER — HOSPITAL ENCOUNTER (OUTPATIENT)
Dept: RADIOLOGY | Facility: HOSPITAL | Age: 62
Discharge: HOME | End: 2022-06-24
Attending: SURGERY
Payer: COMMERCIAL

## 2022-06-24 ENCOUNTER — CLINICAL SUPPORT (OUTPATIENT)
Dept: PREADMISSION TESTING | Facility: HOSPITAL | Age: 62
End: 2022-06-24
Attending: SURGERY
Payer: COMMERCIAL

## 2022-06-24 DIAGNOSIS — Z17.0 MALIGNANT NEOPLASM OF OVERLAPPING SITES OF RIGHT BREAST IN FEMALE, ESTROGEN RECEPTOR POSITIVE: ICD-10-CM

## 2022-06-24 DIAGNOSIS — C50.811 MALIGNANT NEOPLASM OF OVERLAPPING SITES OF RIGHT BREAST IN FEMALE, ESTROGEN RECEPTOR POSITIVE: ICD-10-CM

## 2022-06-24 DIAGNOSIS — Z01.818 OTHER SPECIFIED PRE-OPERATIVE EXAMINATION: Primary | ICD-10-CM

## 2022-06-24 PROCEDURE — 71045 X-RAY EXAM CHEST 1 VIEW: CPT | Mod: TC

## 2022-06-24 PROCEDURE — 93010 ELECTROCARDIOGRAM REPORT: CPT | Mod: ,,, | Performed by: INTERNAL MEDICINE

## 2022-06-24 PROCEDURE — 93005 ELECTROCARDIOGRAM TRACING: CPT

## 2022-06-24 PROCEDURE — 93010 EKG 12-LEAD: ICD-10-PCS | Mod: ,,, | Performed by: INTERNAL MEDICINE

## 2022-06-25 LAB — MAYO GENERIC ORDERABLE RESULT: NORMAL

## 2022-06-29 ENCOUNTER — ANESTHESIA (OUTPATIENT)
Dept: SURGERY | Facility: HOSPITAL | Age: 62
DRG: 581 | End: 2022-06-29
Payer: COMMERCIAL

## 2022-06-29 ENCOUNTER — HOSPITAL ENCOUNTER (INPATIENT)
Facility: HOSPITAL | Age: 62
LOS: 1 days | Discharge: HOME | DRG: 581 | End: 2022-06-30
Attending: SURGERY | Admitting: SURGERY
Payer: COMMERCIAL

## 2022-06-29 ENCOUNTER — ANESTHESIA EVENT (OUTPATIENT)
Dept: SURGERY | Facility: HOSPITAL | Age: 62
DRG: 581 | End: 2022-06-29
Payer: COMMERCIAL

## 2022-06-29 DIAGNOSIS — C50.919 BREAST CANCER: ICD-10-CM

## 2022-06-29 DIAGNOSIS — C50.811 MALIGNANT NEOPLASM OF OVERLAPPING SITES OF RIGHT BREAST IN FEMALE, ESTROGEN RECEPTOR POSITIVE: ICD-10-CM

## 2022-06-29 DIAGNOSIS — Z17.0 MALIGNANT NEOPLASM OF OVERLAPPING SITES OF RIGHT BREAST IN FEMALE, ESTROGEN RECEPTOR POSITIVE: ICD-10-CM

## 2022-06-29 LAB
ANION GAP SERPL CALC-SCNC: 16 MMOL/L (ref 8–16)
BUN SERPL-MCNC: 10 MG/DL (ref 6–30)
CHLORIDE SERPL-SCNC: 104 MMOL/L (ref 95–110)
CREAT SERPL-MCNC: 0.6 MG/DL (ref 0.5–1.4)
GLUCOSE SERPL-MCNC: 199 MG/DL (ref 70–110)
GLUCOSE SERPL-MCNC: 210 MG/DL (ref 70–110)
HCT VFR BLD CALC: 27 %PCV (ref 36–54)
HGB BLD-MCNC: 9 G/DL
POC IONIZED CALCIUM: 1.27 MMOL/L (ref 1.06–1.42)
POC TCO2 (MEASURED): 24 MMOL/L (ref 23–29)
POCT GLUCOSE: 129 MG/DL (ref 70–110)
POCT GLUCOSE: 210 MG/DL (ref 70–110)
POTASSIUM BLD-SCNC: 3.8 MMOL/L (ref 3.5–5.1)
SAMPLE: ABNORMAL
SODIUM BLD-SCNC: 140 MMOL/L (ref 136–145)

## 2022-06-29 PROCEDURE — 63600175 PHARM REV CODE 636 W HCPCS: Performed by: NURSE PRACTITIONER

## 2022-06-29 PROCEDURE — 11000001 HC ACUTE MED/SURG PRIVATE ROOM

## 2022-06-29 PROCEDURE — 25000003 PHARM REV CODE 250

## 2022-06-29 PROCEDURE — 25000003 PHARM REV CODE 250: Performed by: NURSE ANESTHETIST, CERTIFIED REGISTERED

## 2022-06-29 PROCEDURE — 19303 MAST SIMPLE COMPLETE: CPT | Mod: 51,LT,, | Performed by: SURGERY

## 2022-06-29 PROCEDURE — 19307 PR MASTECTOMY, MODIFIED RADICAL: ICD-10-PCS | Mod: RT,,, | Performed by: SURGERY

## 2022-06-29 PROCEDURE — 36000706: Performed by: SURGERY

## 2022-06-29 PROCEDURE — 38792 RA TRACER ID OF SENTINL NODE: CPT | Mod: 59,RT,, | Performed by: SURGERY

## 2022-06-29 PROCEDURE — 36000707: Performed by: SURGERY

## 2022-06-29 PROCEDURE — 71000016 HC POSTOP RECOV ADDL HR: Performed by: SURGERY

## 2022-06-29 PROCEDURE — 63600175 PHARM REV CODE 636 W HCPCS

## 2022-06-29 PROCEDURE — 19303 PR MASTECTOMY, SIMPLE, COMPLETE: ICD-10-PCS | Mod: AS,51,LT, | Performed by: NURSE PRACTITIONER

## 2022-06-29 PROCEDURE — 38900 IO MAP OF SENT LYMPH NODE: CPT | Mod: AS,RT,, | Performed by: NURSE PRACTITIONER

## 2022-06-29 PROCEDURE — 19303 PR MASTECTOMY, SIMPLE, COMPLETE: ICD-10-PCS | Mod: 51,LT,, | Performed by: SURGERY

## 2022-06-29 PROCEDURE — 63600175 PHARM REV CODE 636 W HCPCS: Performed by: ANESTHESIOLOGY

## 2022-06-29 PROCEDURE — 71000033 HC RECOVERY, INTIAL HOUR: Performed by: SURGERY

## 2022-06-29 PROCEDURE — 38900 IO MAP OF SENT LYMPH NODE: CPT | Mod: RT,,, | Performed by: SURGERY

## 2022-06-29 PROCEDURE — 38900 PR INTRAOPERATIVE SENTINEL LYMPH NODE ID W DYE INJECTION: ICD-10-PCS | Mod: AS,RT,, | Performed by: NURSE PRACTITIONER

## 2022-06-29 PROCEDURE — 71000039 HC RECOVERY, EACH ADD'L HOUR: Performed by: SURGERY

## 2022-06-29 PROCEDURE — 37000009 HC ANESTHESIA EA ADD 15 MINS: Performed by: SURGERY

## 2022-06-29 PROCEDURE — 38900 PR INTRAOPERATIVE SENTINEL LYMPH NODE ID W DYE INJECTION: ICD-10-PCS | Mod: RT,,, | Performed by: SURGERY

## 2022-06-29 PROCEDURE — 37000008 HC ANESTHESIA 1ST 15 MINUTES: Performed by: SURGERY

## 2022-06-29 PROCEDURE — 25000003 PHARM REV CODE 250: Performed by: NURSE PRACTITIONER

## 2022-06-29 PROCEDURE — C1729 CATH, DRAINAGE: HCPCS | Performed by: SURGERY

## 2022-06-29 PROCEDURE — 63600175 PHARM REV CODE 636 W HCPCS: Performed by: SURGERY

## 2022-06-29 PROCEDURE — 63600175 PHARM REV CODE 636 W HCPCS: Mod: JG | Performed by: SURGERY

## 2022-06-29 PROCEDURE — 19307 MAST MOD RAD: CPT | Mod: RT,,, | Performed by: SURGERY

## 2022-06-29 PROCEDURE — 71000015 HC POSTOP RECOV 1ST HR: Performed by: SURGERY

## 2022-06-29 PROCEDURE — 19307 MAST MOD RAD: CPT | Mod: AS,RT,, | Performed by: NURSE PRACTITIONER

## 2022-06-29 PROCEDURE — 19303 MAST SIMPLE COMPLETE: CPT | Mod: AS,51,LT, | Performed by: NURSE PRACTITIONER

## 2022-06-29 PROCEDURE — 19307 PR MASTECTOMY, MODIFIED RADICAL: ICD-10-PCS | Mod: AS,RT,, | Performed by: NURSE PRACTITIONER

## 2022-06-29 PROCEDURE — 38792 PR IDENTIFY SENTINEL 2DE: ICD-10-PCS | Mod: 59,RT,, | Performed by: SURGERY

## 2022-06-29 PROCEDURE — 63600175 PHARM REV CODE 636 W HCPCS: Performed by: NURSE ANESTHETIST, CERTIFIED REGISTERED

## 2022-06-29 PROCEDURE — 27201423 OPTIME MED/SURG SUP & DEVICES STERILE SUPPLY: Performed by: SURGERY

## 2022-06-29 RX ORDER — METOPROLOL SUCCINATE 25 MG/1
50 TABLET, EXTENDED RELEASE ORAL DAILY
Status: DISCONTINUED | OUTPATIENT
Start: 2022-06-29 | End: 2022-06-30 | Stop reason: HOSPADM

## 2022-06-29 RX ORDER — EPHEDRINE SULFATE 50 MG/ML
INJECTION, SOLUTION INTRAVENOUS
Status: DISCONTINUED | OUTPATIENT
Start: 2022-06-29 | End: 2022-06-29

## 2022-06-29 RX ORDER — FENTANYL CITRATE 50 UG/ML
INJECTION, SOLUTION INTRAMUSCULAR; INTRAVENOUS
Status: DISCONTINUED | OUTPATIENT
Start: 2022-06-29 | End: 2022-06-29

## 2022-06-29 RX ORDER — NALOXONE HCL 0.4 MG/ML
0.02 VIAL (ML) INJECTION
Status: DISCONTINUED | OUTPATIENT
Start: 2022-06-29 | End: 2022-06-30 | Stop reason: HOSPADM

## 2022-06-29 RX ORDER — ENOXAPARIN SODIUM 100 MG/ML
INJECTION SUBCUTANEOUS
Status: COMPLETED
Start: 2022-06-29 | End: 2022-06-29

## 2022-06-29 RX ORDER — ISOSULFAN BLUE 50 MG/5ML
INJECTION, SOLUTION SUBCUTANEOUS
Status: DISPENSED
Start: 2022-06-29 | End: 2022-06-29

## 2022-06-29 RX ORDER — IBUPROFEN 200 MG
24 TABLET ORAL
Status: DISCONTINUED | OUTPATIENT
Start: 2022-06-29 | End: 2022-06-30 | Stop reason: HOSPADM

## 2022-06-29 RX ORDER — ONDANSETRON HYDROCHLORIDE 2 MG/ML
INJECTION, SOLUTION INTRAMUSCULAR; INTRAVENOUS
Status: DISCONTINUED | OUTPATIENT
Start: 2022-06-29 | End: 2022-06-29

## 2022-06-29 RX ORDER — SODIUM CHLORIDE 0.9 % (FLUSH) 0.9 %
10 SYRINGE (ML) INJECTION
Status: DISCONTINUED | OUTPATIENT
Start: 2022-06-29 | End: 2022-06-30 | Stop reason: HOSPADM

## 2022-06-29 RX ORDER — ONDANSETRON 2 MG/ML
4 INJECTION INTRAMUSCULAR; INTRAVENOUS ONCE
Status: DISCONTINUED | OUTPATIENT
Start: 2022-06-29 | End: 2022-06-30 | Stop reason: HOSPADM

## 2022-06-29 RX ORDER — HYDROMORPHONE HYDROCHLORIDE 2 MG/ML
0.4 INJECTION, SOLUTION INTRAMUSCULAR; INTRAVENOUS; SUBCUTANEOUS EVERY 5 MIN PRN
Status: DISCONTINUED | OUTPATIENT
Start: 2022-06-29 | End: 2022-06-30 | Stop reason: HOSPADM

## 2022-06-29 RX ORDER — METFORMIN HYDROCHLORIDE 500 MG/1
TABLET ORAL
Status: COMPLETED
Start: 2022-06-29 | End: 2022-06-29

## 2022-06-29 RX ORDER — ISOSULFAN BLUE 50 MG/5ML
INJECTION, SOLUTION SUBCUTANEOUS
Status: DISCONTINUED | OUTPATIENT
Start: 2022-06-29 | End: 2022-06-29 | Stop reason: HOSPADM

## 2022-06-29 RX ORDER — ROCURONIUM BROMIDE 10 MG/ML
INJECTION, SOLUTION INTRAVENOUS
Status: DISCONTINUED | OUTPATIENT
Start: 2022-06-29 | End: 2022-06-29

## 2022-06-29 RX ORDER — DIPHENHYDRAMINE HYDROCHLORIDE 50 MG/ML
12.5 INJECTION INTRAMUSCULAR; INTRAVENOUS EVERY 4 HOURS PRN
Status: DISCONTINUED | OUTPATIENT
Start: 2022-06-29 | End: 2022-06-30 | Stop reason: HOSPADM

## 2022-06-29 RX ORDER — SODIUM CHLORIDE, SODIUM LACTATE, POTASSIUM CHLORIDE, CALCIUM CHLORIDE 600; 310; 30; 20 MG/100ML; MG/100ML; MG/100ML; MG/100ML
INJECTION, SOLUTION INTRAVENOUS CONTINUOUS
Status: DISCONTINUED | OUTPATIENT
Start: 2022-06-29 | End: 2022-06-30 | Stop reason: HOSPADM

## 2022-06-29 RX ORDER — ONDANSETRON 2 MG/ML
8 INJECTION INTRAMUSCULAR; INTRAVENOUS EVERY 8 HOURS PRN
Status: DISCONTINUED | OUTPATIENT
Start: 2022-06-29 | End: 2022-06-30 | Stop reason: HOSPADM

## 2022-06-29 RX ORDER — MIDAZOLAM HYDROCHLORIDE 1 MG/ML
2 INJECTION INTRAMUSCULAR; INTRAVENOUS ONCE AS NEEDED
Status: COMPLETED | OUTPATIENT
Start: 2022-06-29 | End: 2022-06-29

## 2022-06-29 RX ORDER — ACETAMINOPHEN 10 MG/ML
INJECTION, SOLUTION INTRAVENOUS
Status: DISCONTINUED | OUTPATIENT
Start: 2022-06-29 | End: 2022-06-29

## 2022-06-29 RX ORDER — ENOXAPARIN SODIUM 100 MG/ML
30 INJECTION SUBCUTANEOUS EVERY 24 HOURS
Status: DISCONTINUED | OUTPATIENT
Start: 2022-06-29 | End: 2022-06-30 | Stop reason: HOSPADM

## 2022-06-29 RX ORDER — CEFAZOLIN SODIUM 1 G/3ML
INJECTION, POWDER, FOR SOLUTION INTRAMUSCULAR; INTRAVENOUS
Status: DISPENSED
Start: 2022-06-29 | End: 2022-06-29

## 2022-06-29 RX ORDER — MEPERIDINE HYDROCHLORIDE 25 MG/ML
12.5 INJECTION INTRAMUSCULAR; INTRAVENOUS; SUBCUTANEOUS ONCE
Status: ACTIVE | OUTPATIENT
Start: 2022-06-29 | End: 2022-06-30

## 2022-06-29 RX ORDER — PROPOFOL 10 MG/ML
VIAL (ML) INTRAVENOUS
Status: DISCONTINUED | OUTPATIENT
Start: 2022-06-29 | End: 2022-06-29

## 2022-06-29 RX ORDER — PROMETHAZINE HYDROCHLORIDE 25 MG/1
25 TABLET ORAL EVERY 6 HOURS PRN
Status: DISCONTINUED | OUTPATIENT
Start: 2022-06-29 | End: 2022-06-30 | Stop reason: HOSPADM

## 2022-06-29 RX ORDER — DEXAMETHASONE SODIUM PHOSPHATE 4 MG/ML
INJECTION, SOLUTION INTRA-ARTICULAR; INTRALESIONAL; INTRAMUSCULAR; INTRAVENOUS; SOFT TISSUE
Status: DISCONTINUED | OUTPATIENT
Start: 2022-06-29 | End: 2022-06-29

## 2022-06-29 RX ORDER — DIPHENHYDRAMINE HYDROCHLORIDE 50 MG/ML
12.5 INJECTION INTRAMUSCULAR; INTRAVENOUS EVERY 6 HOURS PRN
Status: DISCONTINUED | OUTPATIENT
Start: 2022-06-29 | End: 2022-06-30 | Stop reason: HOSPADM

## 2022-06-29 RX ORDER — MIDAZOLAM HYDROCHLORIDE 1 MG/ML
INJECTION INTRAMUSCULAR; INTRAVENOUS
Status: COMPLETED
Start: 2022-06-29 | End: 2022-06-29

## 2022-06-29 RX ORDER — HYDROCODONE BITARTRATE AND ACETAMINOPHEN 5; 325 MG/1; MG/1
1 TABLET ORAL EVERY 4 HOURS PRN
Status: DISCONTINUED | OUTPATIENT
Start: 2022-06-29 | End: 2022-06-30 | Stop reason: HOSPADM

## 2022-06-29 RX ORDER — LIDOCAINE HYDROCHLORIDE 10 MG/ML
INJECTION, SOLUTION EPIDURAL; INFILTRATION; INTRACAUDAL; PERINEURAL
Status: DISCONTINUED | OUTPATIENT
Start: 2022-06-29 | End: 2022-06-29

## 2022-06-29 RX ORDER — CEFAZOLIN SODIUM 2 G/50ML
2 SOLUTION INTRAVENOUS
Status: COMPLETED | OUTPATIENT
Start: 2022-06-29 | End: 2022-06-29

## 2022-06-29 RX ORDER — HYDROMORPHONE HYDROCHLORIDE 2 MG/ML
1 INJECTION, SOLUTION INTRAMUSCULAR; INTRAVENOUS; SUBCUTANEOUS EVERY 4 HOURS PRN
Status: DISCONTINUED | OUTPATIENT
Start: 2022-06-29 | End: 2022-06-30 | Stop reason: HOSPADM

## 2022-06-29 RX ORDER — GLUCAGON 1 MG
1 KIT INJECTION
Status: DISCONTINUED | OUTPATIENT
Start: 2022-06-29 | End: 2022-06-30 | Stop reason: HOSPADM

## 2022-06-29 RX ORDER — METFORMIN HYDROCHLORIDE 500 MG/1
500 TABLET ORAL 2 TIMES DAILY WITH MEALS
Status: DISCONTINUED | OUTPATIENT
Start: 2022-06-29 | End: 2022-06-30 | Stop reason: HOSPADM

## 2022-06-29 RX ORDER — IBUPROFEN 200 MG
16 TABLET ORAL
Status: DISCONTINUED | OUTPATIENT
Start: 2022-06-29 | End: 2022-06-30 | Stop reason: HOSPADM

## 2022-06-29 RX ORDER — SODIUM CHLORIDE, SODIUM GLUCONATE, SODIUM ACETATE, POTASSIUM CHLORIDE AND MAGNESIUM CHLORIDE 30; 37; 368; 526; 502 MG/100ML; MG/100ML; MG/100ML; MG/100ML; MG/100ML
1000 INJECTION, SOLUTION INTRAVENOUS CONTINUOUS
Status: DISCONTINUED | OUTPATIENT
Start: 2022-06-29 | End: 2022-06-30 | Stop reason: HOSPADM

## 2022-06-29 RX ADMIN — SODIUM CHLORIDE, POTASSIUM CHLORIDE, SODIUM LACTATE AND CALCIUM CHLORIDE: 600; 310; 30; 20 INJECTION, SOLUTION INTRAVENOUS at 08:06

## 2022-06-29 RX ADMIN — ONDANSETRON 4 MG: 2 INJECTION INTRAMUSCULAR; INTRAVENOUS at 09:06

## 2022-06-29 RX ADMIN — EPHEDRINE SULFATE 10 MG: 50 INJECTION INTRAVENOUS at 11:06

## 2022-06-29 RX ADMIN — MIDAZOLAM 2 MG: 1 INJECTION INTRAMUSCULAR; INTRAVENOUS at 08:06

## 2022-06-29 RX ADMIN — EPHEDRINE SULFATE 5 MG: 50 INJECTION INTRAVENOUS at 11:06

## 2022-06-29 RX ADMIN — SODIUM CHLORIDE, POTASSIUM CHLORIDE, SODIUM LACTATE AND CALCIUM CHLORIDE: 600; 310; 30; 20 INJECTION, SOLUTION INTRAVENOUS at 11:06

## 2022-06-29 RX ADMIN — ROCURONIUM BROMIDE 50 MG: 10 SOLUTION INTRAVENOUS at 09:06

## 2022-06-29 RX ADMIN — HYDROCODONE BITARTRATE AND ACETAMINOPHEN 1 TABLET: 5; 325 TABLET ORAL at 07:06

## 2022-06-29 RX ADMIN — MIDAZOLAM HYDROCHLORIDE 2 MG: 1 INJECTION INTRAMUSCULAR; INTRAVENOUS at 08:06

## 2022-06-29 RX ADMIN — METFORMIN HYDROCHLORIDE 500 MG: 500 TABLET ORAL at 05:06

## 2022-06-29 RX ADMIN — LIDOCAINE HYDROCHLORIDE 40 MG: 10 INJECTION, SOLUTION EPIDURAL; INFILTRATION; INTRACAUDAL; PERINEURAL at 09:06

## 2022-06-29 RX ADMIN — FENTANYL CITRATE 50 MCG: 50 INJECTION, SOLUTION INTRAMUSCULAR; INTRAVENOUS at 09:06

## 2022-06-29 RX ADMIN — ENOXAPARIN SODIUM 30 MG: 30 INJECTION SUBCUTANEOUS at 08:06

## 2022-06-29 RX ADMIN — ACETAMINOPHEN 1000 MG: 10 INJECTION, SOLUTION INTRAVENOUS at 09:06

## 2022-06-29 RX ADMIN — ENOXAPARIN SODIUM 30 MG: 100 INJECTION SUBCUTANEOUS at 08:06

## 2022-06-29 RX ADMIN — DEXAMETHASONE SODIUM PHOSPHATE 4 MG: 4 INJECTION, SOLUTION INTRA-ARTICULAR; INTRALESIONAL; INTRAMUSCULAR; INTRAVENOUS; SOFT TISSUE at 09:06

## 2022-06-29 RX ADMIN — HYDROMORPHONE HYDROCHLORIDE 1 MG: 2 INJECTION, SOLUTION INTRAMUSCULAR; INTRAVENOUS; SUBCUTANEOUS at 01:06

## 2022-06-29 RX ADMIN — PROPOFOL 150 MG: 10 INJECTION, EMULSION INTRAVENOUS at 09:06

## 2022-06-29 RX ADMIN — SUGAMMADEX 200 MG: 100 INJECTION, SOLUTION INTRAVENOUS at 11:06

## 2022-06-29 RX ADMIN — CEFAZOLIN SODIUM 2 G: 2 SOLUTION INTRAVENOUS at 09:06

## 2022-06-29 NOTE — ANESTHESIA PROCEDURE NOTES
Intubation    Date/Time: 6/29/2022 9:18 AM  Performed by: Darius Bobo CRNA  Authorized by: Dameon Huff MD     Intubation:     Induction:  Inhalational - ETT/trach    Intubated:  Postinduction    Mask Ventilation:  Easy mask    Attempts:  1    Attempted By:  CRNA    Method of Intubation:  Direct    Blade:  Nehal 3    Laryngeal View Grade: Grade I - full view of cords      Difficult Airway Encountered?: No      Complications:  None    Airway Device:  Oral endotracheal tube    Airway Device Size:  7.0    Style/Cuff Inflation:  Cuffed (inflated to minimal occlusive pressure)    Tube secured:  21    Secured at:  The lips    Placement Verified By:  Capnometry    Complicating Factors:  None    Findings Post-Intubation:  BS equal bilateral and atraumatic/condition of teeth unchanged

## 2022-06-29 NOTE — PLAN OF CARE
vss-robert score is10-no pain reported per patient-compression and ice to surgical sites per md order-rudolph drains being monitored by phase2 care nurse for clotting-she will report to md prn bleeding and clotting -meets criteria for release to phase2 per dr tres ascencio-to rm 6 via bed

## 2022-06-29 NOTE — ANESTHESIA PREPROCEDURE EVALUATION
"                                                                                                             06/29/2022  Delia Scruggs is a 62 y.o., female   Pre-operative evaluation for Procedure(s) (LRB):  MASTECTOMY, SIMPLE, Bilateral (Bilateral)  BIOPSY, LYMPH NODE, SENTINEL, Right (Right)    Ht 5' 2" (1.575 m)   Wt 76.7 kg (169 lb)   Breastfeeding No   BMI 30.91 kg/m²     Patient Active Problem List   Diagnosis    Essential hypertension    Type 2 diabetes mellitus with hyperglycemia, without long-term current use of insulin    Hyperlipidemia    Hyperlipidemia associated with type 2 diabetes mellitus    MVP (mitral valve prolapse)    Obesity    BRODY (obstructive sleep apnea)    Hypertension associated with diabetes    Osteopenia    History of breast cancer    Malignant neoplasm of lower-inner quadrant of right female breast    Leukopenia due to antineoplastic chemotherapy    Anemia due to antineoplastic chemotherapy       Review of patient's allergies indicates:  No Known Allergies    Current Outpatient Medications   Medication Instructions    amLODIPine-benazepriL (LOTREL) 10-40 mg per capsule 1 capsule, Oral, Daily    ascorbic acid (vitamin C) (VITAMIN C) 500 mg, Oral    aspirin (ECOTRIN) 81 mg, Oral    b complex vitamins tablet 1 tablet, Oral, Daily    calcium carbonate-vitamin D3 600 mg-20 mcg (800 unit) Chew 1 tablet, Oral, Daily    empagliflozin (JARDIANCE) 25 mg tablet 1 tablet, Oral, Daily    ferrous gluconate (FERGON) 240 mg, Oral    GLUTAMINE ORAL 1 Scoop, Oral, As needed (PRN)    HYDROcodone-acetaminophen (NORCO) 5-325 mg per tablet 1 tablet, Oral, Every 6 hours PRN    loratadine 10 mg, Oral, As needed (PRN)    LORazepam (ATIVAN) 1 mg, Oral, 2 times daily, as needed for anxiety.     magnesium 250 mg Tab 1 tablet, Oral, Daily    metFORMIN (GLUCOPHAGE) 500 mg, Oral    metoprolol succinate (TOPROL-XL) 50 MG 24 hr tablet 1 tablet, Oral, Daily    " multivitamin-minerals-lutein Tab 1 tablet, Oral, Daily    omega-3 fatty acids-fish oil 360-1,200 mg Cap 1 capsule, Oral, Daily    ondansetron (ZOFRAN) 8 mg, Oral, As needed (PRN)    pravastatin (PRAVACHOL) 40 MG tablet 1 tablet, Oral, Daily    pyridoxine (vitamin B6) (B-6) 100 mg, Oral    SITagliptin (JANUVIA) 100 MG Tab 1 tablet, Oral, Daily       Past Surgical History:   Procedure Laterality Date    BREAST LUMPECTOMY Right     MEDIPORT INSERTION, SINGLE      TUBAL LIGATION         Social History     Socioeconomic History    Marital status:    Tobacco Use    Smoking status: Never Smoker    Smokeless tobacco: Never Used   Substance and Sexual Activity    Alcohol use: Not Currently     Comment: twice a year    Drug use: Never    Sexual activity: Yes       Lab Results   Component Value Date    WBC 8.8 06/08/2022    HGB 8.8 (L) 06/08/2022    HCT 28.5 (L) 06/08/2022    .9 (H) 06/08/2022     06/08/2022          BMP  Lab Results   Component Value Date     06/08/2022    K 4.2 06/08/2022    CO2 25 06/08/2022    BUN 7.7 (L) 06/08/2022    CREATININE 0.71 06/08/2022    CALCIUM 9.1 06/08/2022    EGFRNONAA >60 04/28/2022        INR  No results for input(s): PT, INR, PROTIME, APTT in the last 72 hours.        Diagnostic Studies:      EKG:  Results for orders placed or performed in visit on 06/24/22   EKG 12-lead    Collection Time: 06/24/22  7:43 AM    Narrative    Test Reason : C50.811,Z17.0,    Vent. Rate : 073 BPM     Atrial Rate : 073 BPM     P-R Int : 182 ms          QRS Dur : 070 ms      QT Int : 388 ms       P-R-T Axes : 058 006 035 degrees     QTc Int : 427 ms    Normal sinus rhythm  Possible Left atrial enlargement  Borderline Abnormal ECG  No previous ECGs available  Confirmed by Mario Peace MD ([id number]) on 6/24/2022 12:55:29 PM    Referred By: MARKUS PERKINS           Confirmed By:Mario Peace MD         Pre-op Assessment          Review of Systems  Anesthesia Hx:  No problems with  previous Anesthesia  Denies Family Hx of Anesthesia complications.    Cardiovascular:  Cardiovascular Normal  No Cardiac Complaints   Pulmonary:  Pulmonary Normal No Pulmonary Complaints   Hepatic/GI:   No Current GERD Sx       Physical Exam  General: Alert and Oriented    Airway:  Mallampati: II   Mouth Opening: Normal  TM Distance: Normal  Tongue: Normal  Neck ROM: Normal ROM    Dental:  Intact    Chest/Lungs:  Clear to auscultation, Normal Respiratory Rate    Heart:  Rate: Normal  Rhythm: Regular Rhythm        Anesthesia Plan  Type of Anesthesia, risks & benefits discussed:    Anesthesia Type: Gen ETT  Intra-op Monitoring Plan: Standard ASA Monitors  Post Op Pain Control Plan: multimodal analgesia  Induction:  IV and Inhalation  Airway Plan: Direct, Post-Induction  Informed Consent: Informed consent signed with the Patient and all parties understand the risks and agree with anesthesia plan.  All questions answered. Patient consented to blood products? No  ASA Score: 3  Day of Surgery Review of History & Physical: H&P Update referred to the surgeon/provider.  Anesthesia Plan Notes: Discussed Anesthetic Plan w/ Pt/Family. Questions Entertained. Accepted.    Ready For Surgery From Anesthesia Perspective.     .

## 2022-06-29 NOTE — TRANSFER OF CARE
"Anesthesia Transfer of Care Note    Patient: Delia Scruggs    Procedure(s) Performed: Procedure(s) (LRB):  MASTECTOMY, SIMPLE, Bilateral (Bilateral)  RIGHT AXILLARY DISSECTION (Right)    Patient location: PACU    Anesthesia Type: general    Transport from OR: Transported from OR on room air with adequate spontaneous ventilation    Post pain: adequate analgesia    Post assessment: no apparent anesthetic complications    Post vital signs: stable    Level of consciousness: responds to stimulation    Nausea/Vomiting: no nausea/vomiting    Complications: none    Transfer of care protocol was followed      Last vitals:   Visit Vitals  /69   Pulse 79   Temp 36.9 °C (98.4 °F)   Resp 18   Ht 5' 2" (1.575 m)   Wt 78.9 kg (173 lb 15.1 oz)   SpO2 99%   Breastfeeding No   BMI 31.81 kg/m²     "

## 2022-06-29 NOTE — ANESTHESIA POSTPROCEDURE EVALUATION
Anesthesia Post Evaluation    Patient: Delia Scruggs    Procedure(s) Performed: Procedure(s) (LRB):  MASTECTOMY, SIMPLE, Bilateral (Bilateral)  RIGHT AXILLARY DISSECTION (Right)    Final Anesthesia Type: general      Patient location during evaluation: PACU  Patient participation: Yes- Able to Participate  Level of consciousness: awake  Post-procedure vital signs: reviewed and stable  Pain management: adequate  Airway patency: patent    PONV status at discharge: vomiting (controlled) and nausea (inadequately controlled)  Anesthetic complications: no      Cardiovascular status: hemodynamically stable  Respiratory status: spontaneous ventilation and unassisted  Hydration status: euvolemic  Follow-up not needed.  Comments:              Vitals Value Taken Time   /64 06/29/22 1250   Temp 36.5 °C (97.7 °F) 06/29/22 1250   Pulse 80 06/29/22 1251   Resp 18 06/29/22 1344   SpO2 95 % 06/29/22 1251   Vitals shown include unvalidated device data.      Event Time   Out of Recovery 12:55:00         Pain/Pat Score: Pain Rating Prior to Med Admin: (S) 5 (patient unable to tolerate PO) (6/29/2022  1:44 PM)  Pat Score: 10 (6/29/2022 12:55 PM)

## 2022-06-30 VITALS
RESPIRATION RATE: 18 BRPM | SYSTOLIC BLOOD PRESSURE: 110 MMHG | TEMPERATURE: 98 F | HEIGHT: 62 IN | HEART RATE: 93 BPM | OXYGEN SATURATION: 96 % | DIASTOLIC BLOOD PRESSURE: 70 MMHG | BODY MASS INDEX: 32.01 KG/M2 | WEIGHT: 173.94 LBS

## 2022-06-30 PROBLEM — C50.919 BREAST CANCER: Status: ACTIVE | Noted: 2022-06-30

## 2022-06-30 LAB — POCT GLUCOSE: 179 MG/DL (ref 70–110)

## 2022-06-30 PROCEDURE — 25000003 PHARM REV CODE 250: Performed by: NURSE PRACTITIONER

## 2022-06-30 RX ORDER — HYDROCODONE BITARTRATE AND ACETAMINOPHEN 5; 325 MG/1; MG/1
1 TABLET ORAL EVERY 4 HOURS PRN
Qty: 30 TABLET | Refills: 0 | Status: SHIPPED | OUTPATIENT
Start: 2022-06-30 | End: 2023-01-23

## 2022-06-30 RX ADMIN — METOPROLOL SUCCINATE 50 MG: 25 TABLET, EXTENDED RELEASE ORAL at 08:06

## 2022-06-30 RX ADMIN — HYDROCODONE BITARTRATE AND ACETAMINOPHEN 1 TABLET: 5; 325 TABLET ORAL at 12:06

## 2022-06-30 RX ADMIN — METFORMIN HYDROCHLORIDE 500 MG: 500 TABLET ORAL at 07:06

## 2022-06-30 RX ADMIN — HYDROCODONE BITARTRATE AND ACETAMINOPHEN 1 TABLET: 5; 325 TABLET ORAL at 08:06

## 2022-06-30 NOTE — DISCHARGE INSTRUCTIONS
How do I care for my incisions?  You and your caregiver should look at your incision daily. Call your doctor if you see any redness or drainage from your  incision.  You will be given a support bra, wear this for 24 hours a day for first week (unless showering or sponge bathing) for  comfort and to help decrease amount of swelling. If the bra fits too loose or too tight you may go to your local store a  purchase a front opening sports bra that fits snug.  Your incision will be closed with sutures (stitches) under your skin. These sutures dissolve on their own, so they do not  need to be removed.  · If you go home with Steri-StripsTM on your incision, they will loosen and fall off by themselves. If they havent fallen off  within 14 days, you may remove them.  · If you go home with glue over your sutures (stitches), it will also loosen and peel off, similarly to the Steri-Strips.  ** If you have had a Mastectomy and/or Reconstruction and/or Axillary Lymph Node Dissection:  You may have 1-2 Mikael-Buchanan Drains in place. Please refer to the additional instructions discussing care of your drains.  Is it normal to feel new sensations?  As you are healing, you may feel a several different sensations in your breast. Tenderness, numbness, and twinges are  common examples. These sensations usually come and go, and will lessen over time, usually within the first few months  after surgery. As you continue to heal, you may feel scar tissue along your incision site. It will feel hard. This is common  and will soften over the next several months.    Can I shower?  You can shower 48 hours after your surgery. Taking a warm shower is relaxing and can help decrease discomfort. Use  soap when you shower and gently wash your incision. Pat the areas dry with a towel after showering, and leave your  incision uncovered, unless you have drainage from your incision. If you have drainage, call your doctors office.  Do not take tub baths,  swim, or use hot tubs or saunas until you discuss it with your doctor at the first appointment  after your surgery.    Will I have pain when I am home?  The length of time each person has pain or discomfort varies. You will be given a prescription for pain medication  before you go home. Follow the guidelines below to manage your pain.  · Take your medication as directed and as needed.  · Call your doctor if the pain medication prescribed for you doesnt relieve your pain.  · Do not drive or drink alcohol while you are taking prescription pain medication.  · As your incision heals, you will have less pain and need less pain medication. A mild pain reliever such as  acetaminophen (Tylenol) or ibuprofen (Advil) will relieve aches and discomfort. However, large quantities of  acetaminophen may be harmful to your liver. Do not take more acetaminophen than the amount directed on the bottle  or as instructed by your doctor or nurse.  · Pain medication should help you as you resume your normal activities. Pain medication is most effective 30 to 45  minutes after taking it.  · Keep track of when you take your pain medication. Taking it when your pain first begins is more effective than waiting  for the pain to get worse.  Pain medication may cause constipation (having fewer bowel movements than what is normal for you).    How can I prevent constipation?  · Go to the bathroom at the same time every day. Your body will get used to going at that time.  · If you feel the urge to go, do not put it off. Try to use the bathroom 5 to 15 minutes after meals.  · After breakfast is a good time to move your bowels because the reflexes in your colon are strongest then.  · Exercise if you can; walking is an excellent form of exercise.  · Drink 8 (8-ounce) glasses (2 liters) of liquids daily, if you can. Drink water, juices, soups, ice cream shakes, and other  drinks that do not have caffeine. Beverages with caffeine, such as coffee and  soda, pull fluid out of the body.  · Slowly increase the fiber in your diet to 25 to 35 grams per day. Fruits, vegetables, whole grains, and cereals contain  fiber. If you have an ostomy or have had recent bowel surgery, check with your doctor or nurse before making any  changes in your diet.  · Both over-the-counter and prescription medications are available to treat constipation. Start with 1 of the following  over-the-counter medications first:  o Docusate sodium (Colace®) 100 mg. Take __1___ capsules __1___ time a day. This is a stool softener that causes few  side effects. Do not take it with mineral oil.  o Polyethylene glycol (MiraLAX®) 17 grams daily.  o Senna (Senokot®) 2 tablets at bedtime. This is a stimulant laxative, which can cause cramping.  · If you havent had a bowel movement in 2 days, call your doctor or nurse.    Will I be able to eat?  You can resume eating when you go home after surgery. Eating a balanced diet high in protein will help you heal after  surgery. Your diet should include a healthy protein source at each meal, as well as fruits, vegetables, and whole grains. If  you have questions about your diet, ask to see a dietitian.    When is it safe for me to drive and what activities can I perform?  You may resume driving after surgery as long as you are not taking prescription pain medication that may make you  drowsy, and you have your full range of motion.  You should not lift anything heavier than 10-15 lbs the first week. After this time, you may gradually increase the  amount of weight. You want to take it easy the first 2 weeks, no strenuous or repetitive movements such as vacuuming  or scrubbing. Walking is okay. Ask the doctor if you have questions.    How long until I have the pathology results?  The pathology report usually takes to 7 to 10 business days.    When is my first appointment after my surgery?  You should be given or schedule a follow-up appointment 1 to 2 weeks after  your surgery.    How can I cope with my feelings?  After surgery for a serious illness, you may have new and upsetting feelings. Many patients say they felt sad, worried,  nervous, irritable, or angry at one time or another. You may find that you cannot control some of these feelings. If this  happens, its a good idea to seek emotional support.  The first step in coping is to talk about how you feel. Family and friends can help. Your nurse, doctor, and   can reassure, support, and guide you. It is always a good idea to let these professionals know how you, your family, and  your friends are feeling emotionally. Many resources are available to patients and their families. Whether you are in the  hospital or at home, we are here to help you and your family and friends handle the emotional aspects of your illness.    What if I have other questions?  If you have any questions or concerns, please talk with your doctor or nurse.  PLEASE CALL YOUR DOCTOR OR NURSE IF YOU HAVE:  · A temperature of 101° F (38.3° C) or higher  · Shortness of breath  · Warmer than normal skin around your incision  · Increased discomfort in the area  · Increased redness around your incision  · New or increased swelling around your incision  · Discharge from your incision

## 2022-07-01 LAB — POCT GLUCOSE: 182 MG/DL (ref 70–110)

## 2022-07-06 ENCOUNTER — OFFICE VISIT (OUTPATIENT)
Dept: SURGICAL ONCOLOGY | Facility: CLINIC | Age: 62
End: 2022-07-06
Payer: COMMERCIAL

## 2022-07-06 DIAGNOSIS — C50.911 MALIGNANT NEOPLASM OF RIGHT FEMALE BREAST, UNSPECIFIED ESTROGEN RECEPTOR STATUS, UNSPECIFIED SITE OF BREAST: Primary | ICD-10-CM

## 2022-07-06 LAB
DHEA SERPL-MCNC: NORMAL
ESTROGEN SERPL-MCNC: NORMAL PG/ML
INSULIN SERPL-ACNC: NORMAL U[IU]/ML
LAB AP CLINICAL INFORMATION: NORMAL
LAB AP GROSS DESCRIPTION: NORMAL
LAB AP REPORT FOOTNOTES: NORMAL
T3RU NFR SERPL: NORMAL %

## 2022-07-06 PROCEDURE — 3066F NEPHROPATHY DOC TX: CPT | Mod: CPTII,S$GLB,, | Performed by: NURSE PRACTITIONER

## 2022-07-06 PROCEDURE — 3061F PR NEG MICROALBUMINURIA RESULT DOCUMENTED/REVIEW: ICD-10-PCS | Mod: CPTII,S$GLB,, | Performed by: NURSE PRACTITIONER

## 2022-07-06 PROCEDURE — 99999 PR PBB SHADOW E&M-EST. PATIENT-LVL III: ICD-10-PCS | Mod: PBBFAC,,,

## 2022-07-06 PROCEDURE — 1159F PR MEDICATION LIST DOCUMENTED IN MEDICAL RECORD: ICD-10-PCS | Mod: CPTII,S$GLB,, | Performed by: NURSE PRACTITIONER

## 2022-07-06 PROCEDURE — 4010F PR ACE/ARB THEARPY RXD/TAKEN: ICD-10-PCS | Mod: CPTII,S$GLB,, | Performed by: NURSE PRACTITIONER

## 2022-07-06 PROCEDURE — 1159F MED LIST DOCD IN RCRD: CPT | Mod: CPTII,S$GLB,, | Performed by: NURSE PRACTITIONER

## 2022-07-06 PROCEDURE — 99999 PR PBB SHADOW E&M-EST. PATIENT-LVL III: CPT | Mod: PBBFAC,,,

## 2022-07-06 PROCEDURE — 99024 POSTOP FOLLOW-UP VISIT: CPT | Mod: S$GLB,,, | Performed by: NURSE PRACTITIONER

## 2022-07-06 PROCEDURE — 3066F PR DOCUMENTATION OF TREATMENT FOR NEPHROPATHY: ICD-10-PCS | Mod: CPTII,S$GLB,, | Performed by: NURSE PRACTITIONER

## 2022-07-06 PROCEDURE — 3061F NEG MICROALBUMINURIA REV: CPT | Mod: CPTII,S$GLB,, | Performed by: NURSE PRACTITIONER

## 2022-07-06 PROCEDURE — 4010F ACE/ARB THERAPY RXD/TAKEN: CPT | Mod: CPTII,S$GLB,, | Performed by: NURSE PRACTITIONER

## 2022-07-06 PROCEDURE — 99024 PR POST-OP FOLLOW-UP VISIT: ICD-10-PCS | Mod: S$GLB,,, | Performed by: NURSE PRACTITIONER

## 2022-07-11 ENCOUNTER — TELEPHONE (OUTPATIENT)
Dept: SURGICAL ONCOLOGY | Facility: CLINIC | Age: 62
End: 2022-07-11
Payer: COMMERCIAL

## 2022-07-11 NOTE — TELEPHONE ENCOUNTER
Surgical pathology given to patient per telephone.  She has follow up with medical oncology coming up in a couple of weeks.  She will continue to monitor drain outputs and call when they are less than 20cc per 24 hours.

## 2022-07-13 NOTE — PROGRESS NOTES
Subjective:       Patient ID: Delia Scruggs is a 62 y.o. female.  Surgeon: Dr. Sd Baltazar  Radiation oncologist: Dr. Browne Prellop    Recurrence vs. Second Primary Right Breast AJCC Anatomic and Clinical Prognostic Stage IA ([id number])--Diagnosed 21  Right Breast Cancer Stage IIA (T2N0M0) diagnosed 17  Biopsy/pathology:   1. Right breast mass biopsy done 17--invasive ductal carcinoma, grade III with focal DCIS, ER 25.72%, [place] 0.28% negative, Her2 1+ negative by IHC. Oncotype DX testing showed recurrence score of 43 (distant recurrence risk 40% with Tamoxifen alone and 12% Tamoxifen + chemo), PCR done for breast panel showed triple negative.  2. Right breast biopsy mass at 6:00 done 21--invasive ductal carcinoma, Grade 2, ER 29%, NY 0%, Her2 0 by IHC, Ki67 80% high.  Surgery/pathology:   1. Right breast needle localization lumpectomy and SLN biopsy done 17--invasive mammary carcinoma with papillary and ductal features, grade III, 2.2cm, no lymphovascular or perineural invasion, margins clear, few foci of non-invasive predominantly solid papillary carcinoma also identified, 2 sentinel lymph nodes negative, closest margin medial, re-excision of medial margin with focal residual invasive papillary carcinoma <0.1cm, now free.  2. Bilateral mastectomies with right ALND done 22--neoadjuvant therapy effects with near complete tumor response, solitary focus of invasive ductal carcinoma 2mm, Grade 1, resection margin clear, left breast with no atypia or malignancy, 2 LNs negative.   Imagin. Screening MMG 16--asymmetry medially right breast.  2. Diagnostic MMG right breast 16--asymmetrical density located posteriorly in lower inner quadrant of right breast, suggestion of underlying clearly circumscribed 10mm rounded density w/n area on spot compression.  3. US right breast 16--focal mass 4:00 8cm from nipple hypoechoic and hetrogensous with irregular margins  1.7X1.1X1.4cm BIRADS 5.  4. CT A/P 2/24/17--fluid in resection cavity right breast, no evidence for metastatic disease, vague area of enhancement right hepatic low, not typical of metastatic disease, consider liver mass protocol MRI.  5. Bone scan 2/24/17--DJD right knee. No evidence of any metastatic bone lesions.  6. MRI abdomen/liver protocol done 3/7/17--hepatic lesion in question is most c/w focal nodular hyperplasia.  7. Screening MMG done 11/24/21--asymmetry in the area of the lumpectomy site in the inner right breast posterior depth on the CC view needs further evaluation.    8. MMG/US right breast done 12/20/21--Findings concerning for new or recurrent malignancy at the site of prior lumpectomy in this patient with a personal history of right breast cancer. The three adjacent (one dominant 1.8cm, two satellite 5mm and 4mm) irregular hypoechoic masses in the right breast 6:00 axis 4 cm FN position are highly suggestive of malignancy. BI-RADS 5: Highly suggestive of malignancy. No suspicious right axillary adenopathy.  9. MRI bilateral breasts done 1/26/22--Right breast 6:00 irregular heterogeneously enhancing mass containing a biopsy clip, measuring 1.6 m x 0.9 cm x 1.5 cm, is consistent with the known biopsy-proven right breast invasive malignancy. Multiple additional irregular heterogeneously enhancing masses with irregular margins within the lumpectomy bed and anterior to the lumpectomy bed in the 4:00 to 6:00 right breast middle to posterior depths, in total spanning 5.7 cm x 2.8 cm x 2.2 cm, are also highly suggestive of malignancy. The posterior aspect of the suspicious enhancement is 1 cm anterior to the pectoralis major muscle. Although there is slight tenting and mild enhancement of the pectoralis major muscle, this is most likely related to adherent post lumpectomy scarring and post radiation change, no suspicious enhancement in the left breast. No significant internal mammary or axillary  adenopathy.  10. Diagnostic MMG/US 3/30/22--Mild-moderate partial response to neoadjuvant chemotherapy, evidenced mammographically by decreased density and conspicuity of the known malignant masses in the right breast 6:00 axis posterior depth at the site of prior lumpectomy. The previously biopsied dominant mass has decreased in size as measured sonographically, now 1.1 x 0.6 x 1.6 cm, previously 1.8 x 0.9 x 1.7 cm. Also, the two satellite masses previously identified on sonography have resolved. BI-RADS 6: Known biopsy-proven malignancy.    2DECHO 3/6/17--EF 60-65%.  Mediport placement by Dr. Baltazar on 3/23/17--removed 6/2020.  Mediport placed by Dr. Baltazar on 2/7/22.     Genetic testing on 3/21/17 was negative.    DEXA:   10/24/17--L1-L4 T = 1.3, Left femur neck -1.1, right femur neck -2.1, total left femur 0.0, right -0.7 c/w osteopenia.  11/21/19--L1-L4 T = 1.7, Left femur neck -1.3, right femur neck -2.0, total left femur -0.3, right -0.7 (mixed response).  11/24/21--L1-L4 T = 2.0, Left femur neck -1.2, right femur neck -2.0, total left femur -0.1, right -0.7 (improved spine, left hip, right stable)    Treatment History:   1. DDAC x 4 doses. Started 4/3/17. Completed on 5/15/17.    2. Weekly Taxol X 12 completed 5/30/17--8/15/17.   3. Adjuvant XRT completed 8/30/17 - 9/28/17.    4. Femara 10/11/17--stopped 2/1/22.   5. Prolia 6/15/18--12/30/21 (on hold).    6. Neoadjuvant Taxotere/Cytoxan X 6 cycles completed 2/10/22--5/26/22.    Treatment plan:   1. Adjuvant Aromasin and consider adding Verzenio X 2 years.  2. Zometa every 6 months adjvuvant treatment      Chief Complaint: Other Misc (Pt reports that she is disappointed that her long term disability was declined.)    HPI   Patient presents for follow-up of breast cancer.  She underwent bilateral mastectomies on 6/29/22 and pathology showed only a small residual focus of breast cancer 2mm with negative LNs. She is doing well since surgery but did develop a  seroma on the left and has had to have it drained. She still has 1 drain in place. She is meeting with Dr. Rivera to discuss reconstruction on 8/30/22. She has no other complaints today.     Past Medical History:   Diagnosis Date    Anemia, unspecified     Breast cancer     Diabetes mellitus, type 2     Hyperlipidemia     Hypertension     Mitral valve prolapse     Sleep apnea       Review of patient's allergies indicates:  No Known Allergies   Current Outpatient Medications on File Prior to Visit   Medication Sig Dispense Refill    amLODIPine-benazepriL (LOTREL) 10-40 mg per capsule Take 1 capsule by mouth once daily.      ascorbic acid, vitamin C, (VITAMIN C) 500 MG tablet Take 500 mg by mouth.      b complex vitamins tablet Take 1 tablet by mouth once daily.      calcium carbonate-vitamin D3 600 mg-20 mcg (800 unit) Chew Take 1 tablet by mouth once daily.      empagliflozin (JARDIANCE) 25 mg tablet Take 1 tablet by mouth once daily.      ferrous gluconate (FERGON) 240 (27 FE) MG tablet Take 240 mg by mouth.      fluconazole (DIFLUCAN) 150 MG Tab Take 1 tablet by mouth once daily 1 tablet 0    GLUTAMINE ORAL Take 1 Scoop by mouth as needed.      HYDROcodone-acetaminophen (NORCO) 5-325 mg per tablet Take 1 tablet by mouth every 4 (four) hours as needed for Pain. 30 tablet 0    loratadine 10 mg Cap Take 10 mg by mouth as needed.      magnesium 250 mg Tab Take 1 tablet by mouth once daily.      metFORMIN (GLUCOPHAGE) 500 MG tablet Take 500 mg by mouth.      metoprolol succinate (TOPROL-XL) 50 MG 24 hr tablet Take 1 tablet by mouth once daily.      multivitamin-minerals-lutein Tab Take 1 tablet by mouth once daily at 6am.      omega-3 fatty acids-fish oil 360-1,200 mg Cap Take 1 capsule by mouth once daily.      pravastatin (PRAVACHOL) 40 MG tablet Take 1 tablet by mouth once daily.      pyridoxine, vitamin B6, (B-6) 100 MG Tab Take 100 mg by mouth.      SITagliptin (JANUVIA) 100 MG Tab Take  1 tablet by mouth once daily.      aspirin (ECOTRIN) 81 MG EC tablet Take 81 mg by mouth.      HYDROcodone-acetaminophen (NORCO) 5-325 mg per tablet Take 1 tablet by mouth every 6 (six) hours as needed.      LORazepam (ATIVAN) 1 MG tablet Take 1 mg by mouth 2 (two) times daily. as needed for anxiety.      ondansetron (ZOFRAN) 8 MG tablet Take 8 mg by mouth as needed.       No current facility-administered medications on file prior to visit.      Review of Systems   Constitutional: Positive for fatigue. Negative for activity change, appetite change, fever and unexpected weight change.   HENT: Negative for mouth sores.    Eyes: Negative for visual disturbance.   Respiratory: Negative for cough and shortness of breath.    Cardiovascular: Negative for chest pain.   Gastrointestinal: Negative for abdominal pain, blood in stool, constipation, diarrhea, nausea and vomiting.   Genitourinary: Negative for difficulty urinating and frequency.   Musculoskeletal: Negative for back pain.   Integumentary:  Negative for rash.        Bilateral mastectomies with fluid collection on left and drain still in place on right   Neurological: Negative for dizziness, weakness and headaches.   Hematological: Negative for adenopathy.   Psychiatric/Behavioral: Negative for behavioral problems and suicidal ideas. The patient is not nervous/anxious.             Vitals:    07/25/22 1514   BP: 108/68   Pulse: 83   Resp: 14   Temp: 98.4 °F (36.9 °C)      Physical Exam  Vitals reviewed.   Constitutional:       Appearance: Normal appearance. She is normal weight.   HENT:      Head: Normocephalic.   Eyes:      General: Lids are normal. Vision grossly intact.      Extraocular Movements: Extraocular movements intact.      Conjunctiva/sclera: Conjunctivae normal.   Cardiovascular:      Rate and Rhythm: Normal rate and regular rhythm.      Pulses: Normal pulses.      Heart sounds: S1 normal and S2 normal. Murmur heard.    Systolic murmur is present  with a grade of 2/6.  Pulmonary:      Effort: Pulmonary effort is normal.      Breath sounds: Normal breath sounds.   Chest:   Breasts:      Left: Normal.        Comments: S/p bilateral mastectomies with drain in place on right, incisions healing well, some swelling to left axillary area c/w seroma, no masses  Abdominal:      General: Abdomen is flat. Bowel sounds are normal.      Palpations: Abdomen is soft.   Musculoskeletal:      Cervical back: Normal, normal range of motion and neck supple.      Thoracic back: Normal.      Lumbar back: Normal.      Right lower leg: No edema.      Left lower leg: No edema.   Feet:      Right foot:      Skin integrity: Skin integrity normal.   Lymphadenopathy:      Comments: No palpable adenopathy   Skin:     General: Skin is warm.      Capillary Refill: Capillary refill takes less than 2 seconds.      Comments: Left CW mediport intact   Neurological:      Mental Status: She is alert and oriented to person, place, and time.   Psychiatric:         Attention and Perception: Attention normal.         Mood and Affect: Mood normal.         Speech: Speech normal.         Behavior: Behavior normal. Behavior is cooperative.         Judgment: Judgment normal.           No visits with results within 1 Day(s) from this visit.   Latest known visit with results is:   Lab Visit on 07/22/2022   Component Date Value Ref Range Status    Sodium Level 07/22/2022 140  136 - 145 mmol/L Final    Potassium Level 07/22/2022 4.8  3.5 - 5.1 mmol/L Final    Chloride 07/22/2022 107  98 - 107 mmol/L Final    Carbon Dioxide 07/22/2022 26  23 - 31 mmol/L Final    Glucose Level 07/22/2022 149 (A) 82 - 115 mg/dL Final    Blood Urea Nitrogen 07/22/2022 9.2 (A) 9.8 - 20.1 mg/dL Final    Creatinine 07/22/2022 0.77  0.55 - 1.02 mg/dL Final    Calcium Level Total 07/22/2022 10.1  8.4 - 10.2 mg/dL Final    Protein Total 07/22/2022 6.3  5.8 - 7.6 gm/dL Final    Albumin Level 07/22/2022 3.6  3.4 - 4.8 gm/dL  Final    Globulin 07/22/2022 2.7  2.4 - 3.5 gm/dL Final    Albumin/Globulin Ratio 07/22/2022 1.3  1.1 - 2.0 ratio Final    Bilirubin Total 07/22/2022 0.3  <=1.5 mg/dL Final    Alkaline Phosphatase 07/22/2022 99  40 - 150 unit/L Final    Alanine Aminotransferase 07/22/2022 11  0 - 55 unit/L Final    Aspartate Aminotransferase 07/22/2022 16  5 - 34 unit/L Final    Estimated GFR- 07/22/2022 >60  mls/min/1.73/m2 Final    WBC 07/22/2022 3.9 (A) 4.5 - 11.5 x10(3)/mcL Final    RBC 07/22/2022 2.82 (A) 4.20 - 5.40 x10(6)/mcL Final    Hgb 07/22/2022 9.1 (A) 12.0 - 16.0 gm/dL Final    Hct 07/22/2022 29.8 (A) 37.0 - 47.0 % Final    MCV 07/22/2022 105.7 (A) 80.0 - 94.0 fL Final    MCH 07/22/2022 32.3 (A) 27.0 - 31.0 pg Final    MCHC 07/22/2022 30.5 (A) 33.0 - 36.0 mg/dL Final    RDW 07/22/2022 13.4  11.5 - 17.0 % Final    Platelet 07/22/2022 294  130 - 400 x10(3)/mcL Final    MPV 07/22/2022 10.0  7.4 - 10.4 fL Final    Neut % 07/22/2022 63.8  % Final    Lymph % 07/22/2022 26.2  % Final    Mono % 07/22/2022 6.6  % Final    Eos % 07/22/2022 2.3  % Final    Basophil % 07/22/2022 0.8  % Final    Lymph # 07/22/2022 1.03  0.6 - 4.6 x10(3)/mcL Final    Neut # 07/22/2022 2.5  2.1 - 9.2 x10(3)/mcL Final    Mono # 07/22/2022 0.26  0.1 - 1.3 x10(3)/mcL Final    Eos # 07/22/2022 0.09  0 - 0.9 x10(3)/mcL Final    Baso # 07/22/2022 0.03  0 - 0.2 x10(3)/mcL Final    IG# 07/22/2022 0.01  0 - 0.04 x10(3)/mcL Final    IG% 07/22/2022 0.3  % Final       Assessment:            1. Malignant neoplasm of lower-inner quadrant of right breast of female, estrogen receptor positive        Plan:     Patient with breast cancer stage IIA, 2.2cm tumor, high grade, ER weakly positive 25%. S/p right breast lumpectomy on 1/26/17. Lymph node negative.  Per NCCN guidelines, oncotype DX testing recommended.  Oncotype showed tumor high risk and PCR breast panel showing triple negative.  Treatment recommended with dose  dense AC x 4 followed by weekly Taxol x 12.  Treatment was delayed due to non-healing breast wound.  Patient completed 4 cycles DDAC on 5/15/17 and 12 cycles of weekly Taxol on 8/15/17.  Adjuvant XRT completed on 9/28/17 and patient tolerated well.   Patient started Femara on 10/11/17.  Also started on Prolia for osteopenia, last dose in 12/2021.    Screening MMG from 11/2021 showed an asymmetry in area of lumpectomy site in right breast.  Diagnostic MMG/US 12/2021 showed suspicious dominant mass 1.8cm 6:00 with 2 additional satellite lesions.  s/p biopsy 12/21/21 pathology c/w IDCA Grade 2, weakly ER+ 29%, DC and Her2 negative with high Ki67 80%, same breast profile as original cancer.  This represents a recurrence vs. new primary which developed while on AI.  MRI 1/26/22 shows multiple masses, spanning area of 5.7cm wtih dominant mass 1.6cm, no suspicious nodes or enhancing masses in left breast.   Patient has met with Dr. Baltazar and surgical plan is for bilateral mastectomies.  Recommended neoadjuvant chemotherapy due to concerns for wound healing following surgery and need for chemotherapy given recurrence.  Patient completed 6 cycles Taxotere/Cytoxan 2/10/22--5/26/22.   MMG/US done after cycle 3 showed decrease in dominant mass and resolution of satellite masses c/w good partial response.  S/p bilateral mastectomies done 6/29/22 and showed only a residual IDCA 2mm, with negative LNs, and left breast negative for malignancy.     Patient is still healing from surgery.  Labs show improved anemia.   Plan for Aromasin to start after she is mostly healed, to continue for 10 years.  Also I have recommended 2 years of Verzenio due to high risk disease, although realizing that node negative tumors were not included in the [facility] trial.   Plan to also change from Prolia to adjuvant Zometa every 6 months.   RTC 3 weeks for follow-up. Plan to likely start Aromasin if she is almost healed.     Will also need to see when  her reconstruction is planned because we may need to delay the start of Verzenio until after reconstruction.     All questions answered at this time.        Tia Lala MD

## 2022-07-19 ENCOUNTER — OFFICE VISIT (OUTPATIENT)
Dept: SURGICAL ONCOLOGY | Facility: CLINIC | Age: 62
End: 2022-07-19
Payer: COMMERCIAL

## 2022-07-19 DIAGNOSIS — C50.911 MALIGNANT NEOPLASM OF RIGHT FEMALE BREAST, UNSPECIFIED ESTROGEN RECEPTOR STATUS, UNSPECIFIED SITE OF BREAST: Primary | ICD-10-CM

## 2022-07-19 PROCEDURE — 4010F ACE/ARB THERAPY RXD/TAKEN: CPT | Mod: CPTII,S$GLB,, | Performed by: NURSE PRACTITIONER

## 2022-07-19 PROCEDURE — 99024 POSTOP FOLLOW-UP VISIT: CPT | Mod: S$GLB,,, | Performed by: NURSE PRACTITIONER

## 2022-07-19 PROCEDURE — 3061F PR NEG MICROALBUMINURIA RESULT DOCUMENTED/REVIEW: ICD-10-PCS | Mod: CPTII,S$GLB,, | Performed by: NURSE PRACTITIONER

## 2022-07-19 PROCEDURE — 3066F PR DOCUMENTATION OF TREATMENT FOR NEPHROPATHY: ICD-10-PCS | Mod: CPTII,S$GLB,, | Performed by: NURSE PRACTITIONER

## 2022-07-19 PROCEDURE — 3061F NEG MICROALBUMINURIA REV: CPT | Mod: CPTII,S$GLB,, | Performed by: NURSE PRACTITIONER

## 2022-07-19 PROCEDURE — 4010F PR ACE/ARB THEARPY RXD/TAKEN: ICD-10-PCS | Mod: CPTII,S$GLB,, | Performed by: NURSE PRACTITIONER

## 2022-07-19 PROCEDURE — 3066F NEPHROPATHY DOC TX: CPT | Mod: CPTII,S$GLB,, | Performed by: NURSE PRACTITIONER

## 2022-07-19 PROCEDURE — 99024 PR POST-OP FOLLOW-UP VISIT: ICD-10-PCS | Mod: S$GLB,,, | Performed by: NURSE PRACTITIONER

## 2022-07-19 NOTE — PROGRESS NOTES
DRAIN CHECK    PT REPORTS REMAINING DRAIN LEFT SIDE SLID OUT SOME AND IS NO LONGER HOLDING SUCTION, LOW OUTPUT    DRAIN REMAINING ON RIGHT SIDE APPROX 60CC A DAY    LEFT SIDED DRAIN REMOVED AS NO LONGER IN PLACE, PT TOLERATED WELL  SOME SWELLING/FULLNESS NOTED LATER LEFT MASTECTOMY SITE  APPROX 60CC OLD BLOODY FLUID ASPIRATED, SWELLING MUCH IMPROVED    PT WILL CONTINUE TO MONITOR EXISTING DRAIN    SHE HAS FOLLOW UP WITH MED ONC DR ROQUE NEXT WEEK

## 2022-07-22 ENCOUNTER — LAB VISIT (OUTPATIENT)
Dept: LAB | Facility: HOSPITAL | Age: 62
End: 2022-07-22
Attending: INTERNAL MEDICINE
Payer: COMMERCIAL

## 2022-07-22 DIAGNOSIS — C50.311 MALIGNANT NEOPLASM OF LOWER-INNER QUADRANT OF RIGHT FEMALE BREAST, UNSPECIFIED ESTROGEN RECEPTOR STATUS: ICD-10-CM

## 2022-07-22 LAB
ALBUMIN SERPL-MCNC: 3.6 GM/DL (ref 3.4–4.8)
ALBUMIN/GLOB SERPL: 1.3 RATIO (ref 1.1–2)
ALP SERPL-CCNC: 99 UNIT/L (ref 40–150)
ALT SERPL-CCNC: 11 UNIT/L (ref 0–55)
AST SERPL-CCNC: 16 UNIT/L (ref 5–34)
BASOPHILS # BLD AUTO: 0.03 X10(3)/MCL (ref 0–0.2)
BASOPHILS NFR BLD AUTO: 0.8 %
BILIRUBIN DIRECT+TOT PNL SERPL-MCNC: 0.3 MG/DL
BUN SERPL-MCNC: 9.2 MG/DL (ref 9.8–20.1)
CALCIUM SERPL-MCNC: 10.1 MG/DL (ref 8.4–10.2)
CHLORIDE SERPL-SCNC: 107 MMOL/L (ref 98–107)
CO2 SERPL-SCNC: 26 MMOL/L (ref 23–31)
CREAT SERPL-MCNC: 0.77 MG/DL (ref 0.55–1.02)
EOSINOPHIL # BLD AUTO: 0.09 X10(3)/MCL (ref 0–0.9)
EOSINOPHIL NFR BLD AUTO: 2.3 %
ERYTHROCYTE [DISTWIDTH] IN BLOOD BY AUTOMATED COUNT: 13.4 % (ref 11.5–17)
GLOBULIN SER-MCNC: 2.7 GM/DL (ref 2.4–3.5)
GLUCOSE SERPL-MCNC: 149 MG/DL (ref 82–115)
HCT VFR BLD AUTO: 29.8 % (ref 37–47)
HGB BLD-MCNC: 9.1 GM/DL (ref 12–16)
IMM GRANULOCYTES # BLD AUTO: 0.01 X10(3)/MCL (ref 0–0.04)
IMM GRANULOCYTES NFR BLD AUTO: 0.3 %
LYMPHOCYTES # BLD AUTO: 1.03 X10(3)/MCL (ref 0.6–4.6)
LYMPHOCYTES NFR BLD AUTO: 26.2 %
MCH RBC QN AUTO: 32.3 PG (ref 27–31)
MCHC RBC AUTO-ENTMCNC: 30.5 MG/DL (ref 33–36)
MCV RBC AUTO: 105.7 FL (ref 80–94)
MONOCYTES # BLD AUTO: 0.26 X10(3)/MCL (ref 0.1–1.3)
MONOCYTES NFR BLD AUTO: 6.6 %
NEUTROPHILS # BLD AUTO: 2.5 X10(3)/MCL (ref 2.1–9.2)
NEUTROPHILS NFR BLD AUTO: 63.8 %
PLATELET # BLD AUTO: 294 X10(3)/MCL (ref 130–400)
PMV BLD AUTO: 10 FL (ref 7.4–10.4)
POTASSIUM SERPL-SCNC: 4.8 MMOL/L (ref 3.5–5.1)
PROT SERPL-MCNC: 6.3 GM/DL (ref 5.8–7.6)
RBC # BLD AUTO: 2.82 X10(6)/MCL (ref 4.2–5.4)
SODIUM SERPL-SCNC: 140 MMOL/L (ref 136–145)
WBC # SPEC AUTO: 3.9 X10(3)/MCL (ref 4.5–11.5)

## 2022-07-22 PROCEDURE — 36415 COLL VENOUS BLD VENIPUNCTURE: CPT

## 2022-07-22 PROCEDURE — 85025 COMPLETE CBC W/AUTO DIFF WBC: CPT

## 2022-07-22 PROCEDURE — 80053 COMPREHEN METABOLIC PANEL: CPT

## 2022-07-25 ENCOUNTER — OFFICE VISIT (OUTPATIENT)
Dept: HEMATOLOGY/ONCOLOGY | Facility: CLINIC | Age: 62
End: 2022-07-25
Payer: COMMERCIAL

## 2022-07-25 VITALS
OXYGEN SATURATION: 98 % | SYSTOLIC BLOOD PRESSURE: 108 MMHG | RESPIRATION RATE: 14 BRPM | HEIGHT: 62 IN | BODY MASS INDEX: 30.47 KG/M2 | HEART RATE: 83 BPM | WEIGHT: 165.56 LBS | DIASTOLIC BLOOD PRESSURE: 68 MMHG | TEMPERATURE: 98 F

## 2022-07-25 DIAGNOSIS — C50.311 MALIGNANT NEOPLASM OF LOWER-INNER QUADRANT OF RIGHT BREAST OF FEMALE, ESTROGEN RECEPTOR POSITIVE: Primary | ICD-10-CM

## 2022-07-25 DIAGNOSIS — Z17.0 MALIGNANT NEOPLASM OF LOWER-INNER QUADRANT OF RIGHT BREAST OF FEMALE, ESTROGEN RECEPTOR POSITIVE: Primary | ICD-10-CM

## 2022-07-25 PROCEDURE — 3078F PR MOST RECENT DIASTOLIC BLOOD PRESSURE < 80 MM HG: ICD-10-PCS | Mod: CPTII,S$GLB,, | Performed by: INTERNAL MEDICINE

## 2022-07-25 PROCEDURE — 1111F PR DISCHARGE MEDS RECONCILED W/ CURRENT OUTPATIENT MED LIST: ICD-10-PCS | Mod: CPTII,S$GLB,, | Performed by: INTERNAL MEDICINE

## 2022-07-25 PROCEDURE — 1159F PR MEDICATION LIST DOCUMENTED IN MEDICAL RECORD: ICD-10-PCS | Mod: CPTII,S$GLB,, | Performed by: INTERNAL MEDICINE

## 2022-07-25 PROCEDURE — 1160F PR REVIEW ALL MEDS BY PRESCRIBER/CLIN PHARMACIST DOCUMENTED: ICD-10-PCS | Mod: CPTII,S$GLB,, | Performed by: INTERNAL MEDICINE

## 2022-07-25 PROCEDURE — 1160F RVW MEDS BY RX/DR IN RCRD: CPT | Mod: CPTII,S$GLB,, | Performed by: INTERNAL MEDICINE

## 2022-07-25 PROCEDURE — 99999 PR PBB SHADOW E&M-EST. PATIENT-LVL V: ICD-10-PCS | Mod: PBBFAC,,, | Performed by: INTERNAL MEDICINE

## 2022-07-25 PROCEDURE — 1159F MED LIST DOCD IN RCRD: CPT | Mod: CPTII,S$GLB,, | Performed by: INTERNAL MEDICINE

## 2022-07-25 PROCEDURE — 99214 PR OFFICE/OUTPT VISIT, EST, LEVL IV, 30-39 MIN: ICD-10-PCS | Mod: S$GLB,,, | Performed by: INTERNAL MEDICINE

## 2022-07-25 PROCEDURE — 3061F PR NEG MICROALBUMINURIA RESULT DOCUMENTED/REVIEW: ICD-10-PCS | Mod: CPTII,S$GLB,, | Performed by: INTERNAL MEDICINE

## 2022-07-25 PROCEDURE — 3066F NEPHROPATHY DOC TX: CPT | Mod: CPTII,S$GLB,, | Performed by: INTERNAL MEDICINE

## 2022-07-25 PROCEDURE — 3078F DIAST BP <80 MM HG: CPT | Mod: CPTII,S$GLB,, | Performed by: INTERNAL MEDICINE

## 2022-07-25 PROCEDURE — 3066F PR DOCUMENTATION OF TREATMENT FOR NEPHROPATHY: ICD-10-PCS | Mod: CPTII,S$GLB,, | Performed by: INTERNAL MEDICINE

## 2022-07-25 PROCEDURE — 3074F SYST BP LT 130 MM HG: CPT | Mod: CPTII,S$GLB,, | Performed by: INTERNAL MEDICINE

## 2022-07-25 PROCEDURE — 3074F PR MOST RECENT SYSTOLIC BLOOD PRESSURE < 130 MM HG: ICD-10-PCS | Mod: CPTII,S$GLB,, | Performed by: INTERNAL MEDICINE

## 2022-07-25 PROCEDURE — 99999 PR PBB SHADOW E&M-EST. PATIENT-LVL V: CPT | Mod: PBBFAC,,, | Performed by: INTERNAL MEDICINE

## 2022-07-25 PROCEDURE — 1111F DSCHRG MED/CURRENT MED MERGE: CPT | Mod: CPTII,S$GLB,, | Performed by: INTERNAL MEDICINE

## 2022-07-25 PROCEDURE — 3008F BODY MASS INDEX DOCD: CPT | Mod: CPTII,S$GLB,, | Performed by: INTERNAL MEDICINE

## 2022-07-25 PROCEDURE — 3061F NEG MICROALBUMINURIA REV: CPT | Mod: CPTII,S$GLB,, | Performed by: INTERNAL MEDICINE

## 2022-07-25 PROCEDURE — 4010F PR ACE/ARB THEARPY RXD/TAKEN: ICD-10-PCS | Mod: CPTII,S$GLB,, | Performed by: INTERNAL MEDICINE

## 2022-07-25 PROCEDURE — 4010F ACE/ARB THERAPY RXD/TAKEN: CPT | Mod: CPTII,S$GLB,, | Performed by: INTERNAL MEDICINE

## 2022-07-25 PROCEDURE — 3008F PR BODY MASS INDEX (BMI) DOCUMENTED: ICD-10-PCS | Mod: CPTII,S$GLB,, | Performed by: INTERNAL MEDICINE

## 2022-07-25 PROCEDURE — 99214 OFFICE O/P EST MOD 30 MIN: CPT | Mod: S$GLB,,, | Performed by: INTERNAL MEDICINE

## 2022-07-27 ENCOUNTER — OFFICE VISIT (OUTPATIENT)
Dept: SURGICAL ONCOLOGY | Facility: CLINIC | Age: 62
End: 2022-07-27
Payer: COMMERCIAL

## 2022-07-27 DIAGNOSIS — C50.911 MALIGNANT NEOPLASM OF RIGHT FEMALE BREAST, UNSPECIFIED ESTROGEN RECEPTOR STATUS, UNSPECIFIED SITE OF BREAST: Primary | ICD-10-CM

## 2022-07-27 PROCEDURE — 99999 PR PBB SHADOW E&M-EST. PATIENT-LVL III: CPT | Mod: PBBFAC,,,

## 2022-07-27 PROCEDURE — 4010F ACE/ARB THERAPY RXD/TAKEN: CPT | Mod: CPTII,S$GLB,, | Performed by: NURSE PRACTITIONER

## 2022-07-27 PROCEDURE — 3061F PR NEG MICROALBUMINURIA RESULT DOCUMENTED/REVIEW: ICD-10-PCS | Mod: CPTII,S$GLB,, | Performed by: NURSE PRACTITIONER

## 2022-07-27 PROCEDURE — 99024 POSTOP FOLLOW-UP VISIT: CPT | Mod: S$GLB,,, | Performed by: NURSE PRACTITIONER

## 2022-07-27 PROCEDURE — 99024 PR POST-OP FOLLOW-UP VISIT: ICD-10-PCS | Mod: S$GLB,,, | Performed by: NURSE PRACTITIONER

## 2022-07-27 PROCEDURE — 3066F NEPHROPATHY DOC TX: CPT | Mod: CPTII,S$GLB,, | Performed by: NURSE PRACTITIONER

## 2022-07-27 PROCEDURE — 1159F MED LIST DOCD IN RCRD: CPT | Mod: CPTII,S$GLB,, | Performed by: NURSE PRACTITIONER

## 2022-07-27 PROCEDURE — 3066F PR DOCUMENTATION OF TREATMENT FOR NEPHROPATHY: ICD-10-PCS | Mod: CPTII,S$GLB,, | Performed by: NURSE PRACTITIONER

## 2022-07-27 PROCEDURE — 4010F PR ACE/ARB THEARPY RXD/TAKEN: ICD-10-PCS | Mod: CPTII,S$GLB,, | Performed by: NURSE PRACTITIONER

## 2022-07-27 PROCEDURE — 1159F PR MEDICATION LIST DOCUMENTED IN MEDICAL RECORD: ICD-10-PCS | Mod: CPTII,S$GLB,, | Performed by: NURSE PRACTITIONER

## 2022-07-27 PROCEDURE — 3061F NEG MICROALBUMINURIA REV: CPT | Mod: CPTII,S$GLB,, | Performed by: NURSE PRACTITIONER

## 2022-07-27 PROCEDURE — 99999 PR PBB SHADOW E&M-EST. PATIENT-LVL III: ICD-10-PCS | Mod: PBBFAC,,,

## 2022-07-27 NOTE — PROGRESS NOTES
"RECURRENT LEFT MASTECTOMY SEROMA/HEMATOMA    PT CONTINUES WITH SOME RESIDUAL OLD HEMATOMA LEFT MAST SITE  NO REDNESS, NOT PAINFULS  "FEELS FULL"    APPROX 150CC OLD BLOODY FLUID ASPIRATED  PT TOLERATED WELL    RTC AS NEEDED    "

## 2022-08-02 ENCOUNTER — OFFICE VISIT (OUTPATIENT)
Dept: SURGICAL ONCOLOGY | Facility: CLINIC | Age: 62
End: 2022-08-02
Payer: COMMERCIAL

## 2022-08-02 DIAGNOSIS — C50.911 MALIGNANT NEOPLASM OF RIGHT FEMALE BREAST, UNSPECIFIED ESTROGEN RECEPTOR STATUS, UNSPECIFIED SITE OF BREAST: Primary | ICD-10-CM

## 2022-08-02 PROCEDURE — 99999 PR PBB SHADOW E&M-EST. PATIENT-LVL IV: ICD-10-PCS | Mod: PBBFAC,,,

## 2022-08-02 PROCEDURE — 99999 PR PBB SHADOW E&M-EST. PATIENT-LVL IV: CPT | Mod: PBBFAC,,,

## 2022-08-02 PROCEDURE — 4010F PR ACE/ARB THEARPY RXD/TAKEN: ICD-10-PCS | Mod: CPTII,S$GLB,, | Performed by: NURSE PRACTITIONER

## 2022-08-02 PROCEDURE — 1159F PR MEDICATION LIST DOCUMENTED IN MEDICAL RECORD: ICD-10-PCS | Mod: CPTII,S$GLB,, | Performed by: NURSE PRACTITIONER

## 2022-08-02 PROCEDURE — 99024 PR POST-OP FOLLOW-UP VISIT: ICD-10-PCS | Mod: S$GLB,,, | Performed by: NURSE PRACTITIONER

## 2022-08-02 PROCEDURE — 3061F NEG MICROALBUMINURIA REV: CPT | Mod: CPTII,S$GLB,, | Performed by: NURSE PRACTITIONER

## 2022-08-02 PROCEDURE — 3066F NEPHROPATHY DOC TX: CPT | Mod: CPTII,S$GLB,, | Performed by: NURSE PRACTITIONER

## 2022-08-02 PROCEDURE — 3061F PR NEG MICROALBUMINURIA RESULT DOCUMENTED/REVIEW: ICD-10-PCS | Mod: CPTII,S$GLB,, | Performed by: NURSE PRACTITIONER

## 2022-08-02 PROCEDURE — 4010F ACE/ARB THERAPY RXD/TAKEN: CPT | Mod: CPTII,S$GLB,, | Performed by: NURSE PRACTITIONER

## 2022-08-02 PROCEDURE — 3066F PR DOCUMENTATION OF TREATMENT FOR NEPHROPATHY: ICD-10-PCS | Mod: CPTII,S$GLB,, | Performed by: NURSE PRACTITIONER

## 2022-08-02 PROCEDURE — 99024 POSTOP FOLLOW-UP VISIT: CPT | Mod: S$GLB,,, | Performed by: NURSE PRACTITIONER

## 2022-08-02 PROCEDURE — 1159F MED LIST DOCD IN RCRD: CPT | Mod: CPTII,S$GLB,, | Performed by: NURSE PRACTITIONER

## 2022-08-02 NOTE — PROGRESS NOTES
RECURRENT SEROMA/OLD HEMATOMA LEFT MASTECTOMY SITE    MODERATE SWELLING NOTED  NO SIGNS OF INFECTION    APPROX 150CC AGAIN ASPIRATED REPRESENTING OLD HEMATOMA  PT TOLERATED WELL    WILL CALL IF RECURRS  EXPECT THIS WILL BE AN ISSUE FOR A BIT AS PT HAD LARGE POST OP HEMATOMA

## 2022-08-03 ENCOUNTER — PATIENT MESSAGE (OUTPATIENT)
Dept: HEMATOLOGY/ONCOLOGY | Facility: CLINIC | Age: 62
End: 2022-08-03
Payer: COMMERCIAL

## 2022-08-08 NOTE — PROGRESS NOTES
Subjective:       Patient ID: Delia Scruggs is a 62 y.o. female.  Surgeon: Dr. Sd Baltazar  Radiation oncologist: Dr. Browne Prellop    Recurrence vs. Second Primary Right Breast AJCC Anatomic and Clinical Prognostic Stage IA ([id number])--Diagnosed 21  Right Breast Cancer Stage IIA (T2N0M0) diagnosed 17  Biopsy/pathology:   1. Right breast mass biopsy done 17--invasive ductal carcinoma, grade III with focal DCIS, ER 25.72%, WI 0.28% negative, Her2 1+ negative by IHC. Oncotype DX testing showed recurrence score of 43 (distant recurrence risk 40% with Tamoxifen alone and 12% Tamoxifen + chemo), PCR done for breast panel showed triple negative.  2. Right breast biopsy mass at 6:00 done 21--invasive ductal carcinoma, Grade 2, ER 29%, WI 0%, Her2 0 by IHC, Ki67 80% high.  Surgery/pathology:   1. Right breast needle localization lumpectomy and SLN biopsy done 17--invasive mammary carcinoma with papillary and ductal features, grade III, 2.2cm, no lymphovascular or perineural invasion, margins clear, few foci of non-invasive predominantly solid papillary carcinoma also identified, 2 sentinel lymph nodes negative, closest margin medial, re-excision of medial margin with focal residual invasive papillary carcinoma <0.1cm, now free.  2. Bilateral mastectomies with right ALND done 22--neoadjuvant therapy effects with near complete tumor response, solitary focus of invasive ductal carcinoma 2mm, Grade 1, resection margin clear, left breast with no atypia or malignancy, 2 LNs negative.   Imagin. Screening MMG 16--asymmetry medially right breast.  2. Diagnostic MMG right breast 16--asymmetrical density located posteriorly in lower inner quadrant of right breast, suggestion of underlying clearly circumscribed 10mm rounded density w/n area on spot compression.  3. US right breast 16--focal mass 4:00 8cm from nipple hypoechoic and hetrogensous with irregular margins  1.7X1.1X1.4cm BIRADS 5.  4. CT A/P 2/24/17--fluid in resection cavity right breast, no evidence for metastatic disease, vague area of enhancement right hepatic low, not typical of metastatic disease, consider liver mass protocol MRI.  5. Bone scan 2/24/17--DJD right knee. No evidence of any metastatic bone lesions.  6. MRI abdomen/liver protocol done 3/7/17--hepatic lesion in question is most c/w focal nodular hyperplasia.  7. Screening MMG done 11/24/21--asymmetry in the area of the lumpectomy site in the inner right breast posterior depth on the CC view needs further evaluation.    8. MMG/US right breast done 12/20/21--Findings concerning for new or recurrent malignancy at the site of prior lumpectomy in this patient with a personal history of right breast cancer. The three adjacent (one dominant 1.8cm, two satellite 5mm and 4mm) irregular hypoechoic masses in the right breast 6:00 axis 4 cm FN position are highly suggestive of malignancy. BI-RADS 5: Highly suggestive of malignancy. No suspicious right axillary adenopathy.  9. MRI bilateral breasts done 1/26/22--Right breast 6:00 irregular heterogeneously enhancing mass containing a biopsy clip, measuring 1.6 m x 0.9 cm x 1.5 cm, is consistent with the known biopsy-proven right breast invasive malignancy. Multiple additional irregular heterogeneously enhancing masses with irregular margins within the lumpectomy bed and anterior to the lumpectomy bed in the 4:00 to 6:00 right breast middle to posterior depths, in total spanning 5.7 cm x 2.8 cm x 2.2 cm, are also highly suggestive of malignancy. The posterior aspect of the suspicious enhancement is 1 cm anterior to the pectoralis major muscle. Although there is slight tenting and mild enhancement of the pectoralis major muscle, this is most likely related to adherent post lumpectomy scarring and post radiation change, no suspicious enhancement in the left breast. No significant internal mammary or axillary  adenopathy.  10. Diagnostic MMG/US 3/30/22--Mild-moderate partial response to neoadjuvant chemotherapy, evidenced mammographically by decreased density and conspicuity of the known malignant masses in the right breast 6:00 axis posterior depth at the site of prior lumpectomy. The previously biopsied dominant mass has decreased in size as measured sonographically, now 1.1 x 0.6 x 1.6 cm, previously 1.8 x 0.9 x 1.7 cm. Also, the two satellite masses previously identified on sonography have resolved. BI-RADS 6: Known biopsy-proven malignancy.    2DECHO 3/6/17--EF 60-65%.  Mediport placement by Dr. Baltazar on 3/23/17--removed 6/2020.  Mediport placed by Dr. Baltazar on 2/7/22.     Genetic testing on 3/21/17 was negative.    DEXA:   10/24/17--L1-L4 T = 1.3, Left femur neck -1.1, right femur neck -2.1, total left femur 0.0, right -0.7 c/w osteopenia.  11/21/19--L1-L4 T = 1.7, Left femur neck -1.3, right femur neck -2.0, total left femur -0.3, right -0.7 (mixed response).  11/24/21--L1-L4 T = 2.0, Left femur neck -1.2, right femur neck -2.0, total left femur -0.1, right -0.7 (improved spine, left hip, right stable)    Treatment History:   1. DDAC x 4 doses. Started 4/3/17. Completed on 5/15/17.    2. Weekly Taxol X 12 completed 5/30/17--8/15/17.   3. Adjuvant XRT completed 8/30/17 - 9/28/17.    4. Femara 10/11/17--stopped 2/1/22.   5. Prolia 6/15/18--12/30/21 (on hold).    6. Neoadjuvant Taxotere/Cytoxan X 6 cycles completed 2/10/22--5/26/22.    Treatment plan:   1. Adjuvant Aromasin started 8/17/22  2. Plan to add Verzenio X 2 years.  3. Zometa every 6 months adjvuvant treatment      Chief Complaint: Other Misc (Pt reports no new concerns today.)    HPI   Patient presents for follow-up of breast cancer.  She is doing well. Still has one drain in place and plans are to remove tomorrow. Discussed starting Aromasin after tomorrow and she is in agreement.     Past Medical History:   Diagnosis Date    Anemia, unspecified      Breast cancer     Diabetes mellitus, type 2     Hyperlipidemia     Hypertension     Mitral valve prolapse     Sleep apnea       Review of patient's allergies indicates:  No Known Allergies   Current Outpatient Medications on File Prior to Visit   Medication Sig Dispense Refill    amLODIPine-benazepriL (LOTREL) 10-40 mg per capsule Take 1 capsule by mouth once daily.      ascorbic acid, vitamin C, (VITAMIN C) 500 MG tablet Take 500 mg by mouth.      b complex vitamins tablet Take 1 tablet by mouth once daily.      calcium carbonate-vitamin D3 600 mg-20 mcg (800 unit) Chew Take 1 tablet by mouth once daily.      empagliflozin (JARDIANCE) 25 mg tablet Take 1 tablet by mouth once daily.      ferrous gluconate (FERGON) 240 (27 FE) MG tablet Take 240 mg by mouth.      fluconazole (DIFLUCAN) 150 MG Tab Take 1 tablet by mouth once daily 1 tablet 0    GLUTAMINE ORAL Take 1 Scoop by mouth as needed.      HYDROcodone-acetaminophen (NORCO) 5-325 mg per tablet Take 1 tablet by mouth every 6 (six) hours as needed.      HYDROcodone-acetaminophen (NORCO) 5-325 mg per tablet Take 1 tablet by mouth every 4 (four) hours as needed for Pain. 30 tablet 0    loratadine 10 mg Cap Take 10 mg by mouth as needed.      magnesium 250 mg Tab Take 1 tablet by mouth once daily.      metFORMIN (GLUCOPHAGE) 500 MG tablet Take 500 mg by mouth.      metoprolol succinate (TOPROL-XL) 50 MG 24 hr tablet Take 1 tablet by mouth once daily.      multivitamin-minerals-lutein Tab Take 1 tablet by mouth once daily at 6am.      omega-3 fatty acids-fish oil 360-1,200 mg Cap Take 1 capsule by mouth once daily.      ondansetron (ZOFRAN) 8 MG tablet Take 8 mg by mouth as needed.      pravastatin (PRAVACHOL) 40 MG tablet Take 1 tablet by mouth once daily.      pyridoxine, vitamin B6, (B-6) 100 MG Tab Take 100 mg by mouth.      SITagliptin (JANUVIA) 100 MG Tab Take 1 tablet by mouth once daily.      aspirin (ECOTRIN) 81 MG EC tablet Take 81  mg by mouth.      LORazepam (ATIVAN) 1 MG tablet Take 1 mg by mouth 2 (two) times daily. as needed for anxiety.       No current facility-administered medications on file prior to visit.      Review of Systems   Constitutional: Positive for fatigue. Negative for activity change, appetite change, fever and unexpected weight change.   HENT: Negative for mouth sores.    Eyes: Negative for visual disturbance.   Respiratory: Negative for cough and shortness of breath.    Cardiovascular: Negative for chest pain.   Gastrointestinal: Negative for abdominal pain, blood in stool, constipation, diarrhea, nausea and vomiting.   Genitourinary: Negative for difficulty urinating and frequency.   Musculoskeletal: Negative for back pain.   Integumentary:  Negative for rash.        Bilateral mastectomies with drain still in place on right   Neurological: Negative for dizziness, weakness and headaches.   Hematological: Negative for adenopathy.   Psychiatric/Behavioral: Negative for behavioral problems and suicidal ideas. The patient is not nervous/anxious.             Vitals:    08/15/22 0926   BP: 124/71   Pulse: 85   Resp: 14   Temp: 98.1 °F (36.7 °C)      Physical Exam  Vitals reviewed.   Constitutional:       Appearance: Normal appearance. She is normal weight.   HENT:      Head: Normocephalic.   Eyes:      General: Lids are normal. Vision grossly intact.      Extraocular Movements: Extraocular movements intact.      Conjunctiva/sclera: Conjunctivae normal.   Cardiovascular:      Rate and Rhythm: Normal rate and regular rhythm.      Pulses: Normal pulses.      Heart sounds: S1 normal and S2 normal. Murmur heard.    Systolic murmur is present with a grade of 2/6.  Pulmonary:      Effort: Pulmonary effort is normal.      Breath sounds: Normal breath sounds.   Chest:   Breasts:      Left: Normal.        Comments: S/p bilateral mastectomies with drain in place on right, incisions healed well, swelling on left improved   Abdominal:       General: Abdomen is flat. Bowel sounds are normal.      Palpations: Abdomen is soft.   Musculoskeletal:      Cervical back: Normal, normal range of motion and neck supple.      Thoracic back: Normal.      Lumbar back: Normal.      Right lower leg: No edema.      Left lower leg: No edema.   Feet:      Right foot:      Skin integrity: Skin integrity normal.   Lymphadenopathy:      Comments: No palpable adenopathy   Skin:     General: Skin is warm.      Capillary Refill: Capillary refill takes less than 2 seconds.      Comments: Left CW mediport intact   Neurological:      Mental Status: She is alert and oriented to person, place, and time.   Psychiatric:         Attention and Perception: Attention normal.         Mood and Affect: Mood normal.         Speech: Speech normal.         Behavior: Behavior normal. Behavior is cooperative.         Judgment: Judgment normal.           No visits with results within 1 Day(s) from this visit.   Latest known visit with results is:   Lab Visit on 08/12/2022   Component Date Value Ref Range Status    Sodium Level 08/12/2022 143  136 - 145 mmol/L Final    Potassium Level 08/12/2022 4.2  3.5 - 5.1 mmol/L Final    Chloride 08/12/2022 107  98 - 107 mmol/L Final    Carbon Dioxide 08/12/2022 30  23 - 31 mmol/L Final    Glucose Level 08/12/2022 134 (A) 82 - 115 mg/dL Final    Blood Urea Nitrogen 08/12/2022 12.1  9.8 - 20.1 mg/dL Final    Creatinine 08/12/2022 0.74  0.55 - 1.02 mg/dL Final    Calcium Level Total 08/12/2022 10.4 (A) 8.4 - 10.2 mg/dL Final    Protein Total 08/12/2022 6.7  5.8 - 7.6 gm/dL Final    Albumin Level 08/12/2022 3.7  3.4 - 4.8 gm/dL Final    Globulin 08/12/2022 3.0  2.4 - 3.5 gm/dL Final    Albumin/Globulin Ratio 08/12/2022 1.2  1.1 - 2.0 ratio Final    Bilirubin Total 08/12/2022 0.4  <=1.5 mg/dL Final    Alkaline Phosphatase 08/12/2022 116  40 - 150 unit/L Final    Alanine Aminotransferase 08/12/2022 11  0 - 55 unit/L Final    Aspartate  Aminotransferase 08/12/2022 13  5 - 34 unit/L Final    eGFR 08/12/2022 >60  mls/min/1.73/m2 Final    WBC 08/12/2022 6.4  4.5 - 11.5 x10(3)/mcL Final    RBC 08/12/2022 3.40 (A) 4.20 - 5.40 x10(6)/mcL Final    Hgb 08/12/2022 10.7 (A) 12.0 - 16.0 gm/dL Final    Hct 08/12/2022 34.2 (A) 37.0 - 47.0 % Final    MCV 08/12/2022 100.6 (A) 80.0 - 94.0 fL Final    MCH 08/12/2022 31.5 (A) 27.0 - 31.0 pg Final    MCHC 08/12/2022 31.3 (A) 33.0 - 36.0 mg/dL Final    RDW 08/12/2022 12.3  11.5 - 17.0 % Final    Platelet 08/12/2022 230  130 - 400 x10(3)/mcL Final    MPV 08/12/2022 10.1  7.4 - 10.4 fL Final    Neut % 08/12/2022 66.3  % Final    Lymph % 08/12/2022 23.5  % Final    Mono % 08/12/2022 8.6  % Final    Eos % 08/12/2022 1.1  % Final    Basophil % 08/12/2022 0.5  % Final    Lymph # 08/12/2022 1.50  0.6 - 4.6 x10(3)/mcL Final    Neut # 08/12/2022 4.2  2.1 - 9.2 x10(3)/mcL Final    Mono # 08/12/2022 0.55  0.1 - 1.3 x10(3)/mcL Final    Eos # 08/12/2022 0.07  0 - 0.9 x10(3)/mcL Final    Baso # 08/12/2022 0.03  0 - 0.2 x10(3)/mcL Final    IG# 08/12/2022 0.00  0 - 0.04 x10(3)/mcL Final    IG% 08/12/2022 0.0  % Final       Assessment:            1. Malignant neoplasm of lower-inner quadrant of right breast of female, estrogen receptor positive        Plan:     Patient with breast cancer stage IIA, 2.2cm tumor, high grade, ER weakly positive 25%. S/p right breast lumpectomy on 1/26/17. Lymph node negative.  Per NCCN guidelines, oncotype DX testing recommended.  Oncotype showed tumor high risk and PCR breast panel showing triple negative.  Treatment recommended with dose dense AC x 4 followed by weekly Taxol x 12.  Treatment was delayed due to non-healing breast wound.  Patient completed 4 cycles DDAC on 5/15/17 and 12 cycles of weekly Taxol on 8/15/17.  Adjuvant XRT completed on 9/28/17 and patient tolerated well.   Patient started Femara on 10/11/17.  Also started on Prolia for osteopenia, last dose in  12/2021.    Screening MMG from 11/2021 showed an asymmetry in area of lumpectomy site in right breast.  Diagnostic MMG/US 12/2021 showed suspicious dominant mass 1.8cm 6:00 with 2 additional satellite lesions.  s/p biopsy 12/21/21 pathology c/w IDCA Grade 2, weakly ER+ 29%, LA and Her2 negative with high Ki67 80%, same breast profile as original cancer.  This represents a recurrence vs. new primary which developed while on AI.  MRI 1/26/22 showed multiple masses, spanning area of 5.7cm wtih dominant mass 1.6cm, no suspicious nodes or enhancing masses in left breast.   Patient met with Dr. Baltazar and surgical plan is for bilateral mastectomies.  Recommended neoadjuvant chemotherapy due to concerns for wound healing following surgery and need for chemotherapy given recurrence.  Patient completed 6 cycles Taxotere/Cytoxan 2/10/22--5/26/22.   MMG/US done after cycle 3 showed decrease in dominant mass and resolution of satellite masses c/w good partial response.  S/p bilateral mastectomies done 6/29/22 and showed only a residual IDCA 2mm, with negative LNs, and left breast negative for malignancy.     Patient is still healing from surgery, last drain to be removed tomorrow.  Start Aromasin after drain is removed, changing AI and plan for 10 years. Prescription will be sent to pharmacy.   Labs show improved anemia.     Also I have recommended 2 years of Verzenio due to high risk disease, although realizing that node negative tumors were not included in the [facility] trial.   Will need to see when her reconstruction is planned because we may need to delay the start of Verzenio until after reconstruction.     Patient has appointment with Dr. Rivera on 8/30/22 to discuss reconstruction.       RTC 4 weeks for follow-up.   Plan to also change from Prolia to adjuvant Zometa every 6 months. Can start at next visit.     All questions answered at this time.        Tia Lala MD

## 2022-08-09 ENCOUNTER — OFFICE VISIT (OUTPATIENT)
Dept: SURGICAL ONCOLOGY | Facility: CLINIC | Age: 62
End: 2022-08-09
Payer: COMMERCIAL

## 2022-08-09 DIAGNOSIS — C50.911 MALIGNANT NEOPLASM OF RIGHT FEMALE BREAST, UNSPECIFIED ESTROGEN RECEPTOR STATUS, UNSPECIFIED SITE OF BREAST: Primary | ICD-10-CM

## 2022-08-09 PROCEDURE — 99024 POSTOP FOLLOW-UP VISIT: CPT | Mod: S$GLB,,, | Performed by: NURSE PRACTITIONER

## 2022-08-09 PROCEDURE — 99999 PR PBB SHADOW E&M-EST. PATIENT-LVL I: ICD-10-PCS | Mod: PBBFAC,,, | Performed by: NURSE PRACTITIONER

## 2022-08-09 PROCEDURE — 3061F NEG MICROALBUMINURIA REV: CPT | Mod: CPTII,S$GLB,, | Performed by: NURSE PRACTITIONER

## 2022-08-09 PROCEDURE — 4010F ACE/ARB THERAPY RXD/TAKEN: CPT | Mod: CPTII,S$GLB,, | Performed by: NURSE PRACTITIONER

## 2022-08-09 PROCEDURE — 3061F PR NEG MICROALBUMINURIA RESULT DOCUMENTED/REVIEW: ICD-10-PCS | Mod: CPTII,S$GLB,, | Performed by: NURSE PRACTITIONER

## 2022-08-09 PROCEDURE — 4010F PR ACE/ARB THEARPY RXD/TAKEN: ICD-10-PCS | Mod: CPTII,S$GLB,, | Performed by: NURSE PRACTITIONER

## 2022-08-09 PROCEDURE — 99024 PR POST-OP FOLLOW-UP VISIT: ICD-10-PCS | Mod: S$GLB,,, | Performed by: NURSE PRACTITIONER

## 2022-08-09 PROCEDURE — 3066F PR DOCUMENTATION OF TREATMENT FOR NEPHROPATHY: ICD-10-PCS | Mod: CPTII,S$GLB,, | Performed by: NURSE PRACTITIONER

## 2022-08-09 PROCEDURE — 99999 PR PBB SHADOW E&M-EST. PATIENT-LVL I: CPT | Mod: PBBFAC,,, | Performed by: NURSE PRACTITIONER

## 2022-08-09 PROCEDURE — 3066F NEPHROPATHY DOC TX: CPT | Mod: CPTII,S$GLB,, | Performed by: NURSE PRACTITIONER

## 2022-08-09 NOTE — PROGRESS NOTES
FOLLOW UP    RECURRENT SEROMA  APPROX 75CC BROWNISH FLUID ASPIRATED  PT TOLERATED WELL    EXISTING DRAIN OPPOSITE SIDE AT 7 WEEKS NOW  APPROX 50CC A DAY    SHE WILL RETURN NEXT WEEK FOR ASPIRATION OF SEROMA AND DRAIN REMOVAL

## 2022-08-11 NOTE — OP NOTE
Date:  June 29, 2022    Surgeon:  Sd Baltazar MD    Assistant: AURELIA Welch    Preoperative Diagnosis: Recurrent right Breast cancer    Postoperative Diagnosis: Same    Procedure:   1. Left Simple mastectomy   2. Right modified radical mastectomy  3. Injection of 1 mCi radioactive sulfur colloid for sentinel lymph node mapping with Neoprobe  4. Injection of 3 cc of Lymphazurin blue dye for sentinel lymph node mapping  5. [place] lymph node mapping with Neoprobe (failed)     Anesthesia: GETA    Estimated Blood Loss:  Less than 25 cc                           Intraoperative findings:  Radiotracer and blue dye failed to travel given the patient's previous sentinel lymph node mapping and biopsy in the past.  There was a paucity of residual right axillary lymph node tissue.  Judicious axillary lymph node dissection was performed.    Procedure Details: After informed consent was obtained the patient was brought to the operating room placed supine position.  General endotracheal anesthesia was administered without difficulty.  The patient's breast and axilla were prepped and draped in sterile fashion.  I injected 1 mCi of radioactive sulfur colloid in the right areolar dermis for sentinel lymph node mapping with Neoprobe, I injected 3 cc of Lymphazurin blue dye for sentinel lymph node mapping.  Attention was then turned towards the left breast.  An elliptical incision was made around the nipple areolar complex using a scalpel, carried down to the breast capsule using Bovie cautery.  Flaps were then raised between the breast tissue in the subcutaneous tissue superiorly to the second rib, medially to the sternum with care to preserve the perforators, inferiorly to the inframammary crease and laterally to latissimus dorsi muscle.  Vessels were meticulously preserved within the mastectomy flaps.  The breast was then taken off the pectoralis from medial to lateral using Bovie cautery with care to include the axillary  tail and the specimen.  The specimen was marked with suture long lateral and short superior and sent for histopathological evaluation.  Attention was then turned towards the right breast. An elliptical incision was made around the nipple areolar complex using a scalpel, carried down to the breast capsule using Bovie cautery.  Flaps were then raised between the breast tissue in the subcutaneous tissue superiorly to the second rib, medially to the sternum with care to preserve the perforators, inferiorly to the inframammary crease and laterally to latissimus dorsi muscle.  Vessels were meticulously preserved within the mastectomy flaps.  The breast was then taken off the pectoralis from medial to lateral using Bovie cautery with care to include the axillary tail and the specimen.  The specimen was marked with suture long lateral and short superior and sent for histopathological evaluation.  Attention was then turned towards the right axilla.  There was a paucity of residual lymphatic tissue, consistent with her previous history of sentinel lymph node biopsy during her previous breast cancer surgery on the right.  Using the Neoprobe I was unable to identify any areas of increased radio activity.  There was no evidence of blue dye.  I then performed judicious level 1/2 axillary lymph node dissection on the very minimal residual axillary tissue dissecting the inferior border of the axillary vein and dissecting the paucity of tissue over the long thoracic and thoracodorsal nerves using meticulous dissection.  Meticulous hemostasis was achieved.   The wound was then closed in multiple layers with absorbable suture over 2 Raheem drains.  Sterile dressings were applied.  The patient tolerated the procedure well.    Significant surgical tasks conducted by the assistant:  The skilled assistance of the nurse practitioner AURELIA Welch was necessary for the successful completion of this case.  She was essential for the proper  positioning of the patient, manipulation of instruments, proper exposure, manipulation of tissue, and wound closure    Sd Baltazar MD  Surgical Oncology  Complex General, Gastrointestinal and Hepatobiliary Surgery

## 2022-08-12 ENCOUNTER — LAB VISIT (OUTPATIENT)
Dept: LAB | Facility: HOSPITAL | Age: 62
End: 2022-08-12
Attending: INTERNAL MEDICINE
Payer: COMMERCIAL

## 2022-08-12 DIAGNOSIS — C50.311 MALIGNANT NEOPLASM OF LOWER-INNER QUADRANT OF RIGHT BREAST OF FEMALE, ESTROGEN RECEPTOR POSITIVE: ICD-10-CM

## 2022-08-12 DIAGNOSIS — Z17.0 MALIGNANT NEOPLASM OF LOWER-INNER QUADRANT OF RIGHT BREAST OF FEMALE, ESTROGEN RECEPTOR POSITIVE: ICD-10-CM

## 2022-08-12 LAB
ALBUMIN SERPL-MCNC: 3.7 GM/DL (ref 3.4–4.8)
ALBUMIN/GLOB SERPL: 1.2 RATIO (ref 1.1–2)
ALP SERPL-CCNC: 116 UNIT/L (ref 40–150)
ALT SERPL-CCNC: 11 UNIT/L (ref 0–55)
AST SERPL-CCNC: 13 UNIT/L (ref 5–34)
BASOPHILS # BLD AUTO: 0.03 X10(3)/MCL (ref 0–0.2)
BASOPHILS NFR BLD AUTO: 0.5 %
BILIRUBIN DIRECT+TOT PNL SERPL-MCNC: 0.4 MG/DL
BUN SERPL-MCNC: 12.1 MG/DL (ref 9.8–20.1)
CALCIUM SERPL-MCNC: 10.4 MG/DL (ref 8.4–10.2)
CHLORIDE SERPL-SCNC: 107 MMOL/L (ref 98–107)
CO2 SERPL-SCNC: 30 MMOL/L (ref 23–31)
CREAT SERPL-MCNC: 0.74 MG/DL (ref 0.55–1.02)
EOSINOPHIL # BLD AUTO: 0.07 X10(3)/MCL (ref 0–0.9)
EOSINOPHIL NFR BLD AUTO: 1.1 %
ERYTHROCYTE [DISTWIDTH] IN BLOOD BY AUTOMATED COUNT: 12.3 % (ref 11.5–17)
GFR SERPLBLD CREATININE-BSD FMLA CKD-EPI: >60 MLS/MIN/1.73/M2
GLOBULIN SER-MCNC: 3 GM/DL (ref 2.4–3.5)
GLUCOSE SERPL-MCNC: 134 MG/DL (ref 82–115)
HCT VFR BLD AUTO: 34.2 % (ref 37–47)
HGB BLD-MCNC: 10.7 GM/DL (ref 12–16)
IMM GRANULOCYTES # BLD AUTO: 0 X10(3)/MCL (ref 0–0.04)
IMM GRANULOCYTES NFR BLD AUTO: 0 %
LYMPHOCYTES # BLD AUTO: 1.5 X10(3)/MCL (ref 0.6–4.6)
LYMPHOCYTES NFR BLD AUTO: 23.5 %
MCH RBC QN AUTO: 31.5 PG (ref 27–31)
MCHC RBC AUTO-ENTMCNC: 31.3 MG/DL (ref 33–36)
MCV RBC AUTO: 100.6 FL (ref 80–94)
MONOCYTES # BLD AUTO: 0.55 X10(3)/MCL (ref 0.1–1.3)
MONOCYTES NFR BLD AUTO: 8.6 %
NEUTROPHILS # BLD AUTO: 4.2 X10(3)/MCL (ref 2.1–9.2)
NEUTROPHILS NFR BLD AUTO: 66.3 %
PLATELET # BLD AUTO: 230 X10(3)/MCL (ref 130–400)
PMV BLD AUTO: 10.1 FL (ref 7.4–10.4)
POTASSIUM SERPL-SCNC: 4.2 MMOL/L (ref 3.5–5.1)
PROT SERPL-MCNC: 6.7 GM/DL (ref 5.8–7.6)
RBC # BLD AUTO: 3.4 X10(6)/MCL (ref 4.2–5.4)
SODIUM SERPL-SCNC: 143 MMOL/L (ref 136–145)
WBC # SPEC AUTO: 6.4 X10(3)/MCL (ref 4.5–11.5)

## 2022-08-12 PROCEDURE — 85025 COMPLETE CBC W/AUTO DIFF WBC: CPT

## 2022-08-12 PROCEDURE — 36415 COLL VENOUS BLD VENIPUNCTURE: CPT

## 2022-08-12 PROCEDURE — 80053 COMPREHEN METABOLIC PANEL: CPT

## 2022-08-15 ENCOUNTER — OFFICE VISIT (OUTPATIENT)
Dept: HEMATOLOGY/ONCOLOGY | Facility: CLINIC | Age: 62
End: 2022-08-15
Payer: COMMERCIAL

## 2022-08-15 VITALS
WEIGHT: 162.5 LBS | SYSTOLIC BLOOD PRESSURE: 124 MMHG | BODY MASS INDEX: 29.9 KG/M2 | TEMPERATURE: 98 F | HEART RATE: 85 BPM | OXYGEN SATURATION: 100 % | HEIGHT: 62 IN | DIASTOLIC BLOOD PRESSURE: 71 MMHG | RESPIRATION RATE: 14 BRPM

## 2022-08-15 DIAGNOSIS — Z17.0 MALIGNANT NEOPLASM OF LOWER-INNER QUADRANT OF RIGHT BREAST OF FEMALE, ESTROGEN RECEPTOR POSITIVE: Primary | ICD-10-CM

## 2022-08-15 DIAGNOSIS — C50.311 MALIGNANT NEOPLASM OF LOWER-INNER QUADRANT OF RIGHT BREAST OF FEMALE, ESTROGEN RECEPTOR POSITIVE: Primary | ICD-10-CM

## 2022-08-15 PROCEDURE — 4010F PR ACE/ARB THEARPY RXD/TAKEN: ICD-10-PCS | Mod: CPTII,S$GLB,, | Performed by: INTERNAL MEDICINE

## 2022-08-15 PROCEDURE — 1159F MED LIST DOCD IN RCRD: CPT | Mod: CPTII,S$GLB,, | Performed by: INTERNAL MEDICINE

## 2022-08-15 PROCEDURE — 3078F PR MOST RECENT DIASTOLIC BLOOD PRESSURE < 80 MM HG: ICD-10-PCS | Mod: CPTII,S$GLB,, | Performed by: INTERNAL MEDICINE

## 2022-08-15 PROCEDURE — 3066F NEPHROPATHY DOC TX: CPT | Mod: CPTII,S$GLB,, | Performed by: INTERNAL MEDICINE

## 2022-08-15 PROCEDURE — 1160F RVW MEDS BY RX/DR IN RCRD: CPT | Mod: CPTII,S$GLB,, | Performed by: INTERNAL MEDICINE

## 2022-08-15 PROCEDURE — 99214 OFFICE O/P EST MOD 30 MIN: CPT | Mod: S$GLB,,, | Performed by: INTERNAL MEDICINE

## 2022-08-15 PROCEDURE — 1159F PR MEDICATION LIST DOCUMENTED IN MEDICAL RECORD: ICD-10-PCS | Mod: CPTII,S$GLB,, | Performed by: INTERNAL MEDICINE

## 2022-08-15 PROCEDURE — 3074F SYST BP LT 130 MM HG: CPT | Mod: CPTII,S$GLB,, | Performed by: INTERNAL MEDICINE

## 2022-08-15 PROCEDURE — 3008F BODY MASS INDEX DOCD: CPT | Mod: CPTII,S$GLB,, | Performed by: INTERNAL MEDICINE

## 2022-08-15 PROCEDURE — 4010F ACE/ARB THERAPY RXD/TAKEN: CPT | Mod: CPTII,S$GLB,, | Performed by: INTERNAL MEDICINE

## 2022-08-15 PROCEDURE — 3074F PR MOST RECENT SYSTOLIC BLOOD PRESSURE < 130 MM HG: ICD-10-PCS | Mod: CPTII,S$GLB,, | Performed by: INTERNAL MEDICINE

## 2022-08-15 PROCEDURE — 3008F PR BODY MASS INDEX (BMI) DOCUMENTED: ICD-10-PCS | Mod: CPTII,S$GLB,, | Performed by: INTERNAL MEDICINE

## 2022-08-15 PROCEDURE — 1160F PR REVIEW ALL MEDS BY PRESCRIBER/CLIN PHARMACIST DOCUMENTED: ICD-10-PCS | Mod: CPTII,S$GLB,, | Performed by: INTERNAL MEDICINE

## 2022-08-15 PROCEDURE — 3078F DIAST BP <80 MM HG: CPT | Mod: CPTII,S$GLB,, | Performed by: INTERNAL MEDICINE

## 2022-08-15 PROCEDURE — 3061F PR NEG MICROALBUMINURIA RESULT DOCUMENTED/REVIEW: ICD-10-PCS | Mod: CPTII,S$GLB,, | Performed by: INTERNAL MEDICINE

## 2022-08-15 PROCEDURE — 99999 PR PBB SHADOW E&M-EST. PATIENT-LVL V: ICD-10-PCS | Mod: PBBFAC,,, | Performed by: INTERNAL MEDICINE

## 2022-08-15 PROCEDURE — 3066F PR DOCUMENTATION OF TREATMENT FOR NEPHROPATHY: ICD-10-PCS | Mod: CPTII,S$GLB,, | Performed by: INTERNAL MEDICINE

## 2022-08-15 PROCEDURE — 99999 PR PBB SHADOW E&M-EST. PATIENT-LVL V: CPT | Mod: PBBFAC,,, | Performed by: INTERNAL MEDICINE

## 2022-08-15 PROCEDURE — 3061F NEG MICROALBUMINURIA REV: CPT | Mod: CPTII,S$GLB,, | Performed by: INTERNAL MEDICINE

## 2022-08-15 PROCEDURE — 99214 PR OFFICE/OUTPT VISIT, EST, LEVL IV, 30-39 MIN: ICD-10-PCS | Mod: S$GLB,,, | Performed by: INTERNAL MEDICINE

## 2022-08-15 RX ORDER — EXEMESTANE 25 MG/1
25 TABLET ORAL DAILY
Qty: 30 TABLET | Refills: 6 | Status: SHIPPED | OUTPATIENT
Start: 2022-08-15 | End: 2023-03-30 | Stop reason: SDUPTHER

## 2022-08-16 ENCOUNTER — OFFICE VISIT (OUTPATIENT)
Dept: SURGICAL ONCOLOGY | Facility: CLINIC | Age: 62
End: 2022-08-16
Payer: COMMERCIAL

## 2022-08-16 DIAGNOSIS — C50.911 MALIGNANT NEOPLASM OF RIGHT FEMALE BREAST, UNSPECIFIED ESTROGEN RECEPTOR STATUS, UNSPECIFIED SITE OF BREAST: Primary | ICD-10-CM

## 2022-08-16 PROCEDURE — 4010F ACE/ARB THERAPY RXD/TAKEN: CPT | Mod: CPTII,S$GLB,, | Performed by: NURSE PRACTITIONER

## 2022-08-16 PROCEDURE — 99024 POSTOP FOLLOW-UP VISIT: CPT | Mod: S$GLB,,, | Performed by: NURSE PRACTITIONER

## 2022-08-16 PROCEDURE — 3061F NEG MICROALBUMINURIA REV: CPT | Mod: CPTII,S$GLB,, | Performed by: NURSE PRACTITIONER

## 2022-08-16 PROCEDURE — 3061F PR NEG MICROALBUMINURIA RESULT DOCUMENTED/REVIEW: ICD-10-PCS | Mod: CPTII,S$GLB,, | Performed by: NURSE PRACTITIONER

## 2022-08-16 PROCEDURE — 3066F NEPHROPATHY DOC TX: CPT | Mod: CPTII,S$GLB,, | Performed by: NURSE PRACTITIONER

## 2022-08-16 PROCEDURE — 4010F PR ACE/ARB THEARPY RXD/TAKEN: ICD-10-PCS | Mod: CPTII,S$GLB,, | Performed by: NURSE PRACTITIONER

## 2022-08-16 PROCEDURE — 3066F PR DOCUMENTATION OF TREATMENT FOR NEPHROPATHY: ICD-10-PCS | Mod: CPTII,S$GLB,, | Performed by: NURSE PRACTITIONER

## 2022-08-16 PROCEDURE — 99024 PR POST-OP FOLLOW-UP VISIT: ICD-10-PCS | Mod: S$GLB,,, | Performed by: NURSE PRACTITIONER

## 2022-08-16 NOTE — PROGRESS NOTES
DRAIN REMOVAL    RIGHT MASTECTOMY REMAINING DRAIN LOW OUTPUT  REMOVED, PT TOLERATED WELL    SEROMA LEFT MAST SITE MUCH SMALLER  30CC ASPIRATED, PT TOLERATED WELL    PT HAS SCHEDULED APPT WITH DR SANTO NEXT WEEK AS SHE WILL PROCEED WITH RECONSTRUCTITON    RTC 6 MONTHS

## 2022-09-02 NOTE — PROGRESS NOTES
Subjective:       Patient ID: Delia Scruggs is a 62 y.o. female.  Surgeon: Dr. Sd Baltazar  Radiation oncologist: Dr. Browne Prellop    Recurrence vs. Second Primary Right Breast AJCC Anatomic and Clinical Prognostic Stage IA ([id number])--Diagnosed 21  Right Breast Cancer Stage IIA (T2N0M0) diagnosed 17  Biopsy/pathology:   1. Right breast mass biopsy done 17--invasive ductal carcinoma, grade III with focal DCIS, ER 25.72%, MD 0.28% negative, Her2 1+ negative by IHC. Oncotype DX testing showed recurrence score of 43 (distant recurrence risk 40% with Tamoxifen alone and 12% Tamoxifen + chemo), PCR done for breast panel showed triple negative.  2. Right breast biopsy mass at 6:00 done 21--invasive ductal carcinoma, Grade 2, ER 29%, MD 0%, Her2 0 by IHC, Ki67 80% high.  Surgery/pathology:   1. Right breast needle localization lumpectomy and SLN biopsy done 17--invasive mammary carcinoma with papillary and ductal features, grade III, 2.2cm, no lymphovascular or perineural invasion, margins clear, few foci of non-invasive predominantly solid papillary carcinoma also identified, 2 sentinel lymph nodes negative, closest margin medial, re-excision of medial margin with focal residual invasive papillary carcinoma <0.1cm, now free.  2. Bilateral mastectomies with right ALND done 22--neoadjuvant therapy effects with near complete tumor response, solitary focus of invasive ductal carcinoma 2mm, Grade 1, resection margin clear, left breast with no atypia or malignancy, 2 LNs negative.   Imagin. Screening MMG 16--asymmetry medially right breast.  2. Diagnostic MMG right breast 16--asymmetrical density located posteriorly in lower inner quadrant of right breast, suggestion of underlying clearly circumscribed 10mm rounded density w/n area on spot compression.  3. US right breast 16--focal mass 4:00 8cm from nipple hypoechoic and hetrogensous with irregular margins  1.7X1.1X1.4cm BIRADS 5.  4. CT A/P 2/24/17--fluid in resection cavity right breast, no evidence for metastatic disease, vague area of enhancement right hepatic low, not typical of metastatic disease, consider liver mass protocol MRI.  5. Bone scan 2/24/17--DJD right knee. No evidence of any metastatic bone lesions.  6. MRI abdomen/liver protocol done 3/7/17--hepatic lesion in question is most c/w focal nodular hyperplasia.  7. Screening MMG done 11/24/21--asymmetry in the area of the lumpectomy site in the inner right breast posterior depth on the CC view needs further evaluation.    8. MMG/US right breast done 12/20/21--Findings concerning for new or recurrent malignancy at the site of prior lumpectomy in this patient with a personal history of right breast cancer. The three adjacent (one dominant 1.8cm, two satellite 5mm and 4mm) irregular hypoechoic masses in the right breast 6:00 axis 4 cm FN position are highly suggestive of malignancy. BI-RADS 5: Highly suggestive of malignancy. No suspicious right axillary adenopathy.  9. MRI bilateral breasts done 1/26/22--Right breast 6:00 irregular heterogeneously enhancing mass containing a biopsy clip, measuring 1.6 m x 0.9 cm x 1.5 cm, is consistent with the known biopsy-proven right breast invasive malignancy. Multiple additional irregular heterogeneously enhancing masses with irregular margins within the lumpectomy bed and anterior to the lumpectomy bed in the 4:00 to 6:00 right breast middle to posterior depths, in total spanning 5.7 cm x 2.8 cm x 2.2 cm, are also highly suggestive of malignancy. The posterior aspect of the suspicious enhancement is 1 cm anterior to the pectoralis major muscle. Although there is slight tenting and mild enhancement of the pectoralis major muscle, this is most likely related to adherent post lumpectomy scarring and post radiation change, no suspicious enhancement in the left breast. No significant internal mammary or axillary  adenopathy.  10. Diagnostic MMG/US 3/30/22--Mild-moderate partial response to neoadjuvant chemotherapy, evidenced mammographically by decreased density and conspicuity of the known malignant masses in the right breast 6:00 axis posterior depth at the site of prior lumpectomy. The previously biopsied dominant mass has decreased in size as measured sonographically, now 1.1 x 0.6 x 1.6 cm, previously 1.8 x 0.9 x 1.7 cm. Also, the two satellite masses previously identified on sonography have resolved. BI-RADS 6: Known biopsy-proven malignancy.    2DECHO 3/6/17--EF 60-65%.  Mediport placement by Dr. Baltazar on 3/23/17--removed 6/2020.  Mediport placed by Dr. Baltazar on 2/7/22.     Genetic testing on 3/21/17 was negative.    DEXA:   10/24/17--L1-L4 T = 1.3, Left femur neck -1.1, right femur neck -2.1, total left femur 0.0, right -0.7 c/w osteopenia.  11/21/19--L1-L4 T = 1.7, Left femur neck -1.3, right femur neck -2.0, total left femur -0.3, right -0.7 (mixed response).  11/24/21--L1-L4 T = 2.0, Left femur neck -1.2, right femur neck -2.0, total left femur -0.1, right -0.7 (improved spine, left hip, right stable)    Treatment History:   1. DDAC x 4 doses. Started 4/3/17. Completed on 5/15/17.    2. Weekly Taxol X 12 completed 5/30/17--8/15/17.   3. Adjuvant XRT completed 8/30/17 - 9/28/17.    4. Femara 10/11/17--stopped 2/1/22.   5. Prolia 6/15/18--12/30/21 (on hold).    6. Neoadjuvant Taxotere/Cytoxan X 6 cycles completed 2/10/22--5/26/22.    Treatment plan:   1. Adjuvant Aromasin started 8/17/22.  2. Plan to add Verzenio X 2 years after she completes reconstruction.   3. Zometa every 6 months adjvuvant treatment. Plan to start the end of September 2022.     Chief Complaint: Follow-up    HPI   Patient presents for follow-up of breast cancer. She is doing well. She started Aromasin after her last appointment and is tolerating without any problems. Mentions her reconstruction surgery is scheduled for the end of October. She  is feeling back to her normal self. Appetite good and weight stable. Bowels are moving well. No new problems reported today.     Past Medical History:   Diagnosis Date    Anemia, unspecified     Breast cancer     Diabetes mellitus, type 2     Hyperlipidemia     Hypertension     Mitral valve prolapse     Sleep apnea       Review of patient's allergies indicates:  No Known Allergies   Current Outpatient Medications on File Prior to Visit   Medication Sig Dispense Refill    amLODIPine-benazepriL (LOTREL) 10-40 mg per capsule Take 1 capsule by mouth once daily.      ascorbic acid, vitamin C, (VITAMIN C) 500 MG tablet Take 500 mg by mouth.      aspirin (ECOTRIN) 81 MG EC tablet Take 81 mg by mouth.      b complex vitamins tablet Take 1 tablet by mouth once daily.      calcium carbonate-vitamin D3 600 mg-20 mcg (800 unit) Chew Take 1 tablet by mouth once daily.      empagliflozin (JARDIANCE) 25 mg tablet Take 1 tablet by mouth once daily.      exemestane (AROMASIN) 25 mg tablet Take 1 tablet (25 mg total) by mouth once daily. 30 tablet 6    ferrous gluconate (FERGON) 240 (27 FE) MG tablet Take 240 mg by mouth.      GLUTAMINE ORAL Take 1 Scoop by mouth as needed.      HYDROcodone-acetaminophen (NORCO) 5-325 mg per tablet Take 1 tablet by mouth every 6 (six) hours as needed.      HYDROcodone-acetaminophen (NORCO) 5-325 mg per tablet Take 1 tablet by mouth every 4 (four) hours as needed for Pain. 30 tablet 0    loratadine 10 mg Cap Take 10 mg by mouth as needed.      LORazepam (ATIVAN) 1 MG tablet Take 1 mg by mouth 2 (two) times daily. as needed for anxiety.      magnesium 250 mg Tab Take 1 tablet by mouth once daily.      metFORMIN (GLUCOPHAGE) 500 MG tablet Take 500 mg by mouth.      metoprolol succinate (TOPROL-XL) 50 MG 24 hr tablet Take 1 tablet by mouth once daily.      multivitamin-minerals-lutein Tab Take 1 tablet by mouth once daily at 6am.      omega-3 fatty acids-fish oil 360-1,200 mg Cap Take 1 capsule by mouth  once daily.      pravastatin (PRAVACHOL) 40 MG tablet Take 1 tablet by mouth once daily.      pyridoxine, vitamin B6, (B-6) 100 MG Tab Take 100 mg by mouth.      SITagliptin (JANUVIA) 100 MG Tab Take 1 tablet by mouth once daily.      [DISCONTINUED] fluconazole (DIFLUCAN) 150 MG Tab Take 1 tablet by mouth once daily 1 tablet 0    [DISCONTINUED] ondansetron (ZOFRAN) 8 MG tablet Take 8 mg by mouth as needed.       No current facility-administered medications on file prior to visit.      Review of Systems   Constitutional:  Negative for activity change, appetite change, fever and unexpected weight change.   HENT:  Negative for mouth sores.    Eyes:  Negative for visual disturbance.   Respiratory:  Negative for cough and shortness of breath.    Cardiovascular:  Negative for chest pain.   Gastrointestinal:  Negative for abdominal pain, blood in stool, constipation, diarrhea, nausea and vomiting.   Genitourinary:  Negative for difficulty urinating and frequency.   Musculoskeletal:  Negative for back pain.   Integumentary:  Negative for rash.        Bilateral mastectomies   Neurological:  Negative for dizziness, weakness and headaches.        Brain fog from chemo   Hematological:  Negative for adenopathy.   Psychiatric/Behavioral:  Negative for behavioral problems and suicidal ideas. The patient is not nervous/anxious.           Vitals:    09/12/22 0935   BP: 132/71   Pulse: 87   Temp: 98.8 °F (37.1 °C)      Physical Exam  Vitals reviewed.   Constitutional:       Appearance: Normal appearance. She is normal weight.      Comments: Pleasant AA female   HENT:      Head: Normocephalic.   Eyes:      General: Lids are normal. Vision grossly intact.      Extraocular Movements: Extraocular movements intact.      Conjunctiva/sclera: Conjunctivae normal.   Cardiovascular:      Rate and Rhythm: Normal rate and regular rhythm.      Pulses: Normal pulses.      Heart sounds: S1 normal and S2 normal. Murmur heard.   Systolic murmur is  present with a grade of 2/6.   Pulmonary:      Effort: Pulmonary effort is normal.      Breath sounds: Normal breath sounds.   Chest:   Breasts:     Left: Normal.      Comments: S/p bilateral mastectomies, incisions healed well  Abdominal:      General: Abdomen is flat. Bowel sounds are normal. There is no distension.      Palpations: Abdomen is soft.      Tenderness: There is no abdominal tenderness.   Musculoskeletal:      Cervical back: Normal range of motion and neck supple.      Right lower leg: No edema.      Left lower leg: No edema.   Feet:      Right foot:      Skin integrity: Skin integrity normal.   Lymphadenopathy:      Comments: No palpable adenopathy   Skin:     General: Skin is warm and moist.      Capillary Refill: Capillary refill takes less than 2 seconds.      Comments: Left CW mediport intact   Neurological:      General: No focal deficit present.      Mental Status: She is alert and oriented to person, place, and time.   Psychiatric:         Attention and Perception: Attention normal.         Mood and Affect: Mood normal.         Speech: Speech normal.         Behavior: Behavior normal. Behavior is cooperative.         Judgment: Judgment normal.       ECOG SCORE    0 - Fully active-able to carry on all pre-disease performance without restriction        Lab Visit on 09/12/2022   Component Date Value Ref Range Status    WBC 09/12/2022 4.5  4.5 - 11.5 x10(3)/mcL Final    RBC 09/12/2022 3.88 (L)  4.20 - 5.40 x10(6)/mcL Final    Hgb 09/12/2022 11.9 (L)  12.0 - 16.0 gm/dL Final    Hct 09/12/2022 38.7  37.0 - 47.0 % Final    MCV 09/12/2022 99.7 (H)  80.0 - 94.0 fL Final    MCH 09/12/2022 30.7  27.0 - 31.0 pg Final    MCHC 09/12/2022 30.7 (L)  33.0 - 36.0 mg/dL Final    RDW 09/12/2022 12.4  11.5 - 17.0 % Final    Platelet 09/12/2022 219  130 - 400 x10(3)/mcL Final    MPV 09/12/2022 10.9 (H)  7.4 - 10.4 fL Final    Neut % 09/12/2022 61.5  % Final    Lymph % 09/12/2022 28.9  % Final    Mono % 09/12/2022  7.4  % Final    Eos % 09/12/2022 1.3  % Final    Basophil % 09/12/2022 0.7  % Final    Lymph # 09/12/2022 1.29  0.6 - 4.6 x10(3)/mcL Final    Neut # 09/12/2022 2.8  2.1 - 9.2 x10(3)/mcL Final    Mono # 09/12/2022 0.33  0.1 - 1.3 x10(3)/mcL Final    Eos # 09/12/2022 0.06  0 - 0.9 x10(3)/mcL Final    Baso # 09/12/2022 0.03  0 - 0.2 x10(3)/mcL Final    IG# 09/12/2022 0.01  0 - 0.04 x10(3)/mcL Final    IG% 09/12/2022 0.2  % Final       Assessment:            1. Malignant neoplasm of lower-inner quadrant of right breast of female, estrogen receptor positive    2. Osteopenia, unspecified location        Plan:     Patient with breast cancer stage IIA, 2.2cm tumor, high grade, ER weakly positive 25%. S/p right breast lumpectomy on 1/26/17. Lymph node negative.  Per NCCN guidelines, oncotype DX testing recommended.  Oncotype showed tumor high risk and PCR breast panel showing triple negative.  Treatment recommended with dose dense AC x 4 followed by weekly Taxol x 12.  Treatment was delayed due to non-healing breast wound.  Patient completed 4 cycles DDAC on 5/15/17 and 12 cycles of weekly Taxol on 8/15/17.  Adjuvant XRT completed on 9/28/17 and patient tolerated well.   Patient started Femara on 10/11/17.  Also started on Prolia for osteopenia, last dose in 12/2021.    Screening MMG from 11/2021 showed an asymmetry in area of lumpectomy site in right breast.  Diagnostic MMG/US 12/2021 showed suspicious dominant mass 1.8cm 6:00 with 2 additional satellite lesions.  s/p biopsy 12/21/21 pathology c/w IDCA Grade 2, weakly ER+ 29%, NJ and Her2 negative with high Ki67 80%, same breast profile as original cancer.  This represents a recurrence vs. new primary which developed while on AI.  MRI 1/26/22 showed multiple masses, spanning area of 5.7cm wtih dominant mass 1.6cm, no suspicious nodes or enhancing masses in left breast.   Patient met with Dr. Baltazar and surgical plan is for bilateral mastectomies.  Recommended neoadjuvant  chemotherapy due to concerns for wound healing following surgery and need for chemotherapy given recurrence.  Patient completed 6 cycles Taxotere/Cytoxan 2/10/22--5/26/22.   MMG/US done after cycle 3 showed decrease in dominant mass and resolution of satellite masses c/w good partial response.  S/p bilateral mastectomies done 6/29/22 and showed only a residual IDCA 2mm, with negative LNs, and left breast negative for malignancy.     Started Aromasin on 8/17/22 and plan for 10 years. She is tolerating well.   CBC today shows improving anemia with Hgb of 11.9 g/dL. CMP pending.   Dr. Lala recommended 2 years of Verzenio due to high risk disease, although realizing that node negative tumors were not included in the [facility] trial. However, she will be having reconstruction next month so plan to start once she has healed from reconstructive surgery.   Will have her follow-up in 9-10 weeks after surgery.     Plan to also change from Prolia to adjuvant Zometa every 6 months. Can start at the end of this month.  All questions answered at this time.        Jorge Garcia, RON

## 2022-09-12 ENCOUNTER — OFFICE VISIT (OUTPATIENT)
Dept: HEMATOLOGY/ONCOLOGY | Facility: CLINIC | Age: 62
End: 2022-09-12
Payer: COMMERCIAL

## 2022-09-12 ENCOUNTER — LAB VISIT (OUTPATIENT)
Dept: LAB | Facility: HOSPITAL | Age: 62
End: 2022-09-12
Attending: INTERNAL MEDICINE
Payer: COMMERCIAL

## 2022-09-12 VITALS
WEIGHT: 165.88 LBS | BODY MASS INDEX: 30.52 KG/M2 | DIASTOLIC BLOOD PRESSURE: 71 MMHG | HEIGHT: 62 IN | TEMPERATURE: 99 F | SYSTOLIC BLOOD PRESSURE: 132 MMHG | HEART RATE: 87 BPM | OXYGEN SATURATION: 98 %

## 2022-09-12 DIAGNOSIS — Z17.0 MALIGNANT NEOPLASM OF LOWER-INNER QUADRANT OF RIGHT BREAST OF FEMALE, ESTROGEN RECEPTOR POSITIVE: ICD-10-CM

## 2022-09-12 DIAGNOSIS — Z17.0 MALIGNANT NEOPLASM OF LOWER-INNER QUADRANT OF RIGHT BREAST OF FEMALE, ESTROGEN RECEPTOR POSITIVE: Primary | ICD-10-CM

## 2022-09-12 DIAGNOSIS — C50.311 MALIGNANT NEOPLASM OF LOWER-INNER QUADRANT OF RIGHT BREAST OF FEMALE, ESTROGEN RECEPTOR POSITIVE: Primary | ICD-10-CM

## 2022-09-12 DIAGNOSIS — C50.311 MALIGNANT NEOPLASM OF LOWER-INNER QUADRANT OF RIGHT BREAST OF FEMALE, ESTROGEN RECEPTOR POSITIVE: ICD-10-CM

## 2022-09-12 DIAGNOSIS — M85.80 OSTEOPENIA, UNSPECIFIED LOCATION: ICD-10-CM

## 2022-09-12 LAB
ALBUMIN SERPL-MCNC: 4.3 GM/DL (ref 3.4–4.8)
ALBUMIN/GLOB SERPL: 1.3 RATIO (ref 1.1–2)
ALP SERPL-CCNC: 178 UNIT/L (ref 40–150)
ALT SERPL-CCNC: 12 UNIT/L (ref 0–55)
AST SERPL-CCNC: 16 UNIT/L (ref 5–34)
BASOPHILS # BLD AUTO: 0.03 X10(3)/MCL (ref 0–0.2)
BASOPHILS NFR BLD AUTO: 0.7 %
BILIRUBIN DIRECT+TOT PNL SERPL-MCNC: 0.3 MG/DL
BUN SERPL-MCNC: 9.9 MG/DL (ref 9.8–20.1)
CALCIUM SERPL-MCNC: 10.4 MG/DL (ref 8.4–10.2)
CHLORIDE SERPL-SCNC: 104 MMOL/L (ref 98–107)
CO2 SERPL-SCNC: 29 MMOL/L (ref 23–31)
CREAT SERPL-MCNC: 0.94 MG/DL (ref 0.55–1.02)
EOSINOPHIL # BLD AUTO: 0.06 X10(3)/MCL (ref 0–0.9)
EOSINOPHIL NFR BLD AUTO: 1.3 %
ERYTHROCYTE [DISTWIDTH] IN BLOOD BY AUTOMATED COUNT: 12.4 % (ref 11.5–17)
GFR SERPLBLD CREATININE-BSD FMLA CKD-EPI: >60 MLS/MIN/1.73/M2
GLOBULIN SER-MCNC: 3.4 GM/DL (ref 2.4–3.5)
GLUCOSE SERPL-MCNC: 245 MG/DL (ref 82–115)
HCT VFR BLD AUTO: 38.7 % (ref 37–47)
HGB BLD-MCNC: 11.9 GM/DL (ref 12–16)
IMM GRANULOCYTES # BLD AUTO: 0.01 X10(3)/MCL (ref 0–0.04)
IMM GRANULOCYTES NFR BLD AUTO: 0.2 %
LYMPHOCYTES # BLD AUTO: 1.29 X10(3)/MCL (ref 0.6–4.6)
LYMPHOCYTES NFR BLD AUTO: 28.9 %
MCH RBC QN AUTO: 30.7 PG (ref 27–31)
MCHC RBC AUTO-ENTMCNC: 30.7 MG/DL (ref 33–36)
MCV RBC AUTO: 99.7 FL (ref 80–94)
MONOCYTES # BLD AUTO: 0.33 X10(3)/MCL (ref 0.1–1.3)
MONOCYTES NFR BLD AUTO: 7.4 %
NEUTROPHILS # BLD AUTO: 2.8 X10(3)/MCL (ref 2.1–9.2)
NEUTROPHILS NFR BLD AUTO: 61.5 %
PLATELET # BLD AUTO: 219 X10(3)/MCL (ref 130–400)
PMV BLD AUTO: 10.9 FL (ref 7.4–10.4)
POTASSIUM SERPL-SCNC: 4.1 MMOL/L (ref 3.5–5.1)
PROT SERPL-MCNC: 7.7 GM/DL (ref 5.8–7.6)
RBC # BLD AUTO: 3.88 X10(6)/MCL (ref 4.2–5.4)
SODIUM SERPL-SCNC: 142 MMOL/L (ref 136–145)
WBC # SPEC AUTO: 4.5 X10(3)/MCL (ref 4.5–11.5)

## 2022-09-12 PROCEDURE — 99214 PR OFFICE/OUTPT VISIT, EST, LEVL IV, 30-39 MIN: ICD-10-PCS | Mod: S$GLB,,, | Performed by: NURSE PRACTITIONER

## 2022-09-12 PROCEDURE — 80053 COMPREHEN METABOLIC PANEL: CPT

## 2022-09-12 PROCEDURE — 3078F PR MOST RECENT DIASTOLIC BLOOD PRESSURE < 80 MM HG: ICD-10-PCS | Mod: CPTII,S$GLB,, | Performed by: NURSE PRACTITIONER

## 2022-09-12 PROCEDURE — 99214 OFFICE O/P EST MOD 30 MIN: CPT | Mod: S$GLB,,, | Performed by: NURSE PRACTITIONER

## 2022-09-12 PROCEDURE — 3078F DIAST BP <80 MM HG: CPT | Mod: CPTII,S$GLB,, | Performed by: NURSE PRACTITIONER

## 2022-09-12 PROCEDURE — 3008F PR BODY MASS INDEX (BMI) DOCUMENTED: ICD-10-PCS | Mod: CPTII,S$GLB,, | Performed by: NURSE PRACTITIONER

## 2022-09-12 PROCEDURE — 1160F PR REVIEW ALL MEDS BY PRESCRIBER/CLIN PHARMACIST DOCUMENTED: ICD-10-PCS | Mod: CPTII,S$GLB,, | Performed by: NURSE PRACTITIONER

## 2022-09-12 PROCEDURE — 3066F NEPHROPATHY DOC TX: CPT | Mod: CPTII,S$GLB,, | Performed by: NURSE PRACTITIONER

## 2022-09-12 PROCEDURE — 99999 PR PBB SHADOW E&M-EST. PATIENT-LVL V: CPT | Mod: PBBFAC,,, | Performed by: NURSE PRACTITIONER

## 2022-09-12 PROCEDURE — 4010F PR ACE/ARB THEARPY RXD/TAKEN: ICD-10-PCS | Mod: CPTII,S$GLB,, | Performed by: NURSE PRACTITIONER

## 2022-09-12 PROCEDURE — 3075F SYST BP GE 130 - 139MM HG: CPT | Mod: CPTII,S$GLB,, | Performed by: NURSE PRACTITIONER

## 2022-09-12 PROCEDURE — 85025 COMPLETE CBC W/AUTO DIFF WBC: CPT

## 2022-09-12 PROCEDURE — 3066F PR DOCUMENTATION OF TREATMENT FOR NEPHROPATHY: ICD-10-PCS | Mod: CPTII,S$GLB,, | Performed by: NURSE PRACTITIONER

## 2022-09-12 PROCEDURE — 3061F PR NEG MICROALBUMINURIA RESULT DOCUMENTED/REVIEW: ICD-10-PCS | Mod: CPTII,S$GLB,, | Performed by: NURSE PRACTITIONER

## 2022-09-12 PROCEDURE — 3061F NEG MICROALBUMINURIA REV: CPT | Mod: CPTII,S$GLB,, | Performed by: NURSE PRACTITIONER

## 2022-09-12 PROCEDURE — 1159F MED LIST DOCD IN RCRD: CPT | Mod: CPTII,S$GLB,, | Performed by: NURSE PRACTITIONER

## 2022-09-12 PROCEDURE — 99999 PR PBB SHADOW E&M-EST. PATIENT-LVL V: ICD-10-PCS | Mod: PBBFAC,,, | Performed by: NURSE PRACTITIONER

## 2022-09-12 PROCEDURE — 1160F RVW MEDS BY RX/DR IN RCRD: CPT | Mod: CPTII,S$GLB,, | Performed by: NURSE PRACTITIONER

## 2022-09-12 PROCEDURE — 3008F BODY MASS INDEX DOCD: CPT | Mod: CPTII,S$GLB,, | Performed by: NURSE PRACTITIONER

## 2022-09-12 PROCEDURE — 36415 COLL VENOUS BLD VENIPUNCTURE: CPT

## 2022-09-12 PROCEDURE — 1159F PR MEDICATION LIST DOCUMENTED IN MEDICAL RECORD: ICD-10-PCS | Mod: CPTII,S$GLB,, | Performed by: NURSE PRACTITIONER

## 2022-09-12 PROCEDURE — 4010F ACE/ARB THERAPY RXD/TAKEN: CPT | Mod: CPTII,S$GLB,, | Performed by: NURSE PRACTITIONER

## 2022-09-12 PROCEDURE — 3075F PR MOST RECENT SYSTOLIC BLOOD PRESS GE 130-139MM HG: ICD-10-PCS | Mod: CPTII,S$GLB,, | Performed by: NURSE PRACTITIONER

## 2022-09-12 RX ORDER — HEPARIN 100 UNIT/ML
500 SYRINGE INTRAVENOUS
Status: CANCELLED | OUTPATIENT
Start: 2022-09-22

## 2022-09-12 RX ORDER — SODIUM CHLORIDE 0.9 % (FLUSH) 0.9 %
10 SYRINGE (ML) INJECTION
Status: CANCELLED | OUTPATIENT
Start: 2022-09-22

## 2022-09-19 ENCOUNTER — PATIENT MESSAGE (OUTPATIENT)
Dept: HEMATOLOGY/ONCOLOGY | Facility: CLINIC | Age: 62
End: 2022-09-19
Payer: COMMERCIAL

## 2022-09-22 ENCOUNTER — INFUSION (OUTPATIENT)
Dept: INFUSION THERAPY | Facility: HOSPITAL | Age: 62
End: 2022-09-22
Attending: INTERNAL MEDICINE
Payer: COMMERCIAL

## 2022-09-22 ENCOUNTER — HISTORICAL (OUTPATIENT)
Dept: ADMINISTRATIVE | Facility: HOSPITAL | Age: 62
End: 2022-09-22
Payer: COMMERCIAL

## 2022-09-22 VITALS
WEIGHT: 161.63 LBS | HEART RATE: 85 BPM | DIASTOLIC BLOOD PRESSURE: 74 MMHG | SYSTOLIC BLOOD PRESSURE: 134 MMHG | RESPIRATION RATE: 18 BRPM | TEMPERATURE: 98 F | BODY MASS INDEX: 29.56 KG/M2 | OXYGEN SATURATION: 99 %

## 2022-09-22 DIAGNOSIS — M85.80 OSTEOPENIA, UNSPECIFIED LOCATION: Primary | ICD-10-CM

## 2022-09-22 PROCEDURE — A4216 STERILE WATER/SALINE, 10 ML: HCPCS | Performed by: NURSE PRACTITIONER

## 2022-09-22 PROCEDURE — 25000003 PHARM REV CODE 250: Performed by: NURSE PRACTITIONER

## 2022-09-22 PROCEDURE — 96365 THER/PROPH/DIAG IV INF INIT: CPT

## 2022-09-22 PROCEDURE — 63600175 PHARM REV CODE 636 W HCPCS: Performed by: NURSE PRACTITIONER

## 2022-09-22 RX ORDER — SODIUM CHLORIDE 0.9 % (FLUSH) 0.9 %
10 SYRINGE (ML) INJECTION
Status: CANCELLED | OUTPATIENT
Start: 2023-03-23

## 2022-09-22 RX ORDER — ZOLEDRONIC ACID 4 MG/100ML
4 SOLUTION INTRAVENOUS
Status: CANCELLED | OUTPATIENT
Start: 2023-03-23

## 2022-09-22 RX ORDER — HEPARIN 100 UNIT/ML
500 SYRINGE INTRAVENOUS
Status: CANCELLED | OUTPATIENT
Start: 2023-03-23

## 2022-09-22 RX ORDER — HEPARIN 100 UNIT/ML
500 SYRINGE INTRAVENOUS
Status: DISCONTINUED | OUTPATIENT
Start: 2022-09-22 | End: 2022-09-22 | Stop reason: HOSPADM

## 2022-09-22 RX ORDER — ZOLEDRONIC ACID 4 MG/100ML
4 SOLUTION INTRAVENOUS
Status: COMPLETED | OUTPATIENT
Start: 2022-09-22 | End: 2022-09-22

## 2022-09-22 RX ORDER — SODIUM CHLORIDE 0.9 % (FLUSH) 0.9 %
10 SYRINGE (ML) INJECTION
Status: DISCONTINUED | OUTPATIENT
Start: 2022-09-22 | End: 2022-09-22 | Stop reason: HOSPADM

## 2022-09-22 RX ADMIN — Medication 10 ML: at 04:09

## 2022-09-22 RX ADMIN — SODIUM CHLORIDE: 9 INJECTION, SOLUTION INTRAVENOUS at 03:09

## 2022-09-22 RX ADMIN — ZOLEDRONIC ACID 4 MG: 4 SOLUTION INTRAVENOUS at 03:09

## 2022-09-22 RX ADMIN — HEPARIN 500 UNITS: 100 SYRINGE at 04:09

## 2022-09-28 ENCOUNTER — HOSPITAL ENCOUNTER (OUTPATIENT)
Dept: RADIOLOGY | Facility: HOSPITAL | Age: 62
Discharge: HOME | End: 2022-09-28
Attending: SURGERY
Payer: COMMERCIAL

## 2022-09-28 DIAGNOSIS — Z17.0 MALIGNANT NEOPLASM OF LOWER-INNER QUADRANT OF RIGHT BREAST OF FEMALE, ESTROGEN RECEPTOR POSITIVE: ICD-10-CM

## 2022-09-28 DIAGNOSIS — C50.311 MALIGNANT NEOPLASM OF LOWER-INNER QUADRANT OF RIGHT BREAST OF FEMALE, ESTROGEN RECEPTOR POSITIVE: ICD-10-CM

## 2022-09-28 PROCEDURE — 25500020 PHARM REV CODE 255: Performed by: SURGERY

## 2022-09-28 PROCEDURE — 74174 CTA ABD&PLVS W/CONTRAST: CPT | Mod: TC

## 2022-09-28 RX ADMIN — IOPAMIDOL 115 ML: 755 INJECTION, SOLUTION INTRAVENOUS at 10:09

## 2022-10-24 ENCOUNTER — APPOINTMENT (OUTPATIENT)
Dept: LAB | Facility: HOSPITAL | Age: 62
End: 2022-10-24
Attending: SURGERY
Payer: COMMERCIAL

## 2022-10-24 DIAGNOSIS — Z42.1 ENCOUNTER FOR BREAST RECONSTRUCTION FOLLOWING MASTECTOMY: Primary | ICD-10-CM

## 2022-10-24 DIAGNOSIS — Z85.3 PERSONAL HISTORY OF MALIGNANT NEOPLASM OF BREAST: ICD-10-CM

## 2022-10-24 LAB
GROUP & RH: NORMAL
HCT VFR BLD AUTO: 35.5 % (ref 37–47)
HGB BLD-MCNC: 11.7 GM/DL (ref 12–16)
INDIRECT COOMBS GEL: NORMAL

## 2022-10-24 PROCEDURE — 86850 RBC ANTIBODY SCREEN: CPT | Performed by: SURGERY

## 2022-10-24 PROCEDURE — 85014 HEMATOCRIT: CPT

## 2022-10-24 PROCEDURE — 36415 COLL VENOUS BLD VENIPUNCTURE: CPT

## 2022-10-26 ENCOUNTER — ANESTHESIA (OUTPATIENT)
Dept: SURGERY | Facility: HOSPITAL | Age: 62
DRG: 584 | End: 2022-10-26
Payer: COMMERCIAL

## 2022-10-26 ENCOUNTER — ANESTHESIA EVENT (OUTPATIENT)
Dept: SURGERY | Facility: HOSPITAL | Age: 62
DRG: 584 | End: 2022-10-26
Payer: COMMERCIAL

## 2022-10-26 ENCOUNTER — HOSPITAL ENCOUNTER (INPATIENT)
Facility: HOSPITAL | Age: 62
LOS: 4 days | Discharge: HOME | DRG: 584 | End: 2022-10-30
Attending: SURGERY | Admitting: SURGERY
Payer: COMMERCIAL

## 2022-10-26 DIAGNOSIS — Z42.1 ENCOUNTER FOR BREAST RECONSTRUCTION FOLLOWING MASTECTOMY: Primary | ICD-10-CM

## 2022-10-26 DIAGNOSIS — Z42.1 ADMISSION FOR BREAST RECONSTRUCTION FOLLOWING MASTECTOMY: ICD-10-CM

## 2022-10-26 DIAGNOSIS — Z85.3 PERSONAL HISTORY OF BREAST CANCER: ICD-10-CM

## 2022-10-26 LAB
POCT GLUCOSE: 134 MG/DL (ref 70–110)
POCT GLUCOSE: 160 MG/DL (ref 70–110)

## 2022-10-26 PROCEDURE — 82962 GLUCOSE BLOOD TEST: CPT | Performed by: SURGERY

## 2022-10-26 PROCEDURE — 63600175 PHARM REV CODE 636 W HCPCS: Performed by: SURGERY

## 2022-10-26 PROCEDURE — 25000003 PHARM REV CODE 250: Performed by: NURSE ANESTHETIST, CERTIFIED REGISTERED

## 2022-10-26 PROCEDURE — 27800903 OPTIME MED/SURG SUP & DEVICES OTHER IMPLANTS: Performed by: SURGERY

## 2022-10-26 PROCEDURE — 71000016 HC POSTOP RECOV ADDL HR: Performed by: SURGERY

## 2022-10-26 PROCEDURE — S2068 PR  BREAST DIEP OR SIEA FLAP: ICD-10-PCS | Mod: 62,LT,, | Performed by: SURGERY

## 2022-10-26 PROCEDURE — 15860 IV NJX TST VASC FLO FLAP/GRF: CPT | Mod: 51,,, | Performed by: SURGERY

## 2022-10-26 PROCEDURE — S2068 BREAST DIEP OR SIEA FLAP: HCPCS | Mod: 62,LT,, | Performed by: SURGERY

## 2022-10-26 PROCEDURE — C9290 INJ, BUPIVACAINE LIPOSOME: HCPCS | Performed by: SURGERY

## 2022-10-26 PROCEDURE — 15860 PR IV INJ TO TEST BLOOD FLOW IN FLAP/GRAFT: ICD-10-PCS | Mod: 51,,, | Performed by: SURGERY

## 2022-10-26 PROCEDURE — 11000001 HC ACUTE MED/SURG PRIVATE ROOM

## 2022-10-26 PROCEDURE — 27201423 OPTIME MED/SURG SUP & DEVICES STERILE SUPPLY: Performed by: SURGERY

## 2022-10-26 PROCEDURE — 37000009 HC ANESTHESIA EA ADD 15 MINS: Performed by: SURGERY

## 2022-10-26 PROCEDURE — 36000709 HC OR TIME LEV III EA ADD 15 MIN: Performed by: SURGERY

## 2022-10-26 PROCEDURE — 19367 BRST RCNSTJ 1 PDCL TRAM FLAP: CPT | Mod: 62,51,RT, | Performed by: SURGERY

## 2022-10-26 PROCEDURE — 36000708 HC OR TIME LEV III 1ST 15 MIN: Performed by: SURGERY

## 2022-10-26 PROCEDURE — 37000008 HC ANESTHESIA 1ST 15 MINUTES: Performed by: SURGERY

## 2022-10-26 PROCEDURE — 63600175 PHARM REV CODE 636 W HCPCS: Performed by: STUDENT IN AN ORGANIZED HEALTH CARE EDUCATION/TRAINING PROGRAM

## 2022-10-26 PROCEDURE — 88305 TISSUE EXAM BY PATHOLOGIST: CPT | Performed by: SURGERY

## 2022-10-26 PROCEDURE — 25000003 PHARM REV CODE 250: Performed by: STUDENT IN AN ORGANIZED HEALTH CARE EDUCATION/TRAINING PROGRAM

## 2022-10-26 PROCEDURE — 63600175 PHARM REV CODE 636 W HCPCS

## 2022-10-26 PROCEDURE — A4216 STERILE WATER/SALINE, 10 ML: HCPCS | Performed by: SURGERY

## 2022-10-26 PROCEDURE — 63600175 PHARM REV CODE 636 W HCPCS: Performed by: NURSE ANESTHETIST, CERTIFIED REGISTERED

## 2022-10-26 PROCEDURE — C1729 CATH, DRAINAGE: HCPCS | Performed by: SURGERY

## 2022-10-26 PROCEDURE — 25000003 PHARM REV CODE 250: Performed by: SURGERY

## 2022-10-26 PROCEDURE — P9047 ALBUMIN (HUMAN), 25%, 50ML: HCPCS | Mod: JG | Performed by: NURSE ANESTHETIST, CERTIFIED REGISTERED

## 2022-10-26 PROCEDURE — 71000015 HC POSTOP RECOV 1ST HR: Performed by: SURGERY

## 2022-10-26 PROCEDURE — 71000039 HC RECOVERY, EACH ADD'L HOUR: Performed by: SURGERY

## 2022-10-26 PROCEDURE — 71000033 HC RECOVERY, INTIAL HOUR: Performed by: SURGERY

## 2022-10-26 PROCEDURE — 19367: ICD-10-PCS | Mod: 62,51,RT, | Performed by: SURGERY

## 2022-10-26 DEVICE — COUPLER 2.0MM: Type: IMPLANTABLE DEVICE | Site: CHEST | Status: FUNCTIONAL

## 2022-10-26 RX ORDER — ALBUMIN HUMAN 250 G/1000ML
SOLUTION INTRAVENOUS
Status: COMPLETED
Start: 2022-10-26 | End: 2022-10-26

## 2022-10-26 RX ORDER — DIPHENHYDRAMINE HYDROCHLORIDE 50 MG/ML
25 INJECTION INTRAMUSCULAR; INTRAVENOUS EVERY 6 HOURS PRN
Status: DISCONTINUED | OUTPATIENT
Start: 2022-10-26 | End: 2022-11-04 | Stop reason: HOSPADM

## 2022-10-26 RX ORDER — PROMETHAZINE HYDROCHLORIDE 25 MG/1
25 TABLET ORAL EVERY 6 HOURS PRN
Status: DISCONTINUED | OUTPATIENT
Start: 2022-10-26 | End: 2022-11-04 | Stop reason: HOSPADM

## 2022-10-26 RX ORDER — CLINDAMYCIN PHOSPHATE 900 MG/50ML
900 INJECTION, SOLUTION INTRAVENOUS
Status: COMPLETED | OUTPATIENT
Start: 2022-10-26 | End: 2022-10-26

## 2022-10-26 RX ORDER — SODIUM CHLORIDE 9 MG/ML
INJECTION, SOLUTION INTRAVENOUS CONTINUOUS
Status: DISCONTINUED | OUTPATIENT
Start: 2022-10-26 | End: 2022-11-04 | Stop reason: HOSPADM

## 2022-10-26 RX ORDER — SCOLOPAMINE TRANSDERMAL SYSTEM 1 MG/1
1 PATCH, EXTENDED RELEASE TRANSDERMAL
Status: DISCONTINUED | OUTPATIENT
Start: 2022-10-26 | End: 2022-11-04 | Stop reason: HOSPADM

## 2022-10-26 RX ORDER — HYDROMORPHONE HYDROCHLORIDE 2 MG/ML
0.4 INJECTION, SOLUTION INTRAMUSCULAR; INTRAVENOUS; SUBCUTANEOUS EVERY 10 MIN PRN
Status: DISCONTINUED | OUTPATIENT
Start: 2022-10-26 | End: 2022-10-26

## 2022-10-26 RX ORDER — TRAMADOL HYDROCHLORIDE 50 MG/1
50 TABLET ORAL EVERY 4 HOURS PRN
Status: DISCONTINUED | OUTPATIENT
Start: 2022-10-26 | End: 2022-11-04 | Stop reason: HOSPADM

## 2022-10-26 RX ORDER — SODIUM CHLORIDE, SODIUM LACTATE, POTASSIUM CHLORIDE, CALCIUM CHLORIDE 600; 310; 30; 20 MG/100ML; MG/100ML; MG/100ML; MG/100ML
INJECTION, SOLUTION INTRAVENOUS CONTINUOUS
Status: DISCONTINUED | OUTPATIENT
Start: 2022-10-26 | End: 2022-10-26

## 2022-10-26 RX ORDER — ACETAMINOPHEN 10 MG/ML
INJECTION, SOLUTION INTRAVENOUS
Status: DISCONTINUED | OUTPATIENT
Start: 2022-10-26 | End: 2022-10-26

## 2022-10-26 RX ORDER — ACETAMINOPHEN 10 MG/ML
1000 INJECTION, SOLUTION INTRAVENOUS ONCE
Status: COMPLETED | OUTPATIENT
Start: 2022-10-26 | End: 2022-10-26

## 2022-10-26 RX ORDER — LIDOCAINE HYDROCHLORIDE 10 MG/ML
1 INJECTION, SOLUTION EPIDURAL; INFILTRATION; INTRACAUDAL; PERINEURAL ONCE
Status: CANCELLED | OUTPATIENT
Start: 2022-10-26 | End: 2022-10-26

## 2022-10-26 RX ORDER — MUPIROCIN 20 MG/G
1 OINTMENT TOPICAL 2 TIMES DAILY
Status: DISCONTINUED | OUTPATIENT
Start: 2022-10-26 | End: 2022-10-31 | Stop reason: HOSPADM

## 2022-10-26 RX ORDER — PROPOFOL 10 MG/ML
VIAL (ML) INTRAVENOUS
Status: DISCONTINUED | OUTPATIENT
Start: 2022-10-26 | End: 2022-10-26

## 2022-10-26 RX ORDER — DOCUSATE SODIUM 100 MG/1
100 CAPSULE, LIQUID FILLED ORAL 2 TIMES DAILY
Status: DISCONTINUED | OUTPATIENT
Start: 2022-10-26 | End: 2022-11-04 | Stop reason: HOSPADM

## 2022-10-26 RX ORDER — CEFAZOLIN SODIUM 1 G/3ML
2 INJECTION, POWDER, FOR SOLUTION INTRAMUSCULAR; INTRAVENOUS
Status: COMPLETED | OUTPATIENT
Start: 2022-10-26 | End: 2022-10-26

## 2022-10-26 RX ORDER — HEPARIN SODIUM 5000 [USP'U]/ML
INJECTION, SOLUTION INTRAVENOUS; SUBCUTANEOUS
Status: DISCONTINUED | OUTPATIENT
Start: 2022-10-26 | End: 2022-10-26 | Stop reason: HOSPADM

## 2022-10-26 RX ORDER — HYDROMORPHONE HYDROCHLORIDE 2 MG/ML
1 INJECTION, SOLUTION INTRAMUSCULAR; INTRAVENOUS; SUBCUTANEOUS EVERY 4 HOURS PRN
Status: DISCONTINUED | OUTPATIENT
Start: 2022-10-26 | End: 2022-11-04 | Stop reason: HOSPADM

## 2022-10-26 RX ORDER — MIDAZOLAM HYDROCHLORIDE 1 MG/ML
2 INJECTION INTRAMUSCULAR; INTRAVENOUS ONCE
Status: COMPLETED | OUTPATIENT
Start: 2022-10-26 | End: 2022-10-26

## 2022-10-26 RX ORDER — MIDAZOLAM HYDROCHLORIDE 1 MG/ML
INJECTION INTRAMUSCULAR; INTRAVENOUS
Status: COMPLETED
Start: 2022-10-26 | End: 2022-10-26

## 2022-10-26 RX ORDER — CEFAZOLIN SODIUM 1 G/3ML
INJECTION, POWDER, FOR SOLUTION INTRAMUSCULAR; INTRAVENOUS
Status: DISPENSED
Start: 2022-10-26 | End: 2022-10-26

## 2022-10-26 RX ORDER — SODIUM CHLORIDE 9 MG/ML
INJECTION, SOLUTION INTRAMUSCULAR; INTRAVENOUS; SUBCUTANEOUS
Status: DISPENSED
Start: 2022-10-26 | End: 2022-10-26

## 2022-10-26 RX ORDER — PROCHLORPERAZINE EDISYLATE 5 MG/ML
5 INJECTION INTRAMUSCULAR; INTRAVENOUS EVERY 30 MIN PRN
Status: DISCONTINUED | OUTPATIENT
Start: 2022-10-26 | End: 2022-10-26

## 2022-10-26 RX ORDER — HYDROMORPHONE HYDROCHLORIDE 2 MG/ML
INJECTION, SOLUTION INTRAMUSCULAR; INTRAVENOUS; SUBCUTANEOUS
Status: DISPENSED
Start: 2022-10-26 | End: 2022-10-27

## 2022-10-26 RX ORDER — SODIUM CHLORIDE 9 MG/ML
INJECTION, SOLUTION INTRAMUSCULAR; INTRAVENOUS; SUBCUTANEOUS
Status: DISCONTINUED
Start: 2022-10-26 | End: 2022-10-26 | Stop reason: WASHOUT

## 2022-10-26 RX ORDER — ALBUMIN HUMAN 250 G/1000ML
SOLUTION INTRAVENOUS CONTINUOUS PRN
Status: DISCONTINUED | OUTPATIENT
Start: 2022-10-26 | End: 2022-10-26

## 2022-10-26 RX ORDER — ROCURONIUM BROMIDE 10 MG/ML
INJECTION, SOLUTION INTRAVENOUS
Status: DISCONTINUED | OUTPATIENT
Start: 2022-10-26 | End: 2022-10-26

## 2022-10-26 RX ORDER — CEFAZOLIN SODIUM 1 G/3ML
INJECTION, POWDER, FOR SOLUTION INTRAMUSCULAR; INTRAVENOUS
Status: DISCONTINUED
Start: 2022-10-26 | End: 2022-10-26 | Stop reason: WASHOUT

## 2022-10-26 RX ORDER — FENTANYL CITRATE 50 UG/ML
INJECTION, SOLUTION INTRAMUSCULAR; INTRAVENOUS
Status: DISCONTINUED | OUTPATIENT
Start: 2022-10-26 | End: 2022-10-26

## 2022-10-26 RX ORDER — HYDROCODONE BITARTRATE AND ACETAMINOPHEN 5; 325 MG/1; MG/1
1 TABLET ORAL EVERY 4 HOURS PRN
Status: DISCONTINUED | OUTPATIENT
Start: 2022-10-26 | End: 2022-11-04 | Stop reason: HOSPADM

## 2022-10-26 RX ORDER — CEFAZOLIN SODIUM 1 G/3ML
INJECTION, POWDER, FOR SOLUTION INTRAMUSCULAR; INTRAVENOUS
Status: DISCONTINUED | OUTPATIENT
Start: 2022-10-26 | End: 2022-10-26 | Stop reason: HOSPADM

## 2022-10-26 RX ORDER — DEXAMETHASONE SODIUM PHOSPHATE 4 MG/ML
INJECTION, SOLUTION INTRA-ARTICULAR; INTRALESIONAL; INTRAMUSCULAR; INTRAVENOUS; SOFT TISSUE
Status: DISCONTINUED | OUTPATIENT
Start: 2022-10-26 | End: 2022-10-26

## 2022-10-26 RX ORDER — LIDOCAINE HYDROCHLORIDE 10 MG/ML
INJECTION, SOLUTION EPIDURAL; INFILTRATION; INTRACAUDAL; PERINEURAL
Status: DISCONTINUED | OUTPATIENT
Start: 2022-10-26 | End: 2022-10-26

## 2022-10-26 RX ORDER — SODIUM CHLORIDE 0.9 % (FLUSH) 0.9 %
SYRINGE (ML) INJECTION
Status: DISCONTINUED | OUTPATIENT
Start: 2022-10-26 | End: 2022-10-26 | Stop reason: HOSPADM

## 2022-10-26 RX ORDER — ONDANSETRON 2 MG/ML
INJECTION INTRAMUSCULAR; INTRAVENOUS
Status: DISCONTINUED | OUTPATIENT
Start: 2022-10-26 | End: 2022-10-26

## 2022-10-26 RX ORDER — HEPARIN SODIUM 5000 [USP'U]/ML
INJECTION, SOLUTION INTRAVENOUS; SUBCUTANEOUS
Status: DISPENSED
Start: 2022-10-26 | End: 2022-10-26

## 2022-10-26 RX ORDER — INDOCYANINE GREEN AND WATER 25 MG
KIT INJECTION
Status: DISCONTINUED | OUTPATIENT
Start: 2022-10-26 | End: 2022-10-26

## 2022-10-26 RX ORDER — MEPERIDINE HYDROCHLORIDE 25 MG/ML
12.5 INJECTION INTRAMUSCULAR; INTRAVENOUS; SUBCUTANEOUS EVERY 10 MIN PRN
Status: DISCONTINUED | OUTPATIENT
Start: 2022-10-26 | End: 2022-10-26

## 2022-10-26 RX ORDER — PAPAVERINE HYDROCHLORIDE 30 MG/ML
INJECTION INTRAMUSCULAR; INTRAVENOUS
Status: DISCONTINUED
Start: 2022-10-26 | End: 2022-10-26 | Stop reason: WASHOUT

## 2022-10-26 RX ORDER — ONDANSETRON HYDROCHLORIDE 2 MG/ML
INJECTION, SOLUTION INTRAMUSCULAR; INTRAVENOUS
Status: DISCONTINUED | OUTPATIENT
Start: 2022-10-26 | End: 2022-10-26

## 2022-10-26 RX ORDER — ENOXAPARIN SODIUM 100 MG/ML
30 INJECTION SUBCUTANEOUS EVERY 12 HOURS
Status: DISCONTINUED | OUTPATIENT
Start: 2022-10-26 | End: 2022-11-04 | Stop reason: HOSPADM

## 2022-10-26 RX ORDER — HEPARIN SODIUM 5000 [USP'U]/ML
5000 INJECTION, SOLUTION INTRAVENOUS; SUBCUTANEOUS
Status: COMPLETED | OUTPATIENT
Start: 2022-10-26 | End: 2022-10-26

## 2022-10-26 RX ORDER — GENTAMICIN SULFATE 40 MG/ML
INJECTION, SOLUTION INTRAMUSCULAR; INTRAVENOUS
Status: DISCONTINUED
Start: 2022-10-26 | End: 2022-10-26 | Stop reason: WASHOUT

## 2022-10-26 RX ORDER — CALCIUM CHLORIDE INJECTION 100 MG/ML
INJECTION, SOLUTION INTRAVENOUS
Status: COMPLETED
Start: 2022-10-26 | End: 2022-10-26

## 2022-10-26 RX ORDER — METOCLOPRAMIDE HYDROCHLORIDE 5 MG/ML
10 INJECTION INTRAMUSCULAR; INTRAVENOUS EVERY 10 MIN PRN
Status: DISCONTINUED | OUTPATIENT
Start: 2022-10-26 | End: 2022-10-26

## 2022-10-26 RX ADMIN — SODIUM CHLORIDE, POTASSIUM CHLORIDE, SODIUM LACTATE AND CALCIUM CHLORIDE: 600; 310; 30; 20 INJECTION, SOLUTION INTRAVENOUS at 07:10

## 2022-10-26 RX ADMIN — SODIUM CHLORIDE, POTASSIUM CHLORIDE, SODIUM LACTATE AND CALCIUM CHLORIDE: 600; 310; 30; 20 INJECTION, SOLUTION INTRAVENOUS at 08:10

## 2022-10-26 RX ADMIN — ROCURONIUM BROMIDE 20 MG: 10 SOLUTION INTRAVENOUS at 09:10

## 2022-10-26 RX ADMIN — ONDANSETRON 100 MG: 2 INJECTION INTRAMUSCULAR; INTRAVENOUS at 08:10

## 2022-10-26 RX ADMIN — FENTANYL CITRATE 50 MCG: 50 INJECTION, SOLUTION INTRAMUSCULAR; INTRAVENOUS at 07:10

## 2022-10-26 RX ADMIN — ENOXAPARIN SODIUM 30 MG: 30 INJECTION SUBCUTANEOUS at 11:10

## 2022-10-26 RX ADMIN — PROMETHAZINE HYDROCHLORIDE 25 MG: 25 TABLET ORAL at 07:10

## 2022-10-26 RX ADMIN — MUPIROCIN 1 G: 20 OINTMENT TOPICAL at 09:10

## 2022-10-26 RX ADMIN — ENOXAPARIN SODIUM 30 MG: 30 INJECTION SUBCUTANEOUS at 02:10

## 2022-10-26 RX ADMIN — ONDANSETRON 100 MG: 2 INJECTION INTRAMUSCULAR; INTRAVENOUS at 11:10

## 2022-10-26 RX ADMIN — FENTANYL CITRATE 25 MCG: 50 INJECTION, SOLUTION INTRAMUSCULAR; INTRAVENOUS at 08:10

## 2022-10-26 RX ADMIN — DOCUSATE SODIUM 100 MG: 100 CAPSULE, LIQUID FILLED ORAL at 09:10

## 2022-10-26 RX ADMIN — HYDROMORPHONE HYDROCHLORIDE 0.2 MG: 2 INJECTION INTRAMUSCULAR; INTRAVENOUS; SUBCUTANEOUS at 01:10

## 2022-10-26 RX ADMIN — HYDROCODONE BITARTRATE AND ACETAMINOPHEN 1 TABLET: 5; 325 TABLET ORAL at 07:10

## 2022-10-26 RX ADMIN — ROCURONIUM BROMIDE 20 MG: 10 SOLUTION INTRAVENOUS at 08:10

## 2022-10-26 RX ADMIN — DEXAMETHASONE SODIUM PHOSPHATE 4 MG: 4 INJECTION, SOLUTION INTRA-ARTICULAR; INTRALESIONAL; INTRAMUSCULAR; INTRAVENOUS; SOFT TISSUE at 08:10

## 2022-10-26 RX ADMIN — ONDANSETRON 100 MG: 2 INJECTION INTRAMUSCULAR; INTRAVENOUS at 10:10

## 2022-10-26 RX ADMIN — PREGABALIN 75 MG: 50 CAPSULE ORAL at 07:10

## 2022-10-26 RX ADMIN — SODIUM CHLORIDE: 9 INJECTION, SOLUTION INTRAVENOUS at 11:10

## 2022-10-26 RX ADMIN — ROCURONIUM BROMIDE 50 MG: 10 SOLUTION INTRAVENOUS at 07:10

## 2022-10-26 RX ADMIN — ALBUMIN (HUMAN): 12.5 SOLUTION INTRAVENOUS at 08:10

## 2022-10-26 RX ADMIN — ROCURONIUM BROMIDE 30 MG: 10 SOLUTION INTRAVENOUS at 08:10

## 2022-10-26 RX ADMIN — MIDAZOLAM HYDROCHLORIDE 2 MG: 1 INJECTION INTRAMUSCULAR; INTRAVENOUS at 07:10

## 2022-10-26 RX ADMIN — CEFAZOLIN 2 G: 330 INJECTION, POWDER, FOR SOLUTION INTRAMUSCULAR; INTRAVENOUS at 07:10

## 2022-10-26 RX ADMIN — ROCURONIUM BROMIDE 20 MG: 10 SOLUTION INTRAVENOUS at 10:10

## 2022-10-26 RX ADMIN — ROCURONIUM BROMIDE 20 MG: 10 SOLUTION INTRAVENOUS at 11:10

## 2022-10-26 RX ADMIN — CLINDAMYCIN IN 5 PERCENT DEXTROSE 900 MG: 18 INJECTION, SOLUTION INTRAVENOUS at 02:10

## 2022-10-26 RX ADMIN — LIDOCAINE HYDROCHLORIDE 50 MG: 10 INJECTION, SOLUTION EPIDURAL; INFILTRATION; INTRACAUDAL; PERINEURAL at 07:10

## 2022-10-26 RX ADMIN — ONDANSETRON 4 MG: 2 INJECTION INTRAMUSCULAR; INTRAVENOUS at 08:10

## 2022-10-26 RX ADMIN — ACETAMINOPHEN 1000 MG: 10 INJECTION, SOLUTION INTRAVENOUS at 07:10

## 2022-10-26 RX ADMIN — SODIUM CHLORIDE: 9 INJECTION, SOLUTION INTRAVENOUS at 02:10

## 2022-10-26 RX ADMIN — SCOPOLAMINE 1 PATCH: 1 PATCH TRANSDERMAL at 07:10

## 2022-10-26 RX ADMIN — SODIUM CHLORIDE, POTASSIUM CHLORIDE, SODIUM LACTATE AND CALCIUM CHLORIDE: 600; 310; 30; 20 INJECTION, SOLUTION INTRAVENOUS at 06:10

## 2022-10-26 RX ADMIN — SODIUM CHLORIDE, POTASSIUM CHLORIDE, SODIUM LACTATE AND CALCIUM CHLORIDE: 600; 310; 30; 20 INJECTION, SOLUTION INTRAVENOUS at 11:10

## 2022-10-26 RX ADMIN — SUGAMMADEX 200 MG: 100 INJECTION, SOLUTION INTRAVENOUS at 12:10

## 2022-10-26 RX ADMIN — SODIUM CHLORIDE, POTASSIUM CHLORIDE, SODIUM LACTATE AND CALCIUM CHLORIDE: 600; 310; 30; 20 INJECTION, SOLUTION INTRAVENOUS at 10:10

## 2022-10-26 RX ADMIN — PROPOFOL 50 MG: 10 INJECTION, EMULSION INTRAVENOUS at 08:10

## 2022-10-26 RX ADMIN — MIDAZOLAM HYDROCHLORIDE 2 MG: 1 INJECTION, SOLUTION INTRAMUSCULAR; INTRAVENOUS at 07:10

## 2022-10-26 RX ADMIN — FENTANYL CITRATE 25 MCG: 50 INJECTION, SOLUTION INTRAMUSCULAR; INTRAVENOUS at 09:10

## 2022-10-26 RX ADMIN — ONDANSETRON 4 MG: 2 INJECTION INTRAMUSCULAR; INTRAVENOUS at 12:10

## 2022-10-26 RX ADMIN — ROCURONIUM BROMIDE 10 MG: 10 SOLUTION INTRAVENOUS at 10:10

## 2022-10-26 RX ADMIN — INDOCYANINE GREEN AND WATER 7.5 MG: KIT at 11:10

## 2022-10-26 RX ADMIN — ALBUMIN (HUMAN): 12.5 SOLUTION INTRAVENOUS at 11:10

## 2022-10-26 RX ADMIN — PROPOFOL 150 MG: 10 INJECTION, EMULSION INTRAVENOUS at 07:10

## 2022-10-26 RX ADMIN — CLINDAMYCIN IN 5 PERCENT DEXTROSE 900 MG: 18 INJECTION, SOLUTION INTRAVENOUS at 09:10

## 2022-10-26 RX ADMIN — ACETAMINOPHEN 1000 MG: 10 INJECTION, SOLUTION INTRAVENOUS at 12:10

## 2022-10-26 RX ADMIN — CEFAZOLIN 1 G: 330 INJECTION, POWDER, FOR SOLUTION INTRAMUSCULAR; INTRAVENOUS at 11:10

## 2022-10-26 RX ADMIN — HEPARIN SODIUM 5000 UNITS: 5000 INJECTION, SOLUTION INTRAVENOUS; SUBCUTANEOUS at 06:10

## 2022-10-26 NOTE — CARE UPDATE
Received patient from the OR, she is asleep, oral airway in place, chin lift maintained, respirations full-regular-deep-clear,hob up 30 degrees. Left breast doppler reading strong/regular and corresponds to apical pulse, bilateral breasts color corresponds to her usual pigmentation, bilateral breasts slightly cool to touch - surgeon is aware of assessments.

## 2022-10-26 NOTE — CARE UPDATE
Patient is restful, dozing off and on-easily self arouses and denies any c/o of pain. Respirations remain full-regular-deep-clear, hob remains up 30 degrees. Left breast doppler pulse reading is strong/regular and corresponds to her apical pulse.  Bilateral breasts skin color corresponds to her usual pigmentation. Slight bruising noted to left breast right upper corner of incision-area remains soft. Bilateral breast tissue remains soft to touch.  Bilateral breasts are both warm to touch.

## 2022-10-26 NOTE — ANESTHESIA PREPROCEDURE EVALUATION
10/26/2022  Delia Scruggs is a 62 y.o., female.    Other Medical History   Diabetes mellitus, type 2 Hypertension   Hyperlipidemia Breast cancer   Sleep apnea Mitral valve prolapse   Anemia, unspecified      Na 142, K 4.1, Cr 0.94  11.9/38.7/219k  poct glucose 134  Type and screen active    Pre-op Assessment    I have reviewed the Patient Summary Reports.     I have reviewed the Nursing Notes. I have reviewed the NPO Status.   I have reviewed the Medications.     Review of Systems  Anesthesia Hx:   Denies Personal Hx of Anesthesia complications.   Social:  Non-Smoker    Hematology/Oncology:         -- Cancer in past history: Breast   EENT/Dental:EENT/Dental Normal   Cardiovascular:   Hypertension Valvular problems/Murmurs, MVP Denies MI.  Denies CAD.    Denies CABG/stent.  Denies Dysrhythmias.     Pulmonary:   Sleep Apnea, CPAP    Renal/:  Renal/ Normal     Hepatic/GI:  Hepatic/GI Normal    Musculoskeletal:  Musculoskeletal Normal    Neurological:  Neurology Normal    Endocrine:   Diabetes, well controlled    Psych:  Psychiatric Normal           Physical Exam  General: Well nourished, Cooperative and Alert    Airway:  Mallampati: II / I  Mouth Opening: Normal  TM Distance: Normal  Tongue: Large    Dental:  Intact    Chest/Lungs:  Normal Respiratory Rate    Heart:  Rhythm: Regular Rhythm        Anesthesia Plan  Type of Anesthesia, risks & benefits discussed:    Anesthesia Type: Gen ETT  Intra-op Monitoring Plan: Standard ASA Monitors  Post Op Pain Control Plan: multimodal analgesia  Induction:  IV  Informed Consent: Informed consent signed with the Patient and all parties understand the risks and agree with anesthesia plan.  All questions answered. Patient consented to blood products? Yes  ASA Score: 3  Day of Surgery Review of History & Physical: H&P Update referred to the  surgeon/provider.  Anesthesia Plan Notes:   ERAS: scopolamine, pregabalin, acetaminophen pre-op    Cooper WEEMS     Ready For Surgery From Anesthesia Perspective.     .

## 2022-10-26 NOTE — CARE UPDATE
Patient awakened,sandoval,rejected oral airway,oriented/reassured her, she denied c/o, exchanging well. Will maintain quiet for her to rest.

## 2022-10-26 NOTE — ANESTHESIA POSTPROCEDURE EVALUATION
Anesthesia Post Evaluation    Patient: Delia Scruggs    Procedure(s) Performed: Procedure(s) (LRB):  RECONSTRUCTION, BREAST, USING RAHUL SKIN FLAP (Bilateral RAHUL flap vs tram flap and acellular dermal matrix) (Bilateral)    Final Anesthesia Type: general      Patient location during evaluation: PACU  Patient participation: Yes- Able to Participate  Level of consciousness: awake and alert  Post-procedure vital signs: reviewed and stable  Pain management: adequate  Airway patency: patent    PONV status at discharge: No PONV  Anesthetic complications: no      Respiratory status: unassisted  Hydration status: euvolemic  Follow-up not needed.          Vitals Value Taken Time   /67 10/26/22 1410   Temp nl 10/26/22 1411   Pulse 70 10/26/22 1410   Resp 17 10/26/22 1410   SpO2 100 % 10/26/22 1410   Vitals shown include unvalidated device data.      No case tracking events are documented in the log.      Pain/Pat Score: Pain Rating Prior to Med Admin: 0 (10/26/2022  7:02 AM)

## 2022-10-26 NOTE — TRANSFER OF CARE
"Anesthesia Transfer of Care Note    Patient: Delia Scruggs    Procedure(s) Performed: Procedure(s) (LRB):  RECONSTRUCTION, BREAST, USING RAHUL SKIN FLAP (Bilateral RAHUL flap vs tram flap and acellular dermal matrix) (Bilateral)    Patient location: PACU    Anesthesia Type: general    Transport from OR: Transported from OR on room air with adequate spontaneous ventilation    Post pain: adequate analgesia    Post assessment: no apparent anesthetic complications    Post vital signs: stable    Level of consciousness: responds to stimulation    Nausea/Vomiting: no nausea/vomiting    Complications: none    Transfer of care protocol was followed      Last vitals:   Visit Vitals  BP (!) 147/75   Pulse 75   Temp 37 °C (98.6 °F) (Oral)   Resp 18   Ht 5' 2" (1.575 m)   Wt 73.9 kg (163 lb)   SpO2 98%   Breastfeeding No   BMI 29.81 kg/m²     "

## 2022-10-26 NOTE — ANESTHESIA PROCEDURE NOTES
Intubation    Date/Time: 10/26/2022 7:25 AM  Performed by: Yin Blevins CRNA  Authorized by: Devon Gamboa MD     Intubation:     Induction:  Intravenous    Intubated:  Postinduction    Mask Ventilation:  Easy mask    Attempts:  1    Attempted By:  CRNA    Method of Intubation:  Direct    Blade:  Nguyen 2    Laryngeal View Grade: Grade I - full view of cords      Difficult Airway Encountered?: No      Complications:  None    Airway Device:  Oral endotracheal tube    Airway Device Size:  6.5    Style/Cuff Inflation:  Cuffed    Inflation Amount (mL):  3    Tube secured:  21    Secured at:  The lips    Placement Verified By:  Capnometry    Complicating Factors:  None    Findings Post-Intubation:  BS equal bilateral

## 2022-10-26 NOTE — OP NOTE
DATE OF SURGERY:    10/26/22     SURGEON:  Ryan Rivera MD     PREOPERATIVE DIAGNOSIS:  Bilateral mastectomy defects   breast reconstruction.     POSTOPERATIVE DIAGNOSIS:  Bilateral mastectomy defectsbreast reconstruction.     PROCEDURES:    1. Left-sided breast reconstruction utilizing deep inferior epigastric artery    flap with microvascular anastomosis ([id number]).  2. Right-sided breast reconstruction with pedicled TRAM musculocutaneous   rotational flap.  3. Fluorescent angiography with indocyanine green.         FIRST ASSISTANT:  Dr. Howell.     INDICATION FOR PROCEDURE:  Very nice female who presents   with a history of bilateral mastectomy and radiation on right.  She presents for autologous reconstruction for definitive breast   reconstruction.     ANESTHESIA:  General.     COMPLICATIONS:  None.     PROCEDURE IN DETAIL:  The patient was endotracheally intubated, prepped and   draped in the usual sterile fashion.  I first began making an incision on the   abdomen and above the umbilicus to capture the perforating vessels and a   semicircular incision was made 6 cm above the vaginal slit using a 10-blade   scalpel.  Bovie cautery was used to dissect down through the subcutaneous tissue   to the level of the anterior abdominal fascia. We first divided the   skye-abdomen in half by isolating out the umbilicus with single hooks.    Dissection was carried out medial to lateral and lateral to medial.  Upon   inspection of the right skye-abdomen and in concordance with the CT scan, the   perforators were extremely small and there was no dominant .  We   elected to perform a pedicled TRAM flap to preserve the blood supply and create   the safest operation possible. The fascia was then outlined using Bovie cautery   and the inferior attachments of the muscle were divided off the pubic tubercle.    The pedicle was clipped and divided and then dissection was carried superiorly.   We then  divided the fascia up to the subcostal margin. We made a pocket up to   the breast pockets for later insetting. The mastectomy skin on the right breast   was removed using a 10 blade scalpel . A circumferential and radial capsulotomy was performed.    There was a  tunnel created between the breast pocket and the TRAM flap.  We then tunneled   the TRAM flap through this and then laid it down into position, removing some of   the excess tissue. Prior to this, indocyanine green was injected and   fluorescent angiography was carried out to identify the flap perfusion.   Perfusion was excellent.  That flap was then stapled closed and then attention   was turned to the left skye-abdomen.      In a similar fashion, dissection was   carried out lateral to medial and medial and lateral.  There was 2 dominant   lateral row perforators on the left skye-abdomen.  We elected to use this. I made   an incision in the fascia using Bovie cautery and dissection was meticulously   carried out using bipolar cautery, being careful not to sacrifice any muscle or   fascia for a true  flap.  The origin of the inferior epigastric artery   was traced all the way down into the groin and then once this was completed, I   turned my attention to the left mastectomy incision prior to reconstruction. I   made an elliptical incision around the old mastectomy scar.  This was sent to   pathology.  A radial and circumferential capsulotomies   were performed using the Bovie cautery.  The pectoralis major muscle was then   divided over the 3rd intercostal space.  The rib cartilage was removed and then   the intercostal artery and vein were identified. Under microscopic   magnification, the side branches were clipped and divided. The vein and artery   side branches were cleaned up.  The vein was extremely small and there was a   decent artery.  We then began the microvascular portion of the procedure. After   injecting indocyanine green to  assure flap perfusion, we divided the pedicle and   then brought it up in the left chest.  The artery was anastomosed in end-to-end   fashion using 9-0 nylon suture.  The vein was coupled using a 2.0 mm .   de-epithelialized both flaps. The skin was then closed over the top using 0   Vicryl sutures on both incisions of the breast and a running 2-0 Monocryl used   to close the skin.      Again, we turned our attention to the abdomen.  The fascia   on the left was primarily closed using a running #1 looped PDS, and where the pedicle TRAM was taken this was also closed primarily.     We then sat the patient in a beach   chair position, placed two 15-Kinyarwanda round Raheem drains in the operative bed.    Deep tissues closed using 0 Vicryl suture and a running 2-0 Stratafix was   placed.  I placed Exparel throughout the abdomen as well as the chest.  Drains   were also placed in the chest.  The umbilicus was brought up through the   superior flap using a 15-blade scalpel and then inset using 3-0 Monocryl.  There   were no complications.  Good doppler signal in the left flap. I was scrubbed and present for the entire procedure.

## 2022-10-26 NOTE — CARE UPDATE
Left breast doppler reading is strong/regular and corresponds to apical pulse. Left breast is warm to touch and right breast is cool but warmer than pacu admit. Bilateral breasts color is corresponding to her usual pigmentation.

## 2022-10-26 NOTE — DISCHARGE INSTRUCTIONS
Breast Reconstruction Discharge Instructions   About this topic   Breast reconstruction is done to rebuild the shape of your breast. You may have had a mastectomy, which is the removal of a breast, to treat cancer or other disease. Breast reconstruction may be done during or after a mastectomy. The doctor uses an implant or a flap of muscles and tissues from your body to remake your breast.       What care is needed at home?   Sleep with your head and chest raised on 2 to 3 pillows to lower swelling.  Your doctor may suggest you use an ice pack over the painful part. Never put ice right on the skin. Do not leave the ice on more than 10 to 15 minutes at a time.  Cough and take deep breaths to help keep your lungs clear.  Be sure to wash your hands before and after touching your wound or dressing. Leave your dressing in place for 48 hours. Remove the dressing after 48 hours.  You may take a shower in 48 hours. Wash your incision with soap and water, pat dry, then leave open to air. You may cover the incision if it starts draining. Sponge bathe only until then.   Do not lift anything over 10 pounds (4.5 kg)  Ask your doctor when you may go back to your normal activities like work or exercising  Avoid using creams until your skin has healed to lower your risk of infection or told by your doctor.  Wear your supportive/surgical bra at all times except when showering. Put on another open front supportive bra when washing the old one.   Tops that button up the front are easier to put on and take off than those that slip over your head.  Your bowel movements may take some time to get back to normal. Eat small meals high in fiber to avoid hard stools. Drink 6 to 8 glasses of water each day.  Try to walk each day. Start by walking a little more than you did the day before. Walking boosts blood flow and helps prevent lung, belly, and blood problems.  What follow-up care is needed?   Your doctor may ask you to make visits to the  office to check on your progress. Be sure to keep your visits.  Any drains placed in your breasts will be removed 2 to 4 days after surgery.  You may have stitches or staples. If so, your doctor will often want to remove the stitches or staples in 1 to 2 weeks.  If you have a tissue expander, your doctor may need to fill the pouch with saline. Your doctor will set the schedule for follow-up.  If you had silicone gel implants, your doctor will check them every few years using ultrasound or MRI.  Your breasts implants may need to be replaced over time.  A second smaller surgery may need to be done. The surgery would work on fixing the size, shape, and color of your nipple.  What drugs may be needed?   The doctor may order drugs to:  Help with pain and swelling  Prevent infection  Treat an upset stomach  Will physical activity be limited?   You will need to limit your activity for a while. Avoid lifting, sports, and sex until your doctor says physical activity is OK. Talk with your doctor about the right amount of activity for you.  What problems could happen?   Bleeding  Infection  Scarring or wrinkled skin over the implant  Implant may make cancer harder to find  Breast sensation may not be the same as your natural breast  Leakage of implants  Problem with breastfeeding  When do I need to call the doctor?   Signs of infection, such as a fever of 100.4°F (38°C) or higher, chills, wound that will not heal.  Signs of wound infection, such as swelling, redness, warmth around the wound; too much pain when touched; yellowish, greenish, or bloody discharge; foul smell coming from the cut site; cut site opens up.  Drain seems clogged and there is fluid under your surgery cuts  Cough, shortness of breath, chest pain  Upset stomach and throwing up that are not helped by nausea drugs  Pain that is not helped by pain drugs.  Teach Back: Helping You Understand   The Teach Back Method helps you understand the information we are  giving you. After you talk with the staff, tell them in your own words what you learned. This helps to make sure the staff has described each thing clearly. It also helps to explain things that may have been confusing. Before going home, make sure you can do these:  I can tell you about my procedure.  I can tell you how to care for my cut site.  I can tell you what may help ease my pain.  I can tell you what I will do if I have a fever or swelling, redness, or warmth around my wound.  Where can I learn more?   American Cancer Society  http://www.cancer.org/Cancer/BreastCancer/MoreInformation/BreastReconstructionAfterMastectomy/index   National Cancer Cassandra  https://www.cancer.gov/types/breast/reconstruction-fact-sheet   NHS Choices  https://www.nhs.uk/[place]/Breastcancer/Pages/Reconstruction.aspx   Last Reviewed Date   2020-07-02  Consumer Information Use and Disclaimer   This information is not specific medical advice and does not replace information you receive from your health care provider. This is only a brief summary of general information. It does NOT include all information about conditions, illnesses, injuries, tests, procedures, treatments, therapies, discharge instructions or life-style choices that may apply to you. You must talk with your health care provider for complete information about your health and treatment options. This information should not be used to decide whether or not to accept your health care providers advice, instructions or recommendations. Only your health care provider has the knowledge and training to provide advice that is right for you.  Copyright   Copyright © 2021 [employer] and its affiliates and/or licensors. All rights reserved.     Patient Education     Mikael-Buchanan Drain        What will the results be?   This drain gets rid of extra fluid from your cut site. This will help it to get better faster.  What happens after the procedure?   You may be sore where the  drain was put in. Your doctor will give you drugs for the pain.  You may need to stay in the hospital until you are well enough to go home. Your doctor will watch to see how much fluid is in the bulb each day.  The nurses will empty the drain when it gets about half full or at certain times during the day and measure the amount of fluid emptied.  What care is needed at home?   Wash your hands with soap and water every time before and after you empty the drain. Do not change your dressing around the YORDY drain.  Do not put any lotions, creams, or oils around the YORDY drain site.  Empty the drain. Remove the stopper at the end. Pour out the drainage. Your doctor may want you to measure how much drainage is in the bulb. Then, squeeze the bulb to get rid of air and put the stopper back in place.  Care for the tube if there is a clot in it. Squeeze the tube over the clot. You can also squeeze the tube from your skin to the bulb and then let it go. This should open the tube.   Measure the amount of fluid in the drain ball after you empty it each day up to 3 times a day and write it down on a chart.  Look for signs of infection. Your doctor will want to know if you have a fever of 100.4°F (38°C) or higher, chills, if you get very red and sore around the drain, or if the fluid in the drain turns cloudy or smells. Your doctor will want to know if the skin around the drain has any fluid or pus coming out of it.  Sleep on the side opposite to the YORDY drain. This prevents any kinking of the tubing or pulling out the suction bulb.  Avoid bumping the drain.  You can hold the drain up with a safety pin while you are moving around.   Ask your doctor about your activity level while you have your drain in place.  What follow-up care is needed?   Be sure to keep your follow up visit.  Your doctor will tell you when the drain may be removed.  What problems could happen?   Fluid leaking from the cut site  Bleeding  Infection  Clot in the  tube  Tube and drain falls out  Cut area opens up  Drain stops working  When do I need to call the doctor?   Signs of infection at the drain site. These include swelling, redness, warmth around the wound, too much pain when touched, yellowish or greenish or bloody discharge, foul smell coming from the cut site.  Drain becomes loose, comes apart, abruptly stops draining, or falls out   Patient Education        Enoxaparin (miguel alex)   Brand Names: US Lovenox   Brand Names: Terrance Inclunox; Inclunox HP; Lovenox; Lovenox HP; Noromby; Noromby HP; Redesca; Redesca HP     Warning   People who have any type of spinal or epidural procedure are more likely to have bleeding problems around the spine when already on this drug. This bleeding rarely happens, but can lead to not being able to move body (paralysis) long-term or paralysis that will not go away. The risk is raised in people who have problems with their spine, a certain type of epidural catheter, or have had spinal surgery. The risk is also raised in people who take any other drugs that may affect blood clotting, like blood-thinner drugs (like warfarin), aspirin, or nonsteroidal anti-inflammatory drugs (NSAIDs) like ibuprofen or naproxen.  Tell your doctor you use this drug before you have a spinal or epidural procedure. Call your doctor right away if you have any signs of nerve problems like back pain, numbness or tingling, muscle weakness, paralysis, or loss of bladder or bowel control.  Talk with your doctor if you have recently had or will be having a spinal or epidural procedure. Some time may need to pass between the use of this drug and your procedure. Talk with your doctor.     What is this drug used for?   It is used to treat or prevent blood clots.  It is used to lower the number of heart attacks in patients who have unstable angina or mild heart attacks.       What are some things I need to know or do while I take this drug?   All products:   Tell  all of your health care providers that you take this drug. This includes your doctors, nurses, pharmacists, and dentists.  You may bleed more easily. Be careful and avoid injury. Use a soft toothbrush and an electric razor.  Severe and sometimes deadly bleeding problems have happened with this drug.  Have blood work checked as you have been told by the doctor. Talk with the doctor.  If you fall or hurt yourself, or if you hit your head, call your doctor right away. Talk with your doctor even if you feel fine.  Females who weigh less than 100 pounds (45 kilograms) and males who weigh less than 125 pounds (57 kilograms), must use this drug with care. The risk of side effects may be raised.  This drug has caused a problem called heparin-induced thrombocytopenia (HIT). HIT that leads to blood clots is called heparin-induced thrombocytopenia and thrombosis (CYNTHIA). HIT and CYNTHIA can be deadly or cause other problems. They can happen up to several weeks after stopping this drug. If you have questions, talk with the doctor.  If you are 65 or older, use this drug with care. You could have more side effects.    What are some side effects that I need to call my doctor about right away?   WARNING/CAUTION: Even though it may be rare, some people may have very bad and sometimes deadly side effects when taking a drug. Tell your doctor or get medical help right away if you have any of the following signs or symptoms that may be related to a very bad side effect:  Signs of an allergic reaction, like rash; hives; itching; red, swollen, blistered, or peeling skin with or without fever; wheezing; tightness in the chest or throat; trouble breathing, swallowing, or talking; unusual hoarseness; or swelling of the mouth, face, lips, tongue, or throat.  Signs of bleeding like throwing up or coughing up blood; vomit that looks like coffee grounds; blood in the urine; black, red, or tarry stools; bleeding from the gums; abnormal vaginal  bleeding; bruises without a cause or that get bigger; or bleeding you cannot stop.  Weakness on 1 side of the body, trouble speaking or thinking, change in balance, drooping on one side of the face, or blurred eyesight.  Pinpoint red spots on the skin.  Feeling very tired or weak.  Dizziness or passing out.  Shortness of breath.  Feeling confused.  Very bad headache.  Swelling in the arms or legs.    What are some other side effects of this drug?   All drugs may cause side effects. However, many people have no side effects or only have minor side effects. Call your doctor or get medical help if any of these side effects or any other side effects bother you or do not go away:  Upset stomach.  Irritation where the shot is given.  Diarrhea.  These are not all of the side effects that may occur. If you have questions about side effects, call your doctor. Call your doctor for medical advice about side effects.  You may report side effects to your national health agency.  You may report side effects to the FDA at 1-962.603.8570. You may also report side effects at https://www.fda.gov/medwatch.    How is this drug best taken?   Use this drug as ordered by your doctor. Read all information given to you. Follow all instructions closely.    All products:   It is given as a shot into the fatty part of the skin on the right or left side of the belly.  This drug must not be given into a muscle.  If you will be giving yourself the shot, your doctor or nurse will teach you how to give the shot.  Keep using this drug as you have been told by your doctor or other health care provider, even if you feel well.  Wash your hands before and after use.  Do not use if the solution is cloudy, leaking, or has particles.  This drug is colorless to a faint yellow. Do not use if the solution changes color.  Sit or lie down before use.  Move the site where you give the shot with each shot.  Throw away needles in a needle/sharp disposal box. Do  not reuse needles or other items. When the box is full, follow all local rules for getting rid of it. Talk with a doctor or pharmacist if you have any questions.    Prefilled syringes:   If using prefilled syringe, do not get rid of air bubble from syringe before giving.    What do I do if I miss a dose?   Take a missed dose as soon as you think about it.  If it is close to the time for your next dose, skip the missed dose and go back to your normal time.  Do not take 2 doses at the same time or extra doses.     How do I store and/or throw out this drug?   All products:   Store at room temperature in a dry place. Do not store in a bathroom.  Keep all drugs in a safe place. Keep all drugs out of the reach of children and pets.  Throw away unused or  drugs. Do not flush down a toilet or pour down a drain unless you are told to do so. Check with your pharmacist if you have questions about the best way to throw out drugs. There may be drug take-back programs in your area.

## 2022-10-27 PROCEDURE — 25000003 PHARM REV CODE 250: Performed by: SURGERY

## 2022-10-27 PROCEDURE — 63600175 PHARM REV CODE 636 W HCPCS: Performed by: SURGERY

## 2022-10-27 PROCEDURE — 11000001 HC ACUTE MED/SURG PRIVATE ROOM

## 2022-10-27 RX ADMIN — ENOXAPARIN SODIUM 30 MG: 30 INJECTION SUBCUTANEOUS at 09:10

## 2022-10-27 RX ADMIN — DOCUSATE SODIUM 100 MG: 100 CAPSULE, LIQUID FILLED ORAL at 09:10

## 2022-10-27 RX ADMIN — HYDROCODONE BITARTRATE AND ACETAMINOPHEN 1 TABLET: 5; 325 TABLET ORAL at 01:10

## 2022-10-27 RX ADMIN — HYDROCODONE BITARTRATE AND ACETAMINOPHEN 1 TABLET: 5; 325 TABLET ORAL at 07:10

## 2022-10-27 RX ADMIN — HYDROMORPHONE HYDROCHLORIDE 1 MG: 2 INJECTION INTRAMUSCULAR; INTRAVENOUS; SUBCUTANEOUS at 11:10

## 2022-10-27 RX ADMIN — HYDROCODONE BITARTRATE AND ACETAMINOPHEN 1 TABLET: 5; 325 TABLET ORAL at 09:10

## 2022-10-27 RX ADMIN — MUPIROCIN 1 G: 20 OINTMENT TOPICAL at 09:10

## 2022-10-27 RX ADMIN — SODIUM CHLORIDE: 9 INJECTION, SOLUTION INTRAVENOUS at 06:10

## 2022-10-27 NOTE — OP NOTE
DATE OF SURGERY:    10/26/22     SURGEON:  Ike Howell MD     PREOPERATIVE DIAGNOSIS:  Bilateral mastectomy defects   breast reconstruction.     POSTOPERATIVE DIAGNOSIS:  Bilateral mastectomy defectsbreast reconstruction.     PROCEDURES:    1. Left-sided breast reconstruction utilizing deep inferior epigastric artery    flap with microvascular anastomosis ([id number]).  2. Right-sided breast reconstruction with pedicled TRAM musculocutaneous   rotational flap.  3. Fluorescent angiography with indocyanine green.          FIRST ASSISTANT:  Ryan Rivera MD     INDICATION FOR PROCEDURE:  Very nice female who presents   with a history of bilateral mastectomy and radiation on right.  She presents for autologous reconstruction for definitive breast   reconstruction.     ANESTHESIA:  General.     COMPLICATIONS:  None.     PROCEDURE IN DETAIL:  The patient was endotracheally intubated, prepped and   draped in the usual sterile fashion.  I first began making an incision on the   abdomen and above the umbilicus to capture the perforating vessels and a   semicircular incision was made 6 cm above the vaginal slit using a 10-blade   scalpel.  Bovie cautery was used to dissect down through the subcutaneous tissue   to the level of the anterior abdominal fascia. We first divided the   skye-abdomen in half by isolating out the umbilicus with single hooks.    Dissection was carried out medial to lateral and lateral to medial.  Upon   inspection of the right skye-abdomen and in concordance with the CT scan, the   perforators were extremely small and there was no dominant .  We   elected to perform a pedicled TRAM flap to preserve the blood supply and create   the safest operation possible. The fascia was then outlined using Bovie cautery   and the inferior attachments of the muscle were divided off the pubic tubercle.    The pedicle was clipped and divided and then dissection was carried superiorly.   We  then divided the fascia up to the subcostal margin. We made a pocket up to   the breast pockets for later insetting. The mastectomy skin on the right breast   was removed using a 10 blade scalpel . A circumferential and radial capsulotomy was performed.    There was a  tunnel created between the breast pocket and the TRAM flap.  We then tunneled   the TRAM flap through this and then laid it down into position, removing some of   the excess tissue. Prior to this, indocyanine green was injected and   fluorescent angiography was carried out to identify the flap perfusion.   Perfusion was excellent.  That flap was then stapled closed and then attention   was turned to the left skye-abdomen.       In a similar fashion, dissection was   carried out lateral to medial and medial and lateral.  There was 2 dominant   lateral row perforators on the left skye-abdomen.  We elected to use this. I made   an incision in the fascia using Bovie cautery and dissection was meticulously   carried out using bipolar cautery, being careful not to sacrifice any muscle or   fascia for a true  flap.  The origin of the inferior epigastric artery   was traced all the way down into the groin and then once this was completed, I   turned my attention to the left mastectomy incision prior to reconstruction. I   made an elliptical incision around the old mastectomy scar.  This was sent to   pathology.  A radial and circumferential capsulotomies   were performed using the Bovie cautery.  The pectoralis major muscle was then   divided over the 3rd intercostal space.  The rib cartilage was removed and then   the intercostal artery and vein were identified. Under microscopic   magnification, the side branches were clipped and divided. The vein and artery   side branches were cleaned up.  The vein was extremely small and there was a   decent artery.  We then began the microvascular portion of the procedure. After   injecting indocyanine green  to assure flap perfusion, we divided the pedicle and   then brought it up in the left chest.  The artery was anastomosed in end-to-end   fashion using 9-0 nylon suture.  The vein was coupled using a 2.0 mm .   de-epithelialized both flaps. The skin was then closed over the top using 0   Vicryl sutures on both incisions of the breast and a running 2-0 Monocryl used   to close the skin.       Again, we turned our attention to the abdomen.  The fascia   on the left was primarily closed using a running #1 looped PDS, and where the pedicle TRAM was taken this was also closed primarily.     We then sat the patient in a beach   chair position, placed two 15-[place] round Raheem drains in the operative bed.    Deep tissues closed using 0 Vicryl suture and a running 2-0 Stratafix was   placed.  I placed Exparel throughout the abdomen as well as the chest.  Drains   were also placed in the chest.  The umbilicus was brought up through the   superior flap using a 15-blade scalpel and then inset using 3-0 Monocryl.  There   were no complications.  Good doppler signal in the left flap. I was scrubbed and present for the entire procedure.

## 2022-10-27 NOTE — PROGRESS NOTES
PRS    Doing well. Pain controlled  VSSAF  Doppler in left flap is strong  Bilateral pink and non congested  Incisions CDI  Drains SS    A/P  - OOB, DC FREDDY liu  - q2 hr flaps checks today and q4 tomorrow  - shower tomorrow  - DC at some point this weekend

## 2022-10-28 LAB
ABO + RH BLD: NORMAL
ABO + RH BLD: NORMAL
ANION GAP SERPL CALC-SCNC: 17 MMOL/L (ref 8–16)
BLD PROD TYP BPU: NORMAL
BLD PROD TYP BPU: NORMAL
BLOOD UNIT EXPIRATION DATE: NORMAL
BLOOD UNIT EXPIRATION DATE: NORMAL
BLOOD UNIT TYPE CODE: 5100
BLOOD UNIT TYPE CODE: 5100
BUN SERPL-MCNC: 10 MG/DL (ref 6–30)
CHLORIDE SERPL-SCNC: 106 MMOL/L (ref 95–110)
CREAT SERPL-MCNC: 0.7 MG/DL (ref 0.5–1.4)
CROSSMATCH INTERPRETATION: NORMAL
CROSSMATCH INTERPRETATION: NORMAL
DISPENSE STATUS: NORMAL
DISPENSE STATUS: NORMAL
ESTROGEN SERPL-MCNC: NORMAL PG/ML
GLUCOSE SERPL-MCNC: 157 MG/DL (ref 70–110)
GROUP & RH: NORMAL
HCT VFR BLD CALC: 22 %PCV (ref 36–54)
HGB BLD-MCNC: 8 G/DL
INDIRECT COOMBS GEL: NORMAL
INSULIN SERPL-ACNC: NORMAL U[IU]/ML
LAB AP CLINICAL INFORMATION: NORMAL
LAB AP GROSS DESCRIPTION: NORMAL
LAB AP REPORT FOOTNOTES: NORMAL
POC IONIZED CALCIUM: 0.98 MMOL/L (ref 1.06–1.42)
POC TCO2 (MEASURED): 22 MMOL/L (ref 23–29)
POTASSIUM BLD-SCNC: 3.8 MMOL/L (ref 3.5–5.1)
SAMPLE: ABNORMAL
SODIUM BLD-SCNC: 141 MMOL/L (ref 136–145)
T3RU NFR SERPL: NORMAL %
UNIT NUMBER: NORMAL
UNIT NUMBER: NORMAL

## 2022-10-28 PROCEDURE — 86923 COMPATIBILITY TEST ELECTRIC: CPT | Performed by: SURGERY

## 2022-10-28 PROCEDURE — 25000003 PHARM REV CODE 250: Performed by: SURGERY

## 2022-10-28 PROCEDURE — 93005 ELECTROCARDIOGRAM TRACING: CPT

## 2022-10-28 PROCEDURE — 36430 TRANSFUSION BLD/BLD COMPNT: CPT

## 2022-10-28 PROCEDURE — 99231 SBSQ HOSP IP/OBS SF/LOW 25: CPT | Mod: ,,, | Performed by: SURGERY

## 2022-10-28 PROCEDURE — 93010 ELECTROCARDIOGRAM REPORT: CPT | Mod: ,,, | Performed by: INTERNAL MEDICINE

## 2022-10-28 PROCEDURE — 36415 COLL VENOUS BLD VENIPUNCTURE: CPT | Performed by: SURGERY

## 2022-10-28 PROCEDURE — 11000001 HC ACUTE MED/SURG PRIVATE ROOM

## 2022-10-28 PROCEDURE — 63600175 PHARM REV CODE 636 W HCPCS: Performed by: SURGERY

## 2022-10-28 PROCEDURE — P9016 RBC LEUKOCYTES REDUCED: HCPCS | Performed by: SURGERY

## 2022-10-28 PROCEDURE — 93010 EKG 12-LEAD: ICD-10-PCS | Mod: ,,, | Performed by: INTERNAL MEDICINE

## 2022-10-28 PROCEDURE — 86850 RBC ANTIBODY SCREEN: CPT | Performed by: SURGERY

## 2022-10-28 PROCEDURE — 99231 PR SUBSEQUENT HOSPITAL CARE,LEVL I: ICD-10-PCS | Mod: ,,, | Performed by: SURGERY

## 2022-10-28 RX ORDER — SODIUM CHLORIDE 9 MG/ML
INJECTION, SOLUTION INTRAVENOUS CONTINUOUS
Status: DISCONTINUED | OUTPATIENT
Start: 2022-10-28 | End: 2022-11-04 | Stop reason: HOSPADM

## 2022-10-28 RX ORDER — HYDROCODONE BITARTRATE AND ACETAMINOPHEN 500; 5 MG/1; MG/1
TABLET ORAL
Status: DISCONTINUED | OUTPATIENT
Start: 2022-10-28 | End: 2022-11-04 | Stop reason: HOSPADM

## 2022-10-28 RX ADMIN — HYDROCODONE BITARTRATE AND ACETAMINOPHEN 1 TABLET: 5; 325 TABLET ORAL at 08:10

## 2022-10-28 RX ADMIN — HYDROCODONE BITARTRATE AND ACETAMINOPHEN 1 TABLET: 5; 325 TABLET ORAL at 01:10

## 2022-10-28 RX ADMIN — SODIUM CHLORIDE: 9 INJECTION, SOLUTION INTRAVENOUS at 02:10

## 2022-10-28 RX ADMIN — MUPIROCIN 1 G: 20 OINTMENT TOPICAL at 08:10

## 2022-10-28 RX ADMIN — ENOXAPARIN SODIUM 30 MG: 30 INJECTION SUBCUTANEOUS at 09:10

## 2022-10-28 RX ADMIN — DOCUSATE SODIUM 100 MG: 100 CAPSULE, LIQUID FILLED ORAL at 08:10

## 2022-10-28 RX ADMIN — HYDROCODONE BITARTRATE AND ACETAMINOPHEN 1 TABLET: 5; 325 TABLET ORAL at 03:10

## 2022-10-28 RX ADMIN — HYDROCODONE BITARTRATE AND ACETAMINOPHEN 1 TABLET: 5; 325 TABLET ORAL at 06:10

## 2022-10-28 RX ADMIN — SODIUM CHLORIDE: 9 INJECTION, SOLUTION INTRAVENOUS at 09:10

## 2022-10-28 RX ADMIN — DOCUSATE SODIUM 100 MG: 100 CAPSULE, LIQUID FILLED ORAL at 09:10

## 2022-10-28 RX ADMIN — ENOXAPARIN SODIUM 30 MG: 30 INJECTION SUBCUTANEOUS at 08:10

## 2022-10-28 RX ADMIN — MUPIROCIN 1 G: 20 OINTMENT TOPICAL at 09:10

## 2022-10-28 NOTE — PROGRESS NOTES
PRS  She has no complaints this morning, no drinking much  VSS she is tachy to 150s this morning on rounds  Incisions cdi  Left flap with excellent doppler signals soft  Righ slap with moderate swelling, warm and well perfused  Incisions cdi  Minimal urine output  Hct 22  EKG sinus tach    A/P:  Flaps appear well perfused.  Clinically the patient appears to be dehydrated and anemic.  Will restart IV fluids and transfuse 2 units of PRBCs.  Replace liu to better monitor urine output.  Drop flap checks to q shift.

## 2022-10-28 NOTE — NURSING
Phone call to Dr. Howell. EKG sinus tachycardia Hgb 7.5 Hct 22; new orders for 2 units PRBCs, insert liu, start normal saline @ 150

## 2022-10-28 NOTE — NURSING
Dr. Howell at bedside with NP, assessed incisions and YORDY drains, left flap + doppler, right breast warm, heart rate 150-160s, new order for istat and EKG

## 2022-10-29 LAB
BASOPHILS # BLD AUTO: 0.03 X10(3)/MCL (ref 0–0.2)
BASOPHILS NFR BLD AUTO: 0.4 %
EOSINOPHIL # BLD AUTO: 0.05 X10(3)/MCL (ref 0–0.9)
EOSINOPHIL NFR BLD AUTO: 0.6 %
ERYTHROCYTE [DISTWIDTH] IN BLOOD BY AUTOMATED COUNT: 18.8 % (ref 11.5–17)
HCT VFR BLD AUTO: 26.8 % (ref 37–47)
HGB BLD-MCNC: 8.9 GM/DL (ref 12–16)
IMM GRANULOCYTES # BLD AUTO: 0.05 X10(3)/MCL (ref 0–0.04)
IMM GRANULOCYTES NFR BLD AUTO: 0.6 %
LYMPHOCYTES # BLD AUTO: 1.69 X10(3)/MCL (ref 0.6–4.6)
LYMPHOCYTES NFR BLD AUTO: 20.2 %
MCH RBC QN AUTO: 30.6 PG (ref 27–31)
MCHC RBC AUTO-ENTMCNC: 33.2 MG/DL (ref 33–36)
MCV RBC AUTO: 92.1 FL (ref 80–94)
MONOCYTES # BLD AUTO: 0.79 X10(3)/MCL (ref 0.1–1.3)
MONOCYTES NFR BLD AUTO: 9.5 %
NEUTROPHILS # BLD AUTO: 5.7 X10(3)/MCL (ref 2.1–9.2)
NEUTROPHILS NFR BLD AUTO: 68.7 %
NRBC BLD AUTO-RTO: 0 %
PLATELET # BLD AUTO: 143 X10(3)/MCL (ref 130–400)
PMV BLD AUTO: 12 FL (ref 7.4–10.4)
RBC # BLD AUTO: 2.91 X10(6)/MCL (ref 4.2–5.4)
WBC # SPEC AUTO: 8.4 X10(3)/MCL (ref 4.5–11.5)

## 2022-10-29 PROCEDURE — 11000001 HC ACUTE MED/SURG PRIVATE ROOM

## 2022-10-29 PROCEDURE — 36415 COLL VENOUS BLD VENIPUNCTURE: CPT | Performed by: NURSE PRACTITIONER

## 2022-10-29 PROCEDURE — 85025 COMPLETE CBC W/AUTO DIFF WBC: CPT | Performed by: NURSE PRACTITIONER

## 2022-10-29 PROCEDURE — 99024 PR POST-OP FOLLOW-UP VISIT: ICD-10-PCS | Mod: ,,, | Performed by: SURGERY

## 2022-10-29 PROCEDURE — 25000003 PHARM REV CODE 250: Performed by: SURGERY

## 2022-10-29 PROCEDURE — 99024 POSTOP FOLLOW-UP VISIT: CPT | Mod: ,,, | Performed by: SURGERY

## 2022-10-29 PROCEDURE — 63600175 PHARM REV CODE 636 W HCPCS: Performed by: SURGERY

## 2022-10-29 RX ORDER — SODIUM CHLORIDE 900 MG/100ML
75 INJECTION INTRAVENOUS ONCE
Status: DISCONTINUED | OUTPATIENT
Start: 2022-10-29 | End: 2022-11-04 | Stop reason: HOSPADM

## 2022-10-29 RX ADMIN — SODIUM CHLORIDE: 9 INJECTION, SOLUTION INTRAVENOUS at 06:10

## 2022-10-29 RX ADMIN — HYDROCODONE BITARTRATE AND ACETAMINOPHEN 1 TABLET: 5; 325 TABLET ORAL at 07:10

## 2022-10-29 RX ADMIN — MUPIROCIN 1 G: 20 OINTMENT TOPICAL at 08:10

## 2022-10-29 RX ADMIN — HYDROCODONE BITARTRATE AND ACETAMINOPHEN 1 TABLET: 5; 325 TABLET ORAL at 02:10

## 2022-10-29 RX ADMIN — DOCUSATE SODIUM 100 MG: 100 CAPSULE, LIQUID FILLED ORAL at 08:10

## 2022-10-29 RX ADMIN — HYDROCODONE BITARTRATE AND ACETAMINOPHEN 1 TABLET: 5; 325 TABLET ORAL at 08:10

## 2022-10-29 RX ADMIN — ENOXAPARIN SODIUM 30 MG: 30 INJECTION SUBCUTANEOUS at 08:10

## 2022-10-29 NOTE — NURSING
Report received from ALDEN Aguirre RN , patient awake, alert, and oriented; reports pain 6/10 to abdomen; bloody drainage noted to right breast area, blood noted on gown, reinforced area with gauze and paper tape; left hand IV with Ns @ 125,. [place] catheter draining clear, yellow urine. Patient resting in bed with  attentive at bedside

## 2022-10-29 NOTE — PROGRESS NOTES
PRS  She is feeling better today overall, tolerating more PO  Tachycardia improved to the 100s otherwise VSS  Incisions intact, some venous ozzing from the right breast lateral incision, flap is swollen with some venous congestion, left flap well perfused with good signals.  Abd soft, incisions cdi  Drains thin sanginous  Hgb 8.9  UOP much improved    A/P:  Improved after hydration and transfusion.  HR is down, she has not been on her metoprolol (possible rebound in HR).  Will restart it and all of her home medications including DM medications.  Drop fluids to 75cc/hr, heplock when PO intake improves to normal levels.

## 2022-10-29 NOTE — NURSING
at bedside incisions and left doppler assessed, + doppler left breast; new order to decrease IV fluids to 75ml/hr and hep lock when tolerating PO fluids, patient agrees with plan of care

## 2022-10-29 NOTE — PLAN OF CARE
Problem: Hypertension Acute  Goal: Blood Pressure Within Desired Range  Outcome: Ongoing, Progressing     Problem: Obstructive Sleep Apnea Risk or Actual Comorbidity Management  Goal: Unobstructed Breathing During Sleep  Outcome: Ongoing, Progressing     Problem: Diabetes Comorbidity  Goal: Blood Glucose Level Within Targeted Range  Outcome: Ongoing, Progressing     Problem: Pain Acute  Goal: Acceptable Pain Control and Functional Ability  Outcome: Ongoing, Progressing     Problem: Fall Injury Risk  Goal: Absence of Fall and Fall-Related Injury  Outcome: Ongoing, Progressing     Problem: Adult Inpatient Plan of Care  Goal: Plan of Care Review  Outcome: Ongoing, Progressing  Goal: Patient-Specific Goal (Individualized)  Outcome: Ongoing, Progressing  Goal: Absence of Hospital-Acquired Illness or Injury  Outcome: Ongoing, Progressing  Goal: Optimal Comfort and Wellbeing  Outcome: Ongoing, Progressing  Goal: Readiness for Transition of Care  Outcome: Ongoing, Progressing

## 2022-10-30 PROBLEM — N65.0 BREAST RECONSTRUCTION DEFORMITY: Status: ACTIVE | Noted: 2022-10-30

## 2022-10-30 PROCEDURE — 63600175 PHARM REV CODE 636 W HCPCS: Performed by: SURGERY

## 2022-10-30 PROCEDURE — 11000001 HC ACUTE MED/SURG PRIVATE ROOM

## 2022-10-30 PROCEDURE — 99024 POSTOP FOLLOW-UP VISIT: CPT | Mod: ,,, | Performed by: SURGERY

## 2022-10-30 PROCEDURE — 99024 PR POST-OP FOLLOW-UP VISIT: ICD-10-PCS | Mod: ,,, | Performed by: SURGERY

## 2022-10-30 RX ADMIN — ENOXAPARIN SODIUM 30 MG: 30 INJECTION SUBCUTANEOUS at 07:10

## 2022-10-30 NOTE — NURSING
Discharge instructions reviewed with patient and ; instructed on lovenox injections and YORDY drain care, verbalized understanding.  Pain 5/10; incisions clean/dry/intact without any drainage. Patient discharged in stable condition via wheelchair to private vehicle driven by

## 2022-10-30 NOTE — PLAN OF CARE
Problem: Hypertension Acute  Goal: Blood Pressure Within Desired Range  Outcome: Ongoing, Progressing     Problem: Obstructive Sleep Apnea Risk or Actual Comorbidity Management  Goal: Unobstructed Breathing During Sleep  Outcome: Ongoing, Progressing     Problem: Diabetes Comorbidity  Goal: Blood Glucose Level Within Targeted Range  Outcome: Ongoing, Progressing     Problem: Pain Acute  Goal: Acceptable Pain Control and Functional Ability  Outcome: Ongoing, Progressing     Problem: Fall Injury Risk  Goal: Absence of Fall and Fall-Related Injury  Outcome: Ongoing, Progressing     Problem: Infection  Goal: Absence of Infection Signs and Symptoms  Outcome: Ongoing, Progressing     Problem: Adult Inpatient Plan of Care  Goal: Plan of Care Review  Outcome: Ongoing, Progressing  Flowsheets (Taken 10/29/2022 1940)  Plan of Care Reviewed With:   patient   spouse  Goal: Patient-Specific Goal (Individualized)  Outcome: Ongoing, Progressing  Goal: Absence of Hospital-Acquired Illness or Injury  Outcome: Ongoing, Progressing  Goal: Optimal Comfort and Wellbeing  Outcome: Ongoing, Progressing  Goal: Readiness for Transition of Care  Outcome: Ongoing, Progressing

## 2022-10-30 NOTE — PROGRESS NOTES
PRS  No complaints, tolerating PO well  AFVSS  Tachycardia has resolved  Incisions all cdi, drains ss  Both flaps well perfused, good doppler on the left RAHUL, R tram is somewhat swollen  Abd soft incisions cdi  Stable for discharge home.  Follow up this week.  Ok to shower.  Drain care. [facility].

## 2022-10-30 NOTE — PLAN OF CARE
Problem: Hypertension Acute  Goal: Blood Pressure Within Desired Range  Outcome: Met     Problem: Obstructive Sleep Apnea Risk or Actual Comorbidity Management  Goal: Unobstructed Breathing During Sleep  Outcome: Met     Problem: Diabetes Comorbidity  Goal: Blood Glucose Level Within Targeted Range  Outcome: Met     Problem: Pain Acute  Goal: Acceptable Pain Control and Functional Ability  Outcome: Met     Problem: Fall Injury Risk  Goal: Absence of Fall and Fall-Related Injury  Outcome: Met     Problem: Adult Inpatient Plan of Care  Goal: Plan of Care Review  Outcome: Met  Goal: Patient-Specific Goal (Individualized)  Outcome: Met  Goal: Absence of Hospital-Acquired Illness or Injury  Outcome: Met  Goal: Optimal Comfort and Wellbeing  Outcome: Met  Goal: Readiness for Transition of Care  Outcome: Met

## 2022-10-30 NOTE — NURSING
at bedside; assessed incisions, + doppler to left breast, new orders to discharge patient home, verbalized understanding; patient and  agree with plan of care

## 2022-10-31 LAB — POCT GLUCOSE: 186 MG/DL (ref 70–110)

## 2022-10-31 PROCEDURE — 11000001 HC ACUTE MED/SURG PRIVATE ROOM

## 2022-11-01 VITALS
TEMPERATURE: 99 F | WEIGHT: 163 LBS | BODY MASS INDEX: 30 KG/M2 | HEART RATE: 97 BPM | OXYGEN SATURATION: 95 % | HEIGHT: 62 IN | DIASTOLIC BLOOD PRESSURE: 70 MMHG | RESPIRATION RATE: 18 BRPM | SYSTOLIC BLOOD PRESSURE: 124 MMHG

## 2022-11-01 PROCEDURE — 11000001 HC ACUTE MED/SURG PRIVATE ROOM

## 2022-11-02 PROCEDURE — 11000001 HC ACUTE MED/SURG PRIVATE ROOM

## 2022-11-02 NOTE — PHYSICIAN QUERY
PT Name: Delia Scruggs  MR #: [id number]    DOCUMENTATION CLARIFICATION      CDS/: Sophia Ruffin RN BSN            Contact information:[email or web address]    This form is a permanent document in the medical record.      Query Date: November 2, 2022    By submitting this query, we are merely seeking further clarification of documentation. Please utilize your independent clinical judgment when addressing the question(s) below.    The Medical Record contains the following:   Indicators  Supporting Clinical Findings Location in Medical Record   x Anemia documented Clinically the patient appears to be dehydrated and anemic.  Will restart IV fluids and transfuse 2 units of PRBCs 10/28/22 Progress note   x H&H 10/24 hgb 11.7  hct 35.5  10/29 hgb 8.9    hct 26.8 10/24-10/29/22 Lab    BP                    HR      GI bleeding documented      Acute bleeding (Non GI site)     x Transfusion(s) Transfuse  2 u PRBC 10/28/22 Transfusion record    Acute/Chronic illness      Treatments     x Other POSTOPERATIVE DIAGNOSIS:  Bilateral mastectomy defectsbreast reconstruction.  PROCEDURES:    1. Left-sided breast reconstruction utilizing deep inferior epigastric artery    flap with microvascular anastomosis ([id number]-LT).  2. Right-sided breast reconstruction with pedicled TRAM musculocutaneous   rotational flap.    10/29 Plastic Surgery  Incisions intact, some venous ozzing from the right breast lateral incision, flap is swollen with some venous congestion, left flap well perfused with good signals.  3. Fluorescent angiography with indocyanine green 10/26/22 Op                    10/29/22 Progress note     Provider, please specify diagnosis or diagnoses associated with above clinical findings.   [   ] Acute blood loss anemia    [  x ] Acute blood loss anemia expected post-operatively    [   ] Anemia, unspecified    [   ] Other Hematological Diagnosis (please specify): _________________   [   ] Clinically  Undetermined              Please document in your progress notes daily for the duration of treatment, until resolved, and include in your discharge summary.    Form No. [id number]

## 2022-11-03 PROCEDURE — 11000001 HC ACUTE MED/SURG PRIVATE ROOM

## 2022-11-07 ENCOUNTER — DOCUMENTATION ONLY (OUTPATIENT)
Dept: INTERNAL MEDICINE | Facility: CLINIC | Age: 62
End: 2022-11-07
Payer: COMMERCIAL

## 2022-11-15 ENCOUNTER — DOCUMENTATION ONLY (OUTPATIENT)
Dept: INTERNAL MEDICINE | Facility: CLINIC | Age: 62
End: 2022-11-15
Payer: COMMERCIAL

## 2022-11-16 ENCOUNTER — DOCUMENTATION ONLY (OUTPATIENT)
Dept: INTERNAL MEDICINE | Facility: CLINIC | Age: 62
End: 2022-11-16
Payer: COMMERCIAL

## 2022-11-16 NOTE — DISCHARGE SUMMARY
[facility] Surgical - Med Surg  Plastic Surgery  Discharge Summary      Patient Name: Delia Scruggs  MRN: [id number]  Admission Date: 10/26/2022  Hospital Length of Stay: 4 days  Discharge Date and Time: 10/30/2022  7:41 AM  Attending Physician: No att. providers found   Discharging Provider: Ike Howell MD  Primary Care Provider: Ermelinda Hidalgo Jr     HPI: 63 yo female presented for RAHUL flap breast recon    Procedure(s) (LRB):  RECONSTRUCTION, BREAST, USING RAHUL SKIN FLAP (Bilateral RAHUL flap vs tram flap and acellular dermal matrix) (Bilateral)     Hospital Course: Pt had flap reconstruction without complication.  She was eventually discharged in stable condition        Significant Diagnostic Studies: Labs: CBC No results for input(s): WBC, HGB, HCT, PLT in the last 48 hours.    Pending Diagnostic Studies:       None          Final Active Diagnoses:    Diagnosis Date Noted POA    PRINCIPAL PROBLEM:  Breast reconstruction deformity [N65.0] 10/30/2022 Yes      Problems Resolved During this Admission:      Discharged Condition: good    Disposition: Home or Self Care    Follow Up:   Follow-up Information       Ryan Rivera MD Follow up on 11/4/2022.    Specialty: Plastic Surgery  Why: 8:45 AM  Contact information:  [address] [address]  [phone]                           Patient Instructions:   No discharge procedures on file.  Medications:  Reconciled Home Medications:      Medication List        ASK your doctor about these medications      amLODIPine-benazepriL 10-40 mg per capsule  Commonly known as: LOTREL  Take 1 capsule by mouth once daily.     ascorbic acid (vitamin C) 500 MG tablet  Commonly known as: VITAMIN C  Take 500 mg by mouth.     aspirin 81 MG EC tablet  Commonly known as: ECOTRIN  Take 81 mg by mouth.     b complex vitamins tablet  Take 1 tablet by mouth once daily.     calcium carbonate-vitamin D3 600 mg-20 mcg (800 unit) Chew  Take 1  tablet by mouth once daily.     exemestane 25 mg tablet  Commonly known as: AROMASIN  Take 1 tablet (25 mg total) by mouth once daily.     ferrous gluconate 240 (27 FE) MG tablet  Commonly known as: FERGON  Take 240 mg by mouth.     fluconazole 150 MG Tab  Commonly known as: DIFLUCAN  Take 1 tablet by mouth once daily     GLUTAMINE ORAL  Take 1 Scoop by mouth as needed.     * HYDROcodone-acetaminophen 5-325 mg per tablet  Commonly known as: NORCO  Take 1 tablet by mouth every 6 (six) hours as needed.     * HYDROcodone-acetaminophen 5-325 mg per tablet  Commonly known as: NORCO  Take 1 tablet by mouth every 4 (four) hours as needed for Pain.     JARDIANCE 25 mg tablet  Generic drug: empagliflozin  Take 1 tablet by mouth once daily.     loratadine 10 mg Cap  Take 10 mg by mouth as needed.     LORazepam 1 MG tablet  Commonly known as: ATIVAN  Take 1 mg by mouth 2 (two) times daily. as needed for anxiety.     magnesium 250 mg Tab  Take 1 tablet by mouth once daily.     metFORMIN 500 MG tablet  Commonly known as: GLUCOPHAGE  Take 500 mg by mouth.     metoprolol succinate 50 MG 24 hr tablet  Commonly known as: TOPROL-XL  Take 1 tablet by mouth once daily.     multivitamin-minerals-lutein Tab  Take 1 tablet by mouth once daily at 6am.     omega-3 fatty acids-fish oil 360-1,200 mg Cap  Take 1 capsule by mouth once daily.     pravastatin 40 MG tablet  Commonly known as: PRAVACHOL  Take 1 tablet by mouth once daily.     pyridoxine (vitamin B6) 100 MG Tab  Commonly known as: B-6  Take 100 mg by mouth.     SITagliptin phosphate 100 MG Tab  Commonly known as: JANUVIA  Take 1 tablet by mouth once daily.           * This list has 2 medication(s) that are the same as other medications prescribed for you. Read the directions carefully, and ask your doctor or other care provider to review them with you.                  Ike Howell MD  Plastic Surgery  [facility] Surgical - Med Surg

## 2022-11-18 ENCOUNTER — LAB VISIT (OUTPATIENT)
Dept: LAB | Facility: HOSPITAL | Age: 62
End: 2022-11-18
Attending: INTERNAL MEDICINE
Payer: COMMERCIAL

## 2022-11-18 DIAGNOSIS — Z17.0 MALIGNANT NEOPLASM OF LOWER-INNER QUADRANT OF RIGHT BREAST OF FEMALE, ESTROGEN RECEPTOR POSITIVE: ICD-10-CM

## 2022-11-18 DIAGNOSIS — C50.311 MALIGNANT NEOPLASM OF LOWER-INNER QUADRANT OF RIGHT BREAST OF FEMALE, ESTROGEN RECEPTOR POSITIVE: ICD-10-CM

## 2022-11-18 DIAGNOSIS — M85.80 OSTEOPENIA, UNSPECIFIED LOCATION: ICD-10-CM

## 2022-11-18 LAB
ALBUMIN SERPL-MCNC: 3 GM/DL (ref 3.4–4.8)
ALBUMIN/GLOB SERPL: 0.7 RATIO (ref 1.1–2)
ALP SERPL-CCNC: 112 UNIT/L (ref 40–150)
ALT SERPL-CCNC: 39 UNIT/L (ref 0–55)
AST SERPL-CCNC: 26 UNIT/L (ref 5–34)
BASOPHILS # BLD AUTO: 0.03 X10(3)/MCL (ref 0–0.2)
BASOPHILS NFR BLD AUTO: 0.3 %
BILIRUBIN DIRECT+TOT PNL SERPL-MCNC: 0.5 MG/DL
BUN SERPL-MCNC: 7.6 MG/DL (ref 9.8–20.1)
CALCIUM SERPL-MCNC: 9.8 MG/DL (ref 8.4–10.2)
CHLORIDE SERPL-SCNC: 102 MMOL/L (ref 98–107)
CO2 SERPL-SCNC: 26 MMOL/L (ref 23–31)
CREAT SERPL-MCNC: 0.83 MG/DL (ref 0.55–1.02)
EOSINOPHIL # BLD AUTO: 0.07 X10(3)/MCL (ref 0–0.9)
EOSINOPHIL NFR BLD AUTO: 0.7 %
ERYTHROCYTE [DISTWIDTH] IN BLOOD BY AUTOMATED COUNT: 16.7 % (ref 11.5–17)
GFR SERPLBLD CREATININE-BSD FMLA CKD-EPI: >60 MLS/MIN/1.73/M2
GLOBULIN SER-MCNC: 4.1 GM/DL (ref 2.4–3.5)
GLUCOSE SERPL-MCNC: 194 MG/DL (ref 82–115)
HCT VFR BLD AUTO: 28.9 % (ref 37–47)
HGB BLD-MCNC: 8.7 GM/DL (ref 12–16)
IMM GRANULOCYTES # BLD AUTO: 0.06 X10(3)/MCL (ref 0–0.04)
IMM GRANULOCYTES NFR BLD AUTO: 0.6 %
LYMPHOCYTES # BLD AUTO: 1.29 X10(3)/MCL (ref 0.6–4.6)
LYMPHOCYTES NFR BLD AUTO: 12.7 %
MCH RBC QN AUTO: 29 PG (ref 27–31)
MCHC RBC AUTO-ENTMCNC: 30.1 MG/DL (ref 33–36)
MCV RBC AUTO: 96.3 FL (ref 80–94)
MONOCYTES # BLD AUTO: 1.08 X10(3)/MCL (ref 0.1–1.3)
MONOCYTES NFR BLD AUTO: 10.6 %
NEUTROPHILS # BLD AUTO: 7.7 X10(3)/MCL (ref 2.1–9.2)
NEUTROPHILS NFR BLD AUTO: 75.1 %
PLATELET # BLD AUTO: 445 X10(3)/MCL (ref 130–400)
PMV BLD AUTO: 9.1 FL (ref 7.4–10.4)
POTASSIUM SERPL-SCNC: 5.1 MMOL/L (ref 3.5–5.1)
PROT SERPL-MCNC: 7.1 GM/DL (ref 5.8–7.6)
RBC # BLD AUTO: 3 X10(6)/MCL (ref 4.2–5.4)
SODIUM SERPL-SCNC: 138 MMOL/L (ref 136–145)
WBC # SPEC AUTO: 10.2 X10(3)/MCL (ref 4.5–11.5)

## 2022-11-18 PROCEDURE — 80053 COMPREHEN METABOLIC PANEL: CPT

## 2022-11-18 PROCEDURE — 85025 COMPLETE CBC W/AUTO DIFF WBC: CPT

## 2022-11-18 PROCEDURE — 36415 COLL VENOUS BLD VENIPUNCTURE: CPT

## 2022-11-18 NOTE — PROGRESS NOTES
Subjective:       Patient ID: Delia Scruggs is a 62 y.o. female.  Surgeon: Dr. Sd Baltazar  Radiation oncologist: Dr. Browne Prellop    Recurrence vs. Second Primary Right Breast AJCC Anatomic and Clinical Prognostic Stage IA ([id number])--Diagnosed 21  Right Breast Cancer Stage IIA (T2N0M0) diagnosed 17  Biopsy/pathology:   1. Right breast mass biopsy done 17--invasive ductal carcinoma, grade III with focal DCIS, ER 25.72%, W[place] 0.28% negative, Her2 1+ negative by IHC. Oncotype DX testing showed recurrence score of 43 (distant recurrence risk 40% with Tamoxifen alone and 12% Tamoxifen + chemo), PCR done for breast panel showed triple negative.  2. Right breast biopsy mass at 6:00 done 21--invasive ductal carcinoma, Grade 2, ER 29%, WY 0%, Her2 0 by IHC, Ki67 80% high.  Surgery/pathology:   1. Right breast needle localization lumpectomy and SLN biopsy done 17--invasive mammary carcinoma with papillary and ductal features, grade III, 2.2cm, no lymphovascular or perineural invasion, margins clear, few foci of non-invasive predominantly solid papillary carcinoma also identified, 2 sentinel lymph nodes negative, closest margin medial, re-excision of medial margin with focal residual invasive papillary carcinoma <0.1cm, now free.  2. Bilateral mastectomies with right ALND done 22--neoadjuvant therapy effects with near complete tumor response, solitary focus of invasive ductal carcinoma 2mm, Grade 1, resection margin clear, left breast with no atypia or malignancy, 2 LNs negative.   Imagin. Screening MMG 16--asymmetry medially right breast.  2. Diagnostic MMG right breast 16--asymmetrical density located posteriorly in lower inner quadrant of right breast, suggestion of underlying clearly circumscribed 10mm rounded density w/n area on spot compression.  3. US right breast 16--focal mass 4:00 8cm from nipple hypoechoic and hetrogensous with irregular margins  1.7X1.1X1.4cm BIRADS 5.  4. CT A/P 2/24/17--fluid in resection cavity right breast, no evidence for metastatic disease, vague area of enhancement right hepatic low, not typical of metastatic disease, consider liver mass protocol MRI.  5. Bone scan 2/24/17--DJD right knee. No evidence of any metastatic bone lesions.  6. MRI abdomen/liver protocol done 3/7/17--hepatic lesion in question is most c/w focal nodular hyperplasia.  7. Screening MMG done 11/24/21--asymmetry in the area of the lumpectomy site in the inner right breast posterior depth on the CC view needs further evaluation.    8. MMG/US right breast done 12/20/21--Findings concerning for new or recurrent malignancy at the site of prior lumpectomy in this patient with a personal history of right breast cancer. The three adjacent (one dominant 1.8cm, two satellite 5mm and 4mm) irregular hypoechoic masses in the right breast 6:00 axis 4 cm FN position are highly suggestive of malignancy. BI-RADS 5: Highly suggestive of malignancy. No suspicious right axillary adenopathy.  9. MRI bilateral breasts done 1/26/22--Right breast 6:00 irregular heterogeneously enhancing mass containing a biopsy clip, measuring 1.6 m x 0.9 cm x 1.5 cm, is consistent with the known biopsy-proven right breast invasive malignancy. Multiple additional irregular heterogeneously enhancing masses with irregular margins within the lumpectomy bed and anterior to the lumpectomy bed in the 4:00 to 6:00 right breast middle to posterior depths, in total spanning 5.7 cm x 2.8 cm x 2.2 cm, are also highly suggestive of malignancy. The posterior aspect of the suspicious enhancement is 1 cm anterior to the pectoralis major muscle. Although there is slight tenting and mild enhancement of the pectoralis major muscle, this is most likely related to adherent post lumpectomy scarring and post radiation change, no suspicious enhancement in the left breast. No significant internal mammary or axillary  adenopathy.  10. Diagnostic MMG/US 3/30/22--Mild-moderate partial response to neoadjuvant chemotherapy, evidenced mammographically by decreased density and conspicuity of the known malignant masses in the right breast 6:00 axis posterior depth at the site of prior lumpectomy. The previously biopsied dominant mass has decreased in size as measured sonographically, now 1.1 x 0.6 x 1.6 cm, previously 1.8 x 0.9 x 1.7 cm. Also, the two satellite masses previously identified on sonography have resolved. BI-RADS 6: Known biopsy-proven malignancy.    2DECHO 3/6/17--EF 60-65%.  Mediport placement by Dr. Baltazar on 3/23/17--removed 6/2020.  Mediport placed by Dr. Baltazar on 2/7/22.     Genetic testing on 3/21/17 was negative.    DEXA:   10/24/17--L1-L4 T = 1.3, Left femur neck -1.1, right femur neck -2.1, total left femur 0.0, right -0.7 c/w osteopenia.  11/21/19--L1-L4 T = 1.7, Left femur neck -1.3, right femur neck -2.0, total left femur -0.3, right -0.7 (mixed response).  11/24/21--L1-L4 T = 2.0, Left femur neck -1.2, right femur neck -2.0, total left femur -0.1, right -0.7 (improved spine, left hip, right stable)    Treatment History:   1. DDAC x 4 doses. Started 4/3/17. Completed on 5/15/17.    2. Weekly Taxol X 12 completed 5/30/17--8/15/17.   3. Adjuvant XRT completed 8/30/17 - 9/28/17.    4. Femara 10/11/17--stopped 2/1/22.   5. Prolia 6/15/18--12/30/21 (on hold).    6. Neoadjuvant Taxotere/Cytoxan X 6 cycles completed 2/10/22--5/26/22.   7. Bilateral mastectomies 6/29/22.    8. RAHUL flap reconstruction done 10/26/22 Dr. Rivera.    Treatment plan:   1. Adjuvant Aromasin started 8/17/22.  2. Zometa every 6 months X 2 years adjuvant treatment started 9/2022, next due 3/2023.     Chief Complaint: Other Misc (Pt reports that she is not as strong as she used to be. )    HPI   Patient presents for follow-up of breast cancer. She is doing okay. She underwent reconstructive surgery and still has some nonhealing wounds to  right breast. She denies any other complaints. Recent labs show post-surgical anemia and she did receive 2 units of PRBC with surgery. She continues on Aromasin.     Past Medical History:   Diagnosis Date    Anemia, unspecified     Breast cancer     Diabetes mellitus, type 2     Hyperlipidemia     Hypertension     Mitral valve prolapse     Sleep apnea       Review of patient's allergies indicates:  No Known Allergies   Current Outpatient Medications on File Prior to Visit   Medication Sig Dispense Refill    amLODIPine-benazepriL (LOTREL) 10-40 mg per capsule Take 1 capsule by mouth once daily.      ascorbic acid, vitamin C, (VITAMIN C) 500 MG tablet Take 500 mg by mouth.      b complex vitamins tablet Take 1 tablet by mouth once daily.      calcium carbonate-vitamin D3 600 mg-20 mcg (800 unit) Chew Take 1 tablet by mouth once daily.      empagliflozin (JARDIANCE) 25 mg tablet Take 1 tablet by mouth once daily.      exemestane (AROMASIN) 25 mg tablet Take 1 tablet (25 mg total) by mouth once daily. 30 tablet 6    ferrous gluconate (FERGON) 240 (27 FE) MG tablet Take 240 mg by mouth.      GLUTAMINE ORAL Take 1 Scoop by mouth as needed.      loratadine 10 mg Cap Take 10 mg by mouth as needed.      magnesium 250 mg Tab Take 1 tablet by mouth once daily.      metFORMIN (GLUCOPHAGE) 500 MG tablet Take 500 mg by mouth.      metoprolol succinate (TOPROL-XL) 50 MG 24 hr tablet Take 1 tablet by mouth once daily.      multivitamin-minerals-lutein Tab Take 1 tablet by mouth once daily at 6am.      omega-3 fatty acids-fish oil 360-1,200 mg Cap Take 1 capsule by mouth once daily.      pravastatin (PRAVACHOL) 40 MG tablet Take 1 tablet by mouth once daily.      pyridoxine, vitamin B6, (B-6) 100 MG Tab Take 100 mg by mouth.      SITagliptin (JANUVIA) 100 MG Tab Take 1 tablet by mouth once daily.      aspirin (ECOTRIN) 81 MG EC tablet Take 81 mg by mouth.      fluconazole (DIFLUCAN) 150 MG Tab Take 1 tablet by mouth once daily  (Patient not taking: Reported on 11/21/2022) 1 tablet 0    HYDROcodone-acetaminophen (NORCO) 5-325 mg per tablet Take 1 tablet by mouth every 6 (six) hours as needed.      HYDROcodone-acetaminophen (NORCO) 5-325 mg per tablet Take 1 tablet by mouth every 4 (four) hours as needed for Pain. (Patient not taking: Reported on 11/21/2022) 30 tablet 0    LORazepam (ATIVAN) 1 MG tablet Take 1 mg by mouth 2 (two) times daily. as needed for anxiety.       No current facility-administered medications on file prior to visit.      Review of Systems   Constitutional:  Negative for activity change, appetite change, fever and unexpected weight change.   HENT:  Negative for mouth sores.    Eyes:  Negative for visual disturbance.   Respiratory:  Negative for cough and shortness of breath.    Cardiovascular:  Negative for chest pain.   Gastrointestinal:  Negative for abdominal pain, blood in stool, constipation, diarrhea, nausea and vomiting.   Genitourinary:  Negative for difficulty urinating and frequency.   Musculoskeletal:  Negative for back pain.   Integumentary:  Negative for rash.        Bilateral mastectomies with reconstruction, nonhealing wounds to right breast   Neurological:  Negative for dizziness, weakness and headaches.   Hematological:  Negative for adenopathy.   Psychiatric/Behavioral:  Negative for behavioral problems and suicidal ideas. The patient is not nervous/anxious.           Vitals:    11/21/22 0859   BP: 101/69   Pulse: 88   Resp: 14   Temp: 97.7 °F (36.5 °C)        Physical Exam  Vitals reviewed.   Constitutional:       Appearance: Normal appearance. She is normal weight.      Comments: Pleasant AA female   HENT:      Head: Normocephalic.   Eyes:      General: Lids are normal. Vision grossly intact.      Extraocular Movements: Extraocular movements intact.      Conjunctiva/sclera: Conjunctivae normal.   Cardiovascular:      Rate and Rhythm: Normal rate and regular rhythm.      Pulses: Normal pulses.       Heart sounds: S1 normal and S2 normal. Murmur heard.   Systolic murmur is present with a grade of 2/6.   Pulmonary:      Effort: Pulmonary effort is normal.      Breath sounds: Normal breath sounds.   Chest:   Breasts:     Left: Normal.      Comments: S/p bilateral mastectomies with incisions healed on left breast, right breast with axillary wound, and also another open place to superior right breast incision  Abdominal:      General: Abdomen is flat. Bowel sounds are normal. There is no distension.      Palpations: Abdomen is soft.      Tenderness: There is no abdominal tenderness.   Musculoskeletal:      Cervical back: Normal range of motion and neck supple.      Right lower leg: No edema.      Left lower leg: No edema.   Feet:      Right foot:      Skin integrity: Skin integrity normal.   Lymphadenopathy:      Comments: No palpable adenopathy   Skin:     General: Skin is warm and moist.      Capillary Refill: Capillary refill takes less than 2 seconds.      Comments: Left CW mediport intact   Neurological:      General: No focal deficit present.      Mental Status: She is alert and oriented to person, place, and time.   Psychiatric:         Attention and Perception: Attention normal.         Mood and Affect: Mood normal.         Speech: Speech normal.         Behavior: Behavior normal. Behavior is cooperative.         Judgment: Judgment normal.       ECOG SCORE            No visits with results within 1 Day(s) from this visit.   Latest known visit with results is:   Lab Visit on 11/18/2022   Component Date Value Ref Range Status    Sodium Level 11/18/2022 138  136 - 145 mmol/L Final    Potassium Level 11/18/2022 5.1  3.5 - 5.1 mmol/L Final    Chloride 11/18/2022 102  98 - 107 mmol/L Final    Carbon Dioxide 11/18/2022 26  23 - 31 mmol/L Final    Glucose Level 11/18/2022 194 (H)  82 - 115 mg/dL Final    Blood Urea Nitrogen 11/18/2022 7.6 (L)  9.8 - 20.1 mg/dL Final    Creatinine 11/18/2022 0.83  0.55 - 1.02 mg/dL  Final    Calcium Level Total 11/18/2022 9.8  8.4 - 10.2 mg/dL Final    Protein Total 11/18/2022 7.1  5.8 - 7.6 gm/dL Final    Albumin Level 11/18/2022 3.0 (L)  3.4 - 4.8 gm/dL Final    Globulin 11/18/2022 4.1 (H)  2.4 - 3.5 gm/dL Final    Albumin/Globulin Ratio 11/18/2022 0.7 (L)  1.1 - 2.0 ratio Final    Bilirubin Total 11/18/2022 0.5  <=1.5 mg/dL Final    Alkaline Phosphatase 11/18/2022 112  40 - 150 unit/L Final    Alanine Aminotransferase 11/18/2022 39  0 - 55 unit/L Final    Aspartate Aminotransferase 11/18/2022 26  5 - 34 unit/L Final    eGFR 11/18/2022 >60  mls/min/1.73/m2 Final    WBC 11/18/2022 10.2  4.5 - 11.5 x10(3)/mcL Final    RBC 11/18/2022 3.00 (L)  4.20 - 5.40 x10(6)/mcL Final    Hgb 11/18/2022 8.7 (L)  12.0 - 16.0 gm/dL Final    Hct 11/18/2022 28.9 (L)  37.0 - 47.0 % Final    MCV 11/18/2022 96.3 (H)  80.0 - 94.0 fL Final    MCH 11/18/2022 29.0  27.0 - 31.0 pg Final    MCHC 11/18/2022 30.1 (L)  33.0 - 36.0 mg/dL Final    RDW 11/18/2022 16.7  11.5 - 17.0 % Final    Platelet 11/18/2022 445 (H)  130 - 400 x10(3)/mcL Final    MPV 11/18/2022 9.1  7.4 - 10.4 fL Final    Neut % 11/18/2022 75.1  % Final    Lymph % 11/18/2022 12.7  % Final    Mono % 11/18/2022 10.6  % Final    Eos % 11/18/2022 0.7  % Final    Basophil % 11/18/2022 0.3  % Final    Lymph # 11/18/2022 1.29  0.6 - 4.6 x10(3)/mcL Final    Neut # 11/18/2022 7.7  2.1 - 9.2 x10(3)/mcL Final    Mono # 11/18/2022 1.08  0.1 - 1.3 x10(3)/mcL Final    Eos # 11/18/2022 0.07  0 - 0.9 x10(3)/mcL Final    Baso # 11/18/2022 0.03  0 - 0.2 x10(3)/mcL Final    IG# 11/18/2022 0.06 (H)  0 - 0.04 x10(3)/mcL Final    IG% 11/18/2022 0.6  % Final     Assessment:            1. Malignant neoplasm of lower-inner quadrant of right breast of female, estrogen receptor positive        Plan:     Patient with breast cancer stage IIA, 2.2cm tumor, high grade, ER weakly positive 25%. S/p right breast lumpectomy on 1/26/17. Lymph node negative.  Per NCCN guidelines, oncotype DX  testing recommended.  Oncotype showed tumor high risk and PCR breast panel showing triple negative.  Treatment recommended with dose dense AC x 4 followed by weekly Taxol x 12.  Treatment was delayed due to non-healing breast wound.  Patient completed 4 cycles DDAC on 5/15/17 and 12 cycles of weekly Taxol on 8/15/17.  Adjuvant XRT completed on 9/28/17 and patient tolerated well.   Patient started Femara on 10/11/17.  Also started on Prolia for osteopenia, last dose in 12/2021.    Screening MMG from 11/2021 showed an asymmetry in area of lumpectomy site in right breast.  Diagnostic MMG/US 12/2021 showed suspicious dominant mass 1.8cm 6:00 with 2 additional satellite lesions.  s/p biopsy 12/21/21 pathology c/w IDCA Grade 2, weakly ER+ 29%, NH and Her2 negative with high Ki67 80%, same breast profile as original cancer.  This represents a recurrence vs. new primary which developed while on AI.  MRI 1/26/22 showed multiple masses, spanning area of 5.7cm wtih dominant mass 1.6cm, no suspicious nodes or enhancing masses in left breast.   Patient met with Dr. Baltazar and surgical plan is for bilateral mastectomies.  Recommended neoadjuvant chemotherapy due to concerns for wound healing following surgery and need for chemotherapy given recurrence.  Patient completed 6 cycles Taxotere/Cytoxan 2/10/22--5/26/22.   MMG/US done after cycle 3 showed decrease in dominant mass and resolution of satellite masses c/w good partial response.  S/p bilateral mastectomies done 6/29/22 and showed only a residual IDCA 2mm, with negative LNs, and left breast negative for malignancy.     Started Aromasin on 8/17/22 and plan for 10 years. She is tolerating well.   S/p reconstruction on 10/26/22 still with some nonhealing wounds.  Labs show post-operative anemia, otherwise good.  Patient is on oral iron which she will continue.  I had considered adjuvant Verzenio, but decided against it since node negative tumors were not included in the trial  and also because patient is far out from surgery.   Will have patient RTC in 2 months for follow-up to be sure anemia is better.  She will continue follow-up with Dr. Rivera.  Plan to likely start surveillance visits after next visit if she is doing well.     Changed from Prolia to adjuvant Zometa every 6 months started 9/2022, next dose due 3/2023.    Tia Lala MD

## 2022-11-21 ENCOUNTER — OFFICE VISIT (OUTPATIENT)
Dept: HEMATOLOGY/ONCOLOGY | Facility: CLINIC | Age: 62
End: 2022-11-21
Payer: COMMERCIAL

## 2022-11-21 VITALS
OXYGEN SATURATION: 100 % | TEMPERATURE: 98 F | HEIGHT: 62 IN | DIASTOLIC BLOOD PRESSURE: 69 MMHG | SYSTOLIC BLOOD PRESSURE: 101 MMHG | WEIGHT: 154.13 LBS | HEART RATE: 88 BPM | BODY MASS INDEX: 28.36 KG/M2 | RESPIRATION RATE: 14 BRPM

## 2022-11-21 DIAGNOSIS — Z17.0 MALIGNANT NEOPLASM OF LOWER-INNER QUADRANT OF RIGHT BREAST OF FEMALE, ESTROGEN RECEPTOR POSITIVE: Primary | ICD-10-CM

## 2022-11-21 DIAGNOSIS — C50.311 MALIGNANT NEOPLASM OF LOWER-INNER QUADRANT OF RIGHT BREAST OF FEMALE, ESTROGEN RECEPTOR POSITIVE: Primary | ICD-10-CM

## 2022-11-21 PROCEDURE — 1111F PR DISCHARGE MEDS RECONCILED W/ CURRENT OUTPATIENT MED LIST: ICD-10-PCS | Mod: CPTII,S$GLB,, | Performed by: INTERNAL MEDICINE

## 2022-11-21 PROCEDURE — 4010F ACE/ARB THERAPY RXD/TAKEN: CPT | Mod: CPTII,S$GLB,, | Performed by: INTERNAL MEDICINE

## 2022-11-21 PROCEDURE — 3061F NEG MICROALBUMINURIA REV: CPT | Mod: CPTII,S$GLB,, | Performed by: INTERNAL MEDICINE

## 2022-11-21 PROCEDURE — 99999 PR PBB SHADOW E&M-EST. PATIENT-LVL V: ICD-10-PCS | Mod: PBBFAC,,, | Performed by: INTERNAL MEDICINE

## 2022-11-21 PROCEDURE — 3008F PR BODY MASS INDEX (BMI) DOCUMENTED: ICD-10-PCS | Mod: CPTII,S$GLB,, | Performed by: INTERNAL MEDICINE

## 2022-11-21 PROCEDURE — 3078F DIAST BP <80 MM HG: CPT | Mod: CPTII,S$GLB,, | Performed by: INTERNAL MEDICINE

## 2022-11-21 PROCEDURE — 3078F PR MOST RECENT DIASTOLIC BLOOD PRESSURE < 80 MM HG: ICD-10-PCS | Mod: CPTII,S$GLB,, | Performed by: INTERNAL MEDICINE

## 2022-11-21 PROCEDURE — 3066F PR DOCUMENTATION OF TREATMENT FOR NEPHROPATHY: ICD-10-PCS | Mod: CPTII,S$GLB,, | Performed by: INTERNAL MEDICINE

## 2022-11-21 PROCEDURE — 3074F PR MOST RECENT SYSTOLIC BLOOD PRESSURE < 130 MM HG: ICD-10-PCS | Mod: CPTII,S$GLB,, | Performed by: INTERNAL MEDICINE

## 2022-11-21 PROCEDURE — 1111F DSCHRG MED/CURRENT MED MERGE: CPT | Mod: CPTII,S$GLB,, | Performed by: INTERNAL MEDICINE

## 2022-11-21 PROCEDURE — 99999 PR PBB SHADOW E&M-EST. PATIENT-LVL V: CPT | Mod: PBBFAC,,, | Performed by: INTERNAL MEDICINE

## 2022-11-21 PROCEDURE — 3008F BODY MASS INDEX DOCD: CPT | Mod: CPTII,S$GLB,, | Performed by: INTERNAL MEDICINE

## 2022-11-21 PROCEDURE — 4010F PR ACE/ARB THEARPY RXD/TAKEN: ICD-10-PCS | Mod: CPTII,S$GLB,, | Performed by: INTERNAL MEDICINE

## 2022-11-21 PROCEDURE — 3066F NEPHROPATHY DOC TX: CPT | Mod: CPTII,S$GLB,, | Performed by: INTERNAL MEDICINE

## 2022-11-21 PROCEDURE — 1159F MED LIST DOCD IN RCRD: CPT | Mod: CPTII,S$GLB,, | Performed by: INTERNAL MEDICINE

## 2022-11-21 PROCEDURE — 3061F PR NEG MICROALBUMINURIA RESULT DOCUMENTED/REVIEW: ICD-10-PCS | Mod: CPTII,S$GLB,, | Performed by: INTERNAL MEDICINE

## 2022-11-21 PROCEDURE — 99214 OFFICE O/P EST MOD 30 MIN: CPT | Mod: S$GLB,,, | Performed by: INTERNAL MEDICINE

## 2022-11-21 PROCEDURE — 3074F SYST BP LT 130 MM HG: CPT | Mod: CPTII,S$GLB,, | Performed by: INTERNAL MEDICINE

## 2022-11-21 PROCEDURE — 99214 PR OFFICE/OUTPT VISIT, EST, LEVL IV, 30-39 MIN: ICD-10-PCS | Mod: S$GLB,,, | Performed by: INTERNAL MEDICINE

## 2022-11-21 PROCEDURE — 1160F RVW MEDS BY RX/DR IN RCRD: CPT | Mod: CPTII,S$GLB,, | Performed by: INTERNAL MEDICINE

## 2022-11-21 PROCEDURE — 1159F PR MEDICATION LIST DOCUMENTED IN MEDICAL RECORD: ICD-10-PCS | Mod: CPTII,S$GLB,, | Performed by: INTERNAL MEDICINE

## 2022-11-21 PROCEDURE — 1160F PR REVIEW ALL MEDS BY PRESCRIBER/CLIN PHARMACIST DOCUMENTED: ICD-10-PCS | Mod: CPTII,S$GLB,, | Performed by: INTERNAL MEDICINE

## 2022-11-21 RX ORDER — SODIUM CHLORIDE 0.9 % (FLUSH) 0.9 %
10 SYRINGE (ML) INJECTION
Status: CANCELLED | OUTPATIENT
Start: 2022-12-02

## 2022-11-21 RX ORDER — HEPARIN 100 UNIT/ML
500 SYRINGE INTRAVENOUS
Status: CANCELLED | OUTPATIENT
Start: 2022-12-02

## 2022-11-28 ENCOUNTER — INFUSION (OUTPATIENT)
Dept: INFUSION THERAPY | Facility: HOSPITAL | Age: 62
End: 2022-11-28
Attending: INTERNAL MEDICINE
Payer: COMMERCIAL

## 2022-11-28 VITALS
DIASTOLIC BLOOD PRESSURE: 74 MMHG | HEIGHT: 62 IN | WEIGHT: 164.25 LBS | HEART RATE: 99 BPM | BODY MASS INDEX: 30.22 KG/M2 | SYSTOLIC BLOOD PRESSURE: 138 MMHG | RESPIRATION RATE: 18 BRPM | TEMPERATURE: 99 F

## 2022-11-28 DIAGNOSIS — Z17.0 MALIGNANT NEOPLASM OF LOWER-INNER QUADRANT OF RIGHT BREAST OF FEMALE, ESTROGEN RECEPTOR POSITIVE: Primary | ICD-10-CM

## 2022-11-28 DIAGNOSIS — C50.311 MALIGNANT NEOPLASM OF LOWER-INNER QUADRANT OF RIGHT BREAST OF FEMALE, ESTROGEN RECEPTOR POSITIVE: Primary | ICD-10-CM

## 2022-11-28 PROCEDURE — 25000003 PHARM REV CODE 250: Performed by: INTERNAL MEDICINE

## 2022-11-28 PROCEDURE — 96523 IRRIG DRUG DELIVERY DEVICE: CPT

## 2022-11-28 PROCEDURE — A4216 STERILE WATER/SALINE, 10 ML: HCPCS | Performed by: INTERNAL MEDICINE

## 2022-11-28 PROCEDURE — 63600175 PHARM REV CODE 636 W HCPCS: Performed by: INTERNAL MEDICINE

## 2022-11-28 RX ORDER — HEPARIN 100 UNIT/ML
500 SYRINGE INTRAVENOUS
Status: DISCONTINUED | OUTPATIENT
Start: 2022-11-28 | End: 2022-11-28 | Stop reason: HOSPADM

## 2022-11-28 RX ORDER — HEPARIN 100 UNIT/ML
500 SYRINGE INTRAVENOUS
Status: CANCELLED | OUTPATIENT
Start: 2023-02-20

## 2022-11-28 RX ORDER — SODIUM CHLORIDE 0.9 % (FLUSH) 0.9 %
10 SYRINGE (ML) INJECTION
Status: DISCONTINUED | OUTPATIENT
Start: 2022-11-28 | End: 2022-11-28 | Stop reason: HOSPADM

## 2022-11-28 RX ORDER — SODIUM CHLORIDE 0.9 % (FLUSH) 0.9 %
10 SYRINGE (ML) INJECTION
Status: CANCELLED | OUTPATIENT
Start: 2023-02-20

## 2022-11-28 RX ADMIN — SODIUM CHLORIDE, PRESERVATIVE FREE 10 ML: 5 INJECTION INTRAVENOUS at 12:11

## 2022-11-28 RX ADMIN — HEPARIN 500 UNITS: 100 SYRINGE at 12:11

## 2022-11-29 ENCOUNTER — LAB VISIT (OUTPATIENT)
Dept: LAB | Facility: HOSPITAL | Age: 62
End: 2022-11-29
Attending: SURGERY
Payer: COMMERCIAL

## 2022-11-29 DIAGNOSIS — Z42.1 ENCOUNTER FOR BREAST RECONSTRUCTION FOLLOWING MASTECTOMY: Primary | ICD-10-CM

## 2022-11-29 LAB
GROUP & RH: NORMAL
HCT VFR BLD AUTO: 31.5 % (ref 37–47)
HGB BLD-MCNC: 9.5 GM/DL (ref 12–16)
INDIRECT COOMBS GEL: NORMAL

## 2022-11-29 PROCEDURE — 85018 HEMOGLOBIN: CPT

## 2022-11-29 PROCEDURE — 85014 HEMATOCRIT: CPT

## 2022-11-29 PROCEDURE — 36415 COLL VENOUS BLD VENIPUNCTURE: CPT

## 2022-11-29 PROCEDURE — 86901 BLOOD TYPING SEROLOGIC RH(D): CPT | Performed by: SURGERY

## 2022-11-30 ENCOUNTER — ANESTHESIA EVENT (OUTPATIENT)
Dept: SURGERY | Facility: HOSPITAL | Age: 62
End: 2022-11-30
Payer: COMMERCIAL

## 2022-11-30 ENCOUNTER — ANESTHESIA (OUTPATIENT)
Dept: SURGERY | Facility: HOSPITAL | Age: 62
End: 2022-11-30
Payer: COMMERCIAL

## 2022-11-30 ENCOUNTER — HOSPITAL ENCOUNTER (OUTPATIENT)
Facility: HOSPITAL | Age: 62
Discharge: HOME | End: 2022-11-30
Attending: SURGERY | Admitting: SURGERY
Payer: COMMERCIAL

## 2022-11-30 DIAGNOSIS — Z85.3 PERSONAL HISTORY OF BREAST CANCER: ICD-10-CM

## 2022-11-30 DIAGNOSIS — C50.911 MALIGNANT NEOPLASM OF RIGHT FEMALE BREAST, UNSPECIFIED ESTROGEN RECEPTOR STATUS, UNSPECIFIED SITE OF BREAST: ICD-10-CM

## 2022-11-30 DIAGNOSIS — N65.0 BREAST RECONSTRUCTION DEFORMITY: Primary | ICD-10-CM

## 2022-11-30 DIAGNOSIS — Z42.1 ENCOUNTER FOR BREAST RECONSTRUCTION FOLLOWING MASTECTOMY: ICD-10-CM

## 2022-11-30 PROCEDURE — 63600175 PHARM REV CODE 636 W HCPCS: Performed by: ANESTHESIOLOGY

## 2022-11-30 PROCEDURE — 25000003 PHARM REV CODE 250

## 2022-11-30 PROCEDURE — 36000707: Performed by: SURGERY

## 2022-11-30 PROCEDURE — 71000015 HC POSTOP RECOV 1ST HR: Performed by: SURGERY

## 2022-11-30 PROCEDURE — 19380 REVJ RECONSTRUCTED BREAST: CPT | Mod: RT,,, | Performed by: SURGERY

## 2022-11-30 PROCEDURE — 63600175 PHARM REV CODE 636 W HCPCS: Performed by: SURGERY

## 2022-11-30 PROCEDURE — 25000003 PHARM REV CODE 250: Performed by: SURGERY

## 2022-11-30 PROCEDURE — 71000016 HC POSTOP RECOV ADDL HR: Performed by: SURGERY

## 2022-11-30 PROCEDURE — 63600175 PHARM REV CODE 636 W HCPCS

## 2022-11-30 PROCEDURE — 25000003 PHARM REV CODE 250: Performed by: ANESTHESIOLOGY

## 2022-11-30 PROCEDURE — 37000009 HC ANESTHESIA EA ADD 15 MINS: Performed by: SURGERY

## 2022-11-30 PROCEDURE — 19380 PR REVISE BREAST RECONSTRUCTION: ICD-10-PCS | Mod: RT,,, | Performed by: SURGERY

## 2022-11-30 PROCEDURE — 71000033 HC RECOVERY, INTIAL HOUR: Performed by: SURGERY

## 2022-11-30 PROCEDURE — 36000706: Performed by: SURGERY

## 2022-11-30 PROCEDURE — 37000008 HC ANESTHESIA 1ST 15 MINUTES: Performed by: SURGERY

## 2022-11-30 RX ORDER — ONDANSETRON 2 MG/ML
4 INJECTION INTRAMUSCULAR; INTRAVENOUS DAILY PRN
Status: DISCONTINUED | OUTPATIENT
Start: 2022-11-30 | End: 2022-11-30 | Stop reason: HOSPADM

## 2022-11-30 RX ORDER — PROPOFOL 10 MG/ML
VIAL (ML) INTRAVENOUS
Status: DISCONTINUED | OUTPATIENT
Start: 2022-11-30 | End: 2022-11-30

## 2022-11-30 RX ORDER — HYDROMORPHONE HYDROCHLORIDE 2 MG/ML
0.4 INJECTION, SOLUTION INTRAMUSCULAR; INTRAVENOUS; SUBCUTANEOUS EVERY 5 MIN PRN
Status: DISCONTINUED | OUTPATIENT
Start: 2022-11-30 | End: 2022-11-30 | Stop reason: HOSPADM

## 2022-11-30 RX ORDER — DEXAMETHASONE SODIUM PHOSPHATE 4 MG/ML
INJECTION, SOLUTION INTRA-ARTICULAR; INTRALESIONAL; INTRAMUSCULAR; INTRAVENOUS; SOFT TISSUE
Status: DISCONTINUED | OUTPATIENT
Start: 2022-11-30 | End: 2022-11-30

## 2022-11-30 RX ORDER — LABETALOL HYDROCHLORIDE 5 MG/ML
5 INJECTION, SOLUTION INTRAVENOUS ONCE
Status: COMPLETED | OUTPATIENT
Start: 2022-11-30 | End: 2022-11-30

## 2022-11-30 RX ORDER — ONDANSETRON 2 MG/ML
INJECTION INTRAMUSCULAR; INTRAVENOUS
Status: DISCONTINUED | OUTPATIENT
Start: 2022-11-30 | End: 2022-11-30

## 2022-11-30 RX ORDER — SODIUM CHLORIDE, SODIUM GLUCONATE, SODIUM ACETATE, POTASSIUM CHLORIDE AND MAGNESIUM CHLORIDE 30; 37; 368; 526; 502 MG/100ML; MG/100ML; MG/100ML; MG/100ML; MG/100ML
INJECTION, SOLUTION INTRAVENOUS CONTINUOUS
Status: CANCELLED | OUTPATIENT
Start: 2022-11-30 | End: 2022-12-30

## 2022-11-30 RX ORDER — CEFAZOLIN SODIUM 1 G/3ML
INJECTION, POWDER, FOR SOLUTION INTRAMUSCULAR; INTRAVENOUS
Status: DISCONTINUED | OUTPATIENT
Start: 2022-11-30 | End: 2022-11-30 | Stop reason: HOSPADM

## 2022-11-30 RX ORDER — FENTANYL CITRATE 50 UG/ML
INJECTION, SOLUTION INTRAMUSCULAR; INTRAVENOUS
Status: DISCONTINUED | OUTPATIENT
Start: 2022-11-30 | End: 2022-11-30

## 2022-11-30 RX ORDER — HYDROCODONE BITARTRATE AND ACETAMINOPHEN 7.5; 325 MG/1; MG/1
1 TABLET ORAL EVERY 6 HOURS PRN
Status: DISCONTINUED | OUTPATIENT
Start: 2022-11-30 | End: 2022-11-30

## 2022-11-30 RX ORDER — HYDROCODONE BITARTRATE AND ACETAMINOPHEN 7.5; 325 MG/1; MG/1
1 TABLET ORAL EVERY 6 HOURS PRN
Status: DISCONTINUED | OUTPATIENT
Start: 2022-11-30 | End: 2022-11-30 | Stop reason: HOSPADM

## 2022-11-30 RX ORDER — LIDOCAINE HYDROCHLORIDE 20 MG/ML
INJECTION, SOLUTION EPIDURAL; INFILTRATION; INTRACAUDAL; PERINEURAL
Status: DISCONTINUED | OUTPATIENT
Start: 2022-11-30 | End: 2022-11-30

## 2022-11-30 RX ORDER — DIPHENHYDRAMINE HYDROCHLORIDE 50 MG/ML
25 INJECTION INTRAMUSCULAR; INTRAVENOUS EVERY 6 HOURS PRN
Status: DISCONTINUED | OUTPATIENT
Start: 2022-11-30 | End: 2022-11-30 | Stop reason: HOSPADM

## 2022-11-30 RX ORDER — MIDAZOLAM HYDROCHLORIDE 1 MG/ML
INJECTION INTRAMUSCULAR; INTRAVENOUS
Status: DISCONTINUED | OUTPATIENT
Start: 2022-11-30 | End: 2022-11-30

## 2022-11-30 RX ORDER — LIDOCAINE HYDROCHLORIDE 10 MG/ML
1 INJECTION, SOLUTION EPIDURAL; INFILTRATION; INTRACAUDAL; PERINEURAL ONCE
Status: CANCELLED | OUTPATIENT
Start: 2022-11-30 | End: 2022-11-30

## 2022-11-30 RX ORDER — CEFAZOLIN SODIUM 2 G/50ML
2 SOLUTION INTRAVENOUS ONCE
Status: COMPLETED | OUTPATIENT
Start: 2022-11-30 | End: 2022-11-30

## 2022-11-30 RX ORDER — MIDAZOLAM HYDROCHLORIDE 1 MG/ML
2 INJECTION INTRAMUSCULAR; INTRAVENOUS ONCE AS NEEDED
Status: CANCELLED | OUTPATIENT
Start: 2022-11-30 | End: 2034-04-28

## 2022-11-30 RX ORDER — ROCURONIUM BROMIDE 10 MG/ML
INJECTION, SOLUTION INTRAVENOUS
Status: DISCONTINUED | OUTPATIENT
Start: 2022-11-30 | End: 2022-11-30

## 2022-11-30 RX ADMIN — ROCURONIUM BROMIDE 10 MG: 10 INJECTION, SOLUTION INTRAVENOUS at 11:11

## 2022-11-30 RX ADMIN — HYDROCODONE BITARTRATE AND ACETAMINOPHEN 1 TABLET: 7.5; 325 TABLET ORAL at 02:11

## 2022-11-30 RX ADMIN — MIDAZOLAM HYDROCHLORIDE 2 MG: 1 INJECTION, SOLUTION INTRAMUSCULAR; INTRAVENOUS at 11:11

## 2022-11-30 RX ADMIN — SODIUM CHLORIDE, SODIUM GLUCONATE, SODIUM ACETATE, POTASSIUM CHLORIDE AND MAGNESIUM CHLORIDE: 526; 502; 368; 37; 30 INJECTION, SOLUTION INTRAVENOUS at 11:11

## 2022-11-30 RX ADMIN — CEFAZOLIN SODIUM 2 G: 2 SOLUTION INTRAVENOUS at 11:11

## 2022-11-30 RX ADMIN — SUGAMMADEX 200 MG: 100 INJECTION, SOLUTION INTRAVENOUS at 12:11

## 2022-11-30 RX ADMIN — HYDROMORPHONE HYDROCHLORIDE 0.4 MG: 2 INJECTION INTRAMUSCULAR; INTRAVENOUS; SUBCUTANEOUS at 12:11

## 2022-11-30 RX ADMIN — ONDANSETRON 4 MG: 2 INJECTION INTRAMUSCULAR; INTRAVENOUS at 11:11

## 2022-11-30 RX ADMIN — ROCURONIUM BROMIDE 30 MG: 10 INJECTION, SOLUTION INTRAVENOUS at 11:11

## 2022-11-30 RX ADMIN — LABETALOL HYDROCHLORIDE 5 MG: 5 INJECTION, SOLUTION INTRAVENOUS at 01:11

## 2022-11-30 RX ADMIN — PROPOFOL 150 MG: 10 INJECTION, EMULSION INTRAVENOUS at 11:11

## 2022-11-30 RX ADMIN — FENTANYL CITRATE 25 MCG: 50 INJECTION, SOLUTION INTRAMUSCULAR; INTRAVENOUS at 11:11

## 2022-11-30 RX ADMIN — LIDOCAINE HYDROCHLORIDE 80 MG: 20 INJECTION, SOLUTION EPIDURAL; INFILTRATION; INTRACAUDAL; PERINEURAL at 11:11

## 2022-11-30 RX ADMIN — DEXAMETHASONE SODIUM PHOSPHATE 4 MG: 4 INJECTION, SOLUTION INTRA-ARTICULAR; INTRALESIONAL; INTRAMUSCULAR; INTRAVENOUS; SOFT TISSUE at 11:11

## 2022-11-30 RX ADMIN — FENTANYL CITRATE 75 MCG: 50 INJECTION, SOLUTION INTRAMUSCULAR; INTRAVENOUS at 11:11

## 2022-11-30 NOTE — ANESTHESIA PREPROCEDURE EVALUATION
11/30/2022  Delia Scruggs is a 62 y.o., female with breast CA s/p RAHUL flap reconstruction.  Post operative fat necrosis present.  To OR for washout.  She denies cardiopulmonary complaints.      Pre-op Assessment    I have reviewed the Patient Summary Reports.     I have reviewed the Nursing Notes. I have reviewed the NPO Status.   I have reviewed the Medications.     Review of Systems  Anesthesia Hx:  Denies Family Hx of Anesthesia complications.   Denies Personal Hx of Anesthesia complications.   Cardiovascular:  Cardiovascular Normal     Pulmonary:  Pulmonary Normal        Physical Exam  General: Well nourished, Cooperative, Alert and Oriented    Airway:  Mallampati: II   Mouth Opening: Normal  TM Distance: Normal  Tongue: Normal  Neck ROM: Normal ROM    Dental:  Intact    Chest/Lungs:  Clear to auscultation, Normal Respiratory Rate    Heart:  Rate: Normal  Rhythm: Regular Rhythm        Anesthesia Plan  Type of Anesthesia, risks & benefits discussed:    Anesthesia Type: Gen ETT  Intra-op Monitoring Plan: Standard ASA Monitors  Post Op Pain Control Plan: multimodal analgesia and IV/PO Opioids PRN  Induction:  IV  Airway Plan: Direct, Post-Induction  Informed Consent: Informed consent signed with the Patient and all parties understand the risks and agree with anesthesia plan.  All questions answered.   ASA Score: 2  Day of Surgery Review of History & Physical: H&P Update referred to the surgeon/provider.    Ready For Surgery From Anesthesia Perspective.     .

## 2022-11-30 NOTE — DISCHARGE INSTRUCTIONS
Keep site clean and dry  Call md ofc for questions or concerns  GO TO ER FOR SUDDEN/SEVERE CHANGES  Follow prescriptions

## 2022-11-30 NOTE — ANESTHESIA PROCEDURE NOTES
Intubation    Date/Time: 11/30/2022 11:29 AM  Performed by: Selene Sierra  Authorized by: Simon Francisco MD     Intubation:     Induction:  Intravenous    Intubated:  Postinduction    Mask Ventilation:  Easy with oral airway    Attempts:  1    Attempted By:  Student    Method of Intubation:  Direct    Blade:  Nguyen 2    Laryngeal View Grade: Grade IIA - cords partially seen      Difficult Airway Encountered?: No      Complications:  None    Airway Device:  Oral endotracheal tube    Airway Device Size:  7.5    Style/Cuff Inflation:  Cuffed (inflated to minimal occlusive pressure)    Tube secured:  22    Secured at:  The lips    Placement Verified By:  Capnometry    Complicating Factors:  None    Findings Post-Intubation:  BS equal bilateral and atraumatic/condition of teeth unchanged

## 2022-11-30 NOTE — DISCHARGE SUMMARY
[facility] - Periop Services  Discharge Note  Short Stay    Procedure(s) (LRB):  incision and drainage (Right)      OUTCOME: Patient tolerated treatment/procedure well without complication and is now ready for discharge.    DISPOSITION: Home or Self Care    FINAL DIAGNOSIS:  <principal problem not specified>    FOLLOWUP: In clinic    DISCHARGE INSTRUCTIONS:    Discharge Procedure Orders   Diet Adult Regular     Change dressing (specify)   Order Comments: Wet to dry dressing changes to right axillary wound daily  Home health     Activity as tolerated     Weight bearing restrictions (specify):   Order Comments: No heavy lifting         Clinical Reference Documents Added to Patient Instructions         Document    BREAST RECONSTRUCTION (ENGLISH)    BREAST RECONSTRUCTION DISCHARGE INSTRUCTIONS (ENGLISH)    GENERAL DISCHARGE INSTRUCTIONS (ENGLISH)            TIME SPENT ON DISCHARGE:    minutes

## 2022-11-30 NOTE — TRANSFER OF CARE
Anesthesia Transfer of Care Note    Patient: Delia Scruggs    Procedure(s) Performed: Procedure(s) (LRB):  incision and drainage (Right)    Patient location: PACU    Anesthesia Type: general    Transport from OR: Transported from OR on room air with adequate spontaneous ventilation    Post pain: adequate analgesia    Post assessment: no apparent anesthetic complications    Post vital signs: stable    Level of consciousness: sedated    Nausea/Vomiting: no nausea/vomiting    Complications: none    Transfer of care protocol was followed      Last vitals:   Visit Vitals  BP (!) 160/82   Pulse 78   Temp 36.2 °C (97.2 °F)   Resp 13   SpO2 97%   Breastfeeding No

## 2022-11-30 NOTE — OP NOTE
[facility]                       [address] [place] [address]    PATIENT NAME:      ONEYDA LAMAR  YOB: 1960  CSN:               [id number]  MRN:               [id number]  ADMIT DATE:        11/30/2022 09:30:00  PHYSICIAN:         Ryan Rivera MD                          OPERATIVE REPORT      DATE OF SURGERY:    11/30/2022 00:00:00    SURGEON:  Ryan Rivera MD    PREOPERATIVE DIAGNOSIS:  Partial necrosis, right-sided rotational pedicle TRAM   flap.    POSTOPERATIVE DIAGNOSIS:  Partial necrosis, right-sided rotational pedicle TRAM   flap.    PROCEDURES:    1. Revision of breast reconstruction, medialization of the TRAM.  2. Irrigation and excisional debridement skin, subcutaneous tissue and fascia,   10 x 10 cm, right axilla.    INDICATION FOR PROCEDURE:  Oneyda Lamar is a 62-year-old female who presents with   partial necrosis and lateralization of the flap.  She presents for surgical   correction.    ANESTHESIA:  General.    COMPLICATIONS:  None.    PROCEDURE IN DETAIL:  The patient was endotracheally intubated, prepped and   draped in the usual sterile fashion.  We first began by opening up the previous   incision.  There was some fat necrosis that was encountered.  The flap was then   rotated medially to fill in the defect and some of the fat necrosis was debrided   using a rongeur.  There was additional nonviable skin, subcutaneous tissue, and   fat necrosis on the lateral aspect.  This was all debrided 10 x 10 cm down to   healthy bleeding tissue.  A rongeur was used to remove more nonviable tissue.    We then jet lavage irrigated the entirety of the wound.  The superomedial   portion of the flap was then closed with 0 Vicryl pops-offs and running 3-0   chromic.  The remainder was packed with wet-to-dry dressing changes.  No   complications.  I was scrubbed and present for the entire  procedure.        ______________________________  MD KELLEY Steiner/ED  DD:  11/30/2022  Time:  12:31PM  DT:  11/30/2022  Time:  02:49PM  Job #:  [id number]      OPERATIVE REPORT

## 2022-12-01 VITALS
HEART RATE: 78 BPM | OXYGEN SATURATION: 98 % | DIASTOLIC BLOOD PRESSURE: 75 MMHG | RESPIRATION RATE: 18 BRPM | TEMPERATURE: 97 F | SYSTOLIC BLOOD PRESSURE: 128 MMHG

## 2022-12-01 LAB — POCT GLUCOSE: 146 MG/DL (ref 70–110)

## 2022-12-01 PROCEDURE — G0180 MD CERTIFICATION HHA PATIENT: HCPCS | Mod: ,,, | Performed by: SURGERY

## 2022-12-01 PROCEDURE — G0180 PR HOME HEALTH MD CERTIFICATION: ICD-10-PCS | Mod: ,,, | Performed by: SURGERY

## 2022-12-01 NOTE — ANESTHESIA POSTPROCEDURE EVALUATION
Anesthesia Post Evaluation    Patient: Delia Scruggs    Procedure(s) Performed: Procedure(s) (LRB):  incision and drainage (Right)    Final Anesthesia Type: general      Patient location during evaluation: PACU  Patient participation: Yes- Able to Participate  Level of consciousness: awake and alert and oriented  Post-procedure vital signs: reviewed and stable  Pain management: adequate  Airway patency: patent  BRODY mitigation strategies: Intraoperative administration of CPAP, nasopharyngeal airway, or oral appliance during sedation, Multimodal analgesia, Verification of full reversal of neuromuscular block, Extubation and recovery carried out in lateral, semiupright, or other nonsupine position and Postoperative administration of CPAP, nasopharyngeal airway, or oral appliance in the postanesthesia care unit (PACU)  PONV status at discharge: No PONV  Anesthetic complications: no      Cardiovascular status: hemodynamically stable  Respiratory status: room air  Hydration status: euvolemic  Follow-up not needed.          Vitals Value Taken Time   /75 11/30/22 1452   Temp 36.2 °C (97.2 °F) 11/30/22 1240   Pulse 78 11/30/22 1452   Resp 18 11/30/22 1447   SpO2 98 % 11/30/22 1452         Event Time   Out of Recovery 13:20:00         Pain/Pat Score: Pain Rating Prior to Med Admin: 6 (11/30/2022  2:47 PM)  Pat Score: 10 (11/30/2022  3:22 PM)  Modified Pat Score: 19 (11/30/2022  3:22 PM)

## 2022-12-12 ENCOUNTER — EXTERNAL HOME HEALTH (OUTPATIENT)
Dept: HOME HEALTH SERVICES | Facility: HOSPITAL | Age: 62
End: 2022-12-12
Payer: COMMERCIAL

## 2023-01-13 PROBLEM — T45.1X5A LEUKOPENIA DUE TO ANTINEOPLASTIC CHEMOTHERAPY: Status: RESOLVED | Noted: 2022-05-06 | Resolved: 2023-01-13

## 2023-01-13 PROBLEM — D70.1 LEUKOPENIA DUE TO ANTINEOPLASTIC CHEMOTHERAPY: Status: RESOLVED | Noted: 2022-05-06 | Resolved: 2023-01-13

## 2023-01-13 NOTE — PROGRESS NOTES
Subjective:       Patient ID: Delia Scrgugs is a 62 y.o. female.  Surgeon: Dr. Sd Baltazar  Radiation oncologist: Dr. Browne Prellop    Recurrence vs. Second Primary Right Breast AJCC Anatomic and Clinical Prognostic Stage IA ([id number])--Diagnosed 21  Right Breast Cancer Stage IIA (T2N0M0) diagnosed 17  Biopsy/pathology:   1. Right breast mass biopsy done 17--invasive ductal carcinoma, grade III with focal DCIS, ER 25.72%, NM 0.28% negative, Her2 1+ negative by IHC. Oncotype DX testing showed recurrence score of 43 (distant recurrence risk 40% with Tamoxifen alone and 12% Tamoxifen + chemo), PCR done for breast panel showed triple negative.  2. Right breast biopsy mass at 6:00 done 21--invasive ductal carcinoma, Grade 2, ER 29%, NM 0%, Her2 0 by IHC, Ki67 80% high.  Surgery/pathology:   1. Right breast needle localization lumpectomy and SLN biopsy done 17--invasive mammary carcinoma with papillary and ductal features, grade III, 2.2cm, no lymphovascular or perineural invasion, margins clear, few foci of non-invasive predominantly solid papillary carcinoma also identified, 2 sentinel lymph nodes negative, closest margin medial, re-excision of medial margin with focal residual invasive papillary carcinoma <0.1cm, now free.  2. Bilateral mastectomies with right ALND done 22--neoadjuvant therapy effects with near complete tumor response, solitary focus of invasive ductal carcinoma 2mm, Grade 1, resection margin clear, left breast with no atypia or malignancy, 2 LNs negative.   Imagin. Screening MMG 16--asymmetry medially right breast.  2. Diagnostic MMG right breast 16--asymmetrical density located posteriorly in lower inner quadrant of right breast, suggestion of underlying clearly circumscribed 10mm rounded density w/n area on spot compression.  3. US right breast 16--focal mass 4:00 8cm from nipple hypoechoic and hetrogensous with irregular margins  1.7X1.1X1.4cm BIRADS 5.  4. CT A/P 2/24/17--fluid in resection cavity right breast, no evidence for metastatic disease, vague area of enhancement right hepatic low, not typical of metastatic disease, consider liver mass protocol MRI.  5. Bone scan 2/24/17--DJD right knee. No evidence of any metastatic bone lesions.  6. MRI abdomen/liver protocol done 3/7/17--hepatic lesion in question is most c/w focal nodular hyperplasia.  7. Screening MMG done 11/24/21--asymmetry in the area of the lumpectomy site in the inner right breast posterior depth on the CC view needs further evaluation.    8. MMG/US right breast done 12/20/21--Findings concerning for new or recurrent malignancy at the site of prior lumpectomy in this patient with a personal history of right breast cancer. The three adjacent (one dominant 1.8cm, two satellite 5mm and 4mm) irregular hypoechoic masses in the right breast 6:00 axis 4 cm FN position are highly suggestive of malignancy. BI-RADS 5: Highly suggestive of malignancy. No suspicious right axillary adenopathy.  9. MRI bilateral breasts done 1/26/22--Right breast 6:00 irregular heterogeneously enhancing mass containing a biopsy clip, measuring 1.6 m x 0.9 cm x 1.5 cm, is consistent with the known biopsy-proven right breast invasive malignancy. Multiple additional irregular heterogeneously enhancing masses with irregular margins within the lumpectomy bed and anterior to the lumpectomy bed in the 4:00 to 6:00 right breast middle to posterior depths, in total spanning 5.7 cm x 2.8 cm x 2.2 cm, are also highly suggestive of malignancy. The posterior aspect of the suspicious enhancement is 1 cm anterior to the pectoralis major muscle. Although there is slight tenting and mild enhancement of the pectoralis major muscle, this is most likely related to adherent post lumpectomy scarring and post radiation change, no suspicious enhancement in the left breast. No significant internal mammary or axillary  adenopathy.  10. Diagnostic MMG/US 3/30/22--Mild-moderate partial response to neoadjuvant chemotherapy, evidenced mammographically by decreased density and conspicuity of the known malignant masses in the right breast 6:00 axis posterior depth at the site of prior lumpectomy. The previously biopsied dominant mass has decreased in size as measured sonographically, now 1.1 x 0.6 x 1.6 cm, previously 1.8 x 0.9 x 1.7 cm. Also, the two satellite masses previously identified on sonography have resolved. BI-RADS 6: Known biopsy-proven malignancy.    2DECHO 3/6/17--EF 60-65%.  Mediport placement by Dr. Baltazar on 3/23/17--removed 6/2020.  Mediport placed by Dr. Baltazar on 2/7/22.     Genetic testing on 3/21/17 was negative.    DEXA:   10/24/17--L1-L4 T = 1.3, Left femur neck -1.1, right femur neck -2.1, total left femur 0.0, right -0.7 c/w osteopenia.  11/21/19--L1-L4 T = 1.7, Left femur neck -1.3, right femur neck -2.0, total left femur -0.3, right -0.7 (mixed response).  11/24/21--L1-L4 T = 2.0, Left femur neck -1.2, right femur neck -2.0, total left femur -0.1, right -0.7 (improved spine, left hip, right stable).     Treatment History:   1. DDAC x 4 doses. Started 4/3/17. Completed on 5/15/17.    2. Weekly Taxol X 12 completed 5/30/17--8/15/17.   3. Adjuvant XRT completed 8/30/17 - 9/28/17.    4. Femara 10/11/17--stopped 2/1/22.   5. Prolia 6/15/18--12/30/21 (on hold).    6. Neoadjuvant Taxotere/Cytoxan X 6 cycles completed 2/10/22--5/26/22.   7. Bilateral mastectomies 6/29/22.    8. RAHUL flap reconstruction done 10/26/22 Dr. Rivera.    Treatment plan:   1. Adjuvant Aromasin started 8/17/22.  2. Zometa every 6 months X 2 years adjuvant treatment started 9/2022, next due 3/2023.     Chief Complaint: Other Misc (Pt reports that she is still doing wound care on her R breast. Pt states that it is not healing as well due to the radiation.)    HPI   Patient presents for follow-up of breast cancer. She is doing okay. She  underwent reconstructive surgery and still has some nonhealing wounds to right breast. She continues packing this area daily.. No longer has HH coming by. The wound is healing slowly. She denies any other complaints. Recent labs show improvement in post-surgical anemia. She continues on Aromasin daily as well. No new problems reported today.     Past Medical History:   Diagnosis Date    Anemia, unspecified     Breast cancer     Diabetes mellitus, type 2     Hyperlipidemia     Hypertension     Mitral valve prolapse     Sleep apnea       Review of patient's allergies indicates:  No Known Allergies   Current Outpatient Medications on File Prior to Visit   Medication Sig Dispense Refill    amLODIPine-benazepriL (LOTREL) 10-40 mg per capsule Take 1 capsule by mouth once daily.      ascorbic acid, vitamin C, (VITAMIN C) 500 MG tablet Take 500 mg by mouth.      b complex vitamins tablet Take 1 tablet by mouth once daily.      calcium carbonate-vitamin D3 600 mg-20 mcg (800 unit) Chew Take 1 tablet by mouth once daily.      empagliflozin (JARDIANCE) 25 mg tablet Take 1 tablet by mouth once daily.      exemestane (AROMASIN) 25 mg tablet Take 1 tablet (25 mg total) by mouth once daily. 30 tablet 6    ferrous gluconate (FERGON) 240 (27 FE) MG tablet Take 240 mg by mouth.      GLUTAMINE ORAL Take 1 Scoop by mouth as needed.      loratadine 10 mg Cap Take 10 mg by mouth as needed.      LORazepam (ATIVAN) 1 MG tablet Take 1 mg by mouth 2 (two) times daily. as needed for anxiety.      magnesium 250 mg Tab Take 1 tablet by mouth once daily.      metFORMIN (GLUCOPHAGE) 500 MG tablet Take 500 mg by mouth.      metoprolol succinate (TOPROL-XL) 50 MG 24 hr tablet Take 1 tablet by mouth once daily.      multivitamin-folic acid-biotin (HAIR-SKIN-NAILS, MV-FA-BIOTIN,) 400-2,000 mcg Tab Take by mouth.      omega-3 fatty acids-fish oil 360-1,200 mg Cap Take 1 capsule by mouth once daily.      pravastatin (PRAVACHOL) 40 MG tablet Take 1  tablet by mouth once daily.      pyridoxine, vitamin B6, (B-6) 100 MG Tab Take 100 mg by mouth.      SITagliptin (JANUVIA) 100 MG Tab Take 1 tablet by mouth once daily.      [DISCONTINUED] calcium carbonate/vitamin D3 (CALTRATE 600 + D ORAL) Take by mouth.      aspirin (ECOTRIN) 81 MG EC tablet Take 81 mg by mouth. Stopped months ago d/t chemo      HYDROcodone-acetaminophen (NORCO) 5-325 mg per tablet Take 1 tablet by mouth every 6 (six) hours as needed.      multivitamin-minerals-lutein Tab Take 1 tablet by mouth once daily at 6am.      [DISCONTINUED] fluconazole (DIFLUCAN) 150 MG Tab Take 1 tablet by mouth once daily (Patient not taking: Reported on 1/23/2023) 1 tablet 0    [DISCONTINUED] HYDROcodone-acetaminophen (NORCO) 5-325 mg per tablet Take 1 tablet by mouth every 4 (four) hours as needed for Pain. (Patient not taking: Reported on 1/23/2023) 30 tablet 0     No current facility-administered medications on file prior to visit.      Review of Systems   Constitutional:  Negative for activity change, appetite change, fever and unexpected weight change.   HENT:  Negative for mouth sores.    Eyes:  Negative for visual disturbance.   Respiratory:  Negative for cough and shortness of breath.    Cardiovascular:  Negative for chest pain.   Gastrointestinal:  Negative for abdominal pain, blood in stool, constipation, diarrhea, nausea and vomiting.   Genitourinary:  Negative for difficulty urinating and frequency.   Musculoskeletal:  Negative for back pain.   Integumentary:  Negative for rash.        Bilateral mastectomies with reconstruction, nonhealing wounds to right breast   Neurological:  Negative for dizziness, weakness and headaches.   Hematological:  Negative for adenopathy.   Psychiatric/Behavioral:  Negative for behavioral problems and suicidal ideas. The patient is not nervous/anxious.        Vitals:    01/23/23 1327   BP: 113/69   Pulse: 80   Resp: 14   Temp: 98.1 °F (36.7 °C)     Physical Exam  Vitals  reviewed.   Constitutional:       Appearance: Normal appearance. She is normal weight.      Comments: Pleasant AA female   HENT:      Head: Normocephalic.   Eyes:      General: Lids are normal. Vision grossly intact.      Extraocular Movements: Extraocular movements intact.      Conjunctiva/sclera: Conjunctivae normal.   Cardiovascular:      Rate and Rhythm: Normal rate and regular rhythm.      Pulses: Normal pulses.      Heart sounds: S1 normal and S2 normal. Murmur heard.   Systolic murmur is present with a grade of 2/6.   Pulmonary:      Effort: Pulmonary effort is normal.      Breath sounds: Normal breath sounds.   Chest:   Breasts:     Left: Normal.      Comments: S/p bilateral mastectomies with incisions healed on left breast. Right breast with axillary wound dressing with packing in place (did not remove for today's visit)  Abdominal:      General: Abdomen is flat. Bowel sounds are normal. There is no distension.      Palpations: Abdomen is soft.      Tenderness: There is no abdominal tenderness.   Musculoskeletal:      Cervical back: Normal range of motion and neck supple.      Right lower leg: No edema.      Left lower leg: No edema.   Feet:      Right foot:      Skin integrity: Skin integrity normal.   Skin:     General: Skin is warm and moist.      Capillary Refill: Capillary refill takes less than 2 seconds.      Comments: Left CW mediport intact   Neurological:      General: No focal deficit present.      Mental Status: She is alert and oriented to person, place, and time.   Psychiatric:         Attention and Perception: Attention normal.         Mood and Affect: Mood normal.         Speech: Speech normal.         Behavior: Behavior normal. Behavior is cooperative.         Judgment: Judgment normal.       ECOG SCORE    1 - Restricted in strenuous activity-ambulatory and able to carry out work of a light nature        Office Visit on 01/23/2023   Component Date Value Ref Range Status    WBC 01/23/2023  4.9  4.5 - 11.5 x10(3)/mcL Final    RBC 01/23/2023 3.71 (L)  4.20 - 5.40 x10(6)/mcL Final    Hgb 01/23/2023 11.7 (L)  12.0 - 16.0 gm/dL Final    Hct 01/23/2023 37.4  37.0 - 47.0 % Final    MCV 01/23/2023 100.8 (H)  80.0 - 94.0 fL Final    MCH 01/23/2023 31.5  pg Final    MCHC 01/23/2023 31.3 (L)  33.0 - 36.0 mg/dL Final    RDW 01/23/2023 15.0  11.5 - 17.0 % Final    Platelet 01/23/2023 246  130 - 400 x10(3)/mcL Final    MPV 01/23/2023 10.5 (H)  7.4 - 10.4 fL Final    Neut % 01/23/2023 53.6  % Final    Lymph % 01/23/2023 37.0  % Final    Mono % 01/23/2023 7.2  % Final    Eos % 01/23/2023 1.2  % Final    Basophil % 01/23/2023 0.6  % Final    Lymph # 01/23/2023 1.80  0.6 - 4.6 x10(3)/mcL Final    Neut # 01/23/2023 2.61  2.1 - 9.2 x10(3)/mcL Final    Mono # 01/23/2023 0.35  0.1 - 1.3 x10(3)/mcL Final    Eos # 01/23/2023 0.06  0 - 0.9 x10(3)/mcL Final    Baso # 01/23/2023 0.03  0 - 0.2 x10(3)/mcL Final    IG# 01/23/2023 0.02  0 - 0.04 x10(3)/mcL Final    IG% 01/23/2023 0.4  % Final     Assessment:            1. Malignant neoplasm of lower-inner quadrant of right breast of female, estrogen receptor positive    2. Anemia due to antineoplastic chemotherapy    3. Osteopenia, unspecified location        Plan:     Patient with breast cancer stage IIA, 2.2cm tumor, high grade, ER weakly positive 25%. S/p right breast lumpectomy on 1/26/17. Lymph node negative.  Per NCCN guidelines, oncotype DX testing recommended.  Oncotype showed tumor high risk and PCR breast panel showing triple negative.  Treatment recommended with dose dense AC x 4 followed by weekly Taxol x 12.  Treatment was delayed due to non-healing breast wound.  Patient completed 4 cycles DDAC on 5/15/17 and 12 cycles of weekly Taxol on 8/15/17.  Adjuvant XRT completed on 9/28/17 and patient tolerated well.   Patient started Femara on 10/11/17.  Also started on Prolia for osteopenia, last dose in 12/2021.    Screening MMG from 11/2021 showed an asymmetry in area of  lumpectomy site in right breast.  Diagnostic MMG/US 12/2021 showed suspicious dominant mass 1.8cm 6:00 with 2 additional satellite lesions.  s/p biopsy 12/21/21 pathology c/w IDCA Grade 2, weakly ER+ 29%, NC and Her2 negative with high Ki67 80%, same breast profile as original cancer.  This represents a recurrence vs. new primary which developed while on AI.  MRI 1/26/22 showed multiple masses, spanning area of 5.7cm wtih dominant mass 1.6cm, no suspicious nodes or enhancing masses in left breast.   Patient met with Dr. Baltazar and surgical plan is for bilateral mastectomies.  Recommended neoadjuvant chemotherapy due to concerns for wound healing following surgery and need for chemotherapy given recurrence.  Patient completed 6 cycles Taxotere/Cytoxan 2/10/22--5/26/22.   MMG/US done after cycle 3 showed decrease in dominant mass and resolution of satellite masses c/w good partial response.  S/p bilateral mastectomies done 6/29/22 and showed only a residual IDCA 2mm, with negative LNs, and left breast negative for malignancy.     Started Aromasin on 8/17/22 and plan for 10 years. She is tolerating well.   S/p reconstruction on 10/26/22 still with some nonhealing wounds.  Labs show improving post-operative anemia. Hgb of 11.7 g/dL. CMP pending.   Patient is on oral iron which she will continue.  Dr. Lala had considered adjuvant Verzenio, but decided against it since node negative tumors were not included in the trial and also because patient is far out from surgery.   She will continue follow-up with Dr. Rivera.  Will begin surveillance visits every 3 months.     Changed from Prolia to adjuvant Zometa every 6 months started 9/2022, next dose due 3/2023.  Due to repeat DEXA in November 2023.   Continue MPF as well.   All questions answered at this time.       Jorge Garcia, RON

## 2023-01-23 ENCOUNTER — OFFICE VISIT (OUTPATIENT)
Dept: HEMATOLOGY/ONCOLOGY | Facility: CLINIC | Age: 63
End: 2023-01-23
Payer: COMMERCIAL

## 2023-01-23 ENCOUNTER — TELEPHONE (OUTPATIENT)
Dept: HEMATOLOGY/ONCOLOGY | Facility: CLINIC | Age: 63
End: 2023-01-23

## 2023-01-23 VITALS
HEART RATE: 80 BPM | BODY MASS INDEX: 29.87 KG/M2 | SYSTOLIC BLOOD PRESSURE: 113 MMHG | RESPIRATION RATE: 14 BRPM | TEMPERATURE: 98 F | HEIGHT: 62 IN | OXYGEN SATURATION: 100 % | WEIGHT: 162.31 LBS | DIASTOLIC BLOOD PRESSURE: 69 MMHG

## 2023-01-23 DIAGNOSIS — Z17.0 MALIGNANT NEOPLASM OF LOWER-INNER QUADRANT OF RIGHT BREAST OF FEMALE, ESTROGEN RECEPTOR POSITIVE: Primary | ICD-10-CM

## 2023-01-23 DIAGNOSIS — C50.311 MALIGNANT NEOPLASM OF LOWER-INNER QUADRANT OF RIGHT BREAST OF FEMALE, ESTROGEN RECEPTOR POSITIVE: Primary | ICD-10-CM

## 2023-01-23 DIAGNOSIS — D64.81 ANEMIA DUE TO ANTINEOPLASTIC CHEMOTHERAPY: ICD-10-CM

## 2023-01-23 DIAGNOSIS — M85.80 OSTEOPENIA, UNSPECIFIED LOCATION: ICD-10-CM

## 2023-01-23 DIAGNOSIS — T45.1X5A ANEMIA DUE TO ANTINEOPLASTIC CHEMOTHERAPY: ICD-10-CM

## 2023-01-23 LAB
ALBUMIN SERPL-MCNC: 4.3 G/DL (ref 3.4–4.8)
ALBUMIN/GLOB SERPL: 1.2 RATIO (ref 1.1–2)
ALP SERPL-CCNC: 128 UNIT/L (ref 40–150)
ALT SERPL-CCNC: 14 UNIT/L (ref 0–55)
AST SERPL-CCNC: 17 UNIT/L (ref 5–34)
BASOPHILS # BLD AUTO: 0.03 X10(3)/MCL (ref 0–0.2)
BASOPHILS NFR BLD AUTO: 0.6 %
BILIRUBIN DIRECT+TOT PNL SERPL-MCNC: 0.2 MG/DL
BUN SERPL-MCNC: 12.6 MG/DL (ref 9.8–20.1)
CALCIUM SERPL-MCNC: 10.6 MG/DL (ref 8.4–10.2)
CHLORIDE SERPL-SCNC: 106 MMOL/L (ref 98–107)
CO2 SERPL-SCNC: 26 MMOL/L (ref 23–31)
CREAT SERPL-MCNC: 1.05 MG/DL (ref 0.55–1.02)
EOSINOPHIL # BLD AUTO: 0.06 X10(3)/MCL (ref 0–0.9)
EOSINOPHIL NFR BLD AUTO: 1.2 %
ERYTHROCYTE [DISTWIDTH] IN BLOOD BY AUTOMATED COUNT: 15 % (ref 11.5–17)
GFR SERPLBLD CREATININE-BSD FMLA CKD-EPI: 60 MLS/MIN/1.73/M2
GLOBULIN SER-MCNC: 3.7 GM/DL (ref 2.4–3.5)
GLUCOSE SERPL-MCNC: 166 MG/DL (ref 82–115)
HCT VFR BLD AUTO: 37.4 % (ref 37–47)
HGB BLD-MCNC: 11.7 GM/DL (ref 12–16)
IMM GRANULOCYTES # BLD AUTO: 0.02 X10(3)/MCL (ref 0–0.04)
IMM GRANULOCYTES NFR BLD AUTO: 0.4 %
LYMPHOCYTES # BLD AUTO: 1.8 X10(3)/MCL (ref 0.6–4.6)
LYMPHOCYTES NFR BLD AUTO: 37 %
MCH RBC QN AUTO: 31.5 PG
MCHC RBC AUTO-ENTMCNC: 31.3 MG/DL (ref 33–36)
MCV RBC AUTO: 100.8 FL (ref 80–94)
MONOCYTES # BLD AUTO: 0.35 X10(3)/MCL (ref 0.1–1.3)
MONOCYTES NFR BLD AUTO: 7.2 %
NEUTROPHILS # BLD AUTO: 2.61 X10(3)/MCL (ref 2.1–9.2)
NEUTROPHILS NFR BLD AUTO: 53.6 %
PLATELET # BLD AUTO: 246 X10(3)/MCL (ref 130–400)
PMV BLD AUTO: 10.5 FL (ref 7.4–10.4)
POTASSIUM SERPL-SCNC: 4.1 MMOL/L (ref 3.5–5.1)
PROT SERPL-MCNC: 8 GM/DL (ref 5.8–7.6)
RBC # BLD AUTO: 3.71 X10(6)/MCL (ref 4.2–5.4)
SODIUM SERPL-SCNC: 141 MMOL/L (ref 136–145)
WBC # SPEC AUTO: 4.9 X10(3)/MCL (ref 4.5–11.5)

## 2023-01-23 PROCEDURE — 1160F RVW MEDS BY RX/DR IN RCRD: CPT | Mod: CPTII,S$GLB,, | Performed by: NURSE PRACTITIONER

## 2023-01-23 PROCEDURE — 99999 PR PBB SHADOW E&M-EST. PATIENT-LVL V: CPT | Mod: PBBFAC,,, | Performed by: NURSE PRACTITIONER

## 2023-01-23 PROCEDURE — 3078F DIAST BP <80 MM HG: CPT | Mod: CPTII,S$GLB,, | Performed by: NURSE PRACTITIONER

## 2023-01-23 PROCEDURE — 3008F PR BODY MASS INDEX (BMI) DOCUMENTED: ICD-10-PCS | Mod: CPTII,S$GLB,, | Performed by: NURSE PRACTITIONER

## 2023-01-23 PROCEDURE — 99214 PR OFFICE/OUTPT VISIT, EST, LEVL IV, 30-39 MIN: ICD-10-PCS | Mod: S$GLB,,, | Performed by: NURSE PRACTITIONER

## 2023-01-23 PROCEDURE — 3008F BODY MASS INDEX DOCD: CPT | Mod: CPTII,S$GLB,, | Performed by: NURSE PRACTITIONER

## 2023-01-23 PROCEDURE — 99214 OFFICE O/P EST MOD 30 MIN: CPT | Mod: S$GLB,,, | Performed by: NURSE PRACTITIONER

## 2023-01-23 PROCEDURE — 36415 COLL VENOUS BLD VENIPUNCTURE: CPT | Performed by: NURSE PRACTITIONER

## 2023-01-23 PROCEDURE — 1159F MED LIST DOCD IN RCRD: CPT | Mod: CPTII,S$GLB,, | Performed by: NURSE PRACTITIONER

## 2023-01-23 PROCEDURE — 1160F PR REVIEW ALL MEDS BY PRESCRIBER/CLIN PHARMACIST DOCUMENTED: ICD-10-PCS | Mod: CPTII,S$GLB,, | Performed by: NURSE PRACTITIONER

## 2023-01-23 PROCEDURE — 3074F SYST BP LT 130 MM HG: CPT | Mod: CPTII,S$GLB,, | Performed by: NURSE PRACTITIONER

## 2023-01-23 PROCEDURE — 99999 PR PBB SHADOW E&M-EST. PATIENT-LVL V: ICD-10-PCS | Mod: PBBFAC,,, | Performed by: NURSE PRACTITIONER

## 2023-01-23 PROCEDURE — 1159F PR MEDICATION LIST DOCUMENTED IN MEDICAL RECORD: ICD-10-PCS | Mod: CPTII,S$GLB,, | Performed by: NURSE PRACTITIONER

## 2023-01-23 PROCEDURE — 85025 COMPLETE CBC W/AUTO DIFF WBC: CPT | Performed by: NURSE PRACTITIONER

## 2023-01-23 PROCEDURE — 80053 COMPREHEN METABOLIC PANEL: CPT | Performed by: NURSE PRACTITIONER

## 2023-01-23 PROCEDURE — 3074F PR MOST RECENT SYSTOLIC BLOOD PRESSURE < 130 MM HG: ICD-10-PCS | Mod: CPTII,S$GLB,, | Performed by: NURSE PRACTITIONER

## 2023-01-23 PROCEDURE — 3078F PR MOST RECENT DIASTOLIC BLOOD PRESSURE < 80 MM HG: ICD-10-PCS | Mod: CPTII,S$GLB,, | Performed by: NURSE PRACTITIONER

## 2023-01-23 RX ORDER — MULTIVITAMIN/FOLIC ACID/BIOTIN 400-2000
1 TABLET ORAL DAILY
COMMUNITY
Start: 2022-02-03

## 2023-01-31 ENCOUNTER — DOCUMENT SCAN (OUTPATIENT)
Dept: HOME HEALTH SERVICES | Facility: HOSPITAL | Age: 63
End: 2023-01-31
Payer: COMMERCIAL

## 2023-01-31 PROCEDURE — 99499 UNLISTED E&M SERVICE: CPT | Mod: ,,, | Performed by: SURGERY

## 2023-01-31 PROCEDURE — 99499 NO LOS: ICD-10-PCS | Mod: ,,, | Performed by: SURGERY

## 2023-02-22 ENCOUNTER — INFUSION (OUTPATIENT)
Dept: INFUSION THERAPY | Facility: HOSPITAL | Age: 63
End: 2023-02-22
Attending: INTERNAL MEDICINE
Payer: COMMERCIAL

## 2023-02-22 ENCOUNTER — OFFICE VISIT (OUTPATIENT)
Dept: SURGICAL ONCOLOGY | Facility: CLINIC | Age: 63
End: 2023-02-22
Payer: COMMERCIAL

## 2023-02-22 VITALS
DIASTOLIC BLOOD PRESSURE: 61 MMHG | BODY MASS INDEX: 30.51 KG/M2 | WEIGHT: 165.81 LBS | SYSTOLIC BLOOD PRESSURE: 121 MMHG | HEART RATE: 90 BPM | HEIGHT: 62 IN

## 2023-02-22 VITALS
TEMPERATURE: 98 F | DIASTOLIC BLOOD PRESSURE: 75 MMHG | SYSTOLIC BLOOD PRESSURE: 122 MMHG | HEART RATE: 92 BPM | OXYGEN SATURATION: 98 %

## 2023-02-22 DIAGNOSIS — Z17.0 MALIGNANT NEOPLASM OF LOWER-INNER QUADRANT OF RIGHT BREAST OF FEMALE, ESTROGEN RECEPTOR POSITIVE: Primary | ICD-10-CM

## 2023-02-22 DIAGNOSIS — C50.311 MALIGNANT NEOPLASM OF LOWER-INNER QUADRANT OF RIGHT BREAST OF FEMALE, ESTROGEN RECEPTOR POSITIVE: Primary | ICD-10-CM

## 2023-02-22 PROCEDURE — 99213 OFFICE O/P EST LOW 20 MIN: CPT | Mod: S$GLB,,, | Performed by: SURGERY

## 2023-02-22 PROCEDURE — 96523 IRRIG DRUG DELIVERY DEVICE: CPT

## 2023-02-22 PROCEDURE — 3074F PR MOST RECENT SYSTOLIC BLOOD PRESSURE < 130 MM HG: ICD-10-PCS | Mod: CPTII,S$GLB,, | Performed by: SURGERY

## 2023-02-22 PROCEDURE — 63600175 PHARM REV CODE 636 W HCPCS: Performed by: INTERNAL MEDICINE

## 2023-02-22 PROCEDURE — 99999 PR PBB SHADOW E&M-EST. PATIENT-LVL IV: CPT | Mod: PBBFAC,,, | Performed by: SURGERY

## 2023-02-22 PROCEDURE — 99999 PR PBB SHADOW E&M-EST. PATIENT-LVL IV: ICD-10-PCS | Mod: PBBFAC,,, | Performed by: SURGERY

## 2023-02-22 PROCEDURE — 3008F PR BODY MASS INDEX (BMI) DOCUMENTED: ICD-10-PCS | Mod: CPTII,S$GLB,, | Performed by: SURGERY

## 2023-02-22 PROCEDURE — 3008F BODY MASS INDEX DOCD: CPT | Mod: CPTII,S$GLB,, | Performed by: SURGERY

## 2023-02-22 PROCEDURE — 1159F MED LIST DOCD IN RCRD: CPT | Mod: CPTII,S$GLB,, | Performed by: SURGERY

## 2023-02-22 PROCEDURE — 3078F DIAST BP <80 MM HG: CPT | Mod: CPTII,S$GLB,, | Performed by: SURGERY

## 2023-02-22 PROCEDURE — 3074F SYST BP LT 130 MM HG: CPT | Mod: CPTII,S$GLB,, | Performed by: SURGERY

## 2023-02-22 PROCEDURE — 3078F PR MOST RECENT DIASTOLIC BLOOD PRESSURE < 80 MM HG: ICD-10-PCS | Mod: CPTII,S$GLB,, | Performed by: SURGERY

## 2023-02-22 PROCEDURE — 1159F PR MEDICATION LIST DOCUMENTED IN MEDICAL RECORD: ICD-10-PCS | Mod: CPTII,S$GLB,, | Performed by: SURGERY

## 2023-02-22 PROCEDURE — 99213 PR OFFICE/OUTPT VISIT, EST, LEVL III, 20-29 MIN: ICD-10-PCS | Mod: S$GLB,,, | Performed by: SURGERY

## 2023-02-22 RX ORDER — HEPARIN 100 UNIT/ML
500 SYRINGE INTRAVENOUS
Status: CANCELLED | OUTPATIENT
Start: 2023-05-17

## 2023-02-22 RX ORDER — SODIUM CHLORIDE 0.9 % (FLUSH) 0.9 %
10 SYRINGE (ML) INJECTION
Status: DISCONTINUED | OUTPATIENT
Start: 2023-02-22 | End: 2023-02-22 | Stop reason: HOSPADM

## 2023-02-22 RX ORDER — HEPARIN 100 UNIT/ML
500 SYRINGE INTRAVENOUS
Status: DISCONTINUED | OUTPATIENT
Start: 2023-02-22 | End: 2023-02-22 | Stop reason: HOSPADM

## 2023-02-22 RX ORDER — SODIUM CHLORIDE 0.9 % (FLUSH) 0.9 %
10 SYRINGE (ML) INJECTION
Status: CANCELLED | OUTPATIENT
Start: 2023-05-17

## 2023-02-22 RX ADMIN — HEPARIN 500 UNITS: 100 SYRINGE at 01:02

## 2023-02-22 NOTE — PROGRESS NOTES
Chief complaint:  Recurrent breast cancer     HPI:  62-year-old female known to me for previous right breast cancer in 2017, she underwent lumpectomy followed by chemotherapy and radiation.  She continues to be followed by Dr. Lala with Medical Oncology.  Recent screening mammography revealed an asymmetry in the area of the lumpectomy site in the right breast.  Diagnostic imaging revealed a 1.8 cm spiculated mass suggestive of new malignancy.  Core needle biopsy was performed and pathology revealed invasive ductal carcinoma, ER/ND positive, HER2 negative breast MRI revealed multiple masses spanning 5.7 cm.  After discussion with Medical Oncology we agree that neoadjuvant therapy was indicated.  She also visited with Plastic surgery who recommended delayed reconstruction.  I reviewed all past medical records, medical oncology consultations and progress notes, plastic surgery consultation note, all excess of 40 pages records.  I reviewed all past imaging studies and personally interpreted the images.  The patient completed neoadjuvant chemotherapy on May 26, 2022 and tolerated this well.  Follow-up imaging shows mild decrease in size of the primary lesion as well as resolution of satellite lesions.  She underwent bilateral mastectomies in June of 2022 and had a very small residual area of tumor 2 mm in size, otherwise excellent response.  She went on to undergo delayed reconstruction with autologous tissue.She denies breast masses, skin changes, nipple inversion or discharge on self-breast exam.      Past Medical and Surgical History  Allergies :   Patient has no known allergies.    @[facility]@  Medical :   She has a past medical history of Anemia, unspecified, Breast cancer, Diabetes mellitus, type 2, Hyperlipidemia, Hypertension, Mitral valve prolapse, and Sleep apnea.    Surgical :   She has a past surgical history that includes Breast lumpectomy (Right); Tubal ligation; Mediport insertion, single; Simple  mastectomy (Bilateral, 6/29/2022); Axillary node dissection (Right, 6/29/2022); and Reconstruction of breast with deep inferior epigastric artery  (RAHUL) flap (Bilateral, 10/26/2022).     Family History  Her family history includes Cancer in her sister; Diabetes in her mother; Heart failure in her father and mother; Hypertension in her mother and sister; Stroke in her father.    Social History  She reports that she has never smoked. She has never used smokeless tobacco. She reports that she does not currently use alcohol. She reports that she does not use drugs.     Review of Systems   Constitutional:  Negative for appetite change, chills, diaphoresis and fever.   HENT:  Negative for congestion, drooling, ear discharge, ear pain and hearing loss.    Eyes:  Negative for discharge.   Respiratory:  Negative for apnea, cough, choking, chest tightness, shortness of breath and stridor.    Cardiovascular:  Negative for chest pain, palpitations and leg swelling.   Endocrine: Negative for cold intolerance and heat intolerance.   Genitourinary:  Negative for difficulty urinating, dyspareunia, dysuria and hematuria.   Musculoskeletal:  Negative for arthralgias, gait problem and joint swelling.   Skin:  Negative for color change and rash.   Neurological:  Negative for dizziness, tremors, seizures, syncope, facial asymmetry, speech difficulty, light-headedness, numbness and headaches.   Psychiatric/Behavioral:  Negative for agitation and confusion.       Objective   Physical Exam  Constitutional:       General: She is not in acute distress.     Appearance: Normal appearance. She is normal weight. She is not toxic-appearing.   HENT:      Head: Normocephalic and atraumatic.      Right Ear: External ear normal.      Left Ear: External ear normal.      Nose: Nose normal.      Mouth/Throat:      Mouth: Mucous membranes are moist.      Pharynx: Oropharynx is clear.   Eyes:      General: No scleral icterus.      "Conjunctiva/sclera: Conjunctivae normal.      Pupils: Pupils are equal, round, and reactive to light.   Cardiovascular:      Rate and Rhythm: Normal rate and regular rhythm.      Pulses: Normal pulses.      Heart sounds: Normal heart sounds. No murmur heard.    No gallop.   Pulmonary:      Effort: Pulmonary effort is normal.      Breath sounds: Normal breath sounds. No stridor. No wheezing or rhonchi.   Chest:      Chest wall: No tenderness.   Breasts:     Right: No swelling, bleeding, inverted nipple, mass, nipple discharge, skin change or tenderness.      Left: No swelling, bleeding, inverted nipple, mass, nipple discharge, skin change or tenderness.   Musculoskeletal:         General: No swelling, tenderness, deformity or signs of injury. Normal range of motion.      Cervical back: Normal range of motion and neck supple.      Right lower leg: No edema.      Left lower leg: No edema.   Lymphadenopathy:      Upper Body:      Right upper body: No supraclavicular or axillary adenopathy.      Left upper body: No supraclavicular or axillary adenopathy.   Skin:     Capillary Refill: Capillary refill takes less than 2 seconds.      Coloration: Skin is not jaundiced or pale.      Findings: No erythema.   Neurological:      General: No focal deficit present.      Mental Status: She is alert and oriented to person, place, and time.      Cranial Nerves: No cranial nerve deficit.      Motor: No weakness.      Gait: Gait normal.   Psychiatric:         Mood and Affect: Mood normal.         Behavior: Behavior normal.     VITAL SIGNS: 24 HR MIN & MAX LAST    @FLOWSTAT(6:24::1)@           @FLOWSTAT(5:24::1)@  121/61     @FLOWSTAT(8:24::1)@  90     @FLOWSTAT(9:24::1)@       @FLOWSTAT(10:24::1)@         HT: 5' 2" (157.5 cm)  WT: 75.2 kg (165 lb 12.8 oz)  BMI: 30.3       Assessment & Plan     No evidence of disease   Return to clinic in 6 months  "

## 2023-02-28 ENCOUNTER — PATIENT MESSAGE (OUTPATIENT)
Dept: HEMATOLOGY/ONCOLOGY | Facility: CLINIC | Age: 63
End: 2023-02-28
Payer: COMMERCIAL

## 2023-03-20 ENCOUNTER — LAB VISIT (OUTPATIENT)
Dept: LAB | Facility: HOSPITAL | Age: 63
End: 2023-03-20
Attending: INTERNAL MEDICINE
Payer: COMMERCIAL

## 2023-03-20 DIAGNOSIS — M85.80 OSTEOPENIA, UNSPECIFIED LOCATION: ICD-10-CM

## 2023-03-20 DIAGNOSIS — Z17.0 MALIGNANT NEOPLASM OF LOWER-INNER QUADRANT OF RIGHT BREAST OF FEMALE, ESTROGEN RECEPTOR POSITIVE: ICD-10-CM

## 2023-03-20 DIAGNOSIS — C50.311 MALIGNANT NEOPLASM OF LOWER-INNER QUADRANT OF RIGHT BREAST OF FEMALE, ESTROGEN RECEPTOR POSITIVE: ICD-10-CM

## 2023-03-20 LAB
ALBUMIN SERPL-MCNC: 4.3 G/DL (ref 3.4–4.8)
ALBUMIN/GLOB SERPL: 1.3 RATIO (ref 1.1–2)
ALP SERPL-CCNC: 130 UNIT/L (ref 40–150)
ALT SERPL-CCNC: 15 UNIT/L (ref 0–55)
AST SERPL-CCNC: 17 UNIT/L (ref 5–34)
BASOPHILS # BLD AUTO: 0.02 X10(3)/MCL (ref 0–0.2)
BASOPHILS NFR BLD AUTO: 0.4 %
BILIRUBIN DIRECT+TOT PNL SERPL-MCNC: 0.4 MG/DL
BUN SERPL-MCNC: 13.2 MG/DL (ref 9.8–20.1)
CALCIUM SERPL-MCNC: 10.5 MG/DL (ref 8.4–10.2)
CHLORIDE SERPL-SCNC: 101 MMOL/L (ref 98–107)
CO2 SERPL-SCNC: 28 MMOL/L (ref 23–31)
CREAT SERPL-MCNC: 0.98 MG/DL (ref 0.55–1.02)
EOSINOPHIL # BLD AUTO: 0.08 X10(3)/MCL (ref 0–0.9)
EOSINOPHIL NFR BLD AUTO: 1.6 %
ERYTHROCYTE [DISTWIDTH] IN BLOOD BY AUTOMATED COUNT: 12 % (ref 11.5–17)
GFR SERPLBLD CREATININE-BSD FMLA CKD-EPI: >60 MLS/MIN/1.73/M2
GLOBULIN SER-MCNC: 3.4 GM/DL (ref 2.4–3.5)
GLUCOSE SERPL-MCNC: 288 MG/DL (ref 82–115)
HCT VFR BLD AUTO: 40.6 % (ref 37–47)
HGB BLD-MCNC: 12.9 G/DL (ref 12–16)
IMM GRANULOCYTES # BLD AUTO: 0.02 X10(3)/MCL (ref 0–0.04)
IMM GRANULOCYTES NFR BLD AUTO: 0.4 %
LYMPHOCYTES # BLD AUTO: 1.55 X10(3)/MCL (ref 0.6–4.6)
LYMPHOCYTES NFR BLD AUTO: 31 %
MCH RBC QN AUTO: 32 PG
MCHC RBC AUTO-ENTMCNC: 31.8 G/DL (ref 33–36)
MCV RBC AUTO: 100.7 FL (ref 80–94)
MONOCYTES # BLD AUTO: 0.38 X10(3)/MCL (ref 0.1–1.3)
MONOCYTES NFR BLD AUTO: 7.6 %
NEUTROPHILS # BLD AUTO: 2.95 X10(3)/MCL (ref 2.1–9.2)
NEUTROPHILS NFR BLD AUTO: 59 %
PLATELET # BLD AUTO: 193 X10(3)/MCL (ref 130–400)
PMV BLD AUTO: 11 FL (ref 7.4–10.4)
POTASSIUM SERPL-SCNC: 4.4 MMOL/L (ref 3.5–5.1)
PROT SERPL-MCNC: 7.7 GM/DL (ref 5.8–7.6)
RBC # BLD AUTO: 4.03 X10(6)/MCL (ref 4.2–5.4)
SODIUM SERPL-SCNC: 139 MMOL/L (ref 136–145)
WBC # SPEC AUTO: 5 X10(3)/MCL (ref 4.5–11.5)

## 2023-03-20 PROCEDURE — 85025 COMPLETE CBC W/AUTO DIFF WBC: CPT

## 2023-03-20 PROCEDURE — 36415 COLL VENOUS BLD VENIPUNCTURE: CPT

## 2023-03-20 PROCEDURE — 80053 COMPREHEN METABOLIC PANEL: CPT

## 2023-03-22 ENCOUNTER — INFUSION (OUTPATIENT)
Dept: INFUSION THERAPY | Facility: HOSPITAL | Age: 63
End: 2023-03-22
Attending: INTERNAL MEDICINE
Payer: COMMERCIAL

## 2023-03-22 VITALS
BODY MASS INDEX: 30.29 KG/M2 | OXYGEN SATURATION: 99 % | SYSTOLIC BLOOD PRESSURE: 126 MMHG | WEIGHT: 165.63 LBS | HEART RATE: 81 BPM | DIASTOLIC BLOOD PRESSURE: 74 MMHG | TEMPERATURE: 99 F

## 2023-03-22 DIAGNOSIS — M85.80 OSTEOPENIA, UNSPECIFIED LOCATION: Primary | ICD-10-CM

## 2023-03-22 PROCEDURE — 96367 TX/PROPH/DG ADDL SEQ IV INF: CPT

## 2023-03-22 PROCEDURE — 96365 THER/PROPH/DIAG IV INF INIT: CPT

## 2023-03-22 PROCEDURE — 25000003 PHARM REV CODE 250: Performed by: NURSE PRACTITIONER

## 2023-03-22 PROCEDURE — 63600175 PHARM REV CODE 636 W HCPCS: Performed by: NURSE PRACTITIONER

## 2023-03-22 RX ORDER — HEPARIN 100 UNIT/ML
500 SYRINGE INTRAVENOUS
Status: DISCONTINUED | OUTPATIENT
Start: 2023-03-22 | End: 2023-03-22 | Stop reason: HOSPADM

## 2023-03-22 RX ORDER — ZOLEDRONIC ACID 4 MG/100ML
4 SOLUTION INTRAVENOUS
Status: COMPLETED | OUTPATIENT
Start: 2023-03-22 | End: 2023-03-22

## 2023-03-22 RX ORDER — HEPARIN 100 UNIT/ML
500 SYRINGE INTRAVENOUS
Status: CANCELLED | OUTPATIENT
Start: 2023-09-20

## 2023-03-22 RX ORDER — SODIUM CHLORIDE 0.9 % (FLUSH) 0.9 %
10 SYRINGE (ML) INJECTION
Status: CANCELLED | OUTPATIENT
Start: 2023-09-20

## 2023-03-22 RX ORDER — SODIUM CHLORIDE 0.9 % (FLUSH) 0.9 %
10 SYRINGE (ML) INJECTION
Status: DISCONTINUED | OUTPATIENT
Start: 2023-03-22 | End: 2023-03-22 | Stop reason: HOSPADM

## 2023-03-22 RX ORDER — ZOLEDRONIC ACID 4 MG/100ML
4 SOLUTION INTRAVENOUS
Status: CANCELLED | OUTPATIENT
Start: 2023-09-20

## 2023-03-22 RX ADMIN — HEPARIN 500 UNITS: 100 SYRINGE at 02:03

## 2023-03-22 RX ADMIN — ZOLEDRONIC ACID 4 MG: 4 SOLUTION INTRAVENOUS at 02:03

## 2023-03-22 RX ADMIN — SODIUM CHLORIDE: 9 INJECTION, SOLUTION INTRAVENOUS at 02:03

## 2023-03-22 NOTE — NURSING
Zuniga needle removed from mediport. Site without signs and symptoms of complications. Dressing applied.   Pt tolerated treatment with no complaints.

## 2023-03-27 ENCOUNTER — TELEPHONE (OUTPATIENT)
Dept: HEMATOLOGY/ONCOLOGY | Facility: CLINIC | Age: 63
End: 2023-03-27
Payer: COMMERCIAL

## 2023-03-27 NOTE — PROGRESS NOTES
Subjective:       Patient ID: Delia Scruggs is a 62 y.o. female.  Surgeon: Dr. Sd Baltazar  Radiation oncologist: Dr. Browne Prellop    Recurrence vs. Second Primary Right Breast AJCC Anatomic and Clinical Prognostic Stage IA ([id number])--Diagnosed 21  Right Breast Cancer Stage IIA (T2N0M0) diagnosed 17  Biopsy/pathology:   1. Right breast mass biopsy done 17--invasive ductal carcinoma, grade III with focal DCIS, ER 25.72%, WV 0.28% negative, Her2 1+ negative by IHC. Oncotype DX testing showed recurrence score of 43 (distant recurrence risk 40% with Tamoxifen alone and 12% Tamoxifen + chemo), PCR done for breast panel showed triple negative.  2. Right breast biopsy mass at 6:00 done 21--invasive ductal carcinoma, Grade 2, ER 29%, WV 0%, Her2 0 by IHC, Ki67 80% high.  Surgery/pathology:   1. Right breast needle localization lumpectomy and SLN biopsy done 17--invasive mammary carcinoma with papillary and ductal features, grade III, 2.2cm, no lymphovascular or perineural invasion, margins clear, few foci of non-invasive predominantly solid papillary carcinoma also identified, 2 sentinel lymph nodes negative, closest margin medial, re-excision of medial margin with focal residual invasive papillary carcinoma <0.1cm, now free.  2. Bilateral mastectomies with right ALND done 22--neoadjuvant therapy effects with near complete tumor response, solitary focus of invasive ductal carcinoma 2mm, Grade 1, resection margin clear, left breast with no atypia or malignancy, 2 LNs negative.   Imagin. Screening MMG 16--asymmetry medially right breast.  2. Diagnostic MMG right breast 16--asymmetrical density located posteriorly in lower inner quadrant of right breast, suggestion of underlying clearly circumscribed 10mm rounded density w/n area on spot compression.  3. US right breast 16--focal mass 4:00 8cm from nipple hypoechoic and hetrogensous with irregular margins  1.7X1.1X1.4cm BIRADS 5.  4. CT A/P 2/24/17--fluid in resection cavity right breast, no evidence for metastatic disease, vague area of enhancement right hepatic low, not typical of metastatic disease, consider liver mass protocol MRI.  5. Bone scan 2/24/17--DJD right knee. No evidence of any metastatic bone lesions.  6. MRI abdomen/liver protocol done 3/7/17--hepatic lesion in question is most c/w focal nodular hyperplasia.  7. Screening MMG done 11/24/21--asymmetry in the area of the lumpectomy site in the inner right breast posterior depth on the CC view needs further evaluation.    8. MMG/US right breast done 12/20/21--Findings concerning for new or recurrent malignancy at the site of prior lumpectomy in this patient with a personal history of right breast cancer. The three adjacent (one dominant 1.8cm, two satellite 5mm and 4mm) irregular hypoechoic masses in the right breast 6:00 axis 4 cm FN position are highly suggestive of malignancy. BI-RADS 5: Highly suggestive of malignancy. No suspicious right axillary adenopathy.  9. MRI bilateral breasts done 1/26/22--Right breast 6:00 irregular heterogeneously enhancing mass containing a biopsy clip, measuring 1.6 m x 0.9 cm x 1.5 cm, is consistent with the known biopsy-proven right breast invasive malignancy. Multiple additional irregular heterogeneously enhancing masses with irregular margins within the lumpectomy bed and anterior to the lumpectomy bed in the 4:00 to 6:00 right breast middle to posterior depths, in total spanning 5.7 cm x 2.8 cm x 2.2 cm, are also highly suggestive of malignancy. The posterior aspect of the suspicious enhancement is 1 cm anterior to the pectoralis major muscle. Although there is slight tenting and mild enhancement of the pectoralis major muscle, this is most likely related to adherent post lumpectomy scarring and post radiation change, no suspicious enhancement in the left breast. No significant internal mammary or axillary  adenopathy.  10. Diagnostic MMG/US 3/30/22--Mild-moderate partial response to neoadjuvant chemotherapy, evidenced mammographically by decreased density and conspicuity of the known malignant masses in the right breast 6:00 axis posterior depth at the site of prior lumpectomy. The previously biopsied dominant mass has decreased in size as measured sonographically, now 1.1 x 0.6 x 1.6 cm, previously 1.8 x 0.9 x 1.7 cm. Also, the two satellite masses previously identified on sonography have resolved. BI-RADS 6: Known biopsy-proven malignancy.    2DECHO 3/6/17--EF 60-65%.  Mediport placement by Dr. Baltazar on 3/23/17--removed 6/2020.  Mediport placed by Dr. Baltazar on 2/7/22.     Genetic testing on 3/21/17 was negative.    DEXA:   10/24/17--L1-L4 T = 1.3, Left femur neck -1.1, right femur neck -2.1, total left femur 0.0, right -0.7 c/w osteopenia.  11/21/19--L1-L4 T = 1.7, Left femur neck -1.3, right femur neck -2.0, total left femur -0.3, right -0.7 (mixed response).  11/24/21--L1-L4 T = 2.0, Left femur neck -1.2, right femur neck -2.0, total left femur -0.1, right -0.7 (improved spine, left hip, right stable).     Treatment History:   1. DDAC x 4 doses. Started 4/3/17. Completed on 5/15/17.    2. Weekly Taxol X 12 completed 5/30/17--8/15/17.   3. Adjuvant XRT completed 8/30/17 - 9/28/17.    4. Femara 10/11/17--stopped 2/1/22.   5. Prolia 6/15/18--12/30/21 (on hold).    6. Neoadjuvant Taxotere/Cytoxan X 6 cycles completed 2/10/22--5/26/22.   7. Bilateral mastectomies 6/29/22.    8. RAHUL flap reconstruction done 10/26/22 Dr. Rivera.    Treatment plan:   1. Adjuvant Aromasin started 8/17/22.  2. Zometa every 6 months X 2 years adjuvant treatment started 9/2022, next due 9/2023.     Chief Complaint: Other Misc (Pt reports no new concerns today.)    HPI   Patient presents for follow-up of breast cancer. She is doing okay. Her breast wounds have all healed. They are planning on another reconstruction surgery this summer.  Continues tolerating Aromasin without any problems. Recent labs show resolved anemia. No new problems reported today.     Past Medical History:   Diagnosis Date    Anemia, unspecified     Breast cancer     Diabetes mellitus, type 2     Hyperlipidemia     Hypertension     Mitral valve prolapse     Sleep apnea       Review of patient's allergies indicates:  No Known Allergies   Current Outpatient Medications on File Prior to Visit   Medication Sig Dispense Refill    amLODIPine-benazepriL (LOTREL) 10-40 mg per capsule Take 1 capsule by mouth once daily.      ascorbic acid, vitamin C, (VITAMIN C) 500 MG tablet Take 500 mg by mouth.      aspirin (ECOTRIN) 81 MG EC tablet Take 81 mg by mouth. Stopped months ago d/t chemo      b complex vitamins tablet Take 1 tablet by mouth once daily.      calcium carbonate-vitamin D3 600 mg-20 mcg (800 unit) Chew Take 1 tablet by mouth once daily.      empagliflozin (JARDIANCE) 25 mg tablet Take 1 tablet by mouth once daily.      exemestane (AROMASIN) 25 mg tablet Take 1 tablet (25 mg total) by mouth once daily. 30 tablet 0    ferrous gluconate (FERGON) 240 (27 FE) MG tablet Take 240 mg by mouth.      GLUTAMINE ORAL Take 1 Scoop by mouth as needed.      loratadine 10 mg Cap Take 10 mg by mouth as needed.      LORazepam (ATIVAN) 1 MG tablet Take 1 mg by mouth 2 (two) times daily. as needed for anxiety.      magnesium 250 mg Tab Take 1 tablet by mouth once daily.      metFORMIN (GLUCOPHAGE) 500 MG tablet Take 500 mg by mouth.      metoprolol succinate (TOPROL-XL) 50 MG 24 hr tablet Take 1 tablet by mouth once daily.      multivitamin-folic acid-biotin (HAIR-SKIN-NAILS, MV-FA-BIOTIN,) 400-2,000 mcg Tab Take by mouth.      multivitamin-minerals-lutein Tab Take 1 tablet by mouth once daily at 6am.      omega-3 fatty acids-fish oil 360-1,200 mg Cap Take 1 capsule by mouth once daily.      pravastatin (PRAVACHOL) 40 MG tablet Take 1 tablet by mouth once daily.      pyridoxine, vitamin B6,  (B-6) 100 MG Tab Take 100 mg by mouth.      SITagliptin (JANUVIA) 100 MG Tab Take 1 tablet by mouth once daily.       No current facility-administered medications on file prior to visit.      Review of Systems   Constitutional:  Negative for activity change, appetite change, fever and unexpected weight change.   HENT:  Negative for mouth sores.    Eyes:  Negative for visual disturbance.   Respiratory:  Negative for cough and shortness of breath.    Cardiovascular:  Negative for chest pain.   Gastrointestinal:  Negative for abdominal pain, blood in stool, constipation, diarrhea, nausea and vomiting.   Genitourinary:  Negative for difficulty urinating and frequency.   Musculoskeletal:  Negative for back pain.   Integumentary:  Negative for rash.        Bilateral mastectomies with reconstruction   Neurological:  Negative for dizziness, weakness and headaches.   Hematological:  Negative for adenopathy.   Psychiatric/Behavioral:  Negative for behavioral problems and suicidal ideas. The patient is not nervous/anxious.        Vitals:    04/03/23 0919   BP: 123/81   Pulse: 79   Resp: 14   Temp: 98.4 °F (36.9 °C)     Physical Exam  Vitals reviewed.   Constitutional:       Appearance: Normal appearance. She is normal weight.      Comments: Pleasant AA female   HENT:      Head: Normocephalic.   Eyes:      General: Lids are normal. Vision grossly intact.      Extraocular Movements: Extraocular movements intact.      Conjunctiva/sclera: Conjunctivae normal.   Cardiovascular:      Rate and Rhythm: Normal rate and regular rhythm.      Pulses: Normal pulses.      Heart sounds: S1 normal and S2 normal. Murmur heard.   Systolic murmur is present with a grade of 2/6.   Pulmonary:      Effort: Pulmonary effort is normal.      Breath sounds: Normal breath sounds.   Chest:   Breasts:     Left: Normal.      Comments: S/p bilateral mastectomies with incisions healed.   Abdominal:      General: Abdomen is flat. Bowel sounds are normal.  There is no distension.      Palpations: Abdomen is soft.      Tenderness: There is no abdominal tenderness.   Musculoskeletal:      Cervical back: Normal range of motion and neck supple.      Right lower leg: No edema.      Left lower leg: No edema.   Skin:     General: Skin is warm and moist.      Capillary Refill: Capillary refill takes less than 2 seconds.      Comments: Left CW mediport intact   Neurological:      General: No focal deficit present.      Mental Status: She is alert and oriented to person, place, and time.   Psychiatric:         Attention and Perception: Attention normal.         Mood and Affect: Mood normal.         Speech: Speech normal.         Behavior: Behavior normal. Behavior is cooperative.         Judgment: Judgment normal.       ECOG SCORE    0 - Fully active-able to carry on all pre-disease performance without restriction        No visits with results within 1 Day(s) from this visit.   Latest known visit with results is:   Lab Visit on 03/29/2023   Component Date Value Ref Range Status    Iron Binding Capacity Unsaturated 03/29/2023 225  70 - 310 ug/dL Final    Iron Level 03/29/2023 61  50 - 170 ug/dL Final    Transferrin 03/29/2023 256  173 - 360 mg/dL Final    Iron Binding Capacity Total 03/29/2023 286  250 - 450 ug/dL Final    Iron Saturation 03/29/2023 21  20 - 50 % Final    Ferritin Level 03/29/2023 121.96  4.63 - 204.00 ng/mL Final    Sodium Level 03/29/2023 140  136 - 145 mmol/L Final    Potassium Level 03/29/2023 4.8  3.5 - 5.1 mmol/L Final    Chloride 03/29/2023 105  98 - 107 mmol/L Final    Carbon Dioxide 03/29/2023 28  23 - 31 mmol/L Final    Glucose Level 03/29/2023 156 (H)  82 - 115 mg/dL Final    Blood Urea Nitrogen 03/29/2023 13.5  9.8 - 20.1 mg/dL Final    Creatinine 03/29/2023 0.89  0.55 - 1.02 mg/dL Final    Calcium Level Total 03/29/2023 9.8  8.4 - 10.2 mg/dL Final    Protein Total 03/29/2023 7.4  5.8 - 7.6 gm/dL Final    Albumin Level 03/29/2023 4.1  3.4 - 4.8 g/dL  Final    Globulin 03/29/2023 3.3  2.4 - 3.5 gm/dL Final    Albumin/Globulin Ratio 03/29/2023 1.2  1.1 - 2.0 ratio Final    Bilirubin Total 03/29/2023 0.3  <=1.5 mg/dL Final    Alkaline Phosphatase 03/29/2023 136  40 - 150 unit/L Final    Alanine Aminotransferase 03/29/2023 13  0 - 55 unit/L Final    Aspartate Aminotransferase 03/29/2023 17  5 - 34 unit/L Final    eGFR 03/29/2023 >60  mls/min/1.73/m2 Final    WBC 03/29/2023 4.7  4.5 - 11.5 x10(3)/mcL Final    RBC 03/29/2023 3.89 (L)  4.20 - 5.40 x10(6)/mcL Final    Hgb 03/29/2023 12.4  12.0 - 16.0 g/dL Final    Hct 03/29/2023 38.9  37.0 - 47.0 % Final    MCV 03/29/2023 100.0 (H)  80.0 - 94.0 fL Final    MCH 03/29/2023 31.9 (H)  27.0 - 31.0 pg Final    MCHC 03/29/2023 31.9 (L)  33.0 - 36.0 g/dL Final    RDW 03/29/2023 12.1  11.5 - 17.0 % Final    Platelet 03/29/2023 197  130 - 400 x10(3)/mcL Final    MPV 03/29/2023 11.2 (H)  7.4 - 10.4 fL Final    Neut % 03/29/2023 54.4  % Final    Lymph % 03/29/2023 34.0  % Final    Mono % 03/29/2023 8.7  % Final    Eos % 03/29/2023 1.9  % Final    Basophil % 03/29/2023 0.6  % Final    Lymph # 03/29/2023 1.61  0.6 - 4.6 x10(3)/mcL Final    Neut # 03/29/2023 2.57  2.1 - 9.2 x10(3)/mcL Final    Mono # 03/29/2023 0.41  0.1 - 1.3 x10(3)/mcL Final    Eos # 03/29/2023 0.09  0 - 0.9 x10(3)/mcL Final    Baso # 03/29/2023 0.03  0 - 0.2 x10(3)/mcL Final    IG# 03/29/2023 0.02  0 - 0.04 x10(3)/mcL Final    IG% 03/29/2023 0.4  % Final     Assessment:            1. Malignant neoplasm of lower-inner quadrant of right breast of female, estrogen receptor positive    2. Osteopenia, unspecified location        Plan:     Patient with breast cancer stage IIA, 2.2cm tumor, high grade, ER weakly positive 25%. S/p right breast lumpectomy on 1/26/17. Lymph node negative.  Per NCCN guidelines, oncotype DX testing recommended.  Oncotype showed tumor high risk and PCR breast panel showing triple negative.  Treatment recommended with dose dense AC x 4 followed  by weekly Taxol x 12.  Treatment was delayed due to non-healing breast wound.  Patient completed 4 cycles DDAC on 5/15/17 and 12 cycles of weekly Taxol on 8/15/17.  Adjuvant XRT completed on 9/28/17 and patient tolerated well.   Patient started Femara on 10/11/17.  Also started on Prolia for osteopenia, last dose in 12/2021.    Screening MMG from 11/2021 showed an asymmetry in area of lumpectomy site in right breast.  Diagnostic MMG/US 12/2021 showed suspicious dominant mass 1.8cm 6:00 with 2 additional satellite lesions.  s/p biopsy 12/21/21 pathology c/w IDCA Grade 2, weakly ER+ 29%, MI and Her2 negative with high Ki67 80%, same breast profile as original cancer.  This represents a recurrence vs. new primary which developed while on AI.  MRI 1/26/22 showed multiple masses, spanning area of 5.7cm wtih dominant mass 1.6cm, no suspicious nodes or enhancing masses in left breast.   Patient met with Dr. Baltazar and surgical plan is for bilateral mastectomies.  Recommended neoadjuvant chemotherapy due to concerns for wound healing following surgery and need for chemotherapy given recurrence.  Patient completed 6 cycles Taxotere/Cytoxan 2/10/22--5/26/22.   MMG/US done after cycle 3 showed decrease in dominant mass and resolution of satellite masses c/w good partial response.  S/p bilateral mastectomies done 6/29/22 and showed only a residual IDCA 2mm, with negative LNs, and left breast negative for malignancy.     Started Aromasin on 8/17/22 and plan for 10 years. She is tolerating well.   S/p reconstruction on 10/26/22 still with some delayed wound healing.  Recent labs good.   Patient is on oral iron which she will continue.  Dr. Lala had considered adjuvant Verzenio, but decided against it since node negative tumors were not included in the trial and also because patient is far out from surgery.   She will continue follow-up with Dr. Rivera. Planning for additional reconstruction this summer.   Will continue  surveillance visits every 3 months.     Changed from Prolia to adjuvant Zometa every 6 months started 9/2022, next dose due 9/2023.  Due to repeat DEXA in November 2023.   Continue MPF as well.   All questions answered at this time.       Jorge Garcia, RON

## 2023-03-29 ENCOUNTER — LAB VISIT (OUTPATIENT)
Dept: LAB | Facility: HOSPITAL | Age: 63
End: 2023-03-29
Attending: INTERNAL MEDICINE
Payer: COMMERCIAL

## 2023-03-29 DIAGNOSIS — T45.1X5A ANEMIA DUE TO ANTINEOPLASTIC CHEMOTHERAPY: ICD-10-CM

## 2023-03-29 DIAGNOSIS — C50.311 MALIGNANT NEOPLASM OF LOWER-INNER QUADRANT OF RIGHT BREAST OF FEMALE, ESTROGEN RECEPTOR POSITIVE: ICD-10-CM

## 2023-03-29 DIAGNOSIS — Z17.0 MALIGNANT NEOPLASM OF LOWER-INNER QUADRANT OF RIGHT BREAST OF FEMALE, ESTROGEN RECEPTOR POSITIVE: ICD-10-CM

## 2023-03-29 DIAGNOSIS — M85.80 OSTEOPENIA, UNSPECIFIED LOCATION: ICD-10-CM

## 2023-03-29 DIAGNOSIS — D64.81 ANEMIA DUE TO ANTINEOPLASTIC CHEMOTHERAPY: ICD-10-CM

## 2023-03-29 LAB
ALBUMIN SERPL-MCNC: 4.1 G/DL (ref 3.4–4.8)
ALBUMIN/GLOB SERPL: 1.2 RATIO (ref 1.1–2)
ALP SERPL-CCNC: 136 UNIT/L (ref 40–150)
ALT SERPL-CCNC: 13 UNIT/L (ref 0–55)
AST SERPL-CCNC: 17 UNIT/L (ref 5–34)
BASOPHILS # BLD AUTO: 0.03 X10(3)/MCL (ref 0–0.2)
BASOPHILS NFR BLD AUTO: 0.6 %
BILIRUBIN DIRECT+TOT PNL SERPL-MCNC: 0.3 MG/DL
BUN SERPL-MCNC: 13.5 MG/DL (ref 9.8–20.1)
CALCIUM SERPL-MCNC: 9.8 MG/DL (ref 8.4–10.2)
CHLORIDE SERPL-SCNC: 105 MMOL/L (ref 98–107)
CO2 SERPL-SCNC: 28 MMOL/L (ref 23–31)
CREAT SERPL-MCNC: 0.89 MG/DL (ref 0.55–1.02)
EOSINOPHIL # BLD AUTO: 0.09 X10(3)/MCL (ref 0–0.9)
EOSINOPHIL NFR BLD AUTO: 1.9 %
ERYTHROCYTE [DISTWIDTH] IN BLOOD BY AUTOMATED COUNT: 12.1 % (ref 11.5–17)
FERRITIN SERPL-MCNC: 121.96 NG/ML (ref 4.63–204)
GFR SERPLBLD CREATININE-BSD FMLA CKD-EPI: >60 MLS/MIN/1.73/M2
GLOBULIN SER-MCNC: 3.3 GM/DL (ref 2.4–3.5)
GLUCOSE SERPL-MCNC: 156 MG/DL (ref 82–115)
HCT VFR BLD AUTO: 38.9 % (ref 37–47)
HGB BLD-MCNC: 12.4 G/DL (ref 12–16)
IMM GRANULOCYTES # BLD AUTO: 0.02 X10(3)/MCL (ref 0–0.04)
IMM GRANULOCYTES NFR BLD AUTO: 0.4 %
IRON SATN MFR SERPL: 21 % (ref 20–50)
IRON SERPL-MCNC: 61 UG/DL (ref 50–170)
LYMPHOCYTES # BLD AUTO: 1.61 X10(3)/MCL (ref 0.6–4.6)
LYMPHOCYTES NFR BLD AUTO: 34 %
MCH RBC QN AUTO: 31.9 PG (ref 27–31)
MCHC RBC AUTO-ENTMCNC: 31.9 G/DL (ref 33–36)
MCV RBC AUTO: 100 FL (ref 80–94)
MONOCYTES # BLD AUTO: 0.41 X10(3)/MCL (ref 0.1–1.3)
MONOCYTES NFR BLD AUTO: 8.7 %
NEUTROPHILS # BLD AUTO: 2.57 X10(3)/MCL (ref 2.1–9.2)
NEUTROPHILS NFR BLD AUTO: 54.4 %
PLATELET # BLD AUTO: 197 X10(3)/MCL (ref 130–400)
PMV BLD AUTO: 11.2 FL (ref 7.4–10.4)
POTASSIUM SERPL-SCNC: 4.8 MMOL/L (ref 3.5–5.1)
PROT SERPL-MCNC: 7.4 GM/DL (ref 5.8–7.6)
RBC # BLD AUTO: 3.89 X10(6)/MCL (ref 4.2–5.4)
SODIUM SERPL-SCNC: 140 MMOL/L (ref 136–145)
TIBC SERPL-MCNC: 225 UG/DL (ref 70–310)
TIBC SERPL-MCNC: 286 UG/DL (ref 250–450)
TRANSFERRIN SERPL-MCNC: 256 MG/DL (ref 173–360)
WBC # SPEC AUTO: 4.7 X10(3)/MCL (ref 4.5–11.5)

## 2023-03-29 PROCEDURE — 36415 COLL VENOUS BLD VENIPUNCTURE: CPT

## 2023-03-29 PROCEDURE — 80053 COMPREHEN METABOLIC PANEL: CPT

## 2023-03-29 PROCEDURE — 83550 IRON BINDING TEST: CPT

## 2023-03-29 PROCEDURE — 85025 COMPLETE CBC W/AUTO DIFF WBC: CPT

## 2023-03-29 PROCEDURE — 82728 ASSAY OF FERRITIN: CPT

## 2023-04-03 ENCOUNTER — OFFICE VISIT (OUTPATIENT)
Dept: HEMATOLOGY/ONCOLOGY | Facility: CLINIC | Age: 63
End: 2023-04-03
Payer: COMMERCIAL

## 2023-04-03 VITALS
HEIGHT: 62 IN | SYSTOLIC BLOOD PRESSURE: 123 MMHG | HEART RATE: 79 BPM | DIASTOLIC BLOOD PRESSURE: 81 MMHG | TEMPERATURE: 98 F | RESPIRATION RATE: 14 BRPM | WEIGHT: 169.13 LBS | OXYGEN SATURATION: 99 % | BODY MASS INDEX: 31.12 KG/M2

## 2023-04-03 DIAGNOSIS — C50.311 MALIGNANT NEOPLASM OF LOWER-INNER QUADRANT OF RIGHT BREAST OF FEMALE, ESTROGEN RECEPTOR POSITIVE: Primary | ICD-10-CM

## 2023-04-03 DIAGNOSIS — M85.80 OSTEOPENIA, UNSPECIFIED LOCATION: ICD-10-CM

## 2023-04-03 DIAGNOSIS — Z17.0 MALIGNANT NEOPLASM OF LOWER-INNER QUADRANT OF RIGHT BREAST OF FEMALE, ESTROGEN RECEPTOR POSITIVE: Primary | ICD-10-CM

## 2023-04-03 PROCEDURE — 1160F RVW MEDS BY RX/DR IN RCRD: CPT | Mod: CPTII,S$GLB,, | Performed by: NURSE PRACTITIONER

## 2023-04-03 PROCEDURE — 4010F PR ACE/ARB THEARPY RXD/TAKEN: ICD-10-PCS | Mod: CPTII,S$GLB,, | Performed by: NURSE PRACTITIONER

## 2023-04-03 PROCEDURE — 3079F DIAST BP 80-89 MM HG: CPT | Mod: CPTII,S$GLB,, | Performed by: NURSE PRACTITIONER

## 2023-04-03 PROCEDURE — 1160F PR REVIEW ALL MEDS BY PRESCRIBER/CLIN PHARMACIST DOCUMENTED: ICD-10-PCS | Mod: CPTII,S$GLB,, | Performed by: NURSE PRACTITIONER

## 2023-04-03 PROCEDURE — 3074F SYST BP LT 130 MM HG: CPT | Mod: CPTII,S$GLB,, | Performed by: NURSE PRACTITIONER

## 2023-04-03 PROCEDURE — 1159F MED LIST DOCD IN RCRD: CPT | Mod: CPTII,S$GLB,, | Performed by: NURSE PRACTITIONER

## 2023-04-03 PROCEDURE — 3074F PR MOST RECENT SYSTOLIC BLOOD PRESSURE < 130 MM HG: ICD-10-PCS | Mod: CPTII,S$GLB,, | Performed by: NURSE PRACTITIONER

## 2023-04-03 PROCEDURE — 3008F BODY MASS INDEX DOCD: CPT | Mod: CPTII,S$GLB,, | Performed by: NURSE PRACTITIONER

## 2023-04-03 PROCEDURE — 99214 PR OFFICE/OUTPT VISIT, EST, LEVL IV, 30-39 MIN: ICD-10-PCS | Mod: S$GLB,,, | Performed by: NURSE PRACTITIONER

## 2023-04-03 PROCEDURE — 99999 PR PBB SHADOW E&M-EST. PATIENT-LVL V: ICD-10-PCS | Mod: PBBFAC,,, | Performed by: NURSE PRACTITIONER

## 2023-04-03 PROCEDURE — 4010F ACE/ARB THERAPY RXD/TAKEN: CPT | Mod: CPTII,S$GLB,, | Performed by: NURSE PRACTITIONER

## 2023-04-03 PROCEDURE — 3079F PR MOST RECENT DIASTOLIC BLOOD PRESSURE 80-89 MM HG: ICD-10-PCS | Mod: CPTII,S$GLB,, | Performed by: NURSE PRACTITIONER

## 2023-04-03 PROCEDURE — 99999 PR PBB SHADOW E&M-EST. PATIENT-LVL V: CPT | Mod: PBBFAC,,, | Performed by: NURSE PRACTITIONER

## 2023-04-03 PROCEDURE — 1159F PR MEDICATION LIST DOCUMENTED IN MEDICAL RECORD: ICD-10-PCS | Mod: CPTII,S$GLB,, | Performed by: NURSE PRACTITIONER

## 2023-04-03 PROCEDURE — 99214 OFFICE O/P EST MOD 30 MIN: CPT | Mod: S$GLB,,, | Performed by: NURSE PRACTITIONER

## 2023-04-03 PROCEDURE — 3008F PR BODY MASS INDEX (BMI) DOCUMENTED: ICD-10-PCS | Mod: CPTII,S$GLB,, | Performed by: NURSE PRACTITIONER

## 2023-04-04 ENCOUNTER — TELEPHONE (OUTPATIENT)
Dept: HEMATOLOGY/ONCOLOGY | Facility: CLINIC | Age: 63
End: 2023-04-04
Payer: COMMERCIAL

## 2023-04-26 ENCOUNTER — ANESTHESIA EVENT (OUTPATIENT)
Dept: SURGERY | Facility: HOSPITAL | Age: 63
End: 2023-04-26
Payer: COMMERCIAL

## 2023-04-28 ENCOUNTER — PATIENT MESSAGE (OUTPATIENT)
Dept: ADMINISTRATIVE | Facility: OTHER | Age: 63
End: 2023-04-28
Payer: COMMERCIAL

## 2023-04-30 ENCOUNTER — PATIENT MESSAGE (OUTPATIENT)
Dept: ADMINISTRATIVE | Facility: OTHER | Age: 63
End: 2023-04-30
Payer: COMMERCIAL

## 2023-05-01 ENCOUNTER — HOSPITAL ENCOUNTER (OUTPATIENT)
Facility: HOSPITAL | Age: 63
Discharge: HOME | End: 2023-05-01
Attending: SURGERY | Admitting: SURGERY
Payer: COMMERCIAL

## 2023-05-01 ENCOUNTER — ANESTHESIA (OUTPATIENT)
Dept: SURGERY | Facility: HOSPITAL | Age: 63
End: 2023-05-01
Payer: COMMERCIAL

## 2023-05-01 VITALS
HEIGHT: 62 IN | RESPIRATION RATE: 20 BRPM | HEART RATE: 75 BPM | BODY MASS INDEX: 30.36 KG/M2 | OXYGEN SATURATION: 100 % | DIASTOLIC BLOOD PRESSURE: 68 MMHG | SYSTOLIC BLOOD PRESSURE: 109 MMHG | TEMPERATURE: 98 F | WEIGHT: 165 LBS

## 2023-05-01 DIAGNOSIS — N65.0 BREAST RECONSTRUCTION DEFORMITY: Primary | ICD-10-CM

## 2023-05-01 LAB
POCT GLUCOSE: 103 MG/DL (ref 70–110)
POCT GLUCOSE: 124 MG/DL (ref 70–110)

## 2023-05-01 PROCEDURE — 37000008 HC ANESTHESIA 1ST 15 MINUTES: Performed by: SURGERY

## 2023-05-01 PROCEDURE — 82962 GLUCOSE BLOOD TEST: CPT | Performed by: SURGERY

## 2023-05-01 PROCEDURE — 63600175 PHARM REV CODE 636 W HCPCS: Performed by: SURGERY

## 2023-05-01 PROCEDURE — D9220A PRA ANESTHESIA: ICD-10-PCS | Mod: CRNA,,, | Performed by: NURSE ANESTHETIST, CERTIFIED REGISTERED

## 2023-05-01 PROCEDURE — 25000003 PHARM REV CODE 250: Performed by: SURGERY

## 2023-05-01 PROCEDURE — 36000707: Performed by: SURGERY

## 2023-05-01 PROCEDURE — 37000009 HC ANESTHESIA EA ADD 15 MINS: Performed by: SURGERY

## 2023-05-01 PROCEDURE — 71000016 HC POSTOP RECOV ADDL HR: Performed by: SURGERY

## 2023-05-01 PROCEDURE — C1789 PROSTHESIS, BREAST, IMP: HCPCS | Performed by: SURGERY

## 2023-05-01 PROCEDURE — 71000033 HC RECOVERY, INTIAL HOUR: Performed by: SURGERY

## 2023-05-01 PROCEDURE — D9220A PRA ANESTHESIA: Mod: CRNA,,, | Performed by: NURSE ANESTHETIST, CERTIFIED REGISTERED

## 2023-05-01 PROCEDURE — D9220A PRA ANESTHESIA: Mod: ANES,,, | Performed by: ANESTHESIOLOGY

## 2023-05-01 PROCEDURE — 19357 TISS XPNDR PLMT BRST RCNSTJ: CPT | Mod: RT,,, | Performed by: SURGERY

## 2023-05-01 PROCEDURE — 71000015 HC POSTOP RECOV 1ST HR: Performed by: SURGERY

## 2023-05-01 PROCEDURE — C1729 CATH, DRAINAGE: HCPCS | Performed by: SURGERY

## 2023-05-01 PROCEDURE — D9220A PRA ANESTHESIA: ICD-10-PCS | Mod: ANES,,, | Performed by: ANESTHESIOLOGY

## 2023-05-01 PROCEDURE — 63600175 PHARM REV CODE 636 W HCPCS: Performed by: NURSE ANESTHETIST, CERTIFIED REGISTERED

## 2023-05-01 PROCEDURE — 36000706: Performed by: SURGERY

## 2023-05-01 PROCEDURE — 19357 PR BREAST RECONSTRUC W TISS EXPANDR: ICD-10-PCS | Mod: RT,,, | Performed by: SURGERY

## 2023-05-01 PROCEDURE — 25000003 PHARM REV CODE 250: Performed by: NURSE ANESTHETIST, CERTIFIED REGISTERED

## 2023-05-01 DEVICE — EXPANDER NATRELLE FH EP 350CC: Type: IMPLANTABLE DEVICE | Site: BREAST | Status: FUNCTIONAL

## 2023-05-01 RX ORDER — PHENYLEPHRINE HYDROCHLORIDE 10 MG/ML
INJECTION INTRAVENOUS
Status: DISCONTINUED | OUTPATIENT
Start: 2023-05-01 | End: 2023-05-01

## 2023-05-01 RX ORDER — GENTAMICIN SULFATE 40 MG/ML
INJECTION, SOLUTION INTRAMUSCULAR; INTRAVENOUS
Status: DISCONTINUED | OUTPATIENT
Start: 2023-05-01 | End: 2023-05-01 | Stop reason: HOSPADM

## 2023-05-01 RX ORDER — VANCOMYCIN HYDROCHLORIDE 1 G/20ML
INJECTION, POWDER, LYOPHILIZED, FOR SOLUTION INTRAVENOUS
Status: DISCONTINUED | OUTPATIENT
Start: 2023-05-01 | End: 2023-05-01 | Stop reason: HOSPADM

## 2023-05-01 RX ORDER — ROCURONIUM BROMIDE 10 MG/ML
INJECTION, SOLUTION INTRAVENOUS
Status: DISCONTINUED | OUTPATIENT
Start: 2023-05-01 | End: 2023-05-01

## 2023-05-01 RX ORDER — KETOROLAC TROMETHAMINE 30 MG/ML
INJECTION, SOLUTION INTRAMUSCULAR; INTRAVENOUS
Status: DISCONTINUED | OUTPATIENT
Start: 2023-05-01 | End: 2023-05-01

## 2023-05-01 RX ORDER — PROPOFOL 10 MG/ML
VIAL (ML) INTRAVENOUS
Status: DISCONTINUED | OUTPATIENT
Start: 2023-05-01 | End: 2023-05-01

## 2023-05-01 RX ORDER — METHYLENE BLUE 5 MG/ML
INJECTION INTRAVENOUS
Status: DISCONTINUED | OUTPATIENT
Start: 2023-05-01 | End: 2023-05-01 | Stop reason: HOSPADM

## 2023-05-01 RX ORDER — ACETAMINOPHEN 10 MG/ML
INJECTION, SOLUTION INTRAVENOUS
Status: DISCONTINUED | OUTPATIENT
Start: 2023-05-01 | End: 2023-05-01

## 2023-05-01 RX ORDER — ONDANSETRON 2 MG/ML
INJECTION INTRAMUSCULAR; INTRAVENOUS
Status: DISCONTINUED | OUTPATIENT
Start: 2023-05-01 | End: 2023-05-01

## 2023-05-01 RX ORDER — GENTAMICIN SULFATE 80 MG/50ML
80 INJECTION, SOLUTION INTRAVENOUS
Status: DISCONTINUED | OUTPATIENT
Start: 2023-05-01 | End: 2023-05-01 | Stop reason: HOSPADM

## 2023-05-01 RX ORDER — HYDROCODONE BITARTRATE AND ACETAMINOPHEN 5; 325 MG/1; MG/1
1 TABLET ORAL EVERY 4 HOURS PRN
Status: DISCONTINUED | OUTPATIENT
Start: 2023-05-01 | End: 2023-05-01 | Stop reason: HOSPADM

## 2023-05-01 RX ORDER — ACETAMINOPHEN 325 MG/1
650 TABLET ORAL EVERY 4 HOURS PRN
Status: DISCONTINUED | OUTPATIENT
Start: 2023-05-01 | End: 2023-05-01 | Stop reason: HOSPADM

## 2023-05-01 RX ORDER — ONDANSETRON 2 MG/ML
4 INJECTION INTRAMUSCULAR; INTRAVENOUS EVERY 12 HOURS PRN
Status: DISCONTINUED | OUTPATIENT
Start: 2023-05-01 | End: 2023-05-01 | Stop reason: HOSPADM

## 2023-05-01 RX ORDER — SODIUM CHLORIDE 9 MG/ML
INJECTION, SOLUTION INTRAVENOUS CONTINUOUS
Status: DISCONTINUED | OUTPATIENT
Start: 2023-05-01 | End: 2023-05-01 | Stop reason: HOSPADM

## 2023-05-01 RX ORDER — CEFAZOLIN SODIUM 1 G/3ML
INJECTION, POWDER, FOR SOLUTION INTRAMUSCULAR; INTRAVENOUS
Status: DISCONTINUED | OUTPATIENT
Start: 2023-05-01 | End: 2023-05-01 | Stop reason: HOSPADM

## 2023-05-01 RX ORDER — DEXAMETHASONE SODIUM PHOSPHATE 4 MG/ML
INJECTION, SOLUTION INTRA-ARTICULAR; INTRALESIONAL; INTRAMUSCULAR; INTRAVENOUS; SOFT TISSUE
Status: DISCONTINUED | OUTPATIENT
Start: 2023-05-01 | End: 2023-05-01

## 2023-05-01 RX ORDER — FENTANYL CITRATE 50 UG/ML
INJECTION, SOLUTION INTRAMUSCULAR; INTRAVENOUS
Status: DISCONTINUED | OUTPATIENT
Start: 2023-05-01 | End: 2023-05-01

## 2023-05-01 RX ORDER — LIDOCAINE HYDROCHLORIDE 20 MG/ML
INJECTION, SOLUTION EPIDURAL; INFILTRATION; INTRACAUDAL; PERINEURAL
Status: DISCONTINUED | OUTPATIENT
Start: 2023-05-01 | End: 2023-05-01

## 2023-05-01 RX ORDER — MIDAZOLAM HYDROCHLORIDE 1 MG/ML
INJECTION INTRAMUSCULAR; INTRAVENOUS
Status: DISCONTINUED | OUTPATIENT
Start: 2023-05-01 | End: 2023-05-01

## 2023-05-01 RX ORDER — HYDROMORPHONE HYDROCHLORIDE 2 MG/ML
1 INJECTION, SOLUTION INTRAMUSCULAR; INTRAVENOUS; SUBCUTANEOUS EVERY 4 HOURS PRN
Status: DISCONTINUED | OUTPATIENT
Start: 2023-05-01 | End: 2023-05-01 | Stop reason: HOSPADM

## 2023-05-01 RX ORDER — PROMETHAZINE HYDROCHLORIDE 25 MG/1
25 TABLET ORAL EVERY 6 HOURS PRN
Status: DISCONTINUED | OUTPATIENT
Start: 2023-05-01 | End: 2023-05-01 | Stop reason: HOSPADM

## 2023-05-01 RX ORDER — CLINDAMYCIN PHOSPHATE 600 MG/50ML
600 INJECTION, SOLUTION INTRAVENOUS
Status: DISCONTINUED | OUTPATIENT
Start: 2023-05-01 | End: 2023-05-01 | Stop reason: HOSPADM

## 2023-05-01 RX ADMIN — ROCURONIUM BROMIDE 50 MG: 10 SOLUTION INTRAVENOUS at 01:05

## 2023-05-01 RX ADMIN — SODIUM CHLORIDE, SODIUM GLUCONATE, SODIUM ACETATE, POTASSIUM CHLORIDE AND MAGNESIUM CHLORIDE: 526; 502; 368; 37; 30 INJECTION, SOLUTION INTRAVENOUS at 01:05

## 2023-05-01 RX ADMIN — GENTAMICIN SULFATE 80 MG: 80 INJECTION, SOLUTION INTRAVENOUS at 01:05

## 2023-05-01 RX ADMIN — PROPOFOL 150 MG: 10 INJECTION, EMULSION INTRAVENOUS at 01:05

## 2023-05-01 RX ADMIN — FENTANYL CITRATE 100 MCG: 50 INJECTION, SOLUTION INTRAMUSCULAR; INTRAVENOUS at 01:05

## 2023-05-01 RX ADMIN — ONDANSETRON 4 MG: 2 INJECTION INTRAMUSCULAR; INTRAVENOUS at 02:05

## 2023-05-01 RX ADMIN — KETOROLAC TROMETHAMINE 30 MG: 30 INJECTION, SOLUTION INTRAMUSCULAR; INTRAVENOUS at 02:05

## 2023-05-01 RX ADMIN — DEXAMETHASONE SODIUM PHOSPHATE 4 MG: 4 INJECTION, SOLUTION INTRA-ARTICULAR; INTRALESIONAL; INTRAMUSCULAR; INTRAVENOUS; SOFT TISSUE at 01:05

## 2023-05-01 RX ADMIN — LIDOCAINE HYDROCHLORIDE 40 MG: 20 INJECTION, SOLUTION EPIDURAL; INFILTRATION; INTRACAUDAL; PERINEURAL at 01:05

## 2023-05-01 RX ADMIN — CLINDAMYCIN PHOSPHATE 600 MG: 600 INJECTION, SOLUTION INTRAVENOUS at 01:05

## 2023-05-01 RX ADMIN — MIDAZOLAM HYDROCHLORIDE 2 MG: 1 INJECTION, SOLUTION INTRAMUSCULAR; INTRAVENOUS at 01:05

## 2023-05-01 RX ADMIN — PHENYLEPHRINE HYDROCHLORIDE 100 MCG: 10 INJECTION INTRAVENOUS at 02:05

## 2023-05-01 RX ADMIN — ACETAMINOPHEN 1000 MG: 10 INJECTION, SOLUTION INTRAVENOUS at 02:05

## 2023-05-01 RX ADMIN — SUGAMMADEX 200 MG: 100 INJECTION, SOLUTION INTRAVENOUS at 02:05

## 2023-05-01 NOTE — OP NOTE
[facility]                       [address] [place] [address]    PATIENT NAME:      ONEYDA LAMAR  YOB: 1960  CSN:               [id number]  MRN:               [id number]  ADMIT DATE:        05/01/2023 09:52:00  PHYSICIAN:         Ryan Rivera MD                          OPERATIVE REPORT      DATE OF SURGERY:    05/01/2023 00:00:00    SURGEON:  Ryan Rivera MD    PREOPERATIVE DIAGNOSIS:  Right sided breast reconstruction.    POSTOPERATIVE DIAGNOSIS:  Right sided breast reconstruction.    PROCEDURE:  Revision right-sided breast reconstruction with placement of a 350   mL tissue expander, smooth round Allergan Style.    INDICATION FOR PROCEDURE:  Oneyda Lamar is a 62-year-old female, underwent a   pedicle TRAM for breast reconstruction.  She is horribly radiated.  She needs   further expansion of that radiated skin on the right side.  She presents for   placement of tissue expander.    ANESTHESIA:  General.    COMPLICATIONS:  None.    PROCEDURE IN DETAIL:  The patient was placed under LMA anesthesia, prepped and   draped usual sterile fashion.  An incision was made at the superior border of   the pedicle TRAM and the mastectomy skin using a 10 blade scalpel.  The Bovie   cautery was used to dissect down through the subcutaneous tissue at the level of   the pectoralis major muscle.  This was undermined up for approximately the   clavicle.  This was horribly radiated and stuck down.  The pedicle TRAM was also   released from its attachments to make a pocket for the tissue expander.  The   air was removed from the 350 mL tissue expander and placed in the pocket that   was created after hemostasis was achieved.  We irrigated out with triple   antibiotic solution, changed our gloves.  I then placed the tissue expander.    Deep tissue was closed using 0 Vicryl suture.  A 15-Tu[place] round Raheem drain  was   left in the operative bed.  A running 2-0 Monocryl was used to close the skin.    Dermabond was applied.  There were no complications.  I was scrubbed and   present for the entire procedure.        ______________________________  MD KELLEY Steiner/ED  DD:  05/01/2023  Time:  02:19PM  DT:  05/01/2023  Time:  02:40PM  Job #:  [id number]      OPERATIVE REPORT

## 2023-05-01 NOTE — TRANSFER OF CARE
"Anesthesia Transfer of Care Note    Patient: Delia Scruggs    Procedure(s) Performed: Procedure(s) (LRB):  INSERTION, TISSUE EXPANDER, BREAST (placement of right sided tissue expander, possible acellular dermal matrix) (Right)    Patient location: PACU    Anesthesia Type: general    Transport from OR: Transported from OR on room air with adequate spontaneous ventilation    Post pain: adequate analgesia    Post assessment: no apparent anesthetic complications and tolerated procedure well    Post vital signs: stable    Level of consciousness: lethargic    Nausea/Vomiting: no nausea/vomiting    Complications: none    Transfer of care protocol was followed      Last vitals:   Visit Vitals  /63   Pulse 79   Temp 36.9 °C (98.5 °F) (Oral)   Resp 18   Ht 5' 2" (1.575 m)   Wt 74.8 kg (165 lb)   SpO2 99%   Breastfeeding No   BMI 30.18 kg/m²     "

## 2023-05-01 NOTE — ANESTHESIA PREPROCEDURE EVALUATION
05/01/2023  Delia Scruggs is a 62 y.o., female.  Case: [id number] Date/Time: 05/01/23 1110   Procedure: INSERTION, TISSUE EXPANDER, BREAST (placement of right sided tissue expander, possible acellular dermal matrix) (Right: Breast) - XX   Anesthesia type: Gen ETT   Diagnosis:        Encounter for breast reconstruction following mastectomy [Z42.1]       Personal history of breast cancer [Z85.3]       Deformity of reconstructed breast [N65.0]   Pre-op diagnosis:        Encounter for breast reconstruction following mastectomy [Z42.1]       Personal history of breast cancer [Z85.3]       Deformity of reconstructed breast [N65.0]   Location: [facility] OR  / [facility] OR   Surgeons: Ryan Rivera MD         Pre-op Assessment    I have reviewed the Patient Summary Reports.     I have reviewed the Nursing Notes. I have reviewed the NPO Status.   I have reviewed the Medications.     Review of Systems  Anesthesia Hx:  No problems with previous Anesthesia    Hematology/Oncology:  Hematology Normal   Oncology Normal     EENT/Dental:EENT/Dental Normal   Cardiovascular:  Cardiovascular Normal Exercise tolerance: good   Functional Capacity good / => 4 METS    Pulmonary:  Pulmonary Normal    Renal/:   Denies Chronic Renal Disease.     Hepatic/GI:  Hepatic/GI Normal    Musculoskeletal:  Musculoskeletal Normal    Neurological:  Neurology Normal    Endocrine:  Endocrine Normal  Denies Morbid Obesity / BMI > 40  Dermatological:  Skin Normal    Psych:  Psychiatric Normal           Physical Exam  General: Alert, Oriented, Well nourished and Cooperative    Airway:  Mallampati: II   Mouth Opening: Normal  TM Distance: Normal  Tongue: Normal  Neck ROM: Normal ROM    Dental:  Intact    Chest/Lungs:  Clear to auscultation, Normal Respiratory Rate    Heart:  Rate: Normal  Rhythm: Regular Rhythm      Sinus tachycardia   Minimal  voltage criteria for LVH, may be normal variant   Inferior infarct ,age undetermined   Abnormal ECG     Confirmed by Iliana Saldaña MD ([id number]) on 10/28/2022 9:30:15 AM     Referred By: JOANNA SANTO           Confirmed By:Iliana Saldaña MD      Specimen Collected: 10/28/22 07:14 Last          Latest Reference Range & Units Most Recent   WBC 4.5 - 11.5 x10(3)/mcL 4.7  3/29/23 07:15   RBC 4.20 - 5.40 x10(6)/mcL 3.89 (L)  3/29/23 07:15   Hemoglobin 12.0 - 16.0 g/dL 12.4  3/29/23 07:15   Hematocrit 37.0 - 47.0 % 38.9  3/29/23 07:15   MCV 80.0 - 94.0 fL 100.0 (H)  3/29/23 07:15   MCH 27.0 - 31.0 pg 31.9 (H)  3/29/23 07:15   MCHC 33.0 - 36.0 g/dL 31.9 (L)  3/29/23 07:15   RDW 11.5 - 17.0 % 12.1  3/29/23 07:15   Platelets 130 - 400 x10(3)/mcL 197  3/29/23 07:15   MPV 7.4 - 10.4 fL 11.2 (H)  3/29/23 07:15   Platelet Estimate >Normal  Normal  4/26/19 08:51   Neutrophils Relative 47 - 80 % 42 (L)  4/26/19 08:51   Neut % % 54.4  3/29/23 07:15   LYMPH % % 34.0  3/29/23 07:15   Lymph Man 13 - 40 % 44 (H)  4/26/19 08:51   Mono % % 8.7  3/29/23 07:15   Eosinophil % % 1.9  3/29/23 07:15   Basophil % % 0.6  3/29/23 07:15   Immature Granulocytes % 0.4  3/29/23 07:15   Gran # (ANC) 2.10 - 9.20  7.59  4/28/22 07:19   Neut # 2.1 - 9.2 x10(3)/mcL 2.57  3/29/23 07:15   Lymph # 0.6 - 4.6 x10(3)/mcL 1.61  3/29/23 07:15   Mono # 0.1 - 1.3 x10(3)/mcL 0.41  3/29/23 07:15   Eos # 0 - 0.9 x10(3)/mcL 0.09  3/29/23 07:15   Baso # 0 - 0.2 x10(3)/mcL 0.03  3/29/23 07:15   Immature Grans (Abs) 0 - 0.04 x10(3)/mcL 0.02  3/29/23 07:15   nRBC % 0.0  10/29/22 03:30   Macrocyte  0  11/19/18 10:16   Microcyte  0  11/19/18 10:16   Aniso  0  11/19/18 10:16   Poikilocytosis  0  11/19/18 10:16   Poly  0  11/19/18 10:16   Hypo  0  11/19/18 10:16   RBC Morph  Normal  10/9/17 10:15   Schistocytes  0  11/19/18 10:16   Spherocytes  0  11/19/18 10:16   Target Cells  0  11/19/18 10:16   Iron 50 - 170 ug/dL 61  3/29/23 07:15   TIBC 250 - 450 ug/dL 286  3/29/23  07:15   Iron Binding Capacity Unsaturated 70 - 310 ug/dL 225  3/29/23 07:15   Transferrin 173 - 360 mg/dL 256  3/29/23 07:15   Ferritin 4.63 - 204.00 ng/mL 121.96  3/29/23 07:15   Folate 3.1 - 17.5 ng/mL 41.9 (H)  8/9/18 09:27   Vitamin B-12 193 - 986 pg/mL 2,716 (H)  8/16/19 11:20   Iron Saturation 20 - 50 % 21  3/29/23 07:15   (L): Data is abnormally low  (H): Data is abnormally high    Anesthesia Plan  Type of Anesthesia, risks & benefits discussed:    Anesthesia Type: Gen ETT  Intra-op Monitoring Plan: Standard ASA Monitors  Post Op Pain Control Plan: multimodal analgesia  Induction:  IV and Inhalation  Airway Plan: Direct  Informed Consent: Informed consent signed with the Patient and all parties understand the risks and agree with anesthesia plan.  All questions answered. Patient consented to blood products? Yes  ASA Score: 3  Day of Surgery Review of History & Physical: H&P Update referred to the surgeon/provider.    Ready For Surgery From Anesthesia Perspective.     .

## 2023-05-01 NOTE — ANESTHESIA PROCEDURE NOTES
Intubation    Date/Time: 5/1/2023 1:16 PM  Performed by: Arpan Edward CRNA  Authorized by: Grant Ceballos MD     Intubation:     Induction:  Intravenous    Intubated:  Postinduction    Mask Ventilation:  Easy mask    Attempts:  2    Attempted By:  Student    Method of Intubation:  Video laryngoscopy and direct    Blade:  Nguyen 2    Laryngeal View Grade: Grade III - only epiglottis visible      Attempted By (2nd Attempt):  CRNA    Method of Intubation (2nd Attempt):  Video laryngoscopy    Blade (2nd Attempt):  Atkinson 3    Laryngeal View Grade (2nd Attempt): Grade IIa - cords partially seen      Difficult Airway Encountered?: No      Complications:  None    Airway Device:  Oral endotracheal tube    Airway Device Size:  7.0    Style/Cuff Inflation:  Cuffed (inflated to minimal occlusive pressure)    Inflation Amount (mL):  8    Tube secured:  22    Secured at:  The lips    Placement Verified By:  Capnometry    Complicating Factors:  Anterior larynx, obesity, short neck and large/floppy epiglottis    Findings Post-Intubation:  BS equal bilateral and atraumatic/condition of teeth unchanged  Notes:      Difficult DL with mil2, anterior and large floppy epiglottis even with Atkinson.

## 2023-05-01 NOTE — DISCHARGE SUMMARY
[facility] - Periop Services  Discharge Note  Short Stay    Procedure(s) (LRB):  INSERTION, TISSUE EXPANDER, BREAST (placement of right sided tissue expander, possible acellular dermal matrix) (Right)      OUTCOME: Patient tolerated treatment/procedure well without complication and is now ready for discharge.    DISPOSITION: Home or Self Care    FINAL DIAGNOSIS:  <principal problem not specified>    FOLLOWUP: In clinic    DISCHARGE INSTRUCTIONS:    Discharge Procedure Orders   Diet general     Keep surgical extremity elevated     Remove dressing in 48 hours   Order Comments: May remove dressing and shower in 48 hrs  Replace with dry dressing if draining    If breast surgery: may wear surgical bra or sports bra- no wire  If abdominal surgery- abdominal binder at all times, may wash and replace  If drains: YORDY drain teaching     Weight bearing restrictions (specify)   Order Comments: No heavy lifting, otherwise, activity as tolerated         Clinical Reference Documents Added to Patient Instructions         Document    TISSUE EXPANSION DISCHARGE INSTRUCTIONS (ENGLISH)            TIME SPENT ON DISCHARGE:      minutes

## 2023-05-03 NOTE — ANESTHESIA POSTPROCEDURE EVALUATION
Anesthesia Post Evaluation    Patient: Delia Scruggs    Procedure(s) Performed: Procedure(s) (LRB):  INSERTION, TISSUE EXPANDER, BREAST (placement of right sided tissue expander, possible acellular dermal matrix) (Right)    Final Anesthesia Type: general      Patient location during evaluation: PACU  Patient participation: Yes- Able to Participate  Level of consciousness: awake and alert and oriented  Post-procedure vital signs: reviewed and stable  Pain management: adequate  Airway patency: patent  BRODY mitigation strategies: Verification of full reversal of neuromuscular block  PONV status at discharge: No PONV  Anesthetic complications: no      Cardiovascular status: blood pressure returned to baseline and stable  Respiratory status: spontaneous ventilation and unassisted  Hydration status: euvolemic  Follow-up not needed.  Comments: [facility]          Vitals Value Taken Time   /68 05/01/23 1559   Temp 36.8 °C (98.2 °F) 05/01/23 1431   Pulse 75 05/01/23 1559   Resp 17 05/01/23 1504   SpO2 100 % 05/01/23 1559   Vitals shown include unvalidated device data.      Event Time   Out of Recovery 15:06:00         Pain/Pat Score: No data recorded

## 2023-05-15 ENCOUNTER — INFUSION (OUTPATIENT)
Dept: INFUSION THERAPY | Facility: HOSPITAL | Age: 63
End: 2023-05-15
Attending: INTERNAL MEDICINE
Payer: COMMERCIAL

## 2023-05-15 VITALS
TEMPERATURE: 99 F | HEART RATE: 88 BPM | RESPIRATION RATE: 20 BRPM | HEIGHT: 62 IN | WEIGHT: 164.88 LBS | SYSTOLIC BLOOD PRESSURE: 106 MMHG | OXYGEN SATURATION: 100 % | DIASTOLIC BLOOD PRESSURE: 64 MMHG | BODY MASS INDEX: 30.34 KG/M2

## 2023-05-15 DIAGNOSIS — C50.311 MALIGNANT NEOPLASM OF LOWER-INNER QUADRANT OF RIGHT BREAST OF FEMALE, ESTROGEN RECEPTOR POSITIVE: Primary | ICD-10-CM

## 2023-05-15 DIAGNOSIS — Z17.0 MALIGNANT NEOPLASM OF LOWER-INNER QUADRANT OF RIGHT BREAST OF FEMALE, ESTROGEN RECEPTOR POSITIVE: Primary | ICD-10-CM

## 2023-05-15 PROCEDURE — 96523 IRRIG DRUG DELIVERY DEVICE: CPT

## 2023-05-15 PROCEDURE — 63600175 PHARM REV CODE 636 W HCPCS: Performed by: INTERNAL MEDICINE

## 2023-05-15 RX ORDER — HEPARIN 100 UNIT/ML
500 SYRINGE INTRAVENOUS
Status: CANCELLED | OUTPATIENT
Start: 2023-08-07

## 2023-05-15 RX ORDER — HEPARIN 100 UNIT/ML
500 SYRINGE INTRAVENOUS
Status: DISCONTINUED | OUTPATIENT
Start: 2023-05-15 | End: 2023-05-15 | Stop reason: HOSPADM

## 2023-05-15 RX ORDER — SODIUM CHLORIDE 0.9 % (FLUSH) 0.9 %
10 SYRINGE (ML) INJECTION
Status: DISCONTINUED | OUTPATIENT
Start: 2023-05-15 | End: 2023-05-15 | Stop reason: HOSPADM

## 2023-05-15 RX ORDER — SODIUM CHLORIDE 0.9 % (FLUSH) 0.9 %
10 SYRINGE (ML) INJECTION
Status: CANCELLED | OUTPATIENT
Start: 2023-08-07

## 2023-05-15 RX ADMIN — Medication 500 UNITS: at 08:05

## 2023-05-15 NOTE — NURSING
Pt tolerated MPF without any complications. Pt discharged home in stable condition, future appts given.

## 2023-05-22 ENCOUNTER — LAB VISIT (OUTPATIENT)
Dept: LAB | Facility: HOSPITAL | Age: 63
End: 2023-05-22
Attending: INTERNAL MEDICINE
Payer: COMMERCIAL

## 2023-05-22 DIAGNOSIS — I10 ESSENTIAL HYPERTENSION, MALIGNANT: Primary | ICD-10-CM

## 2023-05-22 LAB
ALBUMIN SERPL-MCNC: 4.1 G/DL (ref 3.4–4.8)
ALBUMIN/GLOB SERPL: 1.3 RATIO (ref 1.1–2)
ALP SERPL-CCNC: 136 UNIT/L (ref 40–150)
ALT SERPL-CCNC: 14 UNIT/L (ref 0–55)
AST SERPL-CCNC: 20 UNIT/L (ref 5–34)
BILIRUBIN DIRECT+TOT PNL SERPL-MCNC: 0.4 MG/DL
BUN SERPL-MCNC: 11.8 MG/DL (ref 9.8–20.1)
CALCIUM SERPL-MCNC: 9.4 MG/DL (ref 8.4–10.2)
CHLORIDE SERPL-SCNC: 106 MMOL/L (ref 98–107)
CHOLEST SERPL-MCNC: 194 MG/DL
CHOLEST/HDLC SERPL: 3 {RATIO} (ref 0–5)
CO2 SERPL-SCNC: 26 MMOL/L (ref 23–31)
CREAT SERPL-MCNC: 0.85 MG/DL (ref 0.55–1.02)
EST. AVERAGE GLUCOSE BLD GHB EST-MCNC: 177.2 MG/DL
GFR SERPLBLD CREATININE-BSD FMLA CKD-EPI: >60 MLS/MIN/1.73/M2
GLOBULIN SER-MCNC: 3.2 GM/DL (ref 2.4–3.5)
GLUCOSE SERPL-MCNC: 157 MG/DL (ref 82–115)
HBA1C MFR BLD: 7.8 %
HDLC SERPL-MCNC: 57 MG/DL (ref 35–60)
LDLC SERPL CALC-MCNC: 123 MG/DL (ref 50–140)
POTASSIUM SERPL-SCNC: 4.6 MMOL/L (ref 3.5–5.1)
PROT SERPL-MCNC: 7.3 GM/DL (ref 5.8–7.6)
SODIUM SERPL-SCNC: 142 MMOL/L (ref 136–145)
TRIGL SERPL-MCNC: 69 MG/DL (ref 37–140)
TSH SERPL-ACNC: 0.65 UIU/ML (ref 0.35–4.94)
VLDLC SERPL CALC-MCNC: 14 MG/DL

## 2023-05-22 PROCEDURE — 80061 LIPID PANEL: CPT

## 2023-05-22 PROCEDURE — 80053 COMPREHEN METABOLIC PANEL: CPT

## 2023-05-22 PROCEDURE — 84443 ASSAY THYROID STIM HORMONE: CPT

## 2023-05-22 PROCEDURE — 83036 HEMOGLOBIN GLYCOSYLATED A1C: CPT

## 2023-05-22 PROCEDURE — 36415 COLL VENOUS BLD VENIPUNCTURE: CPT

## 2023-07-05 NOTE — PROGRESS NOTES
Subjective:       Patient ID: Delia Scruggs is a 63 y.o. female.  Surgeon: Dr. Sd Baltazar  Radiation oncologist: Dr. Browne Prellop    Recurrence vs. Second Primary Right Breast AJCC Anatomic and Clinical Prognostic Stage IA ([id number])--Diagnosed 21  Right Breast Cancer Stage IIA (T2N0M0) diagnosed 17  Biopsy/pathology:   1. Right breast mass biopsy done 17--invasive ductal carcinoma, grade III with focal DCIS, ER 25.72%, OK 0.28% negative, Her2 1+ negative by IHC. Oncotype DX testing showed recurrence score of 43 (distant recurrence risk 40% with Tamoxifen alone and 12% Tamoxifen + chemo), PCR done for breast panel showed triple negative.  2. Right breast biopsy mass at 6:00 done 21--invasive ductal carcinoma, Grade 2, ER 29%, OK 0%, Her2 0 by IHC, Ki67 80% high.  Surgery/pathology:   1. Right breast needle localization lumpectomy and SLN biopsy done 17--invasive mammary carcinoma with papillary and ductal features, grade III, 2.2cm, no lymphovascular or perineural invasion, margins clear, few foci of non-invasive predominantly solid papillary carcinoma also identified, 2 sentinel lymph nodes negative, closest margin medial, re-excision of medial margin with focal residual invasive papillary carcinoma <0.1cm, now free.  2. Bilateral mastectomies with right ALND done 22--neoadjuvant therapy effects with near complete tumor response, solitary focus of invasive ductal carcinoma 2mm, Grade 1, resection margin clear, left breast with no atypia or malignancy, 2 LNs negative.   Imagin. Screening MMG 16--asymmetry medially right breast.  2. Diagnostic MMG right breast 16--asymmetrical density located posteriorly in lower inner quadrant of right breast, suggestion of underlying clearly circumscribed 10mm rounded density w/n area on spot compression.  3. US right breast 16--focal mass 4:00 8cm from nipple hypoechoic and hetrogensous with irregular margins  1.7X1.1X1.4cm BIRADS 5.  4. CT A/P 2/24/17--fluid in resection cavity right breast, no evidence for metastatic disease, vague area of enhancement right hepatic low, not typical of metastatic disease, consider liver mass protocol MRI.  5. Bone scan 2/24/17--DJD right knee. No evidence of any metastatic bone lesions.  6. MRI abdomen/liver protocol done 3/7/17--hepatic lesion in question is most c/w focal nodular hyperplasia.  7. Screening MMG done 11/24/21--asymmetry in the area of the lumpectomy site in the inner right breast posterior depth on the CC view needs further evaluation.    8. MMG/US right breast done 12/20/21--Findings concerning for new or recurrent malignancy at the site of prior lumpectomy in this patient with a personal history of right breast cancer. The three adjacent (one dominant 1.8cm, two satellite 5mm and 4mm) irregular hypoechoic masses in the right breast 6:00 axis 4 cm FN position are highly suggestive of malignancy. BI-RADS 5: Highly suggestive of malignancy. No suspicious right axillary adenopathy.  9. MRI bilateral breasts done 1/26/22--Right breast 6:00 irregular heterogeneously enhancing mass containing a biopsy clip, measuring 1.6 m x 0.9 cm x 1.5 cm, is consistent with the known biopsy-proven right breast invasive malignancy. Multiple additional irregular heterogeneously enhancing masses with irregular margins within the lumpectomy bed and anterior to the lumpectomy bed in the 4:00 to 6:00 right breast middle to posterior depths, in total spanning 5.7 cm x 2.8 cm x 2.2 cm, are also highly suggestive of malignancy. The posterior aspect of the suspicious enhancement is 1 cm anterior to the pectoralis major muscle. Although there is slight tenting and mild enhancement of the pectoralis major muscle, this is most likely related to adherent post lumpectomy scarring and post radiation change, no suspicious enhancement in the left breast. No significant internal mammary or axillary  adenopathy.  10. Diagnostic MMG/US 3/30/22--Mild-moderate partial response to neoadjuvant chemotherapy, evidenced mammographically by decreased density and conspicuity of the known malignant masses in the right breast 6:00 axis posterior depth at the site of prior lumpectomy. The previously biopsied dominant mass has decreased in size as measured sonographically, now 1.1 x 0.6 x 1.6 cm, previously 1.8 x 0.9 x 1.7 cm. Also, the two satellite masses previously identified on sonography have resolved. BI-RADS 6: Known biopsy-proven malignancy.    2DECHO 3/6/17--EF 60-65%.  Mediport placement by Dr. Baltazar on 3/23/17--removed 6/2020.  Mediport placed by Dr. Baltazar on 2/7/22.     Genetic testing on 3/21/17 was negative.    DEXA:   10/24/17--L1-L4 T = 1.3, Left femur neck -1.1, right femur neck -2.1, total left femur 0.0, right -0.7 c/w osteopenia.  11/21/19--L1-L4 T = 1.7, Left femur neck -1.3, right femur neck -2.0, total left femur -0.3, right -0.7 (mixed response).  11/24/21--L1-L4 T = 2.0, Left femur neck -1.2, right femur neck -2.0, total left femur -0.1, right -0.7 (improved spine, left hip, right stable).     Treatment History:   1. DDAC x 4 doses. Started 4/3/17. Completed on 5/15/17.    2. Weekly Taxol X 12 completed 5/30/17--8/15/17.   3. Adjuvant XRT completed 8/30/17 - 9/28/17.    4. Femara 10/11/17--stopped 2/1/22.   5. Prolia 6/15/18--12/30/21 (on hold).    6. Neoadjuvant Taxotere/Cytoxan X 6 cycles completed 2/10/22--5/26/22.   7. Bilateral mastectomies 6/29/22.    8. RAHUL flap reconstruction done 10/26/22 Dr. Rivera.    Treatment plan:   1. Adjuvant Aromasin started 8/17/22.  2. Zometa every 6 months X 2 years adjuvant treatment started 9/2022, next due 9/2023.     Chief Complaint: Other Misc (Pt reports that she is disappointed in her reconstructive sx. Plans to see a specialist in [place].)    HPI   Patient presents for follow-up of breast cancer. She is doing okay. She underwent right breast expander  placement in May with Dr. Rivera but he has now left [facility] and she is searching for another surgeon to complete her surgery. Otherwise, she continues tolerating Aromasin without any problems. Recent labs show mild anemia which has been off/on. No other problems reported today.     Past Medical History:   Diagnosis Date    Anemia, unspecified     Anxiety     Breast cancer     Diabetes mellitus, type 2     Hyperlipidemia     Hypertension     Mitral valve prolapse     Obesity     Osteopenia     Sleep apnea     uses CPAP      Review of patient's allergies indicates:  No Known Allergies   Current Outpatient Medications on File Prior to Visit   Medication Sig Dispense Refill    amLODIPine-benazepriL (LOTREL) 10-40 mg per capsule Take 1 capsule by mouth once daily.      ascorbic acid, vitamin C, (VITAMIN C) 500 MG tablet Take 500 mg by mouth once daily.      b complex vitamins tablet Take 1 tablet by mouth once daily.      calcium carbonate-vitamin D3 600 mg-20 mcg (800 unit) Chew Take 1 tablet by mouth once daily.      empagliflozin (JARDIANCE) 25 mg tablet Take 1 tablet by mouth once daily.      exemestane (AROMASIN) 25 mg tablet Take 1 tablet by mouth once daily 30 tablet 2    ferrous gluconate (FERGON) 240 (27 FE) MG tablet Take 240 mg by mouth once daily.      GLUTAMINE ORAL Take 1 Scoop by mouth as needed.      loratadine 10 mg Cap Take 10 mg by mouth as needed.      magnesium 250 mg Tab Take 1 tablet by mouth once daily.      metFORMIN (GLUCOPHAGE) 500 MG tablet Take 500 mg by mouth 2 (two) times daily with meals.      metoprolol succinate (TOPROL-XL) 50 MG 24 hr tablet Take 1 tablet by mouth once daily.      multivitamin-folic acid-biotin (HAIR-SKIN-NAILS, MV-FA-BIOTIN,) 400-2,000 mcg Tab Take 1 tablet by mouth once daily.      multivitamin-minerals-lutein Tab Take 1 tablet by mouth once daily at 6am.      NON FORMULARY MEDICATION Take 1 tablet by mouth once daily. Tumeric      omega-3 fatty acids-fish oil  360-1,200 mg Cap Take 1 capsule by mouth once daily.      pyridoxine, vitamin B6, (B-6) 100 MG Tab Take 100 mg by mouth once daily.      rosuvastatin (CRESTOR) 20 MG tablet       SITagliptin (JANUVIA) 100 MG Tab Take 1 tablet by mouth once daily.      GLUTAMINE ORAL Take 1 tablet by mouth once daily.      [DISCONTINUED] aspirin (ECOTRIN) 81 MG EC tablet Take 81 mg by mouth once daily. Stopped months ago d/t chemo      [DISCONTINUED] LORazepam (ATIVAN) 1 MG tablet Take 1 mg by mouth every 12 (twelve) hours as needed. as needed for anxiety.      [DISCONTINUED] pravastatin (PRAVACHOL) 40 MG tablet Take 1 tablet by mouth once daily.       No current facility-administered medications on file prior to visit.      Review of Systems   Constitutional:  Negative for activity change, appetite change, fever and unexpected weight change.   HENT:  Negative for mouth sores.    Eyes:  Negative for visual disturbance.   Respiratory:  Negative for cough and shortness of breath.    Cardiovascular:  Negative for chest pain.   Gastrointestinal:  Negative for abdominal pain, blood in stool, constipation, diarrhea, nausea and vomiting.   Genitourinary:  Negative for difficulty urinating and frequency.   Musculoskeletal:  Negative for back pain.   Integumentary:  Negative for rash.        Bilateral mastectomies with reconstruction. S/p right breast expander placement   Neurological:  Negative for dizziness, weakness and headaches.   Hematological:  Negative for adenopathy.   Psychiatric/Behavioral:  Negative for behavioral problems and suicidal ideas. The patient is not nervous/anxious.        Vitals:    07/19/23 1013   BP: 107/65   Pulse: 83   Resp: 14   Temp: 98.5 °F (36.9 °C)     Physical Exam  Vitals reviewed.   Constitutional:       Appearance: Normal appearance. She is normal weight.      Comments: Pleasant AA female   HENT:      Head: Normocephalic.   Eyes:      General: Lids are normal. Vision grossly intact.      Extraocular  Movements: Extraocular movements intact.      Conjunctiva/sclera: Conjunctivae normal.   Cardiovascular:      Rate and Rhythm: Normal rate and regular rhythm.      Pulses: Normal pulses.      Heart sounds: S1 normal and S2 normal. Murmur heard.   Systolic murmur is present with a grade of 2/6.   Pulmonary:      Effort: Pulmonary effort is normal.      Breath sounds: Normal breath sounds.   Chest:      Comments: Bilateral mastectomies with RAHUL reconstruction. S/p recent right breast expander placement. Incisions all healed. Palpable scar tissue along incision lines but no other masses  Abdominal:      General: Abdomen is flat. Bowel sounds are normal. There is no distension.      Palpations: Abdomen is soft.      Tenderness: There is no abdominal tenderness.   Musculoskeletal:      Cervical back: Normal range of motion and neck supple.      Right lower leg: No edema.      Left lower leg: No edema.   Skin:     General: Skin is warm and moist.      Capillary Refill: Capillary refill takes less than 2 seconds.      Comments: Left CW mediport intact   Neurological:      General: No focal deficit present.      Mental Status: She is alert and oriented to person, place, and time.   Psychiatric:         Attention and Perception: Attention normal.         Mood and Affect: Mood normal.         Speech: Speech normal.         Behavior: Behavior normal. Behavior is cooperative.         Judgment: Judgment normal.       ECOG SCORE    0 - Fully active-able to carry on all pre-disease performance without restriction        No visits with results within 1 Day(s) from this visit.   Latest known visit with results is:   Lab Visit on 07/17/2023   Component Date Value Ref Range Status    Sodium Level 07/17/2023 144  136 - 145 mmol/L Final    Potassium Level 07/17/2023 4.6  3.5 - 5.1 mmol/L Final    Chloride 07/17/2023 105  98 - 107 mmol/L Final    Carbon Dioxide 07/17/2023 28  23 - 31 mmol/L Final    Glucose Level 07/17/2023 205 (H)  82  - 115 mg/dL Final    Blood Urea Nitrogen 07/17/2023 10.1  9.8 - 20.1 mg/dL Final    Creatinine 07/17/2023 0.90  0.55 - 1.02 mg/dL Final    Calcium Level Total 07/17/2023 10.0  8.4 - 10.2 mg/dL Final    Protein Total 07/17/2023 7.3  5.8 - 7.6 gm/dL Final    Albumin Level 07/17/2023 4.2  3.4 - 4.8 g/dL Final    Globulin 07/17/2023 3.1  2.4 - 3.5 gm/dL Final    Albumin/Globulin Ratio 07/17/2023 1.4  1.1 - 2.0 ratio Final    Bilirubin Total 07/17/2023 0.3  <=1.5 mg/dL Final    Alkaline Phosphatase 07/17/2023 116  40 - 150 unit/L Final    Alanine Aminotransferase 07/17/2023 18  0 - 55 unit/L Final    Aspartate Aminotransferase 07/17/2023 17  5 - 34 unit/L Final    eGFR 07/17/2023 >60  mls/min/1.73/m2 Final    WBC 07/17/2023 5.09  4.50 - 11.50 x10(3)/mcL Final    RBC 07/17/2023 3.65 (L)  4.20 - 5.40 x10(6)/mcL Final    Hgb 07/17/2023 11.9 (L)  12.0 - 16.0 g/dL Final    Hct 07/17/2023 37.2  37.0 - 47.0 % Final    MCV 07/17/2023 101.9 (H)  80.0 - 94.0 fL Final    MCH 07/17/2023 32.6 (H)  27.0 - 31.0 pg Final    MCHC 07/17/2023 32.0 (L)  33.0 - 36.0 g/dL Final    RDW 07/17/2023 11.6  11.5 - 17.0 % Final    Platelet 07/17/2023 166  130 - 400 x10(3)/mcL Final    MPV 07/17/2023 11.9 (H)  7.4 - 10.4 fL Final    Neut % 07/17/2023 49.2  % Final    Lymph % 07/17/2023 39.7  % Final    Mono % 07/17/2023 7.3  % Final    Eos % 07/17/2023 2.6  % Final    Basophil % 07/17/2023 0.6  % Final    Lymph # 07/17/2023 2.02  0.6 - 4.6 x10(3)/mcL Final    Neut # 07/17/2023 2.51  2.1 - 9.2 x10(3)/mcL Final    Mono # 07/17/2023 0.37  0.1 - 1.3 x10(3)/mcL Final    Eos # 07/17/2023 0.13  0 - 0.9 x10(3)/mcL Final    Baso # 07/17/2023 0.03  <=0.2 x10(3)/mcL Final    IG# 07/17/2023 0.03  0 - 0.04 x10(3)/mcL Final    IG% 07/17/2023 0.6  % Final     Assessment:            1. Malignant neoplasm of lower-inner quadrant of right breast of female, estrogen receptor positive    2. Osteopenia, unspecified location        Plan:     Patient with breast cancer  stage IIA, 2.2cm tumor, high grade, ER weakly positive 25%. S/p right breast lumpectomy on 1/26/17. Lymph node negative.  Per NCCN guidelines, oncotype DX testing recommended.  Oncotype showed tumor high risk and PCR breast panel showing triple negative.  Treatment recommended with dose dense AC x 4 followed by weekly Taxol x 12.  Treatment was delayed due to non-healing breast wound.  Patient completed 4 cycles DDAC on 5/15/17 and 12 cycles of weekly Taxol on 8/15/17.  Adjuvant XRT completed on 9/28/17 and patient tolerated well.   Patient started Femara on 10/11/17.  Also started on Prolia for osteopenia, last dose in 12/2021.    Screening MMG from 11/2021 showed an asymmetry in area of lumpectomy site in right breast.  Diagnostic MMG/US 12/2021 showed suspicious dominant mass 1.8cm 6:00 with 2 additional satellite lesions.  s/p biopsy 12/21/21 pathology c/w IDCA Grade 2, weakly ER+ 29%, OH and Her2 negative with high Ki67 80%, same breast profile as original cancer.  This represents a recurrence vs. new primary which developed while on AI.  MRI 1/26/22 showed multiple masses, spanning area of 5.7cm wtih dominant mass 1.6cm, no suspicious nodes or enhancing masses in left breast.   Patient met with Dr. Baltazar and surgical plan is for bilateral mastectomies.  Recommended neoadjuvant chemotherapy due to concerns for wound healing following surgery and need for chemotherapy given recurrence.  Patient completed 6 cycles Taxotere/Cytoxan 2/10/22--5/26/22.   MMG/US done after cycle 3 showed decrease in dominant mass and resolution of satellite masses c/w good partial response.  S/p bilateral mastectomies done 6/29/22 and showed only a residual IDCA 2mm, with negative LNs, and left breast negative for malignancy.     Started Aromasin on 8/17/22 and plan for 10 years. She is tolerating well.   S/p reconstruction on 10/26/22 she did have delayed wound healing.   S/p right breast expander placement on 5/1/23. All incisions  are healed.   Recent labs with mild anemia, Hgb of 11.9 g/dL. Otherwise labs are good.   Patient is on oral iron which she will continue.  Dr. Lala had considered adjuvant Verzenio, but decided against it since node negative tumors were not included in the trial and also because patient is far out from surgery.   Dr. Rivera is no longer with [facility], she is searching for another plastic surgeon. I recommended she contact Dr. Robins or [place].   Will continue surveillance visits every 3 months.     Changed from Prolia to adjuvant Zometa every 6 months started 9/2022, next dose due 9/2023.  Due to repeat DEXA in November 2023. Will send orders.   Continue MPF as well.   All questions answered at this time.       Jorge Garcia, RON

## 2023-07-17 ENCOUNTER — LAB VISIT (OUTPATIENT)
Dept: LAB | Facility: HOSPITAL | Age: 63
End: 2023-07-17
Attending: INTERNAL MEDICINE
Payer: COMMERCIAL

## 2023-07-17 DIAGNOSIS — M85.80 OSTEOPENIA, UNSPECIFIED LOCATION: ICD-10-CM

## 2023-07-17 DIAGNOSIS — Z17.0 MALIGNANT NEOPLASM OF LOWER-INNER QUADRANT OF RIGHT BREAST OF FEMALE, ESTROGEN RECEPTOR POSITIVE: ICD-10-CM

## 2023-07-17 DIAGNOSIS — C50.311 MALIGNANT NEOPLASM OF LOWER-INNER QUADRANT OF RIGHT BREAST OF FEMALE, ESTROGEN RECEPTOR POSITIVE: ICD-10-CM

## 2023-07-17 LAB
ALBUMIN SERPL-MCNC: 4.2 G/DL (ref 3.4–4.8)
ALBUMIN/GLOB SERPL: 1.4 RATIO (ref 1.1–2)
ALP SERPL-CCNC: 116 UNIT/L (ref 40–150)
ALT SERPL-CCNC: 18 UNIT/L (ref 0–55)
AST SERPL-CCNC: 17 UNIT/L (ref 5–34)
BASOPHILS # BLD AUTO: 0.03 X10(3)/MCL
BASOPHILS NFR BLD AUTO: 0.6 %
BILIRUBIN DIRECT+TOT PNL SERPL-MCNC: 0.3 MG/DL
BUN SERPL-MCNC: 10.1 MG/DL (ref 9.8–20.1)
CALCIUM SERPL-MCNC: 10 MG/DL (ref 8.4–10.2)
CHLORIDE SERPL-SCNC: 105 MMOL/L (ref 98–107)
CO2 SERPL-SCNC: 28 MMOL/L (ref 23–31)
CREAT SERPL-MCNC: 0.9 MG/DL (ref 0.55–1.02)
EOSINOPHIL # BLD AUTO: 0.13 X10(3)/MCL (ref 0–0.9)
EOSINOPHIL NFR BLD AUTO: 2.6 %
ERYTHROCYTE [DISTWIDTH] IN BLOOD BY AUTOMATED COUNT: 11.6 % (ref 11.5–17)
GFR SERPLBLD CREATININE-BSD FMLA CKD-EPI: >60 MLS/MIN/1.73/M2
GLOBULIN SER-MCNC: 3.1 GM/DL (ref 2.4–3.5)
GLUCOSE SERPL-MCNC: 205 MG/DL (ref 82–115)
HCT VFR BLD AUTO: 37.2 % (ref 37–47)
HGB BLD-MCNC: 11.9 G/DL (ref 12–16)
IMM GRANULOCYTES # BLD AUTO: 0.03 X10(3)/MCL (ref 0–0.04)
IMM GRANULOCYTES NFR BLD AUTO: 0.6 %
LYMPHOCYTES # BLD AUTO: 2.02 X10(3)/MCL (ref 0.6–4.6)
LYMPHOCYTES NFR BLD AUTO: 39.7 %
MCH RBC QN AUTO: 32.6 PG (ref 27–31)
MCHC RBC AUTO-ENTMCNC: 32 G/DL (ref 33–36)
MCV RBC AUTO: 101.9 FL (ref 80–94)
MONOCYTES # BLD AUTO: 0.37 X10(3)/MCL (ref 0.1–1.3)
MONOCYTES NFR BLD AUTO: 7.3 %
NEUTROPHILS # BLD AUTO: 2.51 X10(3)/MCL (ref 2.1–9.2)
NEUTROPHILS NFR BLD AUTO: 49.2 %
PLATELET # BLD AUTO: 166 X10(3)/MCL (ref 130–400)
PMV BLD AUTO: 11.9 FL (ref 7.4–10.4)
POTASSIUM SERPL-SCNC: 4.6 MMOL/L (ref 3.5–5.1)
PROT SERPL-MCNC: 7.3 GM/DL (ref 5.8–7.6)
RBC # BLD AUTO: 3.65 X10(6)/MCL (ref 4.2–5.4)
SODIUM SERPL-SCNC: 144 MMOL/L (ref 136–145)
WBC # SPEC AUTO: 5.09 X10(3)/MCL (ref 4.5–11.5)

## 2023-07-17 PROCEDURE — 36415 COLL VENOUS BLD VENIPUNCTURE: CPT

## 2023-07-17 PROCEDURE — 80053 COMPREHEN METABOLIC PANEL: CPT

## 2023-07-17 PROCEDURE — 85025 COMPLETE CBC W/AUTO DIFF WBC: CPT

## 2023-07-19 ENCOUNTER — OFFICE VISIT (OUTPATIENT)
Dept: HEMATOLOGY/ONCOLOGY | Facility: CLINIC | Age: 63
End: 2023-07-19
Payer: COMMERCIAL

## 2023-07-19 VITALS
BODY MASS INDEX: 30.61 KG/M2 | RESPIRATION RATE: 14 BRPM | SYSTOLIC BLOOD PRESSURE: 107 MMHG | WEIGHT: 166.31 LBS | OXYGEN SATURATION: 98 % | TEMPERATURE: 99 F | HEART RATE: 83 BPM | DIASTOLIC BLOOD PRESSURE: 65 MMHG | HEIGHT: 62 IN

## 2023-07-19 DIAGNOSIS — C50.311 MALIGNANT NEOPLASM OF LOWER-INNER QUADRANT OF RIGHT BREAST OF FEMALE, ESTROGEN RECEPTOR POSITIVE: Primary | ICD-10-CM

## 2023-07-19 DIAGNOSIS — M85.80 OSTEOPENIA, UNSPECIFIED LOCATION: ICD-10-CM

## 2023-07-19 DIAGNOSIS — Z17.0 MALIGNANT NEOPLASM OF LOWER-INNER QUADRANT OF RIGHT BREAST OF FEMALE, ESTROGEN RECEPTOR POSITIVE: Primary | ICD-10-CM

## 2023-07-19 PROCEDURE — 99999 PR PBB SHADOW E&M-EST. PATIENT-LVL V: CPT | Mod: PBBFAC,,, | Performed by: NURSE PRACTITIONER

## 2023-07-19 PROCEDURE — 3074F SYST BP LT 130 MM HG: CPT | Mod: CPTII,S$GLB,, | Performed by: NURSE PRACTITIONER

## 2023-07-19 PROCEDURE — 1160F RVW MEDS BY RX/DR IN RCRD: CPT | Mod: CPTII,S$GLB,, | Performed by: NURSE PRACTITIONER

## 2023-07-19 PROCEDURE — 99999 PR PBB SHADOW E&M-EST. PATIENT-LVL V: ICD-10-PCS | Mod: PBBFAC,,, | Performed by: NURSE PRACTITIONER

## 2023-07-19 PROCEDURE — 3051F HG A1C>EQUAL 7.0%<8.0%: CPT | Mod: CPTII,S$GLB,, | Performed by: NURSE PRACTITIONER

## 2023-07-19 PROCEDURE — 99214 PR OFFICE/OUTPT VISIT, EST, LEVL IV, 30-39 MIN: ICD-10-PCS | Mod: S$GLB,,, | Performed by: NURSE PRACTITIONER

## 2023-07-19 PROCEDURE — 1159F PR MEDICATION LIST DOCUMENTED IN MEDICAL RECORD: ICD-10-PCS | Mod: CPTII,S$GLB,, | Performed by: NURSE PRACTITIONER

## 2023-07-19 PROCEDURE — 1159F MED LIST DOCD IN RCRD: CPT | Mod: CPTII,S$GLB,, | Performed by: NURSE PRACTITIONER

## 2023-07-19 PROCEDURE — 4010F ACE/ARB THERAPY RXD/TAKEN: CPT | Mod: CPTII,S$GLB,, | Performed by: NURSE PRACTITIONER

## 2023-07-19 PROCEDURE — 3074F PR MOST RECENT SYSTOLIC BLOOD PRESSURE < 130 MM HG: ICD-10-PCS | Mod: CPTII,S$GLB,, | Performed by: NURSE PRACTITIONER

## 2023-07-19 PROCEDURE — 3008F BODY MASS INDEX DOCD: CPT | Mod: CPTII,S$GLB,, | Performed by: NURSE PRACTITIONER

## 2023-07-19 PROCEDURE — 3051F PR MOST RECENT HEMOGLOBIN A1C LEVEL 7.0 - < 8.0%: ICD-10-PCS | Mod: CPTII,S$GLB,, | Performed by: NURSE PRACTITIONER

## 2023-07-19 PROCEDURE — 3078F DIAST BP <80 MM HG: CPT | Mod: CPTII,S$GLB,, | Performed by: NURSE PRACTITIONER

## 2023-07-19 PROCEDURE — 1160F PR REVIEW ALL MEDS BY PRESCRIBER/CLIN PHARMACIST DOCUMENTED: ICD-10-PCS | Mod: CPTII,S$GLB,, | Performed by: NURSE PRACTITIONER

## 2023-07-19 PROCEDURE — 3008F PR BODY MASS INDEX (BMI) DOCUMENTED: ICD-10-PCS | Mod: CPTII,S$GLB,, | Performed by: NURSE PRACTITIONER

## 2023-07-19 PROCEDURE — 99214 OFFICE O/P EST MOD 30 MIN: CPT | Mod: S$GLB,,, | Performed by: NURSE PRACTITIONER

## 2023-07-19 PROCEDURE — 4010F PR ACE/ARB THEARPY RXD/TAKEN: ICD-10-PCS | Mod: CPTII,S$GLB,, | Performed by: NURSE PRACTITIONER

## 2023-07-19 PROCEDURE — 3078F PR MOST RECENT DIASTOLIC BLOOD PRESSURE < 80 MM HG: ICD-10-PCS | Mod: CPTII,S$GLB,, | Performed by: NURSE PRACTITIONER

## 2023-07-19 RX ORDER — FLUCONAZOLE 150 MG/1
150 TABLET ORAL ONCE
Qty: 1 TABLET | Refills: 4 | Status: SHIPPED | OUTPATIENT
Start: 2023-07-19 | End: 2023-07-19

## 2023-07-19 RX ORDER — ROSUVASTATIN CALCIUM 20 MG/1
TABLET, COATED ORAL
COMMUNITY
Start: 2023-07-12

## 2023-08-06 DIAGNOSIS — C50.311 MALIGNANT NEOPLASM OF LOWER-INNER QUADRANT OF RIGHT BREAST OF FEMALE, ESTROGEN RECEPTOR POSITIVE: ICD-10-CM

## 2023-08-06 DIAGNOSIS — Z17.0 MALIGNANT NEOPLASM OF LOWER-INNER QUADRANT OF RIGHT BREAST OF FEMALE, ESTROGEN RECEPTOR POSITIVE: ICD-10-CM

## 2023-08-07 ENCOUNTER — INFUSION (OUTPATIENT)
Dept: INFUSION THERAPY | Facility: HOSPITAL | Age: 63
End: 2023-08-07
Attending: INTERNAL MEDICINE
Payer: COMMERCIAL

## 2023-08-07 VITALS
HEART RATE: 75 BPM | SYSTOLIC BLOOD PRESSURE: 110 MMHG | DIASTOLIC BLOOD PRESSURE: 65 MMHG | OXYGEN SATURATION: 95 % | TEMPERATURE: 98 F | RESPIRATION RATE: 20 BRPM

## 2023-08-07 DIAGNOSIS — Z17.0 MALIGNANT NEOPLASM OF LOWER-INNER QUADRANT OF RIGHT BREAST OF FEMALE, ESTROGEN RECEPTOR POSITIVE: Primary | ICD-10-CM

## 2023-08-07 DIAGNOSIS — Z17.0 MALIGNANT NEOPLASM OF LOWER-INNER QUADRANT OF RIGHT BREAST OF FEMALE, ESTROGEN RECEPTOR POSITIVE: ICD-10-CM

## 2023-08-07 DIAGNOSIS — C50.311 MALIGNANT NEOPLASM OF LOWER-INNER QUADRANT OF RIGHT BREAST OF FEMALE, ESTROGEN RECEPTOR POSITIVE: ICD-10-CM

## 2023-08-07 DIAGNOSIS — C50.311 MALIGNANT NEOPLASM OF LOWER-INNER QUADRANT OF RIGHT BREAST OF FEMALE, ESTROGEN RECEPTOR POSITIVE: Primary | ICD-10-CM

## 2023-08-07 PROCEDURE — 25000003 PHARM REV CODE 250: Performed by: NURSE PRACTITIONER

## 2023-08-07 PROCEDURE — 96523 IRRIG DRUG DELIVERY DEVICE: CPT

## 2023-08-07 PROCEDURE — 63600175 PHARM REV CODE 636 W HCPCS: Performed by: NURSE PRACTITIONER

## 2023-08-07 PROCEDURE — A4216 STERILE WATER/SALINE, 10 ML: HCPCS | Performed by: NURSE PRACTITIONER

## 2023-08-07 RX ORDER — HEPARIN 100 UNIT/ML
500 SYRINGE INTRAVENOUS
Status: CANCELLED | OUTPATIENT
Start: 2023-10-30

## 2023-08-07 RX ORDER — EXEMESTANE 25 MG/1
TABLET ORAL
Qty: 30 TABLET | Refills: 2 | Status: SHIPPED | OUTPATIENT
Start: 2023-08-07 | End: 2023-08-07 | Stop reason: SDUPTHER

## 2023-08-07 RX ORDER — SODIUM CHLORIDE 0.9 % (FLUSH) 0.9 %
10 SYRINGE (ML) INJECTION
Status: CANCELLED | OUTPATIENT
Start: 2023-10-30

## 2023-08-07 RX ORDER — EXEMESTANE 25 MG/1
25 TABLET ORAL DAILY
Qty: 30 TABLET | Refills: 2 | Status: SHIPPED | OUTPATIENT
Start: 2023-08-07 | End: 2023-10-19

## 2023-08-07 RX ORDER — HEPARIN 100 UNIT/ML
500 SYRINGE INTRAVENOUS
Status: DISCONTINUED | OUTPATIENT
Start: 2023-08-07 | End: 2023-08-07 | Stop reason: HOSPADM

## 2023-08-07 RX ORDER — SODIUM CHLORIDE 0.9 % (FLUSH) 0.9 %
10 SYRINGE (ML) INJECTION
Status: DISCONTINUED | OUTPATIENT
Start: 2023-08-07 | End: 2023-08-07 | Stop reason: HOSPADM

## 2023-08-07 RX ADMIN — Medication 10 ML: at 08:08

## 2023-08-07 RX ADMIN — HEPARIN 500 UNITS: 100 SYRINGE at 08:08

## 2023-08-07 NOTE — PLAN OF CARE
Mediport flushed without difficulty. Tolerated well. Next appt in 12 weeks confirmed with pt. Dc'd home in stable condition.

## 2023-08-22 ENCOUNTER — OFFICE VISIT (OUTPATIENT)
Dept: SURGICAL ONCOLOGY | Facility: CLINIC | Age: 63
End: 2023-08-22
Payer: COMMERCIAL

## 2023-08-22 VITALS
WEIGHT: 170.63 LBS | SYSTOLIC BLOOD PRESSURE: 117 MMHG | BODY MASS INDEX: 31.4 KG/M2 | HEIGHT: 62 IN | HEART RATE: 78 BPM | DIASTOLIC BLOOD PRESSURE: 73 MMHG

## 2023-08-22 DIAGNOSIS — C50.311 MALIGNANT NEOPLASM OF LOWER-INNER QUADRANT OF RIGHT BREAST OF FEMALE, ESTROGEN RECEPTOR POSITIVE: Primary | ICD-10-CM

## 2023-08-22 DIAGNOSIS — Z17.0 MALIGNANT NEOPLASM OF LOWER-INNER QUADRANT OF RIGHT BREAST OF FEMALE, ESTROGEN RECEPTOR POSITIVE: Primary | ICD-10-CM

## 2023-08-22 PROCEDURE — 3008F PR BODY MASS INDEX (BMI) DOCUMENTED: ICD-10-PCS | Mod: CPTII,S$GLB,, | Performed by: SURGERY

## 2023-08-22 PROCEDURE — 4010F PR ACE/ARB THEARPY RXD/TAKEN: ICD-10-PCS | Mod: CPTII,S$GLB,, | Performed by: SURGERY

## 2023-08-22 PROCEDURE — 3078F PR MOST RECENT DIASTOLIC BLOOD PRESSURE < 80 MM HG: ICD-10-PCS | Mod: CPTII,S$GLB,, | Performed by: SURGERY

## 2023-08-22 PROCEDURE — 99999 PR PBB SHADOW E&M-EST. PATIENT-LVL IV: CPT | Mod: PBBFAC,,, | Performed by: SURGERY

## 2023-08-22 PROCEDURE — 99999 PR PBB SHADOW E&M-EST. PATIENT-LVL IV: ICD-10-PCS | Mod: PBBFAC,,, | Performed by: SURGERY

## 2023-08-22 PROCEDURE — 3074F SYST BP LT 130 MM HG: CPT | Mod: CPTII,S$GLB,, | Performed by: SURGERY

## 2023-08-22 PROCEDURE — 99213 PR OFFICE/OUTPT VISIT, EST, LEVL III, 20-29 MIN: ICD-10-PCS | Mod: S$GLB,,, | Performed by: SURGERY

## 2023-08-22 PROCEDURE — 3074F PR MOST RECENT SYSTOLIC BLOOD PRESSURE < 130 MM HG: ICD-10-PCS | Mod: CPTII,S$GLB,, | Performed by: SURGERY

## 2023-08-22 PROCEDURE — 3078F DIAST BP <80 MM HG: CPT | Mod: CPTII,S$GLB,, | Performed by: SURGERY

## 2023-08-22 PROCEDURE — 4010F ACE/ARB THERAPY RXD/TAKEN: CPT | Mod: CPTII,S$GLB,, | Performed by: SURGERY

## 2023-08-22 PROCEDURE — 1159F PR MEDICATION LIST DOCUMENTED IN MEDICAL RECORD: ICD-10-PCS | Mod: CPTII,S$GLB,, | Performed by: SURGERY

## 2023-08-22 PROCEDURE — 1159F MED LIST DOCD IN RCRD: CPT | Mod: CPTII,S$GLB,, | Performed by: SURGERY

## 2023-08-22 PROCEDURE — 3051F PR MOST RECENT HEMOGLOBIN A1C LEVEL 7.0 - < 8.0%: ICD-10-PCS | Mod: CPTII,S$GLB,, | Performed by: SURGERY

## 2023-08-22 PROCEDURE — 3008F BODY MASS INDEX DOCD: CPT | Mod: CPTII,S$GLB,, | Performed by: SURGERY

## 2023-08-22 PROCEDURE — 3051F HG A1C>EQUAL 7.0%<8.0%: CPT | Mod: CPTII,S$GLB,, | Performed by: SURGERY

## 2023-08-22 PROCEDURE — 99213 OFFICE O/P EST LOW 20 MIN: CPT | Mod: S$GLB,,, | Performed by: SURGERY

## 2023-08-22 NOTE — PROGRESS NOTES
Chief complaint:  Recurrent breast cancer     HPI:  63-year-old female known to me for previous right breast cancer in 2017, she underwent lumpectomy followed by chemotherapy and radiation.  She continues to be followed by Dr. Lala with Medical Oncology.  Recent screening mammography revealed an asymmetry in the area of the lumpectomy site in the right breast.  Diagnostic imaging revealed a 1.8 cm spiculated mass suggestive of new malignancy.  Core needle biopsy was performed and pathology revealed invasive ductal carcinoma, ER/AL positive, HER2 negative breast MRI revealed multiple masses spanning 5.7 cm.  After discussion with Medical Oncology we agree that neoadjuvant therapy was indicated.  She also visited with Plastic surgery who recommended delayed reconstruction.  I reviewed all past medical records, medical oncology consultations and progress notes, plastic surgery consultation note, all excess of 40 pages records.  I reviewed all past imaging studies and personally interpreted the images.  The patient completed neoadjuvant chemotherapy on May 26, 2022 and tolerated this well.  Follow-up imaging shows mild decrease in size of the primary lesion as well as resolution of satellite lesions.  She underwent bilateral mastectomies in June of 2022 and had a very small residual area of tumor 2 mm in size, otherwise excellent response.  She went on to undergo delayed reconstruction with autologous tissue.She denies breast masses, skin changes, nipple inversion or discharge on self-breast exam.    She is not happy with her reconstruction  and she is interested in a 2nd opinion on possible revision surgery    Past Medical and Surgical History  Allergies :   Patient has no known allergies.    @[facility]@  Medical :   She has a past medical history of Anemia, unspecified, Anxiety, Breast cancer, Diabetes mellitus, type 2, Hyperlipidemia, Hypertension, Mitral valve prolapse, Obesity, Osteopenia, and Sleep  apnea.    Surgical :   She has a past surgical history that includes Breast lumpectomy (Right); Tubal ligation; Mediport insertion, single; Simple mastectomy (Bilateral, 06/29/2022); Axillary node dissection (Right, 06/29/2022); Reconstruction of breast with deep inferior epigastric artery  (RAHUL) flap (Bilateral, 10/26/2022); Colonoscopy; Insertion of breast tissue expander (Right, 05/01/2023); and Cosmetic surgery.     Family History  Her family history includes Cancer in her sister; Diabetes in her mother; Heart failure in her father and mother; Hypertension in her mother and sister; Stroke in her father.    Social History  She reports that she has never smoked. She has never used smokeless tobacco. She reports that she does not currently use alcohol. She reports that she does not use drugs.     Review of Systems   Constitutional:  Negative for appetite change, chills, diaphoresis and fever.   HENT:  Negative for congestion, drooling, ear discharge, ear pain and hearing loss.    Eyes:  Negative for discharge.   Respiratory:  Negative for apnea, cough, choking, chest tightness, shortness of breath and stridor.    Cardiovascular:  Negative for chest pain, palpitations and leg swelling.   Endocrine: Negative for cold intolerance and heat intolerance.   Genitourinary:  Negative for difficulty urinating, dyspareunia, dysuria and hematuria.   Musculoskeletal:  Negative for arthralgias, gait problem and joint swelling.   Skin:  Negative for color change and rash.   Neurological:  Negative for dizziness, tremors, seizures, syncope, facial asymmetry, speech difficulty, light-headedness, numbness and headaches.   Psychiatric/Behavioral:  Negative for agitation and confusion.         Objective   Physical Exam  Constitutional:       General: She is not in acute distress.     Appearance: Normal appearance. She is normal weight. She is not toxic-appearing.   HENT:      Head: Normocephalic and atraumatic.      Right  Ear: External ear normal.      Left Ear: External ear normal.      Nose: Nose normal.      Mouth/Throat:      Mouth: Mucous membranes are moist.      Pharynx: Oropharynx is clear.   Eyes:      General: No scleral icterus.     Conjunctiva/sclera: Conjunctivae normal.      Pupils: Pupils are equal, round, and reactive to light.   Cardiovascular:      Rate and Rhythm: Normal rate and regular rhythm.      Pulses: Normal pulses.      Heart sounds: Normal heart sounds. No murmur heard.     No gallop.   Pulmonary:      Effort: Pulmonary effort is normal.      Breath sounds: Normal breath sounds. No stridor. No wheezing or rhonchi.   Chest:      Chest wall: No tenderness.   Breasts:     Right: No swelling, bleeding, inverted nipple, mass, nipple discharge, skin change or tenderness.      Left: No swelling, bleeding, inverted nipple, mass, nipple discharge, skin change or tenderness.   Musculoskeletal:         General: No swelling, tenderness, deformity or signs of injury. Normal range of motion.      Cervical back: Normal range of motion and neck supple.      Right lower leg: No edema.      Left lower leg: No edema.   Lymphadenopathy:      Upper Body:      Right upper body: No supraclavicular or axillary adenopathy.      Left upper body: No supraclavicular or axillary adenopathy.   Skin:     Capillary Refill: Capillary refill takes less than 2 seconds.      Coloration: Skin is not jaundiced or pale.      Findings: No erythema.   Neurological:      General: No focal deficit present.      Mental Status: She is alert and oriented to person, place, and time.      Cranial Nerves: No cranial nerve deficit.      Motor: No weakness.      Gait: Gait normal.   Psychiatric:         Mood and Affect: Mood normal.         Behavior: Behavior normal.       VITAL SIGNS: 24 HR MIN & MAX LAST    @FLOWSTAT(6:24::1)@           @FLOWSTAT(5:24::1)@  117/73     @FLOWSTAT(8:24::1)@  78     @FLOWSTAT(9:24::1)@       @FLOWSTAT(10:24::1)@         HT:  "5' 2" (157.5 cm)  WT: 77.4 kg (170 lb 9.6 oz)  BMI: 31.2       Assessment & Plan     No evidence of disease   Return to clinic in 6 months    Refer to Dr. Robins to discuss revision reconstructive surgery      "

## 2023-08-23 ENCOUNTER — DOCUMENTATION ONLY (OUTPATIENT)
Dept: SURGICAL ONCOLOGY | Facility: CLINIC | Age: 63
End: 2023-08-23
Payer: COMMERCIAL

## 2023-08-23 NOTE — PROGRESS NOTES
Referral faxed to Dr. Robins; reminder set to f/u on referral next week. Cpl       08/28/23: Spoke with Catherine at Dr. Robins's office who stated patient has completed her medical hx form, sent in her ID and insurance,  but has not been scheduled yet. Will check back later this week. Cpl

## 2023-09-05 ENCOUNTER — TELEPHONE (OUTPATIENT)
Dept: SURGICAL ONCOLOGY | Facility: CLINIC | Age: 63
End: 2023-09-05
Payer: COMMERCIAL

## 2023-09-18 ENCOUNTER — INFUSION (OUTPATIENT)
Dept: INFUSION THERAPY | Facility: HOSPITAL | Age: 63
End: 2023-09-18
Attending: INTERNAL MEDICINE
Payer: COMMERCIAL

## 2023-09-18 VITALS — DIASTOLIC BLOOD PRESSURE: 78 MMHG | SYSTOLIC BLOOD PRESSURE: 132 MMHG

## 2023-09-18 DIAGNOSIS — M85.80 OSTEOPENIA, UNSPECIFIED LOCATION: Primary | ICD-10-CM

## 2023-09-18 PROCEDURE — 63600175 PHARM REV CODE 636 W HCPCS: Performed by: INTERNAL MEDICINE

## 2023-09-18 PROCEDURE — 96365 THER/PROPH/DIAG IV INF INIT: CPT

## 2023-09-18 RX ORDER — HEPARIN 100 UNIT/ML
500 SYRINGE INTRAVENOUS
Status: CANCELLED | OUTPATIENT
Start: 2023-09-18

## 2023-09-18 RX ORDER — HEPARIN 100 UNIT/ML
500 SYRINGE INTRAVENOUS
Status: CANCELLED | OUTPATIENT
Start: 2024-03-18

## 2023-09-18 RX ORDER — HEPARIN 100 UNIT/ML
500 SYRINGE INTRAVENOUS
Status: DISCONTINUED | OUTPATIENT
Start: 2023-09-18 | End: 2023-09-18 | Stop reason: HOSPADM

## 2023-09-18 RX ORDER — SODIUM CHLORIDE 0.9 % (FLUSH) 0.9 %
10 SYRINGE (ML) INJECTION
Status: CANCELLED | OUTPATIENT
Start: 2024-03-18

## 2023-09-18 RX ORDER — SODIUM CHLORIDE 0.9 % (FLUSH) 0.9 %
10 SYRINGE (ML) INJECTION
Status: CANCELLED | OUTPATIENT
Start: 2023-09-18

## 2023-09-18 RX ORDER — SODIUM CHLORIDE 0.9 % (FLUSH) 0.9 %
10 SYRINGE (ML) INJECTION
Status: DISCONTINUED | OUTPATIENT
Start: 2023-09-18 | End: 2023-09-18 | Stop reason: HOSPADM

## 2023-09-18 RX ORDER — ZOLEDRONIC ACID 4 MG/100ML
4 SOLUTION INTRAVENOUS
Status: COMPLETED | OUTPATIENT
Start: 2023-09-18 | End: 2023-09-18

## 2023-09-18 RX ADMIN — ZOLEDRONIC ACID 4 MG: 4 SOLUTION INTRAVENOUS at 03:09

## 2023-10-19 DIAGNOSIS — Z17.0 MALIGNANT NEOPLASM OF LOWER-INNER QUADRANT OF RIGHT BREAST OF FEMALE, ESTROGEN RECEPTOR POSITIVE: ICD-10-CM

## 2023-10-19 DIAGNOSIS — C50.311 MALIGNANT NEOPLASM OF LOWER-INNER QUADRANT OF RIGHT BREAST OF FEMALE, ESTROGEN RECEPTOR POSITIVE: ICD-10-CM

## 2023-10-19 RX ORDER — EXEMESTANE 25 MG/1
25 TABLET ORAL
Qty: 90 TABLET | Refills: 3 | Status: SHIPPED | OUTPATIENT
Start: 2023-10-19

## 2023-10-24 ENCOUNTER — LAB VISIT (OUTPATIENT)
Dept: LAB | Facility: HOSPITAL | Age: 63
End: 2023-10-24
Attending: INTERNAL MEDICINE
Payer: COMMERCIAL

## 2023-10-24 DIAGNOSIS — E11.65 TYPE 2 DIABETES MELLITUS WITH HYPERGLYCEMIA, WITHOUT LONG-TERM CURRENT USE OF INSULIN: Primary | ICD-10-CM

## 2023-10-24 DIAGNOSIS — Z17.0 MALIGNANT NEOPLASM OF LOWER-INNER QUADRANT OF RIGHT BREAST OF FEMALE, ESTROGEN RECEPTOR POSITIVE: ICD-10-CM

## 2023-10-24 DIAGNOSIS — M85.80 OSTEOPENIA, UNSPECIFIED LOCATION: ICD-10-CM

## 2023-10-24 DIAGNOSIS — C50.311 MALIGNANT NEOPLASM OF LOWER-INNER QUADRANT OF RIGHT BREAST OF FEMALE, ESTROGEN RECEPTOR POSITIVE: ICD-10-CM

## 2023-10-24 LAB
ALBUMIN SERPL-MCNC: 4.4 G/DL (ref 3.4–4.8)
ALBUMIN/GLOB SERPL: 1.4 RATIO (ref 1.1–2)
ALP SERPL-CCNC: 103 UNIT/L (ref 40–150)
ALT SERPL-CCNC: 16 UNIT/L (ref 0–55)
AST SERPL-CCNC: 19 UNIT/L (ref 5–34)
BASOPHILS # BLD AUTO: 0.04 X10(3)/MCL
BASOPHILS NFR BLD AUTO: 0.8 %
BILIRUB SERPL-MCNC: 0.4 MG/DL
BUN SERPL-MCNC: 15.1 MG/DL (ref 9.8–20.1)
CALCIUM SERPL-MCNC: 9.8 MG/DL (ref 8.4–10.2)
CHLORIDE SERPL-SCNC: 106 MMOL/L (ref 98–107)
CO2 SERPL-SCNC: 28 MMOL/L (ref 23–31)
CREAT SERPL-MCNC: 1.15 MG/DL (ref 0.55–1.02)
EOSINOPHIL # BLD AUTO: 0.08 X10(3)/MCL (ref 0–0.9)
EOSINOPHIL NFR BLD AUTO: 1.6 %
ERYTHROCYTE [DISTWIDTH] IN BLOOD BY AUTOMATED COUNT: 12 % (ref 11.5–17)
EST. AVERAGE GLUCOSE BLD GHB EST-MCNC: 177.2 MG/DL
GFR SERPLBLD CREATININE-BSD FMLA CKD-EPI: 53 MLS/MIN/1.73/M2
GLOBULIN SER-MCNC: 3.1 GM/DL (ref 2.4–3.5)
GLUCOSE SERPL-MCNC: 140 MG/DL (ref 82–115)
HBA1C MFR BLD: 7.8 %
HCT VFR BLD AUTO: 35 % (ref 37–47)
HGB BLD-MCNC: 11 G/DL (ref 12–16)
IMM GRANULOCYTES # BLD AUTO: 0.02 X10(3)/MCL (ref 0–0.04)
IMM GRANULOCYTES NFR BLD AUTO: 0.4 %
LYMPHOCYTES # BLD AUTO: 1.91 X10(3)/MCL (ref 0.6–4.6)
LYMPHOCYTES NFR BLD AUTO: 38.2 %
MCH RBC QN AUTO: 32.1 PG (ref 27–31)
MCHC RBC AUTO-ENTMCNC: 31.4 G/DL (ref 33–36)
MCV RBC AUTO: 102 FL (ref 80–94)
MONOCYTES # BLD AUTO: 0.47 X10(3)/MCL (ref 0.1–1.3)
MONOCYTES NFR BLD AUTO: 9.4 %
NEUTROPHILS # BLD AUTO: 2.48 X10(3)/MCL (ref 2.1–9.2)
NEUTROPHILS NFR BLD AUTO: 49.6 %
PLATELET # BLD AUTO: 179 X10(3)/MCL (ref 130–400)
PMV BLD AUTO: 11.2 FL (ref 7.4–10.4)
POTASSIUM SERPL-SCNC: 4.6 MMOL/L (ref 3.5–5.1)
PROT SERPL-MCNC: 7.5 GM/DL (ref 5.8–7.6)
RBC # BLD AUTO: 3.43 X10(6)/MCL (ref 4.2–5.4)
SODIUM SERPL-SCNC: 140 MMOL/L (ref 136–145)
WBC # SPEC AUTO: 5 X10(3)/MCL (ref 4.5–11.5)

## 2023-10-24 PROCEDURE — 36415 COLL VENOUS BLD VENIPUNCTURE: CPT

## 2023-10-24 PROCEDURE — 85025 COMPLETE CBC W/AUTO DIFF WBC: CPT

## 2023-10-24 PROCEDURE — 80053 COMPREHEN METABOLIC PANEL: CPT

## 2023-10-24 PROCEDURE — 83036 HEMOGLOBIN GLYCOSYLATED A1C: CPT

## 2023-10-26 ENCOUNTER — OFFICE VISIT (OUTPATIENT)
Dept: HEMATOLOGY/ONCOLOGY | Facility: CLINIC | Age: 63
End: 2023-10-26
Payer: COMMERCIAL

## 2023-10-26 VITALS
WEIGHT: 170 LBS | BODY MASS INDEX: 31.09 KG/M2 | TEMPERATURE: 98 F | HEART RATE: 76 BPM | SYSTOLIC BLOOD PRESSURE: 129 MMHG | DIASTOLIC BLOOD PRESSURE: 70 MMHG | OXYGEN SATURATION: 99 %

## 2023-10-26 DIAGNOSIS — Z17.0 MALIGNANT NEOPLASM OF LOWER-INNER QUADRANT OF RIGHT BREAST OF FEMALE, ESTROGEN RECEPTOR POSITIVE: Primary | ICD-10-CM

## 2023-10-26 DIAGNOSIS — D64.9 ANEMIA, UNSPECIFIED TYPE: ICD-10-CM

## 2023-10-26 DIAGNOSIS — C50.311 MALIGNANT NEOPLASM OF LOWER-INNER QUADRANT OF RIGHT BREAST OF FEMALE, ESTROGEN RECEPTOR POSITIVE: Primary | ICD-10-CM

## 2023-10-26 PROCEDURE — 1159F PR MEDICATION LIST DOCUMENTED IN MEDICAL RECORD: ICD-10-PCS | Mod: CPTII,S$GLB,, | Performed by: NURSE PRACTITIONER

## 2023-10-26 PROCEDURE — 99999 PR PBB SHADOW E&M-EST. PATIENT-LVL IV: CPT | Mod: PBBFAC,,, | Performed by: NURSE PRACTITIONER

## 2023-10-26 PROCEDURE — 1159F MED LIST DOCD IN RCRD: CPT | Mod: CPTII,S$GLB,, | Performed by: NURSE PRACTITIONER

## 2023-10-26 PROCEDURE — 4010F PR ACE/ARB THEARPY RXD/TAKEN: ICD-10-PCS | Mod: CPTII,S$GLB,, | Performed by: NURSE PRACTITIONER

## 2023-10-26 PROCEDURE — 3074F PR MOST RECENT SYSTOLIC BLOOD PRESSURE < 130 MM HG: ICD-10-PCS | Mod: CPTII,S$GLB,, | Performed by: NURSE PRACTITIONER

## 2023-10-26 PROCEDURE — 3078F DIAST BP <80 MM HG: CPT | Mod: CPTII,S$GLB,, | Performed by: NURSE PRACTITIONER

## 2023-10-26 PROCEDURE — 3074F SYST BP LT 130 MM HG: CPT | Mod: CPTII,S$GLB,, | Performed by: NURSE PRACTITIONER

## 2023-10-26 PROCEDURE — 4010F ACE/ARB THERAPY RXD/TAKEN: CPT | Mod: CPTII,S$GLB,, | Performed by: NURSE PRACTITIONER

## 2023-10-26 PROCEDURE — 99999 PR PBB SHADOW E&M-EST. PATIENT-LVL IV: ICD-10-PCS | Mod: PBBFAC,,, | Performed by: NURSE PRACTITIONER

## 2023-10-26 PROCEDURE — 3051F PR MOST RECENT HEMOGLOBIN A1C LEVEL 7.0 - < 8.0%: ICD-10-PCS | Mod: CPTII,S$GLB,, | Performed by: NURSE PRACTITIONER

## 2023-10-26 PROCEDURE — 3051F HG A1C>EQUAL 7.0%<8.0%: CPT | Mod: CPTII,S$GLB,, | Performed by: NURSE PRACTITIONER

## 2023-10-26 PROCEDURE — 3078F PR MOST RECENT DIASTOLIC BLOOD PRESSURE < 80 MM HG: ICD-10-PCS | Mod: CPTII,S$GLB,, | Performed by: NURSE PRACTITIONER

## 2023-10-26 PROCEDURE — 99214 OFFICE O/P EST MOD 30 MIN: CPT | Mod: S$GLB,,, | Performed by: NURSE PRACTITIONER

## 2023-10-26 PROCEDURE — 1160F PR REVIEW ALL MEDS BY PRESCRIBER/CLIN PHARMACIST DOCUMENTED: ICD-10-PCS | Mod: CPTII,S$GLB,, | Performed by: NURSE PRACTITIONER

## 2023-10-26 PROCEDURE — 99214 PR OFFICE/OUTPT VISIT, EST, LEVL IV, 30-39 MIN: ICD-10-PCS | Mod: S$GLB,,, | Performed by: NURSE PRACTITIONER

## 2023-10-26 PROCEDURE — 3008F BODY MASS INDEX DOCD: CPT | Mod: CPTII,S$GLB,, | Performed by: NURSE PRACTITIONER

## 2023-10-26 PROCEDURE — 1160F RVW MEDS BY RX/DR IN RCRD: CPT | Mod: CPTII,S$GLB,, | Performed by: NURSE PRACTITIONER

## 2023-10-26 PROCEDURE — 3008F PR BODY MASS INDEX (BMI) DOCUMENTED: ICD-10-PCS | Mod: CPTII,S$GLB,, | Performed by: NURSE PRACTITIONER

## 2023-10-26 NOTE — PROGRESS NOTES
Subjective:       Patient ID: Delia Scruggs is a 63 y.o. female.  Surgeon: Dr. Sd Baltazar  Radiation oncologist: Dr. Browne Prellop    Recurrence vs. Second Primary Right Breast AJCC Anatomic and Clinical Prognostic Stage IA ([id number])--Diagnosed 21  Right Breast Cancer Stage IIA (T2N0M0) diagnosed 17  Biopsy/pathology:   1. Right breast mass biopsy done 17--invasive ductal carcinoma, grade III with focal DCIS, ER 25.72%, AR 0.28% negative, Her2 1+ negative by IHC. Oncotype DX testing showed recurrence score of 43 (distant recurrence risk 40% with Tamoxifen alone and 12% Tamoxifen + chemo), PCR done for breast panel showed triple negative.  2. Right breast biopsy mass at 6:00 done 21--invasive ductal carcinoma, Grade 2, ER 29%, AR 0%, Her2 0 by IHC, Ki67 80% high.  Surgery/pathology:   1. Right breast needle localization lumpectomy and SLN biopsy done 17--invasive mammary carcinoma with papillary and ductal features, grade III, 2.2cm, no lymphovascular or perineural invasion, margins clear, few foci of non-invasive predominantly solid papillary carcinoma also identified, 2 sentinel lymph nodes negative, closest margin medial, re-excision of medial margin with focal residual invasive papillary carcinoma <0.1cm, now free.  2. Bilateral mastectomies with right ALND done 22--neoadjuvant therapy effects with near complete tumor response, solitary focus of invasive ductal carcinoma 2mm, Grade 1, resection margin clear, left breast with no atypia or malignancy, 2 LNs negative.   Imagin. Screening MMG 16--asymmetry medially right breast.  2. Diagnostic MMG right breast 16--asymmetrical density located posteriorly in lower inner quadrant of right breast, suggestion of underlying clearly circumscribed 10mm rounded density w/n area on spot compression.  3. US right breast 16--focal mass 4:00 8cm from nipple hypoechoic and hetrogensous with irregular margins  1.7X1.1X1.4cm BIRADS 5.  4. CT A/P 2/24/17--fluid in resection cavity right breast, no evidence for metastatic disease, vague area of enhancement right hepatic low, not typical of metastatic disease, consider liver mass protocol MRI.  5. Bone scan 2/24/17--DJD right knee. No evidence of any metastatic bone lesions.  6. MRI abdomen/liver protocol done 3/7/17--hepatic lesion in question is most c/w focal nodular hyperplasia.  7. Screening MMG done 11/24/21--asymmetry in the area of the lumpectomy site in the inner right breast posterior depth on the CC view needs further evaluation.    8. MMG/US right breast done 12/20/21--Findings concerning for new or recurrent malignancy at the site of prior lumpectomy in this patient with a personal history of right breast cancer. The three adjacent (one dominant 1.8cm, two satellite 5mm and 4mm) irregular hypoechoic masses in the right breast 6:00 axis 4 cm FN position are highly suggestive of malignancy. BI-RADS 5: Highly suggestive of malignancy. No suspicious right axillary adenopathy.  9. MRI bilateral breasts done 1/26/22--Right breast 6:00 irregular heterogeneously enhancing mass containing a biopsy clip, measuring 1.6 m x 0.9 cm x 1.5 cm, is consistent with the known biopsy-proven right breast invasive malignancy. Multiple additional irregular heterogeneously enhancing masses with irregular margins within the lumpectomy bed and anterior to the lumpectomy bed in the 4:00 to 6:00 right breast middle to posterior depths, in total spanning 5.7 cm x 2.8 cm x 2.2 cm, are also highly suggestive of malignancy. The posterior aspect of the suspicious enhancement is 1 cm anterior to the pectoralis major muscle. Although there is slight tenting and mild enhancement of the pectoralis major muscle, this is most likely related to adherent post lumpectomy scarring and post radiation change, no suspicious enhancement in the left breast. No significant internal mammary or axillary  adenopathy.  10. Diagnostic MMG/US 3/30/22--Mild-moderate partial response to neoadjuvant chemotherapy, evidenced mammographically by decreased density and conspicuity of the known malignant masses in the right breast 6:00 axis posterior depth at the site of prior lumpectomy. The previously biopsied dominant mass has decreased in size as measured sonographically, now 1.1 x 0.6 x 1.6 cm, previously 1.8 x 0.9 x 1.7 cm. Also, the two satellite masses previously identified on sonography have resolved. BI-RADS 6: Known biopsy-proven malignancy.    Mediport placement by Dr. Baltazar on 3/23/17--removed 6/2020.  Mediport placed by Dr. Baltazar on 2/7/22.     Genetic testing on 3/21/17 was negative.    DEXA:   10/24/17--L1-L4 T = 1.3, Left femur neck -1.1, right femur neck -2.1, total left femur 0.0, right -0.7 c/w osteopenia.  11/21/19--L1-L4 T = 1.7, Left femur neck -1.3, right femur neck -2.0, total left femur -0.3, right -0.7 (mixed response).  11/24/21--L1-L4 T = 2.0, Left femur neck -1.2, right femur neck -2.0, total left femur -0.1, right -0.7 (improved spine, left hip, right stable).     Treatment History:   1. DDAC x 4 doses. Started 4/3/17. Completed on 5/15/17.    2. Weekly Taxol X 12 completed 5/30/17--8/15/17.   3. Adjuvant XRT completed 8/30/17 - 9/28/17.    4. Femara 10/11/17--stopped 2/1/22.   5. Prolia 6/15/18--12/30/21 (on hold).    6. Neoadjuvant Taxotere/Cytoxan X 6 cycles completed 2/10/22--5/26/22.   7. Bilateral mastectomies 6/29/22.    8. RAHUL flap reconstruction done 10/26/22 Dr. Rivera.    Treatment plan:   1. Adjuvant Aromasin started 8/17/22.  2. Zometa every 6 months X 2 years. Started 9/2022, given 9/18/23 next dose due 3/24.     Chief Complaint: Other ( Would like to discuss her medication (Exemestane))    HPI   Patient presents for follow-up of breast cancer. She is doing okay. She underwent right breast expander placement in May with Dr. Rivera but he has now left [facility] so she met with   Julienne to complete her surgery. He is is working her up at this time. Otherwise, she continues tolerating Aromasin without any problems. Recent labs show mild anemia which has been off/on. No other problems reported today.     Past Medical History:   Diagnosis Date    Anemia, unspecified     Anxiety     Breast cancer     Diabetes mellitus, type 2     Hyperlipidemia     Hypertension     Mitral valve prolapse     Obesity     Osteopenia     Sleep apnea     uses CPAP      Review of patient's allergies indicates:  No Known Allergies   Current Outpatient Medications on File Prior to Visit   Medication Sig Dispense Refill    amLODIPine-benazepriL (LOTREL) 10-40 mg per capsule Take 1 capsule by mouth once daily.      ascorbic acid, vitamin C, (VITAMIN C) 500 MG tablet Take 500 mg by mouth once daily.      b complex vitamins tablet Take 1 tablet by mouth once daily.      calcium carbonate-vitamin D3 600 mg-20 mcg (800 unit) Chew Take 1 tablet by mouth once daily.      empagliflozin (JARDIANCE) 25 mg tablet Take 1 tablet by mouth once daily.      exemestane (AROMASIN) 25 mg tablet Take 1 tablet by mouth once daily 90 tablet 3    ferrous gluconate (FERGON) 240 (27 FE) MG tablet Take 240 mg by mouth once daily.      GLUTAMINE ORAL Take 1 Scoop by mouth as needed.      GLUTAMINE ORAL Take 1 tablet by mouth once daily.      loratadine 10 mg Cap Take 10 mg by mouth as needed.      magnesium 250 mg Tab Take 1 tablet by mouth once daily.      metFORMIN (GLUCOPHAGE) 500 MG tablet Take 500 mg by mouth 2 (two) times daily with meals.      metoprolol succinate (TOPROL-XL) 50 MG 24 hr tablet Take 1 tablet by mouth once daily.      multivitamin-folic acid-biotin (HAIR-SKIN-NAILS, MV-FA-BIOTIN,) 400-2,000 mcg Tab Take 1 tablet by mouth once daily.      multivitamin-minerals-lutein Tab Take 1 tablet by mouth once daily at 6am.      NON FORMULARY MEDICATION Take 1 tablet by mouth once daily. Tumeric      omega-3 fatty acids-fish oil  360-1,200 mg Cap Take 1 capsule by mouth once daily.      pyridoxine, vitamin B6, (B-6) 100 MG Tab Take 100 mg by mouth once daily.      rosuvastatin (CRESTOR) 20 MG tablet       SITagliptin (JANUVIA) 100 MG Tab Take 1 tablet by mouth once daily.       No current facility-administered medications on file prior to visit.      Review of Systems   Constitutional:  Negative for activity change, appetite change, fever and unexpected weight change.   HENT:  Negative for mouth sores.    Eyes:  Negative for visual disturbance.   Respiratory:  Negative for cough and shortness of breath.    Cardiovascular:  Negative for chest pain.   Gastrointestinal:  Negative for abdominal pain, blood in stool, constipation, diarrhea, nausea and vomiting.   Genitourinary:  Negative for difficulty urinating and frequency.   Musculoskeletal:  Negative for back pain.   Integumentary:  Negative for rash.        Bilateral mastectomies with reconstruction. S/p right breast expander placement   Neurological:  Negative for dizziness, weakness and headaches.   Hematological:  Negative for adenopathy.   Psychiatric/Behavioral:  Negative for behavioral problems and suicidal ideas. The patient is not nervous/anxious.          Vitals:    10/26/23 0853   BP: 129/70   Pulse: 76   Temp: 98 °F (36.7 °C)     Physical Exam  Vitals reviewed.   Constitutional:       Appearance: Normal appearance. She is normal weight.      Comments: Pleasant AA female   HENT:      Head: Normocephalic.   Eyes:      General: Lids are normal. Vision grossly intact.      Extraocular Movements: Extraocular movements intact.      Conjunctiva/sclera: Conjunctivae normal.   Cardiovascular:      Rate and Rhythm: Normal rate and regular rhythm.      Pulses: Normal pulses.      Heart sounds: S1 normal and S2 normal. Murmur heard.      Systolic murmur is present with a grade of 2/6.   Pulmonary:      Effort: Pulmonary effort is normal.      Breath sounds: Normal breath sounds.   Chest:       Comments: Bilateral mastectomies with RAHUL reconstruction. S/p recent right breast expander placement. Incisions all healed. Palpable scar tissue along incision lines but no other masses  Abdominal:      General: Abdomen is flat. Bowel sounds are normal. There is no distension.      Palpations: Abdomen is soft.      Tenderness: There is no abdominal tenderness.   Musculoskeletal:      Cervical back: Normal range of motion and neck supple.      Right lower leg: No edema.      Left lower leg: No edema.   Skin:     General: Skin is warm and moist.      Capillary Refill: Capillary refill takes less than 2 seconds.      Comments: Left CW mediport intact   Neurological:      General: No focal deficit present.      Mental Status: She is alert and oriented to person, place, and time.   Psychiatric:         Attention and Perception: Attention normal.         Mood and Affect: Mood normal.         Speech: Speech normal.         Behavior: Behavior normal. Behavior is cooperative.         Judgment: Judgment normal.       ECOG SCORE    0 - Fully active-able to carry on all pre-disease performance without restriction        No visits with results within 1 Day(s) from this visit.   Latest known visit with results is:   Lab Visit on 10/24/2023   Component Date Value Ref Range Status    Sodium Level 10/24/2023 140  136 - 145 mmol/L Final    Potassium Level 10/24/2023 4.6  3.5 - 5.1 mmol/L Final    Chloride 10/24/2023 106  98 - 107 mmol/L Final    Carbon Dioxide 10/24/2023 28  23 - 31 mmol/L Final    Glucose Level 10/24/2023 140 (H)  82 - 115 mg/dL Final    Blood Urea Nitrogen 10/24/2023 15.1  9.8 - 20.1 mg/dL Final    Creatinine 10/24/2023 1.15 (H)  0.55 - 1.02 mg/dL Final    Calcium Level Total 10/24/2023 9.8  8.4 - 10.2 mg/dL Final    Protein Total 10/24/2023 7.5  5.8 - 7.6 gm/dL Final    Albumin Level 10/24/2023 4.4  3.4 - 4.8 g/dL Final    Globulin 10/24/2023 3.1  2.4 - 3.5 gm/dL Final    Albumin/Globulin Ratio 10/24/2023 1.4   1.1 - 2.0 ratio Final    Bilirubin Total 10/24/2023 0.4  <=1.5 mg/dL Final    Alkaline Phosphatase 10/24/2023 103  40 - 150 unit/L Final    Alanine Aminotransferase 10/24/2023 16  0 - 55 unit/L Final    Aspartate Aminotransferase 10/24/2023 19  5 - 34 unit/L Final    eGFR 10/24/2023 53  mls/min/1.73/m2 Final    WBC 10/24/2023 5.00  4.50 - 11.50 x10(3)/mcL Final    RBC 10/24/2023 3.43 (L)  4.20 - 5.40 x10(6)/mcL Final    Hgb 10/24/2023 11.0 (L)  12.0 - 16.0 g/dL Final    Hct 10/24/2023 35.0 (L)  37.0 - 47.0 % Final    MCV 10/24/2023 102.0 (H)  80.0 - 94.0 fL Final    MCH 10/24/2023 32.1 (H)  27.0 - 31.0 pg Final    MCHC 10/24/2023 31.4 (L)  33.0 - 36.0 g/dL Final    RDW 10/24/2023 12.0  11.5 - 17.0 % Final    Platelet 10/24/2023 179  130 - 400 x10(3)/mcL Final    MPV 10/24/2023 11.2 (H)  7.4 - 10.4 fL Final    Neut % 10/24/2023 49.6  % Final    Lymph % 10/24/2023 38.2  % Final    Mono % 10/24/2023 9.4  % Final    Eos % 10/24/2023 1.6  % Final    Basophil % 10/24/2023 0.8  % Final    Lymph # 10/24/2023 1.91  0.6 - 4.6 x10(3)/mcL Final    Neut # 10/24/2023 2.48  2.1 - 9.2 x10(3)/mcL Final    Mono # 10/24/2023 0.47  0.1 - 1.3 x10(3)/mcL Final    Eos # 10/24/2023 0.08  0 - 0.9 x10(3)/mcL Final    Baso # 10/24/2023 0.04  <=0.2 x10(3)/mcL Final    IG# 10/24/2023 0.02  0 - 0.04 x10(3)/mcL Final    IG% 10/24/2023 0.4  % Final    Hemoglobin A1c 10/24/2023 7.8 (H)  <=7.0 % Final    Estimated Average Glucose 10/24/2023 177.2  mg/dL Final     Assessment:            1. Malignant neoplasm of lower-inner quadrant of right breast of female, estrogen receptor positive    2. Anemia, unspecified type          Plan:     Patient with breast cancer stage IIA, 2.2cm tumor, high grade, ER weakly positive 25%. S/p right breast lumpectomy on 1/26/17. Lymph node negative.  Per NCCN guidelines, oncotype DX testing recommended.  Oncotype showed tumor high risk and PCR breast panel showing triple negative.  Treatment recommended with dose dense  AC x 4 followed by weekly Taxol x 12.  Treatment was delayed due to non-healing breast wound.  Patient completed 4 cycles DDAC on 5/15/17 and 12 cycles of weekly Taxol on 8/15/17.  Adjuvant XRT completed on 9/28/17 and patient tolerated well.   Patient started Femara on 10/11/17.  Also started on Prolia for osteopenia, last dose in 12/2021.    Screening MMG from 11/2021 showed an asymmetry in area of lumpectomy site in right breast.  Diagnostic MMG/US 12/2021 showed suspicious dominant mass 1.8cm 6:00 with 2 additional satellite lesions.  s/p biopsy 12/21/21 pathology c/w IDCA Grade 2, weakly ER+ 29%, SD and Her2 negative with high Ki67 80%, same breast profile as original cancer.  This represents a recurrence vs. new primary which developed while on AI.  MRI 1/26/22 showed multiple masses, spanning area of 5.7cm wtih dominant mass 1.6cm, no suspicious nodes or enhancing masses in left breast.   Patient met with Dr. Baltazar and surgical plan is for bilateral mastectomies.  Recommended neoadjuvant chemotherapy due to concerns for wound healing following surgery and need for chemotherapy given recurrence.  Patient completed 6 cycles Taxotere/Cytoxan 2/10/22--5/26/22.   MMG/US done after cycle 3 showed decrease in dominant mass and resolution of satellite masses c/w good partial response.  S/p bilateral mastectomies done 6/29/22 and showed only a residual IDCA 2mm, with negative LNs, and left breast negative for malignancy.     Started Aromasin on 8/17/22 and plan for 10 years. She is tolerating well.   S/p reconstruction on 10/26/22 she did have delayed wound healing.   S/p right breast expander placement on 5/1/23. All incisions are healed.   Recent labs with mild anemia, Hgb of 11.0 g/dL. Will repeat workup at next appointment. Mild renal insufficiency noted. Otherwise labs are good.   Patient is on oral iron which she will continue.  Dr. Lala had considered adjuvant Verzenio, but decided against it since node  negative tumors were not included in the trial and also because patient is far out from surgery.   Dr. Rivera is no longer with [facility], she is being worked up by Dr. Robins for breast reconstruction.   Will continue surveillance visits every 3 months.     Changed from Prolia to adjuvant Zometa every 6 months last dose 9/2023 next dose 3/24.  Due to repeat DEXA in November 2023. Orders sent.  Continue MPF as well.   All questions answered at this time.       RON Boswell

## 2023-10-30 ENCOUNTER — INFUSION (OUTPATIENT)
Dept: INFUSION THERAPY | Facility: HOSPITAL | Age: 63
End: 2023-10-30
Attending: INTERNAL MEDICINE
Payer: COMMERCIAL

## 2023-10-30 VITALS
WEIGHT: 173.19 LBS | HEIGHT: 62 IN | DIASTOLIC BLOOD PRESSURE: 67 MMHG | SYSTOLIC BLOOD PRESSURE: 122 MMHG | OXYGEN SATURATION: 96 % | HEART RATE: 83 BPM | TEMPERATURE: 98 F | BODY MASS INDEX: 31.87 KG/M2 | RESPIRATION RATE: 18 BRPM

## 2023-10-30 DIAGNOSIS — Z17.0 MALIGNANT NEOPLASM OF LOWER-INNER QUADRANT OF RIGHT BREAST OF FEMALE, ESTROGEN RECEPTOR POSITIVE: Primary | ICD-10-CM

## 2023-10-30 DIAGNOSIS — C50.311 MALIGNANT NEOPLASM OF LOWER-INNER QUADRANT OF RIGHT BREAST OF FEMALE, ESTROGEN RECEPTOR POSITIVE: Primary | ICD-10-CM

## 2023-10-30 PROCEDURE — 63600175 PHARM REV CODE 636 W HCPCS: Performed by: NURSE PRACTITIONER

## 2023-10-30 PROCEDURE — A4216 STERILE WATER/SALINE, 10 ML: HCPCS | Performed by: NURSE PRACTITIONER

## 2023-10-30 PROCEDURE — 96523 IRRIG DRUG DELIVERY DEVICE: CPT

## 2023-10-30 PROCEDURE — 25000003 PHARM REV CODE 250: Performed by: NURSE PRACTITIONER

## 2023-10-30 RX ORDER — SODIUM CHLORIDE 0.9 % (FLUSH) 0.9 %
10 SYRINGE (ML) INJECTION
Status: DISCONTINUED | OUTPATIENT
Start: 2023-10-30 | End: 2023-10-30 | Stop reason: HOSPADM

## 2023-10-30 RX ORDER — SODIUM CHLORIDE 0.9 % (FLUSH) 0.9 %
10 SYRINGE (ML) INJECTION
Status: CANCELLED | OUTPATIENT
Start: 2024-01-22

## 2023-10-30 RX ORDER — HEPARIN 100 UNIT/ML
500 SYRINGE INTRAVENOUS
Status: CANCELLED | OUTPATIENT
Start: 2024-01-22

## 2023-10-30 RX ORDER — HEPARIN 100 UNIT/ML
500 SYRINGE INTRAVENOUS
Status: DISCONTINUED | OUTPATIENT
Start: 2023-10-30 | End: 2023-10-30 | Stop reason: HOSPADM

## 2023-10-30 RX ADMIN — Medication 10 ML: at 08:10

## 2023-10-30 RX ADMIN — HEPARIN 500 UNITS: 100 SYRINGE at 08:10

## 2023-10-30 NOTE — NURSING
Mediport flushed without any complications. Pt discharged home in stable condition, future appts given.

## 2023-11-06 ENCOUNTER — HOSPITAL ENCOUNTER (OUTPATIENT)
Dept: RADIOLOGY | Facility: HOSPITAL | Age: 63
Discharge: HOME | End: 2023-11-06
Attending: NURSE PRACTITIONER
Payer: COMMERCIAL

## 2023-11-06 DIAGNOSIS — M85.80 OSTEOPENIA, UNSPECIFIED LOCATION: ICD-10-CM

## 2023-11-06 DIAGNOSIS — C50.311 MALIGNANT NEOPLASM OF LOWER-INNER QUADRANT OF RIGHT BREAST OF FEMALE, ESTROGEN RECEPTOR POSITIVE: ICD-10-CM

## 2023-11-06 DIAGNOSIS — Z17.0 MALIGNANT NEOPLASM OF LOWER-INNER QUADRANT OF RIGHT BREAST OF FEMALE, ESTROGEN RECEPTOR POSITIVE: ICD-10-CM

## 2023-11-06 PROCEDURE — 77080 DXA BONE DENSITY AXIAL: CPT | Mod: TC

## 2023-11-06 PROCEDURE — 77080 DXA BONE DENSITY AXIAL: CPT | Mod: 26,,, | Performed by: STUDENT IN AN ORGANIZED HEALTH CARE EDUCATION/TRAINING PROGRAM

## 2023-11-06 PROCEDURE — 77080 DXA BONE DENSITY AXIAL SKELETON 1 OR MORE SITES: ICD-10-PCS | Mod: 26,,, | Performed by: STUDENT IN AN ORGANIZED HEALTH CARE EDUCATION/TRAINING PROGRAM

## 2023-11-08 NOTE — PROGRESS NOTES
Recent DEXA shows slight worsening bone density in all areas. She was previously on Prolia from 2018 - 2021 then most recently started on Zometa in September 2022. We can continue the Zometa for now but if it worsens again then will need to consider referral to Endocrinology.

## 2023-11-27 ENCOUNTER — LAB VISIT (OUTPATIENT)
Dept: LAB | Facility: HOSPITAL | Age: 63
End: 2023-11-27
Attending: INTERNAL MEDICINE
Payer: COMMERCIAL

## 2023-11-27 DIAGNOSIS — E11.69 DIABETES MELLITUS ASSOCIATED WITH HORMONAL ETIOLOGY: Primary | ICD-10-CM

## 2023-11-27 LAB
CREAT UR-MCNC: 138.3 MG/DL (ref 45–106)
EST. AVERAGE GLUCOSE BLD GHB EST-MCNC: 168.6 MG/DL
HBA1C MFR BLD: 7.5 %
MICROALBUMIN UR-MCNC: 26.1 UG/ML
MICROALBUMIN/CREAT RATIO PNL UR: 18.9 MG/GM CR (ref 0–30)

## 2023-11-27 PROCEDURE — 82043 UR ALBUMIN QUANTITATIVE: CPT

## 2023-11-27 PROCEDURE — 83036 HEMOGLOBIN GLYCOSYLATED A1C: CPT

## 2023-11-27 PROCEDURE — 36415 COLL VENOUS BLD VENIPUNCTURE: CPT

## 2024-01-22 ENCOUNTER — INFUSION (OUTPATIENT)
Dept: INFUSION THERAPY | Facility: HOSPITAL | Age: 64
End: 2024-01-22
Attending: INTERNAL MEDICINE
Payer: COMMERCIAL

## 2024-01-22 ENCOUNTER — LAB VISIT (OUTPATIENT)
Dept: LAB | Facility: HOSPITAL | Age: 64
End: 2024-01-22
Attending: INTERNAL MEDICINE
Payer: COMMERCIAL

## 2024-01-22 VITALS
HEART RATE: 83 BPM | OXYGEN SATURATION: 97 % | RESPIRATION RATE: 16 BRPM | DIASTOLIC BLOOD PRESSURE: 66 MMHG | SYSTOLIC BLOOD PRESSURE: 112 MMHG | TEMPERATURE: 99 F

## 2024-01-22 DIAGNOSIS — C50.311 MALIGNANT NEOPLASM OF LOWER-INNER QUADRANT OF RIGHT BREAST OF FEMALE, ESTROGEN RECEPTOR POSITIVE: ICD-10-CM

## 2024-01-22 DIAGNOSIS — C50.311 MALIGNANT NEOPLASM OF LOWER-INNER QUADRANT OF RIGHT BREAST OF FEMALE, ESTROGEN RECEPTOR POSITIVE: Primary | ICD-10-CM

## 2024-01-22 DIAGNOSIS — Z17.0 MALIGNANT NEOPLASM OF LOWER-INNER QUADRANT OF RIGHT BREAST OF FEMALE, ESTROGEN RECEPTOR POSITIVE: ICD-10-CM

## 2024-01-22 DIAGNOSIS — D64.9 ANEMIA, UNSPECIFIED TYPE: ICD-10-CM

## 2024-01-22 DIAGNOSIS — Z17.0 MALIGNANT NEOPLASM OF LOWER-INNER QUADRANT OF RIGHT BREAST OF FEMALE, ESTROGEN RECEPTOR POSITIVE: Primary | ICD-10-CM

## 2024-01-22 LAB
ALBUMIN SERPL-MCNC: 4.4 G/DL (ref 3.4–4.8)
ALBUMIN/GLOB SERPL: 1.3 RATIO (ref 1.1–2)
ALP SERPL-CCNC: 110 UNIT/L (ref 40–150)
ALT SERPL-CCNC: 16 UNIT/L (ref 0–55)
AST SERPL-CCNC: 20 UNIT/L (ref 5–34)
BASOPHILS # BLD AUTO: 0.04 X10(3)/MCL
BASOPHILS NFR BLD AUTO: 0.8 %
BILIRUB SERPL-MCNC: 0.4 MG/DL
BUN SERPL-MCNC: 12.2 MG/DL (ref 9.8–20.1)
CALCIUM SERPL-MCNC: 10.5 MG/DL (ref 8.4–10.2)
CHLORIDE SERPL-SCNC: 105 MMOL/L (ref 98–107)
CO2 SERPL-SCNC: 30 MMOL/L (ref 23–31)
CREAT SERPL-MCNC: 0.86 MG/DL (ref 0.55–1.02)
EOSINOPHIL # BLD AUTO: 0.18 X10(3)/MCL (ref 0–0.9)
EOSINOPHIL NFR BLD AUTO: 3.8 %
ERYTHROCYTE [DISTWIDTH] IN BLOOD BY AUTOMATED COUNT: 12.4 % (ref 11.5–17)
FERRITIN SERPL-MCNC: 253.31 NG/ML (ref 4.63–204)
FOLATE SERPL-MCNC: 14.7 NG/ML (ref 7–31.4)
GFR SERPLBLD CREATININE-BSD FMLA CKD-EPI: >60 MLS/MIN/1.73/M2
GLOBULIN SER-MCNC: 3.3 GM/DL (ref 2.4–3.5)
GLUCOSE SERPL-MCNC: 116 MG/DL (ref 82–115)
HCT VFR BLD AUTO: 36.6 % (ref 37–47)
HGB BLD-MCNC: 11.6 G/DL (ref 12–16)
IMM GRANULOCYTES # BLD AUTO: 0 X10(3)/MCL (ref 0–0.04)
IMM GRANULOCYTES NFR BLD AUTO: 0 %
IRON SATN MFR SERPL: 25 % (ref 20–50)
IRON SERPL-MCNC: 81 UG/DL (ref 50–170)
LYMPHOCYTES # BLD AUTO: 1.79 X10(3)/MCL (ref 0.6–4.6)
LYMPHOCYTES NFR BLD AUTO: 37.4 %
MCH RBC QN AUTO: 32.2 PG (ref 27–31)
MCHC RBC AUTO-ENTMCNC: 31.7 G/DL (ref 33–36)
MCV RBC AUTO: 101.7 FL (ref 80–94)
MONOCYTES # BLD AUTO: 0.42 X10(3)/MCL (ref 0.1–1.3)
MONOCYTES NFR BLD AUTO: 8.8 %
NEUTROPHILS # BLD AUTO: 2.36 X10(3)/MCL (ref 2.1–9.2)
NEUTROPHILS NFR BLD AUTO: 49.2 %
PLATELET # BLD AUTO: 207 X10(3)/MCL (ref 130–400)
PMV BLD AUTO: 10.6 FL (ref 7.4–10.4)
POTASSIUM SERPL-SCNC: 4.7 MMOL/L (ref 3.5–5.1)
PROT SERPL-MCNC: 7.7 GM/DL (ref 5.8–7.6)
RBC # BLD AUTO: 3.6 X10(6)/MCL (ref 4.2–5.4)
SODIUM SERPL-SCNC: 142 MMOL/L (ref 136–145)
TIBC SERPL-MCNC: 239 UG/DL (ref 70–310)
TIBC SERPL-MCNC: 320 UG/DL (ref 250–450)
TRANSFERRIN SERPL-MCNC: 297 MG/DL (ref 173–360)
VIT B12 SERPL-MCNC: >2000 PG/ML (ref 213–816)
WBC # SPEC AUTO: 4.79 X10(3)/MCL (ref 4.5–11.5)

## 2024-01-22 PROCEDURE — 82746 ASSAY OF FOLIC ACID SERUM: CPT

## 2024-01-22 PROCEDURE — 80053 COMPREHEN METABOLIC PANEL: CPT

## 2024-01-22 PROCEDURE — 83540 ASSAY OF IRON: CPT

## 2024-01-22 PROCEDURE — 63600175 PHARM REV CODE 636 W HCPCS: Performed by: NURSE PRACTITIONER

## 2024-01-22 PROCEDURE — 82607 VITAMIN B-12: CPT

## 2024-01-22 PROCEDURE — 85025 COMPLETE CBC W/AUTO DIFF WBC: CPT

## 2024-01-22 PROCEDURE — 36415 COLL VENOUS BLD VENIPUNCTURE: CPT

## 2024-01-22 PROCEDURE — 96523 IRRIG DRUG DELIVERY DEVICE: CPT

## 2024-01-22 PROCEDURE — 82728 ASSAY OF FERRITIN: CPT

## 2024-01-22 RX ORDER — HEPARIN 100 UNIT/ML
500 SYRINGE INTRAVENOUS
OUTPATIENT
Start: 2024-04-15

## 2024-01-22 RX ORDER — HEPARIN 100 UNIT/ML
500 SYRINGE INTRAVENOUS
Status: DISCONTINUED | OUTPATIENT
Start: 2024-01-22 | End: 2024-01-22 | Stop reason: HOSPADM

## 2024-01-22 RX ORDER — SODIUM CHLORIDE 0.9 % (FLUSH) 0.9 %
10 SYRINGE (ML) INJECTION
Status: DISCONTINUED | OUTPATIENT
Start: 2024-01-22 | End: 2024-01-22 | Stop reason: HOSPADM

## 2024-01-22 RX ORDER — SODIUM CHLORIDE 0.9 % (FLUSH) 0.9 %
10 SYRINGE (ML) INJECTION
OUTPATIENT
Start: 2024-04-15

## 2024-01-22 RX ADMIN — HEPARIN 500 UNITS: 100 SYRINGE at 08:01

## 2024-01-22 NOTE — PROGRESS NOTES
Subjective:       Patient ID: Delia Scruggs is a 63 y.o. female.  Surgeon: Dr. Sd Baltazar  Radiation oncologist: Dr. Browne Prellop    Recurrence vs. Second Primary Right Breast AJCC Anatomic and Clinical Prognostic Stage IA ([id number])--Diagnosed 21  Right Breast Cancer Stage IIA (T2N0M0) diagnosed 17  Biopsy/pathology:   1. Right breast mass biopsy done 17--invasive ductal carcinoma, grade III with focal DCIS, ER 25.72%, [place] 0.28% negative, Her2 1+ negative by IHC. Oncotype DX testing showed recurrence score of 43 (distant recurrence risk 40% with Tamoxifen alone and 12% Tamoxifen + chemo), PCR done for breast panel showed triple negative.  2. Right breast biopsy mass at 6:00 done 21--invasive ductal carcinoma, Grade 2, ER 29%, [place] 0%, Her2 0 by IHC, Ki67 80% high.  Surgery/pathology:   1. Right breast needle localization lumpectomy and SLN biopsy done 17--invasive mammary carcinoma with papillary and ductal features, grade III, 2.2cm, no lymphovascular or perineural invasion, margins clear, few foci of non-invasive predominantly solid papillary carcinoma also identified, 2 sentinel lymph nodes negative, closest margin medial, re-excision of medial margin with focal residual invasive papillary carcinoma <0.1cm, now free.  2. Bilateral mastectomies with right ALND done 22--neoadjuvant therapy effects with near complete tumor response, solitary focus of invasive ductal carcinoma 2mm, Grade 1, resection margin clear, left breast with no atypia or malignancy, 2 LNs negative.   Imagin. Screening MMG 16--asymmetry medially right breast.  2. Diagnostic MMG right breast 16--asymmetrical density located posteriorly in lower inner quadrant of right breast, suggestion of underlying clearly circumscribed 10mm rounded density w/n area on spot compression.  3. US right breast 16--focal mass 4:00 8cm from nipple hypoechoic and hetrogensous with irregular margins  1.7X1.1X1.4cm BIRADS 5.  4. CT A/P 2/24/17--fluid in resection cavity right breast, no evidence for metastatic disease, vague area of enhancement right hepatic low, not typical of metastatic disease, consider liver mass protocol MRI.  5. Bone scan 2/24/17--DJD right knee. No evidence of any metastatic bone lesions.  6. MRI abdomen/liver protocol done 3/7/17--hepatic lesion in question is most c/w focal nodular hyperplasia.  7. Screening MMG done 11/24/21--asymmetry in the area of the lumpectomy site in the inner right breast posterior depth on the CC view needs further evaluation.    8. MMG/US right breast done 12/20/21--Findings concerning for new or recurrent malignancy at the site of prior lumpectomy in this patient with a personal history of right breast cancer. The three adjacent (one dominant 1.8cm, two satellite 5mm and 4mm) irregular hypoechoic masses in the right breast 6:00 axis 4 cm FN position are highly suggestive of malignancy. BI-RADS 5: Highly suggestive of malignancy. No suspicious right axillary adenopathy.  9. MRI bilateral breasts done 1/26/22--Right breast 6:00 irregular heterogeneously enhancing mass containing a biopsy clip, measuring 1.6 m x 0.9 cm x 1.5 cm, is consistent with the known biopsy-proven right breast invasive malignancy. Multiple additional irregular heterogeneously enhancing masses with irregular margins within the lumpectomy bed and anterior to the lumpectomy bed in the 4:00 to 6:00 right breast middle to posterior depths, in total spanning 5.7 cm x 2.8 cm x 2.2 cm, are also highly suggestive of malignancy. The posterior aspect of the suspicious enhancement is 1 cm anterior to the pectoralis major muscle. Although there is slight tenting and mild enhancement of the pectoralis major muscle, this is most likely related to adherent post lumpectomy scarring and post radiation change, no suspicious enhancement in the left breast. No significant internal mammary or axillary  adenopathy.  10. Diagnostic MMG/US 3/30/22--Mild-moderate partial response to neoadjuvant chemotherapy, evidenced mammographically by decreased density and conspicuity of the known malignant masses in the right breast 6:00 axis posterior depth at the site of prior lumpectomy. The previously biopsied dominant mass has decreased in size as measured sonographically, now 1.1 x 0.6 x 1.6 cm, previously 1.8 x 0.9 x 1.7 cm. Also, the two satellite masses previously identified on sonography have resolved. BI-RADS 6: Known biopsy-proven malignancy.    Mediport placement by Dr. Baltazar on 3/23/17--removed 6/2020.  Mediport placed by Dr. Baltazar on 2/7/22.     Genetic testing on 3/21/17 was negative.    DEXA:   10/24/17--L1-L4 T = 1.3, Left femur neck -1.1, right femur neck -2.1, total left femur 0.0, right -0.7 c/w osteopenia.  11/21/19--L1-L4 T = 1.7, Left femur neck -1.3, right femur neck -2.0, total left femur -0.3, right -0.7 (mixed response).  11/24/21--L1-L4 T = 2.0, Left femur neck -1.2, right femur neck -2.0, total left femur -0.1, right -0.7 (improved spine, left hip, right stable).   11/6/23--L1-L4 T = 1.8, left femur neck -1.6, right femur neck -2.1, total left -0.4, right total -1.0 (slightly worse in all areas).    Treatment History:   1. DDAC x 4 doses. Started 4/3/17. Completed on 5/15/17.    2. Weekly Taxol X 12 completed 5/30/17--8/15/17.   3. Adjuvant XRT completed 8/30/17 - 9/28/17.    4. Femara 10/11/17--stopped 2/1/22.   5. Prolia 6/15/18--12/30/21 (on hold).    6. Neoadjuvant Taxotere/Cytoxan X 6 cycles completed 2/10/22--5/26/22.   7. Bilateral mastectomies 6/29/22.    8. RAHUL flap reconstruction done 10/26/22 Dr. Rivera.    Treatment plan:   1. Adjuvant Aromasin started 8/17/22.  2. Zometa every 6 months X 2 years. Started 9/2022, given 9/18/23 next dose due 3/24.     Chief Complaint: Other Misc (Pt reports no concerns today.)    HPI   Patient presents for follow-up of breast cancer. She is doing well.  She is now on Moujaro and Dr. Robins plans to see her back in 4 months and discuss again more reconstruction. She continues on Aromasin with no problems.     Past Medical History:   Diagnosis Date    Anemia, unspecified     Anxiety     Breast cancer     Diabetes mellitus, type 2     Hyperlipidemia     Hypertension     Mitral valve prolapse     Obesity     Osteopenia     Sleep apnea     uses CPAP      Review of patient's allergies indicates:  No Known Allergies   Current Outpatient Medications on File Prior to Visit   Medication Sig Dispense Refill    amLODIPine-benazepriL (LOTREL) 10-40 mg per capsule Take 1 capsule by mouth once daily.      ascorbic acid, vitamin C, (VITAMIN C) 500 MG tablet Take 500 mg by mouth once daily.      b complex vitamins tablet Take 1 tablet by mouth once daily.      calcium carbonate-vitamin D3 600 mg-20 mcg (800 unit) Chew Take 1 tablet by mouth once daily.      empagliflozin (JARDIANCE) 25 mg tablet Take 1 tablet by mouth once daily.      exemestane (AROMASIN) 25 mg tablet Take 1 tablet by mouth once daily 90 tablet 3    ferrous gluconate (FERGON) 240 (27 FE) MG tablet Take 240 mg by mouth once daily.      GLUTAMINE ORAL Take 1 Scoop by mouth as needed.      loratadine 10 mg Cap Take 10 mg by mouth as needed.      magnesium 250 mg Tab Take 1 tablet by mouth once daily.      metFORMIN (GLUCOPHAGE) 500 MG tablet Take 500 mg by mouth 2 (two) times daily with meals.      metoprolol succinate (TOPROL-XL) 50 MG 24 hr tablet Take 1 tablet by mouth once daily.      multivitamin-folic acid-biotin (HAIR-SKIN-NAILS, MV-FA-BIOTIN,) 400-2,000 mcg Tab Take 1 tablet by mouth once daily.      multivitamin-minerals-lutein Tab Take 1 tablet by mouth once daily at 6am.      omega-3 fatty acids-fish oil 360-1,200 mg Cap Take 1 capsule by mouth once daily.      pyridoxine, vitamin B6, (B-6) 100 MG Tab Take 100 mg by mouth once daily.      rosuvastatin (CRESTOR) 20 MG tablet       aspirin 81 MG Chew 1  tablet Orally Once a day      GLUTAMINE ORAL Take 1 tablet by mouth once daily.      NON FORMULARY MEDICATION Take 1 tablet by mouth once daily. Tumeric      SITagliptin (JANUVIA) 100 MG Tab Take 1 tablet by mouth once daily.       No current facility-administered medications on file prior to visit.      Review of Systems   Constitutional:  Negative for activity change, appetite change, fever and unexpected weight change.   HENT:  Negative for mouth sores.    Eyes:  Negative for visual disturbance.   Respiratory:  Negative for cough and shortness of breath.    Cardiovascular:  Negative for chest pain.   Gastrointestinal:  Negative for abdominal pain, blood in stool, constipation, diarrhea, nausea and vomiting.   Genitourinary:  Negative for difficulty urinating and frequency.   Musculoskeletal:  Negative for back pain.   Integumentary:  Negative for rash.        Bilateral mastectomies with reconstruction and right breast expander placement   Neurological:  Negative for dizziness, weakness and headaches.   Hematological:  Negative for adenopathy.   Psychiatric/Behavioral:  Negative for behavioral problems and suicidal ideas. The patient is not nervous/anxious.          Vitals:    01/29/24 0924   BP: 123/74   Pulse: 92   Resp: 14   Temp: 98.4 °F (36.9 °C)       Physical Exam  Vitals reviewed.   Constitutional:       Appearance: Normal appearance. She is normal weight.      Comments: Pleasant AA female   HENT:      Head: Normocephalic.   Eyes:      General: Lids are normal. Vision grossly intact.      Extraocular Movements: Extraocular movements intact.      Conjunctiva/sclera: Conjunctivae normal.   Cardiovascular:      Rate and Rhythm: Normal rate and regular rhythm.      Pulses: Normal pulses.      Heart sounds: S1 normal and S2 normal. Murmur heard.      Systolic murmur is present with a grade of 2/6.   Pulmonary:      Effort: Pulmonary effort is normal.      Breath sounds: Normal breath sounds.   Chest:       Comments: Bilateral mastectomies with RAHUL reconstruction. S/p right breast expander placement. Incisions all healed. Palpable scar tissue along incision lines but no other masses  Abdominal:      General: Abdomen is flat. Bowel sounds are normal. There is no distension.      Palpations: Abdomen is soft.      Tenderness: There is no abdominal tenderness.   Musculoskeletal:      Cervical back: Normal range of motion and neck supple.      Right lower leg: No edema.      Left lower leg: No edema.   Skin:     General: Skin is warm and moist.      Capillary Refill: Capillary refill takes less than 2 seconds.      Comments: Left CW mediport intact   Neurological:      General: No focal deficit present.      Mental Status: She is alert and oriented to person, place, and time.   Psychiatric:         Attention and Perception: Attention normal.         Mood and Affect: Mood normal.         Speech: Speech normal.         Behavior: Behavior normal. Behavior is cooperative.         Judgment: Judgment normal.         ECOG SCORE            No visits with results within 1 Day(s) from this visit.   Latest known visit with results is:   Lab Visit on 01/22/2024   Component Date Value Ref Range Status    Sodium Level 01/22/2024 142  136 - 145 mmol/L Final    Potassium Level 01/22/2024 4.7  3.5 - 5.1 mmol/L Final    Chloride 01/22/2024 105  98 - 107 mmol/L Final    Carbon Dioxide 01/22/2024 30  23 - 31 mmol/L Final    Glucose Level 01/22/2024 116 (H)  82 - 115 mg/dL Final    Blood Urea Nitrogen 01/22/2024 12.2  9.8 - 20.1 mg/dL Final    Creatinine 01/22/2024 0.86  0.55 - 1.02 mg/dL Final    Calcium Level Total 01/22/2024 10.5 (H)  8.4 - 10.2 mg/dL Final    Protein Total 01/22/2024 7.7 (H)  5.8 - 7.6 gm/dL Final    Albumin Level 01/22/2024 4.4  3.4 - 4.8 g/dL Final    Globulin 01/22/2024 3.3  2.4 - 3.5 gm/dL Final    Albumin/Globulin Ratio 01/22/2024 1.3  1.1 - 2.0 ratio Final    Bilirubin Total 01/22/2024 0.4  <=1.5 mg/dL Final     Alkaline Phosphatase 01/22/2024 110  40 - 150 unit/L Final    Alanine Aminotransferase 01/22/2024 16  0 - 55 unit/L Final    Aspartate Aminotransferase 01/22/2024 20  5 - 34 unit/L Final    eGFR 01/22/2024 >60  mls/min/1.73/m2 Final    Iron Binding Capacity Unsaturated 01/22/2024 239  70 - 310 ug/dL Final    Iron Level 01/22/2024 81  50 - 170 ug/dL Final    Transferrin 01/22/2024 297  173 - 360 mg/dL Final    Iron Binding Capacity Total 01/22/2024 320  250 - 450 ug/dL Final    Iron Saturation 01/22/2024 25  20 - 50 % Final    Ferritin Level 01/22/2024 253.31 (H)  4.63 - 204.00 ng/mL Final    Vitamin B12 Level 01/22/2024 >2,000 (H)  213 - 816 pg/mL Final    Folate Level 01/22/2024 14.7  7.0 - 31.4 ng/mL Final    WBC 01/22/2024 4.79  4.50 - 11.50 x10(3)/mcL Final    RBC 01/22/2024 3.60 (L)  4.20 - 5.40 x10(6)/mcL Final    Hgb 01/22/2024 11.6 (L)  12.0 - 16.0 g/dL Final    Hct 01/22/2024 36.6 (L)  37.0 - 47.0 % Final    MCV 01/22/2024 101.7 (H)  80.0 - 94.0 fL Final    MCH 01/22/2024 32.2 (H)  27.0 - 31.0 pg Final    MCHC 01/22/2024 31.7 (L)  33.0 - 36.0 g/dL Final    RDW 01/22/2024 12.4  11.5 - 17.0 % Final    Platelet 01/22/2024 207  130 - 400 x10(3)/mcL Final    MPV 01/22/2024 10.6 (H)  7.4 - 10.4 fL Final    Neut % 01/22/2024 49.2  % Final    Lymph % 01/22/2024 37.4  % Final    Mono % 01/22/2024 8.8  % Final    Eos % 01/22/2024 3.8  % Final    Basophil % 01/22/2024 0.8  % Final    Lymph # 01/22/2024 1.79  0.6 - 4.6 x10(3)/mcL Final    Neut # 01/22/2024 2.36  2.1 - 9.2 x10(3)/mcL Final    Mono # 01/22/2024 0.42  0.1 - 1.3 x10(3)/mcL Final    Eos # 01/22/2024 0.18  0 - 0.9 x10(3)/mcL Final    Baso # 01/22/2024 0.04  <=0.2 x10(3)/mcL Final    IG# 01/22/2024 0.00  0 - 0.04 x10(3)/mcL Final    IG% 01/22/2024 0.0  % Final     Assessment:            1. Malignant neoplasm of lower-inner quadrant of right breast of female, estrogen receptor positive      Plan:     Patient with breast cancer stage IIA, 2.2cm tumor, high  grade, ER weakly positive 25%. S/p right breast lumpectomy on 1/26/17. Lymph node negative.  Per NCCN guidelines, oncotype DX testing recommended.  Oncotype showed tumor high risk and PCR breast panel showing triple negative.  Treatment recommended with dose dense AC x 4 followed by weekly Taxol x 12.  Treatment was delayed due to non-healing breast wound.  Patient completed 4 cycles DDAC on 5/15/17 and 12 cycles of weekly Taxol on 8/15/17.  Adjuvant XRT completed on 9/28/17 and patient tolerated well.   Patient started Femara on 10/11/17.  Also started on Prolia for osteopenia, last dose in 12/2021.    Screening MMG from 11/2021 showed an asymmetry in area of lumpectomy site in right breast.  Diagnostic MMG/US 12/2021 showed suspicious dominant mass 1.8cm 6:00 with 2 additional satellite lesions.  s/p biopsy 12/21/21 pathology c/w IDCA Grade 2, weakly ER+ 29%, ME and Her2 negative with high Ki67 80%, same breast profile as original cancer.  This represents a recurrence vs. new primary which developed while on AI.  MRI 1/26/22 showed multiple masses, spanning area of 5.7cm wtih dominant mass 1.6cm, no suspicious nodes or enhancing masses in left breast.   Patient met with Dr. Baltazar and surgical plan is for bilateral mastectomies.  Recommended neoadjuvant chemotherapy due to concerns for wound healing following surgery and need for chemotherapy given recurrence.  Patient completed 6 cycles Taxotere/Cytoxan 2/10/22--5/26/22.   MMG/US done after cycle 3 showed decrease in dominant mass and resolution of satellite masses c/w good partial response.  S/p bilateral mastectomies done 6/29/22 and showed only a residual IDCA 2mm, with negative LNs, and left breast negative for malignancy.     Started Aromasin on 8/17/22 and plan for 10 years. She is tolerating well.   S/p reconstruction on 10/26/22 she did have delayed wound healing.   S/p right breast expander placement on 5/1/23. All incisions are healed.   Now seeing   Julienne with plans for more surgery once her ANC is better.     Recent labs with mild anemia, Hgb of 11.6 g/dL, improved. Work-up for anemia unrevealing. CMP good aside from elevated calcium.  I have instructed her to stop supplementation for now. May try restarting if improved next visit.   Continue every 3 months surveillance visits until after 6/2024.    Changed from Prolia to adjuvant Zometa every 6 months last dose 9/2023 next dose 3/24/24.   Most recent DEXA does show slightly worse osteopenia from 11/2023.   Continue with Zometa for now, will complete 2 years in 9/2024, and plan to change back to Prolia every 6 months at that time.     Continue MPF as well.   All questions answered at this time.       Tia Lala MD

## 2024-01-29 ENCOUNTER — OFFICE VISIT (OUTPATIENT)
Dept: HEMATOLOGY/ONCOLOGY | Facility: CLINIC | Age: 64
End: 2024-01-29
Payer: COMMERCIAL

## 2024-01-29 VITALS
HEIGHT: 62 IN | SYSTOLIC BLOOD PRESSURE: 123 MMHG | TEMPERATURE: 98 F | OXYGEN SATURATION: 100 % | RESPIRATION RATE: 14 BRPM | HEART RATE: 92 BPM | DIASTOLIC BLOOD PRESSURE: 74 MMHG | WEIGHT: 166.13 LBS | BODY MASS INDEX: 30.57 KG/M2

## 2024-01-29 DIAGNOSIS — Z17.0 MALIGNANT NEOPLASM OF LOWER-INNER QUADRANT OF RIGHT BREAST OF FEMALE, ESTROGEN RECEPTOR POSITIVE: Primary | ICD-10-CM

## 2024-01-29 DIAGNOSIS — C50.311 MALIGNANT NEOPLASM OF LOWER-INNER QUADRANT OF RIGHT BREAST OF FEMALE, ESTROGEN RECEPTOR POSITIVE: Primary | ICD-10-CM

## 2024-01-29 PROCEDURE — 1160F RVW MEDS BY RX/DR IN RCRD: CPT | Mod: CPTII,S$GLB,, | Performed by: INTERNAL MEDICINE

## 2024-01-29 PROCEDURE — 99999 PR PBB SHADOW E&M-EST. PATIENT-LVL V: CPT | Mod: PBBFAC,,, | Performed by: INTERNAL MEDICINE

## 2024-01-29 PROCEDURE — 3078F DIAST BP <80 MM HG: CPT | Mod: CPTII,S$GLB,, | Performed by: INTERNAL MEDICINE

## 2024-01-29 PROCEDURE — 3074F SYST BP LT 130 MM HG: CPT | Mod: CPTII,S$GLB,, | Performed by: INTERNAL MEDICINE

## 2024-01-29 PROCEDURE — 1159F MED LIST DOCD IN RCRD: CPT | Mod: CPTII,S$GLB,, | Performed by: INTERNAL MEDICINE

## 2024-01-29 PROCEDURE — 99214 OFFICE O/P EST MOD 30 MIN: CPT | Mod: S$GLB,,, | Performed by: INTERNAL MEDICINE

## 2024-01-29 PROCEDURE — 3008F BODY MASS INDEX DOCD: CPT | Mod: CPTII,S$GLB,, | Performed by: INTERNAL MEDICINE

## 2024-01-29 RX ORDER — NAPROXEN SODIUM 220 MG/1
TABLET, FILM COATED ORAL
COMMUNITY

## 2024-03-27 ENCOUNTER — INFUSION (OUTPATIENT)
Dept: INFUSION THERAPY | Facility: HOSPITAL | Age: 64
End: 2024-03-27
Attending: INTERNAL MEDICINE
Payer: COMMERCIAL

## 2024-03-27 VITALS
HEART RATE: 92 BPM | OXYGEN SATURATION: 99 % | DIASTOLIC BLOOD PRESSURE: 64 MMHG | SYSTOLIC BLOOD PRESSURE: 113 MMHG | TEMPERATURE: 98 F

## 2024-03-27 DIAGNOSIS — M85.80 OSTEOPENIA, UNSPECIFIED LOCATION: Primary | ICD-10-CM

## 2024-03-27 PROCEDURE — A4216 STERILE WATER/SALINE, 10 ML: HCPCS | Performed by: INTERNAL MEDICINE

## 2024-03-27 PROCEDURE — 96365 THER/PROPH/DIAG IV INF INIT: CPT

## 2024-03-27 PROCEDURE — 25000003 PHARM REV CODE 250: Performed by: INTERNAL MEDICINE

## 2024-03-27 PROCEDURE — 63600175 PHARM REV CODE 636 W HCPCS: Performed by: INTERNAL MEDICINE

## 2024-03-27 RX ORDER — SODIUM CHLORIDE 0.9 % (FLUSH) 0.9 %
10 SYRINGE (ML) INJECTION
OUTPATIENT
Start: 2024-09-18

## 2024-03-27 RX ORDER — ZOLEDRONIC ACID 0.04 MG/ML
4 INJECTION, SOLUTION INTRAVENOUS
OUTPATIENT
Start: 2024-09-18

## 2024-03-27 RX ORDER — HEPARIN 100 UNIT/ML
500 SYRINGE INTRAVENOUS
OUTPATIENT
Start: 2024-09-18

## 2024-03-27 RX ORDER — HEPARIN 100 UNIT/ML
500 SYRINGE INTRAVENOUS
Status: DISCONTINUED | OUTPATIENT
Start: 2024-03-27 | End: 2024-03-27 | Stop reason: HOSPADM

## 2024-03-27 RX ORDER — ZOLEDRONIC ACID 0.04 MG/ML
4 INJECTION, SOLUTION INTRAVENOUS
Status: COMPLETED | OUTPATIENT
Start: 2024-03-27 | End: 2024-03-27

## 2024-03-27 RX ORDER — SODIUM CHLORIDE 0.9 % (FLUSH) 0.9 %
10 SYRINGE (ML) INJECTION
Status: DISCONTINUED | OUTPATIENT
Start: 2024-03-27 | End: 2024-03-27 | Stop reason: HOSPADM

## 2024-03-27 RX ADMIN — SODIUM CHLORIDE: 9 INJECTION, SOLUTION INTRAVENOUS at 09:03

## 2024-03-27 RX ADMIN — Medication 10 ML: at 09:03

## 2024-03-27 RX ADMIN — HEPARIN 500 UNITS: 100 SYRINGE at 09:03

## 2024-03-27 RX ADMIN — ZOLEDRONIC ACID 4 MG: 0.04 INJECTION, SOLUTION INTRAVENOUS at 09:03

## 2024-04-26 ENCOUNTER — LAB VISIT (OUTPATIENT)
Dept: LAB | Facility: HOSPITAL | Age: 64
End: 2024-04-26
Attending: INTERNAL MEDICINE
Payer: COMMERCIAL

## 2024-04-26 DIAGNOSIS — E78.5 HYPERLIPIDEMIA, UNSPECIFIED HYPERLIPIDEMIA TYPE: ICD-10-CM

## 2024-04-26 DIAGNOSIS — I15.2 ENDOCRINE HYPERTENSION: ICD-10-CM

## 2024-04-26 DIAGNOSIS — E11.69 OBESITY, DIABETES, AND HYPERTENSION SYNDROME: ICD-10-CM

## 2024-04-26 DIAGNOSIS — E11.69 DIABETES MELLITUS ASSOCIATED WITH HORMONAL ETIOLOGY: Primary | ICD-10-CM

## 2024-04-26 DIAGNOSIS — E66.9 OBESITY, DIABETES, AND HYPERTENSION SYNDROME: ICD-10-CM

## 2024-04-26 DIAGNOSIS — E11.59 OBESITY, DIABETES, AND HYPERTENSION SYNDROME: ICD-10-CM

## 2024-04-26 DIAGNOSIS — I15.2 OBESITY, DIABETES, AND HYPERTENSION SYNDROME: ICD-10-CM

## 2024-04-26 LAB
ALBUMIN SERPL-MCNC: 4.2 G/DL (ref 3.4–4.8)
ALBUMIN/GLOB SERPL: 1.4 RATIO (ref 1.1–2)
ALP SERPL-CCNC: 106 UNIT/L (ref 40–150)
ALT SERPL-CCNC: 16 UNIT/L (ref 0–55)
AST SERPL-CCNC: 22 UNIT/L (ref 5–34)
BASOPHILS # BLD AUTO: 0.05 X10(3)/MCL
BASOPHILS NFR BLD AUTO: 1.1 %
BILIRUB SERPL-MCNC: 0.3 MG/DL
BUN SERPL-MCNC: 15.3 MG/DL (ref 9.8–20.1)
CALCIUM SERPL-MCNC: 9.3 MG/DL (ref 8.4–10.2)
CHLORIDE SERPL-SCNC: 107 MMOL/L (ref 98–107)
CHOLEST SERPL-MCNC: 112 MG/DL
CHOLEST/HDLC SERPL: 2 {RATIO} (ref 0–5)
CO2 SERPL-SCNC: 24 MMOL/L (ref 23–31)
CREAT SERPL-MCNC: 0.93 MG/DL (ref 0.55–1.02)
CREAT UR-MCNC: 104 MG/DL (ref 45–106)
EOSINOPHIL # BLD AUTO: 0.16 X10(3)/MCL (ref 0–0.9)
EOSINOPHIL NFR BLD AUTO: 3.4 %
ERYTHROCYTE [DISTWIDTH] IN BLOOD BY AUTOMATED COUNT: 12.3 % (ref 11.5–17)
EST. AVERAGE GLUCOSE BLD GHB EST-MCNC: 154.2 MG/DL
GFR SERPLBLD CREATININE-BSD FMLA CKD-EPI: >60 MLS/MIN/1.73/M2
GLOBULIN SER-MCNC: 3.1 GM/DL (ref 2.4–3.5)
GLUCOSE SERPL-MCNC: 111 MG/DL (ref 82–115)
HBA1C MFR BLD: 7 %
HCT VFR BLD AUTO: 34.6 % (ref 37–47)
HDLC SERPL-MCNC: 45 MG/DL (ref 35–60)
HGB BLD-MCNC: 11.2 G/DL (ref 12–16)
IMM GRANULOCYTES # BLD AUTO: 0.01 X10(3)/MCL (ref 0–0.04)
IMM GRANULOCYTES NFR BLD AUTO: 0.2 %
LDLC SERPL CALC-MCNC: 55 MG/DL (ref 50–140)
LYMPHOCYTES # BLD AUTO: 1.89 X10(3)/MCL (ref 0.6–4.6)
LYMPHOCYTES NFR BLD AUTO: 39.7 %
MCH RBC QN AUTO: 32.7 PG (ref 27–31)
MCHC RBC AUTO-ENTMCNC: 32.4 G/DL (ref 33–36)
MCV RBC AUTO: 100.9 FL (ref 80–94)
MICROALBUMIN UR-MCNC: 53 UG/ML
MICROALBUMIN/CREAT RATIO PNL UR: 51 MG/GM CR (ref 0–30)
MONOCYTES # BLD AUTO: 0.45 X10(3)/MCL (ref 0.1–1.3)
MONOCYTES NFR BLD AUTO: 9.5 %
NEUTROPHILS # BLD AUTO: 2.2 X10(3)/MCL (ref 2.1–9.2)
NEUTROPHILS NFR BLD AUTO: 46.1 %
NRBC BLD AUTO-RTO: 0 %
PLATELET # BLD AUTO: 165 X10(3)/MCL (ref 130–400)
PMV BLD AUTO: 11.7 FL (ref 7.4–10.4)
POTASSIUM SERPL-SCNC: 4.4 MMOL/L (ref 3.5–5.1)
PROT SERPL-MCNC: 7.3 GM/DL (ref 5.8–7.6)
RBC # BLD AUTO: 3.43 X10(6)/MCL (ref 4.2–5.4)
SODIUM SERPL-SCNC: 140 MMOL/L (ref 136–145)
TRIGL SERPL-MCNC: 58 MG/DL (ref 37–140)
VLDLC SERPL CALC-MCNC: 12 MG/DL
WBC # SPEC AUTO: 4.76 X10(3)/MCL (ref 4.5–11.5)

## 2024-04-26 PROCEDURE — 85025 COMPLETE CBC W/AUTO DIFF WBC: CPT

## 2024-04-26 PROCEDURE — 36415 COLL VENOUS BLD VENIPUNCTURE: CPT

## 2024-04-26 PROCEDURE — 83036 HEMOGLOBIN GLYCOSYLATED A1C: CPT

## 2024-04-26 PROCEDURE — 80061 LIPID PANEL: CPT

## 2024-04-26 PROCEDURE — 80053 COMPREHEN METABOLIC PANEL: CPT

## 2024-04-26 PROCEDURE — 82043 UR ALBUMIN QUANTITATIVE: CPT

## 2024-07-08 ENCOUNTER — INFUSION (OUTPATIENT)
Dept: INFUSION THERAPY | Facility: HOSPITAL | Age: 64
End: 2024-07-08
Attending: INTERNAL MEDICINE
Payer: MEDICARE

## 2024-07-08 VITALS
SYSTOLIC BLOOD PRESSURE: 127 MMHG | OXYGEN SATURATION: 99 % | RESPIRATION RATE: 18 BRPM | HEART RATE: 92 BPM | DIASTOLIC BLOOD PRESSURE: 75 MMHG

## 2024-07-08 DIAGNOSIS — Z17.0 MALIGNANT NEOPLASM OF LOWER-INNER QUADRANT OF RIGHT BREAST OF FEMALE, ESTROGEN RECEPTOR POSITIVE: Primary | ICD-10-CM

## 2024-07-08 DIAGNOSIS — C50.311 MALIGNANT NEOPLASM OF LOWER-INNER QUADRANT OF RIGHT BREAST OF FEMALE, ESTROGEN RECEPTOR POSITIVE: Primary | ICD-10-CM

## 2024-07-08 PROCEDURE — 25000003 PHARM REV CODE 250: Performed by: NURSE PRACTITIONER

## 2024-07-08 PROCEDURE — A4216 STERILE WATER/SALINE, 10 ML: HCPCS | Performed by: NURSE PRACTITIONER

## 2024-07-08 PROCEDURE — 96523 IRRIG DRUG DELIVERY DEVICE: CPT

## 2024-07-08 PROCEDURE — 63600175 PHARM REV CODE 636 W HCPCS: Performed by: NURSE PRACTITIONER

## 2024-07-08 RX ORDER — HEPARIN 100 UNIT/ML
500 SYRINGE INTRAVENOUS
Status: DISCONTINUED | OUTPATIENT
Start: 2024-07-08 | End: 2024-07-08 | Stop reason: HOSPADM

## 2024-07-08 RX ORDER — SODIUM CHLORIDE 0.9 % (FLUSH) 0.9 %
10 SYRINGE (ML) INJECTION
OUTPATIENT
Start: 2024-09-30

## 2024-07-08 RX ORDER — SODIUM CHLORIDE 0.9 % (FLUSH) 0.9 %
10 SYRINGE (ML) INJECTION
Status: DISCONTINUED | OUTPATIENT
Start: 2024-07-08 | End: 2024-07-08 | Stop reason: HOSPADM

## 2024-07-08 RX ORDER — HEPARIN 100 UNIT/ML
500 SYRINGE INTRAVENOUS
OUTPATIENT
Start: 2024-09-30

## 2024-07-08 RX ADMIN — Medication 10 ML: at 08:07

## 2024-07-08 RX ADMIN — HEPARIN 500 UNITS: 100 SYRINGE at 08:07

## 2024-07-30 ENCOUNTER — OFFICE VISIT (OUTPATIENT)
Dept: HEMATOLOGY/ONCOLOGY | Facility: CLINIC | Age: 64
End: 2024-07-30
Payer: MEDICARE

## 2024-07-30 VITALS
TEMPERATURE: 98 F | BODY MASS INDEX: 29.26 KG/M2 | WEIGHT: 159 LBS | OXYGEN SATURATION: 98 % | DIASTOLIC BLOOD PRESSURE: 72 MMHG | HEIGHT: 62 IN | SYSTOLIC BLOOD PRESSURE: 105 MMHG | HEART RATE: 83 BPM

## 2024-07-30 DIAGNOSIS — Z17.0 MALIGNANT NEOPLASM OF LOWER-INNER QUADRANT OF RIGHT BREAST OF FEMALE, ESTROGEN RECEPTOR POSITIVE: ICD-10-CM

## 2024-07-30 DIAGNOSIS — Z79.811 LONG TERM CURRENT USE OF AROMATASE INHIBITOR: Primary | ICD-10-CM

## 2024-07-30 DIAGNOSIS — E83.52 HYPERCALCEMIA: ICD-10-CM

## 2024-07-30 DIAGNOSIS — C50.311 MALIGNANT NEOPLASM OF LOWER-INNER QUADRANT OF RIGHT BREAST OF FEMALE, ESTROGEN RECEPTOR POSITIVE: ICD-10-CM

## 2024-07-30 PROCEDURE — 99215 OFFICE O/P EST HI 40 MIN: CPT | Mod: PBBFAC

## 2024-07-30 PROCEDURE — 99999 PR PBB SHADOW E&M-EST. PATIENT-LVL V: CPT | Mod: PBBFAC,,,

## 2024-07-30 RX ORDER — HEPARIN 100 UNIT/ML
500 SYRINGE INTRAVENOUS
OUTPATIENT
Start: 2024-09-24

## 2024-07-30 RX ORDER — HEPARIN 100 UNIT/ML
500 SYRINGE INTRAVENOUS
OUTPATIENT
Start: 2024-09-30

## 2024-07-30 RX ORDER — SODIUM CHLORIDE 0.9 % (FLUSH) 0.9 %
10 SYRINGE (ML) INJECTION
OUTPATIENT
Start: 2024-09-24

## 2024-07-30 RX ORDER — SODIUM CHLORIDE 0.9 % (FLUSH) 0.9 %
10 SYRINGE (ML) INJECTION
OUTPATIENT
Start: 2024-09-30

## 2024-07-30 RX ORDER — EXEMESTANE 25 MG/1
25 TABLET ORAL DAILY
Qty: 30 TABLET | Refills: 6 | Status: SHIPPED | OUTPATIENT
Start: 2024-07-30 | End: 2025-02-25

## 2024-07-30 RX ORDER — TIRZEPATIDE 10 MG/.5ML
INJECTION, SOLUTION SUBCUTANEOUS
COMMUNITY

## 2024-07-30 NOTE — PROGRESS NOTES
Subjective:       Patient ID: Delia Scruggs is a 64 y.o. female.  Surgeon: Dr. Sd Baltazar  Radiation oncologist: Dr. Browne Prellop    Recurrence vs. Second Primary Right Breast AJCC Anatomic and Clinical Prognostic Stage IA ([id number])--Diagnosed 21  Right Breast Cancer Stage IIA (T2N0M0) diagnosed 17  Biopsy/pathology:   1. Right breast mass biopsy done 17--invasive ductal carcinoma, grade III with focal DCIS, ER 25.72%, MT 0.28% negative, Her2 1+ negative by IHC. Oncotype DX testing showed recurrence score of 43 (distant recurrence risk 40% with Tamoxifen alone and 12% Tamoxifen + chemo), PCR done for breast panel showed triple negative.  2. Right breast biopsy mass at 6:00 done 21--invasive ductal carcinoma, Grade 2, ER 29%, MT 0%, Her2 0 by IHC, Ki67 80% high.  Surgery/pathology:   1. Right breast needle localization lumpectomy and SLN biopsy done 17--invasive mammary carcinoma with papillary and ductal features, grade III, 2.2cm, no lymphovascular or perineural invasion, margins clear, few foci of non-invasive predominantly solid papillary carcinoma also identified, 2 sentinel lymph nodes negative, closest margin medial, re-excision of medial margin with focal residual invasive papillary carcinoma <0.1cm, now free.  2. Bilateral mastectomies with right ALND done 22--neoadjuvant therapy effects with near complete tumor response, solitary focus of invasive ductal carcinoma 2mm, Grade 1, resection margin clear, left breast with no atypia or malignancy, 2 LNs negative.   Imagin. Screening MMG 16--asymmetry medially right breast.  2. Diagnostic MMG right breast 16--asymmetrical density located posteriorly in lower inner quadrant of right breast, suggestion of underlying clearly circumscribed 10mm rounded density w/n area on spot compression.  3. US right breast 16--focal mass 4:00 8cm from nipple hypoechoic and hetrogensous with irregular margins  1.7X1.1X1.4cm BIRADS 5.  4. CT A/P 2/24/17--fluid in resection cavity right breast, no evidence for metastatic disease, vague area of enhancement right hepatic low, not typical of metastatic disease, consider liver mass protocol MRI.  5. Bone scan 2/24/17--DJD right knee. No evidence of any metastatic bone lesions.  6. MRI abdomen/liver protocol done 3/7/17--hepatic lesion in question is most c/w focal nodular hyperplasia.  7. Screening MMG done 11/24/21--asymmetry in the area of the lumpectomy site in the inner right breast posterior depth on the CC view needs further evaluation.    8. MMG/US right breast done 12/20/21--Findings concerning for new or recurrent malignancy at the site of prior lumpectomy in this patient with a personal history of right breast cancer. The three adjacent (one dominant 1.8cm, two satellite 5mm and 4mm) irregular hypoechoic masses in the right breast 6:00 axis 4 cm FN position are highly suggestive of malignancy. BI-RADS 5: Highly suggestive of malignancy. No suspicious right axillary adenopathy.  9. MRI bilateral breasts done 1/26/22--Right breast 6:00 irregular heterogeneously enhancing mass containing a biopsy clip, measuring 1.6 m x 0.9 cm x 1.5 cm, is consistent with the known biopsy-proven right breast invasive malignancy. Multiple additional irregular heterogeneously enhancing masses with irregular margins within the lumpectomy bed and anterior to the lumpectomy bed in the 4:00 to 6:00 right breast middle to posterior depths, in total spanning 5.7 cm x 2.8 cm x 2.2 cm, are also highly suggestive of malignancy. The posterior aspect of the suspicious enhancement is 1 cm anterior to the pectoralis major muscle. Although there is slight tenting and mild enhancement of the pectoralis major muscle, this is most likely related to adherent post lumpectomy scarring and post radiation change, no suspicious enhancement in the left breast. No significant internal mammary or axillary  adenopathy.  10. Diagnostic MMG/US 3/30/22--Mild-moderate partial response to neoadjuvant chemotherapy, evidenced mammographically by decreased density and conspicuity of the known malignant masses in the right breast 6:00 axis posterior depth at the site of prior lumpectomy. The previously biopsied dominant mass has decreased in size as measured sonographically, now 1.1 x 0.6 x 1.6 cm, previously 1.8 x 0.9 x 1.7 cm. Also, the two satellite masses previously identified on sonography have resolved. BI-RADS 6: Known biopsy-proven malignancy.    Mediport placement by Dr. Baltazar on 3/23/17--removed 6/2020.  Mediport placed by Dr. Baltazar on 2/7/22.     Genetic testing on 3/21/17 was negative.    DEXA:   10/24/17--L1-L4 T = 1.3, Left femur neck -1.1, right femur neck -2.1, total left femur 0.0, right -0.7 c/w osteopenia.  11/21/19--L1-L4 T = 1.7, Left femur neck -1.3, right femur neck -2.0, total left femur -0.3, right -0.7 (mixed response).  11/24/21--L1-L4 T = 2.0, Left femur neck -1.2, right femur neck -2.0, total left femur -0.1, right -0.7 (improved spine, left hip, right stable).   11/6/23--L1-L4 T = 1.8, left femur neck -1.6, right femur neck -2.1, total left -0.4, right total -1.0 (slightly worse in all areas).    Treatment History:   1. DDAC x 4 doses. Started 4/3/17. Completed on 5/15/17.    2. Weekly Taxol X 12 completed 5/30/17--8/15/17.   3. Adjuvant XRT completed 8/30/17 - 9/28/17.    4. Femara 10/11/17--stopped 2/1/22.   5. Prolia 6/15/18--12/30/21 (on hold).    6. Neoadjuvant Taxotere/Cytoxan X 6 cycles completed 2/10/22--5/26/22.   7. Bilateral mastectomies 6/29/22.    8. RAHUL flap reconstruction done 10/26/22 Dr. Rivera.    Treatment plan:   1. Adjuvant Aromasin started 8/17/22.  2. Zometa every 6 months X 2 years. Started 9/2022, given 9/18/23 next dose due 3/24.     Chief Complaint: OTHER (Patient is getting Physical Therapy for her reconstruction surgery. Sx is scheduled for  8/19/24 with   Julienne.  )    HPI   Patient presents for follow-up of breast cancer. She is doing well. She continues on Mounjaro. She will be having breast reconstruction surgery and Mediport removal with Dr. Robins on 8/19/2024. She continues on Aromasin and is tolerating it well other than occasional hot flashes. She is undergoing physical therapy in preparation for right breast surgery. She is very active and is walking 40 minutes on the treadmill every other day. No new problems reported today. She is due for last Zometa infusion on 9/30/2024, will switch back to Prolia every 6 months.     Past Medical History:   Diagnosis Date    Anemia, unspecified     Anxiety     Breast cancer     Diabetes mellitus, type 2     Hyperlipidemia     Hypertension     Mitral valve prolapse     Obesity     Osteopenia     Sleep apnea     uses CPAP      Review of patient's allergies indicates:  No Known Allergies   Current Outpatient Medications on File Prior to Visit   Medication Sig Dispense Refill    amLODIPine-benazepriL (LOTREL) 10-40 mg per capsule Take 1 capsule by mouth once daily.      ascorbic acid, vitamin C, (VITAMIN C) 500 MG tablet Take 500 mg by mouth once daily.      aspirin 81 MG Chew 1 tablet Orally Once a day      b complex vitamins tablet Take 1 tablet by mouth once daily.      calcium carbonate-vitamin D3 600 mg-20 mcg (800 unit) Chew Take 1 tablet by mouth once daily.      empagliflozin (JARDIANCE) 25 mg tablet Take 1 tablet by mouth once daily.      exemestane (AROMASIN) 25 mg tablet Take 1 tablet by mouth once daily 90 tablet 3    ferrous gluconate (FERGON) 240 (27 FE) MG tablet Take 240 mg by mouth once daily.      GLUTAMINE ORAL Take 1 Scoop by mouth as needed.      GLUTAMINE ORAL Take 1 tablet by mouth once daily.      loratadine 10 mg Cap Take 10 mg by mouth as needed.      magnesium 250 mg Tab Take 1 tablet by mouth once daily.      metFORMIN (GLUCOPHAGE) 500 MG tablet Take 500 mg by mouth 2 (two) times daily with  meals.      metoprolol succinate (TOPROL-XL) 50 MG 24 hr tablet Take 1 tablet by mouth once daily.      MOUNJARO 10 mg/0.5 mL PnIj SMARTSIG:10 Milligram(s) SUB-Q Every Evening      multivitamin-folic acid-biotin (HAIR-SKIN-NAILS, MV-FA-BIOTIN,) 400-2,000 mcg Tab Take 1 tablet by mouth once daily.      multivitamin-minerals-lutein Tab Take 1 tablet by mouth once daily at 6am.      NON FORMULARY MEDICATION Take 1 tablet by mouth once daily. Tumeric      omega-3 fatty acids-fish oil 360-1,200 mg Cap Take 1 capsule by mouth once daily.      pyridoxine, vitamin B6, (B-6) 100 MG Tab Take 100 mg by mouth once daily.      rosuvastatin (CRESTOR) 20 MG tablet       SITagliptin (JANUVIA) 100 MG Tab Take 1 tablet by mouth once daily.       No current facility-administered medications on file prior to visit.      Review of Systems   Constitutional:  Negative for activity change, appetite change, fatigue, fever and unexpected weight change.   HENT:  Negative for mouth sores.    Eyes:  Negative for visual disturbance.   Respiratory:  Negative for cough and shortness of breath.    Cardiovascular:  Negative for chest pain.   Gastrointestinal:  Negative for abdominal pain, blood in stool, constipation, diarrhea, nausea and vomiting.   Genitourinary:  Negative for difficulty urinating and frequency.   Musculoskeletal:  Negative for back pain.   Integumentary:  Negative for rash.        Bilateral mastectomies with reconstruction and right breast expander placement   Neurological:  Negative for dizziness, weakness and headaches.   Hematological:  Negative for adenopathy.   Psychiatric/Behavioral:  Negative for behavioral problems and suicidal ideas. The patient is not nervous/anxious.          Vitals:    07/30/24 1341   BP: 105/72   Pulse: 83   Temp: 98.2 °F (36.8 °C)         Physical Exam  Vitals reviewed.   Constitutional:       Appearance: Normal appearance. She is normal weight.      Comments: Pleasant AA female   HENT:      Head:  Normocephalic.   Eyes:      General: Lids are normal. Vision grossly intact.      Extraocular Movements: Extraocular movements intact.      Conjunctiva/sclera: Conjunctivae normal.   Cardiovascular:      Rate and Rhythm: Normal rate and regular rhythm.      Pulses: Normal pulses.      Heart sounds: S1 normal and S2 normal. Murmur heard.      Systolic murmur is present with a grade of 2/6.   Pulmonary:      Effort: Pulmonary effort is normal.      Breath sounds: Normal breath sounds.   Chest:      Comments: Bilateral mastectomies with RAHUL reconstruction. S/p right breast expander placement. Incisions all healed. Palpable scar tissue along incision lines but no other masses  Abdominal:      General: Abdomen is flat. Bowel sounds are normal. There is no distension.      Palpations: Abdomen is soft.      Tenderness: There is no abdominal tenderness.   Musculoskeletal:      Cervical back: Normal range of motion and neck supple.      Right lower leg: No edema.      Left lower leg: No edema.   Skin:     General: Skin is warm and moist.      Capillary Refill: Capillary refill takes less than 2 seconds.      Comments: Left CW mediport intact   Neurological:      General: No focal deficit present.      Mental Status: She is alert and oriented to person, place, and time.   Psychiatric:         Attention and Perception: Attention normal.         Mood and Affect: Mood normal.         Speech: Speech normal.         Behavior: Behavior normal. Behavior is cooperative.         Judgment: Judgment normal.         ECOG SCORE            Lab Visit on 07/30/2024   Component Date Value Ref Range Status    WBC 07/30/2024 5.48  4.50 - 11.50 x10(3)/mcL Final    RBC 07/30/2024 3.55 (L)  4.20 - 5.40 x10(6)/mcL Final    Hgb 07/30/2024 11.7 (L)  12.0 - 16.0 g/dL Final    Hct 07/30/2024 35.3 (L)  37.0 - 47.0 % Final    MCV 07/30/2024 99.4 (H)  80.0 - 94.0 fL Final    MCH 07/30/2024 33.0 (H)  27.0 - 31.0 pg Final    MCHC 07/30/2024 33.1  33.0 -  36.0 g/dL Final    RDW 07/30/2024 12.0  11.5 - 17.0 % Final    Platelet 07/30/2024 224  130 - 400 x10(3)/mcL Final    MPV 07/30/2024 10.0  7.4 - 10.4 fL Final    Neut % 07/30/2024 38.9  % Final    Lymph % 07/30/2024 48.2  % Final    Mono % 07/30/2024 8.9  % Final    Eos % 07/30/2024 2.9  % Final    Basophil % 07/30/2024 0.7  % Final    Lymph # 07/30/2024 2.64  0.6 - 4.6 x10(3)/mcL Final    Neut # 07/30/2024 2.13  2.1 - 9.2 x10(3)/mcL Final    Mono # 07/30/2024 0.49  0.1 - 1.3 x10(3)/mcL Final    Eos # 07/30/2024 0.16  0 - 0.9 x10(3)/mcL Final    Baso # 07/30/2024 0.04  <=0.2 x10(3)/mcL Final    IG# 07/30/2024 0.02  0 - 0.04 x10(3)/mcL Final    IG% 07/30/2024 0.4  % Final     Assessment:            1. Long term current use of aromatase inhibitor    2. Malignant neoplasm of lower-inner quadrant of right breast of female, estrogen receptor positive        Plan:     Patient with breast cancer stage IIA, 2.2cm tumor, high grade, ER weakly positive 25%. S/p right breast lumpectomy on 1/26/17. Lymph node negative.  Per NCCN guidelines, oncotype DX testing recommended.  Oncotype showed tumor high risk and PCR breast panel showing triple negative.  Treatment recommended with dose dense AC x 4 followed by weekly Taxol x 12.  Treatment was delayed due to non-healing breast wound.  Patient completed 4 cycles DDAC on 5/15/17 and 12 cycles of weekly Taxol on 8/15/17.  Adjuvant XRT completed on 9/28/17 and patient tolerated well.   Patient started Femara on 10/11/17.  Also started on Prolia for osteopenia, last dose in 12/2021.    Screening MMG from 11/2021 showed an asymmetry in area of lumpectomy site in right breast.  Diagnostic MMG/US 12/2021 showed suspicious dominant mass 1.8cm 6:00 with 2 additional satellite lesions.  s/p biopsy 12/21/21 pathology c/w IDCA Grade 2, weakly ER+ 29%, DE and Her2 negative with high Ki67 80%, same breast profile as original cancer.  This represents a recurrence vs. new primary which developed  while on AI.  MRI 1/26/22 showed multiple masses, spanning area of 5.7cm wtih dominant mass 1.6cm, no suspicious nodes or enhancing masses in left breast.   Patient met with Dr. Baltazar and surgical plan is for bilateral mastectomies.  Recommended neoadjuvant chemotherapy due to concerns for wound healing following surgery and need for chemotherapy given recurrence.  Patient completed 6 cycles Taxotere/Cytoxan 2/10/22--5/26/22.   MMG/US done after cycle 3 showed decrease in dominant mass and resolution of satellite masses c/w good partial response.  S/p bilateral mastectomies done 6/29/22 and showed only a residual IDCA 2mm, with negative LNs, and left breast negative for malignancy.     Started Aromasin on 8/17/22 and plan for 10 years, refill sent. She is tolerating well other than occasion hot flashes.   S/p reconstruction on 10/26/22 she did have delayed wound healing.   S/p right breast expander placement on 5/1/23. All incisions are healed.   Now seeing Dr. Robins with plans for more surgery once her ANC is better.     Recent labs with mild anemia, Hgb of 11.7 g/dL, improved. Work-up for anemia in January unrevealing. CMP good aside from elevated calcium. She is not taking any calcium supplements, will recheck calcium in 2 weeks.       Changed from Prolia to adjuvant Zometa every 6 months last dose 3/27/24 next dose 9/30/24.   Most recent DEXA does show slightly worse osteopenia from 11/2023.   Continue with Zometa for now, will complete 2 years in 9/2024, and plan to change back to Prolia every 6 months at that time.     She is scheduled for right breat reconstruction surgery and Mediport removal with Dr. Robins on 8/19/2024.   All questions answered at this time.       Yenny Oropeza, FNP-C  Oncology/Hematology  [facility]

## 2024-07-31 DIAGNOSIS — E83.52 HYPERCALCEMIA: Primary | ICD-10-CM

## 2024-08-01 ENCOUNTER — PATIENT MESSAGE (OUTPATIENT)
Dept: HEMATOLOGY/ONCOLOGY | Facility: CLINIC | Age: 64
End: 2024-08-01
Payer: MEDICARE

## 2024-08-08 ENCOUNTER — TELEPHONE (OUTPATIENT)
Dept: HEMATOLOGY/ONCOLOGY | Facility: CLINIC | Age: 64
End: 2024-08-08
Payer: MEDICARE

## 2024-08-08 NOTE — TELEPHONE ENCOUNTER
Wilman with Dr. Robins's office calling to check status of authorization form that was faxed over. Patient would like to get her mediport removed.

## 2024-08-13 ENCOUNTER — TELEPHONE (OUTPATIENT)
Dept: HEMATOLOGY/ONCOLOGY | Facility: CLINIC | Age: 64
End: 2024-08-13
Payer: MEDICARE

## 2024-08-13 ENCOUNTER — LAB VISIT (OUTPATIENT)
Dept: LAB | Facility: HOSPITAL | Age: 64
End: 2024-08-13
Attending: INTERNAL MEDICINE
Payer: MEDICARE

## 2024-08-13 DIAGNOSIS — E83.52 HYPERCALCEMIA: ICD-10-CM

## 2024-08-13 LAB
ALBUMIN SERPL-MCNC: 4.4 G/DL (ref 3.4–4.8)
ALBUMIN/GLOB SERPL: 1.3 RATIO (ref 1.1–2)
ALP SERPL-CCNC: 103 UNIT/L (ref 40–150)
ALT SERPL-CCNC: 17 UNIT/L (ref 0–55)
ANION GAP SERPL CALC-SCNC: 10 MEQ/L
AST SERPL-CCNC: 21 UNIT/L (ref 5–34)
BILIRUB SERPL-MCNC: 0.4 MG/DL
BUN SERPL-MCNC: 17.6 MG/DL (ref 9.8–20.1)
CALCIUM SERPL-MCNC: 10.1 MG/DL (ref 8.4–10.2)
CHLORIDE SERPL-SCNC: 106 MMOL/L (ref 98–107)
CO2 SERPL-SCNC: 24 MMOL/L (ref 23–31)
CREAT SERPL-MCNC: 1.05 MG/DL (ref 0.55–1.02)
CREAT/UREA NIT SERPL: 17
GFR SERPLBLD CREATININE-BSD FMLA CKD-EPI: 59 ML/MIN/1.73/M2
GLOBULIN SER-MCNC: 3.3 GM/DL (ref 2.4–3.5)
GLUCOSE SERPL-MCNC: 90 MG/DL (ref 82–115)
POTASSIUM SERPL-SCNC: 4.3 MMOL/L (ref 3.5–5.1)
PROT SERPL-MCNC: 7.7 GM/DL (ref 5.8–7.6)
PTH-INTACT SERPL-MCNC: 39.2 PG/ML (ref 8.7–77)
SODIUM SERPL-SCNC: 140 MMOL/L (ref 136–145)

## 2024-08-13 PROCEDURE — 36415 COLL VENOUS BLD VENIPUNCTURE: CPT

## 2024-08-13 PROCEDURE — 83970 ASSAY OF PARATHORMONE: CPT

## 2024-08-13 PROCEDURE — 80053 COMPREHEN METABOLIC PANEL: CPT

## 2024-08-13 NOTE — TELEPHONE ENCOUNTER
Wilman with Dr. Robins's office calling to check status of clearance for port placement. She is scheduled to have surgery Monday. Please advise.

## 2024-09-30 ENCOUNTER — INFUSION (OUTPATIENT)
Dept: INFUSION THERAPY | Facility: HOSPITAL | Age: 64
End: 2024-09-30
Attending: INTERNAL MEDICINE
Payer: MEDICARE

## 2024-09-30 VITALS — HEART RATE: 90 BPM | TEMPERATURE: 98 F | DIASTOLIC BLOOD PRESSURE: 65 MMHG | SYSTOLIC BLOOD PRESSURE: 122 MMHG

## 2024-09-30 DIAGNOSIS — M85.80 OSTEOPENIA, UNSPECIFIED LOCATION: Primary | ICD-10-CM

## 2024-09-30 PROCEDURE — 96374 THER/PROPH/DIAG INJ IV PUSH: CPT

## 2024-09-30 PROCEDURE — 63600175 PHARM REV CODE 636 W HCPCS

## 2024-09-30 RX ORDER — HEPARIN 100 UNIT/ML
500 SYRINGE INTRAVENOUS
OUTPATIENT
Start: 2025-03-24

## 2024-09-30 RX ORDER — HEPARIN 100 UNIT/ML
500 SYRINGE INTRAVENOUS
Status: DISCONTINUED | OUTPATIENT
Start: 2024-09-30 | End: 2024-09-30 | Stop reason: HOSPADM

## 2024-09-30 RX ORDER — SODIUM CHLORIDE 0.9 % (FLUSH) 0.9 %
10 SYRINGE (ML) INJECTION
OUTPATIENT
Start: 2025-03-24

## 2024-09-30 RX ORDER — SODIUM CHLORIDE 0.9 % (FLUSH) 0.9 %
10 SYRINGE (ML) INJECTION
Status: DISCONTINUED | OUTPATIENT
Start: 2024-09-30 | End: 2024-09-30 | Stop reason: HOSPADM

## 2024-09-30 RX ORDER — ZOLEDRONIC ACID 0.04 MG/ML
4 INJECTION, SOLUTION INTRAVENOUS
Status: COMPLETED | OUTPATIENT
Start: 2024-09-30 | End: 2024-09-30

## 2024-09-30 RX ORDER — ZOLEDRONIC ACID 0.04 MG/ML
4 INJECTION, SOLUTION INTRAVENOUS
OUTPATIENT
Start: 2025-03-24

## 2024-09-30 RX ADMIN — ZOLEDRONIC ACID 4 MG: 0.04 INJECTION, SOLUTION INTRAVENOUS at 09:09

## 2024-10-25 ENCOUNTER — LAB VISIT (OUTPATIENT)
Dept: LAB | Facility: HOSPITAL | Age: 64
End: 2024-10-25
Attending: INTERNAL MEDICINE
Payer: MEDICARE

## 2024-10-25 DIAGNOSIS — I15.2 ENDOCRINE HYPERTENSION: ICD-10-CM

## 2024-10-25 DIAGNOSIS — E11.69 DIABETES MELLITUS ASSOCIATED WITH HORMONAL ETIOLOGY: Primary | ICD-10-CM

## 2024-10-25 LAB
ALBUMIN SERPL-MCNC: 4.1 G/DL (ref 3.4–4.8)
ALBUMIN/GLOB SERPL: 1.4 RATIO (ref 1.1–2)
ALP SERPL-CCNC: 108 UNIT/L (ref 40–150)
ALT SERPL-CCNC: 17 UNIT/L (ref 0–55)
ANION GAP SERPL CALC-SCNC: 8 MEQ/L
AST SERPL-CCNC: 23 UNIT/L (ref 5–34)
BASOPHILS # BLD AUTO: 0.04 X10(3)/MCL
BASOPHILS NFR BLD AUTO: 0.8 %
BILIRUB SERPL-MCNC: 0.4 MG/DL
BUN SERPL-MCNC: 13.2 MG/DL (ref 9.8–20.1)
CALCIUM SERPL-MCNC: 9.7 MG/DL (ref 8.4–10.2)
CHLORIDE SERPL-SCNC: 107 MMOL/L (ref 98–107)
CHOLEST SERPL-MCNC: 125 MG/DL
CHOLEST/HDLC SERPL: 2 {RATIO} (ref 0–5)
CO2 SERPL-SCNC: 25 MMOL/L (ref 23–31)
CREAT SERPL-MCNC: 0.94 MG/DL (ref 0.55–1.02)
CREAT UR-MCNC: 71.8 MG/DL (ref 45–106)
CREAT/UREA NIT SERPL: 14
EOSINOPHIL # BLD AUTO: 0.16 X10(3)/MCL (ref 0–0.9)
EOSINOPHIL NFR BLD AUTO: 3.2 %
ERYTHROCYTE [DISTWIDTH] IN BLOOD BY AUTOMATED COUNT: 12 % (ref 11.5–17)
EST. AVERAGE GLUCOSE BLD GHB EST-MCNC: 134.1 MG/DL
GFR SERPLBLD CREATININE-BSD FMLA CKD-EPI: >60 ML/MIN/1.73/M2
GLOBULIN SER-MCNC: 3 GM/DL (ref 2.4–3.5)
GLUCOSE SERPL-MCNC: 102 MG/DL (ref 82–115)
HBA1C MFR BLD: 6.3 %
HCT VFR BLD AUTO: 33.6 % (ref 37–47)
HDLC SERPL-MCNC: 52 MG/DL (ref 35–60)
HGB BLD-MCNC: 11.1 G/DL (ref 12–16)
IMM GRANULOCYTES # BLD AUTO: 0.01 X10(3)/MCL (ref 0–0.04)
IMM GRANULOCYTES NFR BLD AUTO: 0.2 %
LDLC SERPL CALC-MCNC: 64 MG/DL (ref 50–140)
LYMPHOCYTES # BLD AUTO: 1.85 X10(3)/MCL (ref 0.6–4.6)
LYMPHOCYTES NFR BLD AUTO: 36.6 %
MCH RBC QN AUTO: 32.9 PG (ref 27–31)
MCHC RBC AUTO-ENTMCNC: 33 G/DL (ref 33–36)
MCV RBC AUTO: 99.7 FL (ref 80–94)
MICROALBUMIN UR-MCNC: 65 UG/ML
MICROALBUMIN/CREAT RATIO PNL UR: 90.5 MG/GM CR (ref 0–30)
MONOCYTES # BLD AUTO: 0.42 X10(3)/MCL (ref 0.1–1.3)
MONOCYTES NFR BLD AUTO: 8.3 %
NEUTROPHILS # BLD AUTO: 2.58 X10(3)/MCL (ref 2.1–9.2)
NEUTROPHILS NFR BLD AUTO: 50.9 %
NRBC BLD AUTO-RTO: 0 %
PLATELET # BLD AUTO: 192 X10(3)/MCL (ref 130–400)
PMV BLD AUTO: 11.3 FL (ref 7.4–10.4)
POTASSIUM SERPL-SCNC: 4.6 MMOL/L (ref 3.5–5.1)
PROT SERPL-MCNC: 7.1 GM/DL (ref 5.8–7.6)
RBC # BLD AUTO: 3.37 X10(6)/MCL (ref 4.2–5.4)
SODIUM SERPL-SCNC: 140 MMOL/L (ref 136–145)
TRIGL SERPL-MCNC: 46 MG/DL (ref 37–140)
VLDLC SERPL CALC-MCNC: 9 MG/DL
WBC # BLD AUTO: 5.06 X10(3)/MCL (ref 4.5–11.5)

## 2024-10-25 PROCEDURE — 85025 COMPLETE CBC W/AUTO DIFF WBC: CPT

## 2024-10-25 PROCEDURE — 83036 HEMOGLOBIN GLYCOSYLATED A1C: CPT

## 2024-10-25 PROCEDURE — 36415 COLL VENOUS BLD VENIPUNCTURE: CPT

## 2024-10-25 PROCEDURE — 82043 UR ALBUMIN QUANTITATIVE: CPT

## 2024-10-25 PROCEDURE — 80061 LIPID PANEL: CPT

## 2024-10-25 PROCEDURE — 82570 ASSAY OF URINE CREATININE: CPT

## 2024-10-25 PROCEDURE — 80053 COMPREHEN METABOLIC PANEL: CPT

## 2025-01-23 NOTE — PROGRESS NOTES
Subjective:       Patient ID: Delia Scruggs is a 64 y.o. female.  Surgeon: Dr. Sd Baltazar  Radiation oncologist: Dr. Browne Prellop    Recurrence vs. Second Primary Right Breast AJCC Anatomic and Clinical Prognostic Stage IA ([id number])--Diagnosed 21  Right Breast Cancer Stage IIA (T2N0M0) diagnosed 17  Biopsy/pathology:   1. Right breast mass biopsy done 17--invasive ductal carcinoma, grade III with focal DCIS, ER 25.72%, VA 0.28% negative, Her2 1+ negative by IHC. Oncotype DX testing showed recurrence score of 43 (distant recurrence risk 40% with Tamoxifen alone and 12% Tamoxifen + chemo), PCR done for breast panel showed triple negative.  2. Right breast biopsy mass at 6:00 done 21--invasive ductal carcinoma, Grade 2, ER 29%, VA 0%, Her2 0 by IHC, Ki67 80% high.  Surgery/pathology:   1. Right breast needle localization lumpectomy and SLN biopsy done 17--invasive mammary carcinoma with papillary and ductal features, grade III, 2.2cm, no lymphovascular or perineural invasion, margins clear, few foci of non-invasive predominantly solid papillary carcinoma also identified, 2 sentinel lymph nodes negative, closest margin medial, re-excision of medial margin with focal residual invasive papillary carcinoma <0.1cm, now free.  2. Bilateral mastectomies with right ALND done 22--neoadjuvant therapy effects with near complete tumor response, solitary focus of invasive ductal carcinoma 2mm, Grade 1, resection margin clear, left breast with no atypia or malignancy, 2 LNs negative.   Imagin. Screening MMG 16--asymmetry medially right breast.  2. Diagnostic MMG right breast 16--asymmetrical density located posteriorly in lower inner quadrant of right breast, suggestion of underlying clearly circumscribed 10mm rounded density w/n area on spot compression.  3. US right breast 16--focal mass 4:00 8cm from nipple hypoechoic and hetrogensous with irregular margins  1.7X1.1X1.4cm BIRADS 5.  4. CT A/P 2/24/17--fluid in resection cavity right breast, no evidence for metastatic disease, vague area of enhancement right hepatic low, not typical of metastatic disease, consider liver mass protocol MRI.  5. Bone scan 2/24/17--DJD right knee. No evidence of any metastatic bone lesions.  6. MRI abdomen/liver protocol done 3/7/17--hepatic lesion in question is most c/w focal nodular hyperplasia.  7. Screening MMG done 11/24/21--asymmetry in the area of the lumpectomy site in the inner right breast posterior depth on the CC view needs further evaluation.    8. MMG/US right breast done 12/20/21--Findings concerning for new or recurrent malignancy at the site of prior lumpectomy in this patient with a personal history of right breast cancer. The three adjacent (one dominant 1.8cm, two satellite 5mm and 4mm) irregular hypoechoic masses in the right breast 6:00 axis 4 cm FN position are highly suggestive of malignancy. BI-RADS 5: Highly suggestive of malignancy. No suspicious right axillary adenopathy.  9. MRI bilateral breasts done 1/26/22--Right breast 6:00 irregular heterogeneously enhancing mass containing a biopsy clip, measuring 1.6 m x 0.9 cm x 1.5 cm, is consistent with the known biopsy-proven right breast invasive malignancy. Multiple additional irregular heterogeneously enhancing masses with irregular margins within the lumpectomy bed and anterior to the lumpectomy bed in the 4:00 to 6:00 right breast middle to posterior depths, in total spanning 5.7 cm x 2.8 cm x 2.2 cm, are also highly suggestive of malignancy. The posterior aspect of the suspicious enhancement is 1 cm anterior to the pectoralis major muscle. Although there is slight tenting and mild enhancement of the pectoralis major muscle, this is most likely related to adherent post lumpectomy scarring and post radiation change, no suspicious enhancement in the left breast. No significant internal mammary or axillary  adenopathy.  10. Diagnostic MMG/US 3/30/22--Mild-moderate partial response to neoadjuvant chemotherapy, evidenced mammographically by decreased density and conspicuity of the known malignant masses in the right breast 6:00 axis posterior depth at the site of prior lumpectomy. The previously biopsied dominant mass has decreased in size as measured sonographically, now 1.1 x 0.6 x 1.6 cm, previously 1.8 x 0.9 x 1.7 cm. Also, the two satellite masses previously identified on sonography have resolved. BI-RADS 6: Known biopsy-proven malignancy.    Mediport placement by Dr. Baltazar on 3/23/17--removed 6/2020.  Mediport placed by Dr. Baltazar on 2/7/22.     Genetic testing on 3/21/17 was negative.    DEXA:   10/24/17--L1-L4 T = 1.3, Left femur neck -1.1, right femur neck -2.1, total left femur 0.0, right -0.7 c/w osteopenia.  11/21/19--L1-L4 T = 1.7, Left femur neck -1.3, right femur neck -2.0, total left femur -0.3, right -0.7 (mixed response).  11/24/21--L1-L4 T = 2.0, Left femur neck -1.2, right femur neck -2.0, total left femur -0.1, right -0.7 (improved spine, left hip, right stable).   11/06/23--L1-L4 T = 1.8, left femur neck -1.6, right femur neck -2.1, total left -0.4, right total -1.0 (slightly worse in all areas).    Treatment History:   1. DDAC x 4 doses. Started 4/3/17. Completed on 5/15/17.    2. Weekly Taxol X 12 completed 5/30/17--8/15/17.   3. Adjuvant XRT completed 8/30/17 - 9/28/17.    4. Femara 10/11/17--stopped 2/1/22.   5. Prolia 6/15/18--12/30/21.    6. Neoadjuvant Taxotere/Cytoxan X 6 cycles completed 2/10/22--5/26/22.   7. Bilateral mastectomies 6/29/22.    8. RAHUL flap reconstruction done 10/26/22 Dr. Rivera.   9. Zometa every 6 months x 2 years. Started 9/2022. Completed 9/2024    Treatment plan:   1. Adjuvant Aromasin started 8/17/22.  2. Prolia to start on 4/1/25.      Chief Complaint: 6 mo f/u    HPI   Patient presents for follow-up of breast cancer. She is doing well. She continues on Mounjaro.  She had breast reconstruction surgery and Mediport removal with Dr. Robins on 8/19/24. Procedure went well. She continues on Aromasin and is tolerating it well other than occasional hot flashes. She is very active and is walking on the treadmill every other day. No new problems reported today. Completed last Zometa infusion on 9/30/24, will switch back to Prolia every 6 months in April 2025. Appetite good and weight stable. Bowels moving well. Mentions she has upcoming routine appointment with GI in a few months.    Past Medical History:   Diagnosis Date    Anemia, unspecified     Anxiety     Breast cancer     Diabetes mellitus, type 2     Hyperlipidemia     Hypertension     Mitral valve prolapse     Obesity     Osteopenia     Sleep apnea     uses CPAP      Review of patient's allergies indicates:  No Known Allergies   Current Outpatient Medications on File Prior to Visit   Medication Sig Dispense Refill    amLODIPine-benazepriL (LOTREL) 10-40 mg per capsule Take 1 capsule by mouth once daily.      ascorbic acid, vitamin C, (VITAMIN C) 500 MG tablet Take 500 mg by mouth once daily.      aspirin 81 MG Chew 1 tablet Orally Once a day      b complex vitamins tablet Take 1 tablet by mouth once daily.      calcium carbonate-vitamin D3 600 mg-20 mcg (800 unit) Chew Take 1 tablet by mouth once daily.      empagliflozin (JARDIANCE) 25 mg tablet Take 1 tablet by mouth once daily.      exemestane (AROMASIN) 25 mg tablet Take 1 tablet (25 mg total) by mouth once daily. 30 tablet 6    ferrous gluconate (FERGON) 240 (27 FE) MG tablet Take 240 mg by mouth once daily.      GLUTAMINE ORAL Take 1 Scoop by mouth as needed.      GLUTAMINE ORAL Take 1 tablet by mouth once daily.      loratadine 10 mg Cap Take 10 mg by mouth as needed.      magnesium 250 mg Tab Take 1 tablet by mouth once daily.      metFORMIN (GLUCOPHAGE) 500 MG tablet Take 500 mg by mouth 2 (two) times daily with meals.      metoprolol succinate (TOPROL-XL) 50 MG 24  hr tablet Take 1 tablet by mouth once daily.      MOUNJARO 10 mg/0.5 mL PnIj SMARTSIG:10 Milligram(s) SUB-Q Every Evening      multivitamin-folic acid-biotin (HAIR-SKIN-NAILS, MV-FA-BIOTIN,) 400-2,000 mcg Tab Take 1 tablet by mouth once daily.      multivitamin-minerals-lutein Tab Take 1 tablet by mouth once daily at 6am.      NON FORMULARY MEDICATION Take 1 tablet by mouth once daily. Tumeric      omega-3 fatty acids-fish oil 360-1,200 mg Cap Take 1 capsule by mouth once daily.      pyridoxine, vitamin B6, (B-6) 100 MG Tab Take 100 mg by mouth once daily.      rosuvastatin (CRESTOR) 20 MG tablet       SITagliptin (JANUVIA) 100 MG Tab Take 1 tablet by mouth once daily.       No current facility-administered medications on file prior to visit.      Review of Systems   Constitutional:  Negative for appetite change.   HENT:  Negative for mouth sores.    Eyes:  Negative for visual disturbance.   Respiratory:  Negative for cough and shortness of breath.    Cardiovascular:  Negative for chest pain.   Gastrointestinal:  Negative for abdominal pain and diarrhea.   Genitourinary:  Negative for frequency.   Musculoskeletal:  Negative for back pain.   Integumentary:  Negative for rash.   Neurological:  Negative for headaches.   Hematological:  Negative for adenopathy.   Psychiatric/Behavioral:  The patient is not nervous/anxious.          Vitals:    01/30/25 0944   BP: 113/68   Pulse: 89   Resp: 18   Temp: 98.3 °F (36.8 °C)     Physical Exam  Vitals reviewed.   Constitutional:       Appearance: Normal appearance. She is normal weight.      Comments: Pleasant AA female   HENT:      Head: Normocephalic.   Eyes:      General: Lids are normal. Vision grossly intact.      Extraocular Movements: Extraocular movements intact.      Conjunctiva/sclera: Conjunctivae normal.   Cardiovascular:      Rate and Rhythm: Normal rate and regular rhythm.      Pulses: Normal pulses.      Heart sounds: S1 normal and S2 normal. Murmur heard.       Systolic murmur is present with a grade of 2/6.   Pulmonary:      Effort: Pulmonary effort is normal.      Breath sounds: Normal breath sounds.   Chest:      Comments: Bilateral mastectomies with RAHUL reconstruction. S/p right breast reconstruction in August 2024. Incisions all healed. Palpable scar tissue along incision lines but no other masses.   Abdominal:      General: Abdomen is flat. Bowel sounds are normal. There is no distension.      Palpations: Abdomen is soft.      Tenderness: There is no abdominal tenderness.   Musculoskeletal:      Cervical back: Normal range of motion and neck supple.      Right lower leg: No edema.      Left lower leg: No edema.   Skin:     General: Skin is warm and moist.      Capillary Refill: Capillary refill takes less than 2 seconds.   Neurological:      General: No focal deficit present.      Mental Status: She is alert and oriented to person, place, and time.   Psychiatric:         Attention and Perception: Attention normal.         Mood and Affect: Mood normal.         Speech: Speech normal.         Behavior: Behavior normal. Behavior is cooperative.         Judgment: Judgment normal.         ECOG SCORE    1 - Restricted in strenuous activity-ambulatory and able to carry out work of a light nature        Lab Visit on 01/30/2025   Component Date Value Ref Range Status    WBC 01/30/2025 6.43  4.50 - 11.50 x10(3)/mcL Final    RBC 01/30/2025 3.29 (L)  4.20 - 5.40 x10(6)/mcL Final    Hgb 01/30/2025 10.9 (L)  12.0 - 16.0 g/dL Final    Hct 01/30/2025 33.3 (L)  37.0 - 47.0 % Final    MCV 01/30/2025 101.2 (H)  80.0 - 94.0 fL Final    MCH 01/30/2025 33.1 (H)  27.0 - 31.0 pg Final    MCHC 01/30/2025 32.7 (L)  33.0 - 36.0 g/dL Final    RDW 01/30/2025 12.1  11.5 - 17.0 % Final    Platelet 01/30/2025 234  130 - 400 x10(3)/mcL Final    MPV 01/30/2025 10.3  7.4 - 10.4 fL Final    Neut % 01/30/2025 57.1  % Final    Lymph % 01/30/2025 30.6  % Final    Mono % 01/30/2025 8.1  % Final    Eos %  01/30/2025 3.4  % Final    Basophil % 01/30/2025 0.6  % Final    Imm Grans % 01/30/2025 0.2  % Final    Neut # 01/30/2025 3.67  2.1 - 9.2 x10(3)/mcL Final    Lymph # 01/30/2025 1.97  0.6 - 4.6 x10(3)/mcL Final    Mono # 01/30/2025 0.52  0.1 - 1.3 x10(3)/mcL Final    Eos # 01/30/2025 0.22  0 - 0.9 x10(3)/mcL Final    Baso # 01/30/2025 0.04  <=0.2 x10(3)/mcL Final    Imm Gran # 01/30/2025 0.01  0.00 - 0.04 x10(3)/mcL Final     Assessment:            1. Malignant neoplasm of lower-inner quadrant of right breast of female, estrogen receptor positive    2. Osteopenia of multiple sites        Plan:     Patient with breast cancer stage IIA, 2.2cm tumor, high grade, ER weakly positive 25%. S/p right breast lumpectomy on 1/26/17. Lymph node negative.  Per NCCN guidelines, oncotype DX testing recommended.  Oncotype showed tumor high risk and PCR breast panel showing triple negative.  Treatment recommended with dose dense AC x 4 followed by weekly Taxol x 12.  Treatment was delayed due to non-healing breast wound.  Patient completed 4 cycles DDAC on 5/15/17 and 12 cycles of weekly Taxol on 8/15/17.  Adjuvant XRT completed on 9/28/17 and patient tolerated well.   Patient started Femara on 10/11/17.  Also started on Prolia for osteopenia, last dose in 12/2021.    Screening MMG from 11/2021 showed an asymmetry in area of lumpectomy site in right breast.  Diagnostic MMG/US 12/2021 showed suspicious dominant mass 1.8cm 6:00 with 2 additional satellite lesions.  s/p biopsy 12/21/21 pathology c/w IDCA Grade 2, weakly ER+ 29%, HI and Her2 negative with high Ki67 80%, same breast profile as original cancer.  This represents a recurrence vs. new primary which developed while on AI.  MRI 1/26/22 showed multiple masses, spanning area of 5.7cm wtih dominant mass 1.6cm, no suspicious nodes or enhancing masses in left breast.   Patient met with Dr. Baltazar and surgical plan is for bilateral mastectomies.  Recommended neoadjuvant chemotherapy  due to concerns for wound healing following surgery and need for chemotherapy given recurrence.  Patient completed 6 cycles Taxotere/Cytoxan 2/10/22--5/26/22.   MMG/US done after cycle 3 showed decrease in dominant mass and resolution of satellite masses c/w good partial response.  S/p bilateral mastectomies done 6/29/22 and showed only a residual IDCA 2mm, with negative LNs, and left breast negative for malignancy.     Started Aromasin on 8/17/22 and plan for 10 years, refill sent. She is tolerating well other than occasion hot flashes.   S/p reconstruction on 10/26/22 she did have delayed wound healing.   S/p right breast expander placement on 5/1/23.   She had breast reconstruction surgery and Mediport removal with Dr. Robins on 8/19/24.    Recent labs with worsening anemia, Hgb 10.9 g/dL. MCV and MCH still elevated. Work-up for anemia in January 2024, unrevealing. Iron level normal with elevated ferritin. Folate normal and Vitamin B12 elevated.   CMP pending. She is not taking any calcium supplements due to hypercalcemia in the past.    Plan to repeat anemia workup again at her next appointment.   Scheduled to see GI in March-April of this year. Agree with workup.     DEXA from November 2023 does show slightly worse osteopenia from 11/2023.   Completed 2 years of Zometa in 9/2024, plan to change back to Prolia every 6 months. Due to start on 4/1/25.   Plan to repeat DEXA in November 2025.     All questions answered at this time.       JOSE Boswell  Oncology/Hematology  [facility]          Visit today included increased complexity associated with the care of AI related side effects and montioring, also addressed and managed the longitudinal care of the patient's breast cancer diagnosis.    Prior to the patient's arrival on the same day, I spent (5) minutes reviewing chart. Once in the exam room with the patient, I spent (15) minutes in the room with the member performing a history and  exam as well as reviewing the test results and recommendations with the patient. After leaving the exam room, I spent an additional (5) minutes completing the electronic health record. The total time spent that day caring for the member is (25) minutes, and this time - including the breakdown - is documented in the medical record.

## 2025-01-30 ENCOUNTER — OFFICE VISIT (OUTPATIENT)
Dept: HEMATOLOGY/ONCOLOGY | Facility: CLINIC | Age: 65
End: 2025-01-30
Payer: MEDICARE

## 2025-01-30 ENCOUNTER — LAB VISIT (OUTPATIENT)
Dept: LAB | Facility: HOSPITAL | Age: 65
End: 2025-01-30
Attending: INTERNAL MEDICINE
Payer: MEDICARE

## 2025-01-30 VITALS
HEART RATE: 89 BPM | BODY MASS INDEX: 28.46 KG/M2 | RESPIRATION RATE: 18 BRPM | OXYGEN SATURATION: 98 % | SYSTOLIC BLOOD PRESSURE: 113 MMHG | HEIGHT: 62 IN | DIASTOLIC BLOOD PRESSURE: 68 MMHG | TEMPERATURE: 98 F | WEIGHT: 154.63 LBS

## 2025-01-30 DIAGNOSIS — Z17.0 MALIGNANT NEOPLASM OF LOWER-INNER QUADRANT OF RIGHT BREAST OF FEMALE, ESTROGEN RECEPTOR POSITIVE: Primary | ICD-10-CM

## 2025-01-30 DIAGNOSIS — C50.311 MALIGNANT NEOPLASM OF LOWER-INNER QUADRANT OF RIGHT BREAST OF FEMALE, ESTROGEN RECEPTOR POSITIVE: Primary | ICD-10-CM

## 2025-01-30 DIAGNOSIS — D64.9 ANEMIA, UNSPECIFIED TYPE: ICD-10-CM

## 2025-01-30 DIAGNOSIS — C50.311 MALIGNANT NEOPLASM OF LOWER-INNER QUADRANT OF RIGHT BREAST OF FEMALE, ESTROGEN RECEPTOR POSITIVE: ICD-10-CM

## 2025-01-30 DIAGNOSIS — M85.89 OSTEOPENIA OF MULTIPLE SITES: ICD-10-CM

## 2025-01-30 DIAGNOSIS — Z17.0 MALIGNANT NEOPLASM OF LOWER-INNER QUADRANT OF RIGHT BREAST OF FEMALE, ESTROGEN RECEPTOR POSITIVE: ICD-10-CM

## 2025-01-30 LAB
ALBUMIN SERPL-MCNC: 4.1 G/DL (ref 3.4–4.8)
ALBUMIN/GLOB SERPL: 1.1 RATIO (ref 1.1–2)
ALP SERPL-CCNC: 116 UNIT/L (ref 40–150)
ALT SERPL-CCNC: 15 UNIT/L (ref 0–55)
ANION GAP SERPL CALC-SCNC: 7 MEQ/L
AST SERPL-CCNC: 20 UNIT/L (ref 5–34)
BASOPHILS # BLD AUTO: 0.04 X10(3)/MCL
BASOPHILS NFR BLD AUTO: 0.6 %
BILIRUB SERPL-MCNC: 0.4 MG/DL
BUN SERPL-MCNC: 13.6 MG/DL (ref 9.8–20.1)
CALCIUM SERPL-MCNC: 10.2 MG/DL (ref 8.4–10.2)
CHLORIDE SERPL-SCNC: 106 MMOL/L (ref 98–107)
CO2 SERPL-SCNC: 28 MMOL/L (ref 23–31)
CREAT SERPL-MCNC: 0.96 MG/DL (ref 0.55–1.02)
CREAT/UREA NIT SERPL: 14
EOSINOPHIL # BLD AUTO: 0.22 X10(3)/MCL (ref 0–0.9)
EOSINOPHIL NFR BLD AUTO: 3.4 %
ERYTHROCYTE [DISTWIDTH] IN BLOOD BY AUTOMATED COUNT: 12.1 % (ref 11.5–17)
GFR SERPLBLD CREATININE-BSD FMLA CKD-EPI: >60 ML/MIN/1.73/M2
GLOBULIN SER-MCNC: 3.9 GM/DL (ref 2.4–3.5)
GLUCOSE SERPL-MCNC: 120 MG/DL (ref 82–115)
HCT VFR BLD AUTO: 33.3 % (ref 37–47)
HGB BLD-MCNC: 10.9 G/DL (ref 12–16)
IMM GRANULOCYTES # BLD AUTO: 0.01 X10(3)/MCL (ref 0–0.04)
IMM GRANULOCYTES NFR BLD AUTO: 0.2 %
LYMPHOCYTES # BLD AUTO: 1.97 X10(3)/MCL (ref 0.6–4.6)
LYMPHOCYTES NFR BLD AUTO: 30.6 %
MCH RBC QN AUTO: 33.1 PG (ref 27–31)
MCHC RBC AUTO-ENTMCNC: 32.7 G/DL (ref 33–36)
MCV RBC AUTO: 101.2 FL (ref 80–94)
MONOCYTES # BLD AUTO: 0.52 X10(3)/MCL (ref 0.1–1.3)
MONOCYTES NFR BLD AUTO: 8.1 %
NEUTROPHILS # BLD AUTO: 3.67 X10(3)/MCL (ref 2.1–9.2)
NEUTROPHILS NFR BLD AUTO: 57.1 %
PLATELET # BLD AUTO: 234 X10(3)/MCL (ref 130–400)
PMV BLD AUTO: 10.3 FL (ref 7.4–10.4)
POTASSIUM SERPL-SCNC: 4.5 MMOL/L (ref 3.5–5.1)
PROT SERPL-MCNC: 8 GM/DL (ref 5.8–7.6)
RBC # BLD AUTO: 3.29 X10(6)/MCL (ref 4.2–5.4)
SODIUM SERPL-SCNC: 141 MMOL/L (ref 136–145)
WBC # BLD AUTO: 6.43 X10(3)/MCL (ref 4.5–11.5)

## 2025-01-30 PROCEDURE — 36415 COLL VENOUS BLD VENIPUNCTURE: CPT

## 2025-01-30 PROCEDURE — 85025 COMPLETE CBC W/AUTO DIFF WBC: CPT

## 2025-01-30 PROCEDURE — 80053 COMPREHEN METABOLIC PANEL: CPT

## 2025-01-30 PROCEDURE — 99215 OFFICE O/P EST HI 40 MIN: CPT | Mod: PBBFAC | Performed by: NURSE PRACTITIONER

## 2025-01-30 PROCEDURE — 99999 PR PBB SHADOW E&M-EST. PATIENT-LVL V: CPT | Mod: PBBFAC,,, | Performed by: NURSE PRACTITIONER

## 2025-02-03 ENCOUNTER — HOSPITAL ENCOUNTER (OUTPATIENT)
Dept: RADIOLOGY | Facility: HOSPITAL | Age: 65
Discharge: HOME | End: 2025-02-03
Payer: MEDICARE

## 2025-02-03 DIAGNOSIS — Z12.31 ENCOUNTER FOR SCREENING MAMMOGRAM FOR BREAST CANCER: ICD-10-CM

## 2025-02-05 ENCOUNTER — PATIENT MESSAGE (OUTPATIENT)
Dept: HEMATOLOGY/ONCOLOGY | Facility: CLINIC | Age: 65
End: 2025-02-05
Payer: MEDICARE

## 2025-02-05 NOTE — TELEPHONE ENCOUNTER
I wonder who ordered the MMG? She had bilateral mastectomies without nipples so I am not sure how that was ordered.

## 2025-02-14 ENCOUNTER — HOSPITAL ENCOUNTER (OUTPATIENT)
Dept: RADIOLOGY | Facility: HOSPITAL | Age: 65
Discharge: HOME | End: 2025-02-14
Attending: NURSE PRACTITIONER
Payer: MEDICARE

## 2025-02-14 DIAGNOSIS — M85.89 OSTEOPENIA OF MULTIPLE SITES: ICD-10-CM

## 2025-02-14 PROCEDURE — 77080 DXA BONE DENSITY AXIAL: CPT | Mod: TC

## 2025-02-17 ENCOUNTER — RESULTS FOLLOW-UP (OUTPATIENT)
Dept: HEMATOLOGY/ONCOLOGY | Facility: CLINIC | Age: 65
End: 2025-02-17
Payer: MEDICARE

## 2025-02-17 ENCOUNTER — PATIENT MESSAGE (OUTPATIENT)
Dept: HEMATOLOGY/ONCOLOGY | Facility: CLINIC | Age: 65
End: 2025-02-17
Payer: MEDICARE

## 2025-04-01 ENCOUNTER — INFUSION (OUTPATIENT)
Dept: INFUSION THERAPY | Facility: HOSPITAL | Age: 65
End: 2025-04-01
Attending: INTERNAL MEDICINE
Payer: MEDICARE

## 2025-04-01 VITALS
BODY MASS INDEX: 28.21 KG/M2 | RESPIRATION RATE: 18 BRPM | WEIGHT: 153.31 LBS | DIASTOLIC BLOOD PRESSURE: 64 MMHG | OXYGEN SATURATION: 99 % | HEART RATE: 85 BPM | SYSTOLIC BLOOD PRESSURE: 107 MMHG | HEIGHT: 62 IN | TEMPERATURE: 99 F

## 2025-04-01 DIAGNOSIS — M85.80 OSTEOPENIA, UNSPECIFIED LOCATION: Primary | ICD-10-CM

## 2025-04-01 PROCEDURE — 96372 THER/PROPH/DIAG INJ SC/IM: CPT

## 2025-04-01 PROCEDURE — 63600175 PHARM REV CODE 636 W HCPCS: Mod: JZ,TB | Performed by: NURSE PRACTITIONER

## 2025-04-01 RX ADMIN — DENOSUMAB 60 MG: 60 INJECTION SUBCUTANEOUS at 08:04

## 2025-04-01 NOTE — NURSING
Pt denied any recent dental work within the past 3 months as well as any upcoming invasive dental procedures. Prolia injection given, tolerated well. Pt monitored x 15 minutes post injection, discharged in stable condition, next appt given.

## 2025-04-25 ENCOUNTER — LAB VISIT (OUTPATIENT)
Dept: LAB | Facility: HOSPITAL | Age: 65
End: 2025-04-25
Attending: INTERNAL MEDICINE
Payer: MEDICARE

## 2025-04-25 DIAGNOSIS — R80.9 PROTEINURIA, UNSPECIFIED TYPE: ICD-10-CM

## 2025-04-25 DIAGNOSIS — E11.29 TYPE II DIABETES MELLITUS WITH RENAL MANIFESTATIONS: Primary | ICD-10-CM

## 2025-04-25 LAB
ALBUMIN SERPL-MCNC: 4.1 G/DL (ref 3.4–4.8)
ALBUMIN/GLOB SERPL: 1.2 RATIO (ref 1.1–2)
ALP SERPL-CCNC: 99 UNIT/L (ref 40–150)
ALT SERPL-CCNC: 18 UNIT/L (ref 0–55)
ANION GAP SERPL CALC-SCNC: 6 MEQ/L
AST SERPL-CCNC: 21 UNIT/L (ref 11–45)
BASOPHILS # BLD AUTO: 0.05 X10(3)/MCL
BASOPHILS NFR BLD AUTO: 1 %
BILIRUB SERPL-MCNC: 0.3 MG/DL
BUN SERPL-MCNC: 13.2 MG/DL (ref 9.8–20.1)
CALCIUM SERPL-MCNC: 9.5 MG/DL (ref 8.4–10.2)
CHLORIDE SERPL-SCNC: 108 MMOL/L (ref 98–107)
CHOLEST SERPL-MCNC: 140 MG/DL
CHOLEST/HDLC SERPL: 3 {RATIO} (ref 0–5)
CO2 SERPL-SCNC: 29 MMOL/L (ref 23–31)
CREAT SERPL-MCNC: 0.82 MG/DL (ref 0.55–1.02)
CREAT UR-MCNC: 104.9 MG/DL (ref 45–106)
CREAT/UREA NIT SERPL: 16
EOSINOPHIL # BLD AUTO: 0.17 X10(3)/MCL (ref 0–0.9)
EOSINOPHIL NFR BLD AUTO: 3.4 %
ERYTHROCYTE [DISTWIDTH] IN BLOOD BY AUTOMATED COUNT: 12.4 % (ref 11.5–17)
EST. AVERAGE GLUCOSE BLD GHB EST-MCNC: 131.2 MG/DL
GFR SERPLBLD CREATININE-BSD FMLA CKD-EPI: >60 ML/MIN/1.73/M2
GLOBULIN SER-MCNC: 3.4 GM/DL (ref 2.4–3.5)
GLUCOSE SERPL-MCNC: 94 MG/DL (ref 82–115)
HBA1C MFR BLD: 6.2 %
HCT VFR BLD AUTO: 35.3 % (ref 37–47)
HDLC SERPL-MCNC: 54 MG/DL (ref 35–60)
HGB BLD-MCNC: 11.4 G/DL (ref 12–16)
IMM GRANULOCYTES # BLD AUTO: 0.02 X10(3)/MCL (ref 0–0.04)
IMM GRANULOCYTES NFR BLD AUTO: 0.4 %
LDLC SERPL CALC-MCNC: 72 MG/DL (ref 50–140)
LYMPHOCYTES # BLD AUTO: 1.83 X10(3)/MCL (ref 0.6–4.6)
LYMPHOCYTES NFR BLD AUTO: 36.6 %
MCH RBC QN AUTO: 33 PG (ref 27–31)
MCHC RBC AUTO-ENTMCNC: 32.3 G/DL (ref 33–36)
MCV RBC AUTO: 102.3 FL (ref 80–94)
MICROALBUMIN UR-MCNC: 27.6 UG/ML
MICROALBUMIN/CREAT RATIO PNL UR: 26.3 MG/GM CR (ref 0–30)
MONOCYTES # BLD AUTO: 0.38 X10(3)/MCL (ref 0.1–1.3)
MONOCYTES NFR BLD AUTO: 7.6 %
NEUTROPHILS # BLD AUTO: 2.55 X10(3)/MCL (ref 2.1–9.2)
NEUTROPHILS NFR BLD AUTO: 51 %
NRBC BLD AUTO-RTO: 0 %
PLATELET # BLD AUTO: 182 X10(3)/MCL (ref 130–400)
PMV BLD AUTO: 10.7 FL (ref 7.4–10.4)
POTASSIUM SERPL-SCNC: 4.7 MMOL/L (ref 3.5–5.1)
PROT SERPL-MCNC: 7.5 GM/DL (ref 5.8–7.6)
RBC # BLD AUTO: 3.45 X10(6)/MCL (ref 4.2–5.4)
SODIUM SERPL-SCNC: 143 MMOL/L (ref 136–145)
TRIGL SERPL-MCNC: 68 MG/DL (ref 37–140)
VLDLC SERPL CALC-MCNC: 14 MG/DL
WBC # BLD AUTO: 5 X10(3)/MCL (ref 4.5–11.5)

## 2025-04-25 PROCEDURE — 80053 COMPREHEN METABOLIC PANEL: CPT

## 2025-04-25 PROCEDURE — 85025 COMPLETE CBC W/AUTO DIFF WBC: CPT

## 2025-04-25 PROCEDURE — 82570 ASSAY OF URINE CREATININE: CPT

## 2025-04-25 PROCEDURE — 36415 COLL VENOUS BLD VENIPUNCTURE: CPT

## 2025-04-25 PROCEDURE — 80061 LIPID PANEL: CPT

## 2025-04-25 PROCEDURE — 83036 HEMOGLOBIN GLYCOSYLATED A1C: CPT

## 2025-06-10 DIAGNOSIS — C50.311 MALIGNANT NEOPLASM OF LOWER-INNER QUADRANT OF RIGHT BREAST OF FEMALE, ESTROGEN RECEPTOR POSITIVE: Primary | ICD-10-CM

## 2025-06-10 DIAGNOSIS — Z17.0 MALIGNANT NEOPLASM OF LOWER-INNER QUADRANT OF RIGHT BREAST OF FEMALE, ESTROGEN RECEPTOR POSITIVE: Primary | ICD-10-CM

## 2025-06-10 RX ORDER — EXEMESTANE 25 MG/1
25 TABLET ORAL DAILY
Qty: 30 TABLET | Refills: 1 | Status: SHIPPED | OUTPATIENT
Start: 2025-06-10 | End: 2025-08-09

## 2025-07-29 NOTE — PROGRESS NOTES
Subjective:       Patient ID: Delia Scruggs is a 65 y.o. female.  Surgeon: Dr. Sd Baltazar  Radiation oncologist: Dr. Browne Prellop    Recurrence vs. Second Primary Right Breast AJCC Anatomic and Clinical Prognostic Stage IA ([id number])--Diagnosed 21  Right Breast Cancer Stage IIA ([id number]) diagnosed 17  Biopsy/pathology:   1. Right breast mass biopsy done 17--invasive ductal carcinoma, grade III with focal DCIS, ER 25.72%, MN 0.28% negative, Her2 1+ negative by IHC. Oncotype DX testing showed recurrence score of 43 (distant recurrence risk 40% with Tamoxifen alone and 12% Tamoxifen + chemo), PCR done for breast panel showed triple negative.  2. Right breast biopsy mass at 6:00 done 21--invasive ductal carcinoma, Grade 2, ER 29%, MN 0%, Her2 0 by IHC, Ki67 80% high.  Surgery/pathology:   1. Right breast needle localization lumpectomy and SLN biopsy done 17--invasive mammary carcinoma with papillary and ductal features, grade III, 2.2cm, no lymphovascular or perineural invasion, margins clear, few foci of non-invasive predominantly solid papillary carcinoma also identified, 2 sentinel lymph nodes negative, closest margin medial, re-excision of medial margin with focal residual invasive papillary carcinoma <0.1cm, now free.  2. Bilateral mastectomies with right ALND done 22--neoadjuvant therapy effects with near complete tumor response, solitary focus of invasive ductal carcinoma 2mm, Grade 1, resection margin clear, left breast with no atypia or malignancy, 2 LNs negative.   Imagin. Screening MMG 16--asymmetry medially right breast.  2. Diagnostic MMG right breast 16--asymmetrical density located posteriorly in lower inner quadrant of right breast, suggestion of underlying clearly circumscribed 10mm rounded density w/n area on spot compression.  3. US right breast 16--focal mass 4:00 8cm from nipple hypoechoic and hetrogensous with irregular margins  1.7X1.1X1.4cm BIRADS 5.  4. CT A/P 2/24/17--fluid in resection cavity right breast, no evidence for metastatic disease, vague area of enhancement right hepatic low, not typical of metastatic disease, consider liver mass protocol MRI.  5. Bone scan 2/24/17--DJD right knee. No evidence of any metastatic bone lesions.  6. MRI abdomen/liver protocol done 3/7/17--hepatic lesion in question is most c/w focal nodular hyperplasia.  7. Screening MMG done 11/24/21--asymmetry in the area of the lumpectomy site in the inner right breast posterior depth on the CC view needs further evaluation.    8. MMG/US right breast done 12/20/21--Findings concerning for new or recurrent malignancy at the site of prior lumpectomy in this patient with a personal history of right breast cancer. The three adjacent (one dominant 1.8cm, two satellite 5mm and 4mm) irregular hypoechoic masses in the right breast 6:00 axis 4 cm FN position are highly suggestive of malignancy. BI-RADS 5: Highly suggestive of malignancy. No suspicious right axillary adenopathy.  9. MRI bilateral breasts done 1/26/22--Right breast 6:00 irregular heterogeneously enhancing mass containing a biopsy clip, measuring 1.6 m x 0.9 cm x 1.5 cm, is consistent with the known biopsy-proven right breast invasive malignancy. Multiple additional irregular heterogeneously enhancing masses with irregular margins within the lumpectomy bed and anterior to the lumpectomy bed in the 4:00 to 6:00 right breast middle to posterior depths, in total spanning 5.7 cm x 2.8 cm x 2.2 cm, are also highly suggestive of malignancy. The posterior aspect of the suspicious enhancement is 1 cm anterior to the pectoralis major muscle. Although there is slight tenting and mild enhancement of the pectoralis major muscle, this is most likely related to adherent post lumpectomy scarring and post radiation change, no suspicious enhancement in the left breast. No significant internal mammary or axillary  adenopathy.  10. Diagnostic MMG/US 3/30/22--Mild-moderate partial response to neoadjuvant chemotherapy, evidenced mammographically by decreased density and conspicuity of the known malignant masses in the right breast 6:00 axis posterior depth at the site of prior lumpectomy. The previously biopsied dominant mass has decreased in size as measured sonographically, now 1.1 x 0.6 x 1.6 cm, previously 1.8 x 0.9 x 1.7 cm. Also, the two satellite masses previously identified on sonography have resolved. BI-RADS 6: Known biopsy-proven malignancy.    Mediport placement by Dr. Baltazar on 3/23/17--removed 6/2020.  Mediport placed by Dr. Baltazar on 2/7/22.     Genetic testing on 3/21/17 was negative.    DEXA:   10/24/17--L1-L4 T = 1.3, Left femur neck -1.1, right femur neck -2.1, total left femur 0.0, right -0.7 c/w osteopenia.  11/21/19--L1-L4 T = 1.7, Left femur neck -1.3, right femur neck -2.0, total left femur -0.3, right -0.7 (mixed response).  11/24/21--L1-L4 T = 2.0, Left femur neck -1.2, right femur neck -2.0, total left femur -0.1, right -0.7 (improved spine, left hip, right stable).   11/06/23--L1-L4 T = 1.8, left femur neck -1.6, right femur neck -2.1, total left -0.4, right total -1.0 (slightly worse in all areas).  02/14/25--L spine 1.0, left femur neck -1.2, right femur neck -2.3, total left -0.5, total right -1.1. Mixed.     Treatment History:   1. DDAC x 4 doses. Started 4/3/17. Completed on 5/15/17.    2. Weekly Taxol X 12 completed 5/30/17--8/15/17.   3. Adjuvant XRT completed 8/30/17 - 9/28/17.    4. Femara 10/11/17--stopped 2/1/22.   5. Prolia 6/15/18--12/30/21.    6. Neoadjuvant Taxotere/Cytoxan X 6 cycles completed 2/10/22--5/26/22.   7. Bilateral mastectomies 6/29/22.    8. RAHUL flap reconstruction done 10/26/22 Dr. Rivera.   9. Zometa every 6 months x 2 years. Started 9/2022. Completed 9/2024    Treatment plan:   1. Adjuvant Aromasin started 8/17/22.  2. Prolia started on 4/1/25. Next dose scheduled on  10/1/25.     Chief Complaint: Other [facility] [place]    HPI   Patient presents for follow-up of breast cancer. She is doing well. She continues on Mounjaro and has lost 30 lbs in 1 year. She had breast reconstruction surgery and Mediport removal with Dr. Robins on 8/19/24. Procedure went well and she is done having surgery at this time. She continues on Aromasin and is tolerating it well other than occasional hot flashes. She is very active and is walking on the treadmill every other day. No new problems reported today. Bowels moving well. Mentions she has upcoming routine appointment with GI in September.     Past Medical History:   Diagnosis Date    Anemia, unspecified     Anxiety     Breast cancer     Diabetes mellitus, type 2     Hyperlipidemia     Hypertension     Mitral valve prolapse     Obesity     Osteopenia     Sleep apnea     uses CPAP      Review of patient's allergies indicates:  No Known Allergies   Current Outpatient Medications on File Prior to Visit   Medication Sig Dispense Refill    amLODIPine-benazepriL (LOTREL) 10-40 mg per capsule Take 1 capsule by mouth once daily.      ascorbic acid, vitamin C, (VITAMIN C) 500 MG tablet Take 500 mg by mouth once daily.      aspirin 81 MG Chew 1 tablet Orally Once a day      b complex vitamins tablet Take 1 tablet by mouth once daily.      empagliflozin (JARDIANCE) 25 mg tablet Take 1 tablet by mouth once daily.      exemestane (AROMASIN) 25 mg tablet Take 1 tablet (25 mg total) by mouth once daily. 30 tablet 1    ferrous gluconate (FERGON) 240 (27 FE) MG tablet Take 240 mg by mouth once daily.      GLUTAMINE ORAL Take 1 Scoop by mouth as needed.      GLUTAMINE ORAL Take 1 tablet by mouth once daily.      loratadine 10 mg Cap Take 10 mg by mouth as needed.      magnesium 250 mg Tab Take 1 tablet by mouth once daily.      metFORMIN (GLUCOPHAGE) 500 MG tablet Take 500 mg by mouth 2 (two) times daily with meals.      metoprolol succinate (TOPROL-XL) 50 MG 24 hr tablet Take  1 tablet by mouth once daily.      MOUNJARO 10 mg/0.5 mL PnIj SMARTSIG:10 Milligram(s) SUB-Q Every Evening      multivitamin-folic acid-biotin (HAIR-SKIN-NAILS, MV-FA-BIOTIN,) 400-2,000 mcg Tab Take 1 tablet by mouth once daily.      multivitamin-minerals-lutein Tab Take 1 tablet by mouth once daily at 6am.      NON FORMULARY MEDICATION Take 1 tablet by mouth once daily. Tumeric      omega-3 fatty acids-fish oil 360-1,200 mg Cap Take 1 capsule by mouth once daily.      pyridoxine, vitamin B6, (B-6) 100 MG Tab Take 100 mg by mouth once daily.      rosuvastatin (CRESTOR) 20 MG tablet       [DISCONTINUED] SITagliptin (JANUVIA) 100 MG Tab Take 1 tablet by mouth once daily.       No current facility-administered medications on file prior to visit.      Review of Systems   Constitutional:  Negative for appetite change and unexpected weight change.   HENT:  Negative for mouth sores.    Eyes:  Negative for visual disturbance.   Respiratory:  Negative for cough and shortness of breath.    Cardiovascular:  Negative for chest pain.   Gastrointestinal:  Negative for abdominal pain and diarrhea.   Genitourinary:  Negative for frequency.   Musculoskeletal:  Negative for back pain.   Integumentary:  Negative for rash.   Neurological:  Negative for headaches.   Hematological:  Negative for adenopathy.   Psychiatric/Behavioral:  The patient is not nervous/anxious.          Vitals:    08/04/25 1003   BP: 107/69   Pulse: 69   Resp: 14   Temp: 97.9 °F (36.6 °C)     Physical Exam  Vitals reviewed.   Constitutional:       Appearance: Normal appearance. She is normal weight.      Comments: Pleasant AA female   HENT:      Head: Normocephalic.   Eyes:      General: Lids are normal. Vision grossly intact.      Extraocular Movements: Extraocular movements intact.      Conjunctiva/sclera: Conjunctivae normal.   Cardiovascular:      Rate and Rhythm: Normal rate and regular rhythm.      Pulses: Normal pulses.      Heart sounds: S1 normal and S2  normal. Murmur heard.      Systolic murmur is present with a grade of 2/6.   Pulmonary:      Effort: Pulmonary effort is normal.      Breath sounds: Normal breath sounds.   Chest:      Comments: Bilateral mastectomies with RAHUL reconstruction. S/p right breast reconstruction in August 2024. Incisions all healed. Tightness noted in right axillary region. Palpable scar tissue along incision lines but no masses.   Abdominal:      General: Abdomen is flat. Bowel sounds are normal. There is no distension.      Palpations: Abdomen is soft.      Tenderness: There is no abdominal tenderness.   Musculoskeletal:      Cervical back: Normal range of motion and neck supple.      Right lower leg: No edema.      Left lower leg: No edema.   Skin:     General: Skin is warm and moist.      Capillary Refill: Capillary refill takes less than 2 seconds.   Neurological:      General: No focal deficit present.      Mental Status: She is alert and oriented to person, place, and time.   Psychiatric:         Attention and Perception: Attention normal.         Mood and Affect: Mood normal.         Speech: Speech normal.         Behavior: Behavior normal. Behavior is cooperative.         Judgment: Judgment normal.         ECOG SCORE    1 - Restricted in strenuous activity-ambulatory and able to carry out work of a light nature        No visits with results within 1 Day(s) from this visit.   Latest known visit with results is:   Lab Visit on 07/30/2025   Component Date Value Ref Range Status    Iron Binding Capacity Unsaturated 07/30/2025 237  70 - 310 ug/dL Final    Iron Level 07/30/2025 68  50 - 170 ug/dL Final    Transferrin 07/30/2025 270  173 - 360 mg/dL Final    Iron Binding Capacity Total 07/30/2025 305  250 - 450 ug/dL Final    Iron Saturation 07/30/2025 22  20 - 50 % Final    Ferritin Level 07/30/2025 267.56 (H)  4.63 - 204.00 ng/mL Final    Folate Level 07/30/2025 16.8  7.0 - 31.4 ng/mL Final    Vitamin B12 07/30/2025 >2,000 (H)  213  - 816 pg/mL Final    Sodium 07/30/2025 142  136 - 145 mmol/L Final    Potassium 07/30/2025 4.3  3.5 - 5.1 mmol/L Final    Chloride 07/30/2025 104  98 - 107 mmol/L Final    CO2 07/30/2025 30  23 - 31 mmol/L Final    Glucose 07/30/2025 125 (H)  82 - 115 mg/dL Final    Blood Urea Nitrogen 07/30/2025 12.5  9.8 - 20.1 mg/dL Final    Creatinine 07/30/2025 0.86  0.55 - 1.02 mg/dL Final    Calcium 07/30/2025 9.7  8.4 - 10.2 mg/dL Final    Protein Total 07/30/2025 8.0 (H)  5.8 - 7.6 gm/dL Final    Albumin 07/30/2025 4.2  3.4 - 4.8 g/dL Final    Globulin 07/30/2025 3.8 (H)  2.4 - 3.5 gm/dL Final    Albumin/Globulin Ratio 07/30/2025 1.1  1.1 - 2.0 ratio Final    Bilirubin Total 07/30/2025 0.4  <=1.5 mg/dL Final    ALP 07/30/2025 90  40 - 150 unit/L Final    ALT 07/30/2025 19  0 - 55 unit/L Final    AST 07/30/2025 24  11 - 45 unit/L Final    eGFR 07/30/2025 >60  mL/min/1.73/m2 Final    Estimated GFR calculated using the CKD-EPI creatinine (2021) equation.    Anion Gap 07/30/2025 8.0  mEq/L Final    BUN/Creatinine Ratio 07/30/2025 15   Final    WBC 07/30/2025 4.57  4.50 - 11.50 x10(3)/mcL Final    RBC 07/30/2025 3.48 (L)  4.20 - 5.40 x10(6)/mcL Final    Hgb 07/30/2025 11.5 (L)  12.0 - 16.0 g/dL Final    Hct 07/30/2025 34.9 (L)  37.0 - 47.0 % Final    MCV 07/30/2025 100.3 (H)  80.0 - 94.0 fL Final    MCH 07/30/2025 33.0 (H)  27.0 - 31.0 pg Final    MCHC 07/30/2025 33.0  33.0 - 36.0 g/dL Final    RDW 07/30/2025 11.9  11.5 - 17.0 % Final    Platelet 07/30/2025 204  130 - 400 x10(3)/mcL Final    MPV 07/30/2025 10.7 (H)  7.4 - 10.4 fL Final    Neut % 07/30/2025 44.7  % Final    Lymph % 07/30/2025 43.5  % Final    Mono % 07/30/2025 7.4  % Final    Eos % 07/30/2025 3.7  % Final    Basophil % 07/30/2025 0.7  % Final    Imm Grans % 07/30/2025 0.0  % Final    Neut # 07/30/2025 2.04 (L)  2.1 - 9.2 x10(3)/mcL Final    Lymph # 07/30/2025 1.99  0.6 - 4.6 x10(3)/mcL Final    Mono # 07/30/2025 0.34  0.1 - 1.3 x10(3)/mcL Final    Eos #  07/30/2025 0.17  0 - 0.9 x10(3)/mcL Final    Baso # 07/30/2025 0.03  <=0.2 x10(3)/mcL Final    Imm Gran # 07/30/2025 0.00  0.00 - 0.04 x10(3)/mcL Final     Assessment:            1. History of breast cancer    2. Malignant neoplasm of lower-inner quadrant of right breast of female, estrogen receptor positive        Plan:     Patient with breast cancer stage IIA, 2.2cm tumor, high grade, ER weakly positive 25%. S/p right breast lumpectomy on 1/26/17. Lymph node negative.  Per NCCN guidelines, oncotype DX testing recommended.  Oncotype showed tumor high risk and PCR breast panel showing triple negative.  Treatment recommended with dose dense AC x 4 followed by weekly Taxol x 12.  Treatment was delayed due to non-healing breast wound.  Patient completed 4 cycles DDAC on 5/15/17 and 12 cycles of weekly Taxol on 8/15/17.  Adjuvant XRT completed on 9/28/17 and patient tolerated well.   Patient started Femara on 10/11/17.  Also started on Prolia for osteopenia, last dose in 12/2021.    Screening MMG from 11/2021 showed an asymmetry in area of lumpectomy site in right breast.  Diagnostic MMG/US 12/2021 showed suspicious dominant mass 1.8cm 6:00 with 2 additional satellite lesions.  s/p biopsy 12/21/21 pathology c/w IDCA Grade 2, weakly ER+ 29%, MI and Her2 negative with high Ki67 80%, same breast profile as original cancer.  This represents a recurrence vs. new primary which developed while on AI.  MRI 1/26/22 showed multiple masses, spanning area of 5.7cm wtih dominant mass 1.6cm, no suspicious nodes or enhancing masses in left breast.   Patient met with Dr. Baltazar and surgical plan is for bilateral mastectomies.  Recommended neoadjuvant chemotherapy due to concerns for wound healing following surgery and need for chemotherapy given recurrence.  Patient completed 6 cycles Taxotere/Cytoxan 2/10/22--5/26/22.   MMG/US done after cycle 3 showed decrease in dominant mass and resolution of satellite masses c/w good partial  response.  S/p bilateral mastectomies done 6/29/22 and showed only a residual IDCA 2mm, with negative LNs, and left breast negative for malignancy.     Started Aromasin on 8/17/22 and plan for 10 years. She is tolerating well other than occasion hot flashes.   S/p reconstruction on 10/26/22 she did have delayed wound healing.   S/p right breast expander placement on 5/1/23.   She had breast reconstruction surgery and Mediport removal with Dr. Robins on 8/19/24.    Recent labs with improved anemia, Hgb 11.5 g/dL. MCV and MCH still elevated. Work-up for anemia, unrevealing. Iron level normal with elevated ferritin. Folate normal and Vitamin B12 elevated.   CMP good.     Scheduled to see GI in September 2025.     Completed 2 years of Zometa in 9/2024, changed back to Prolia every 6 months on 4/1/25.   Repeat DEXA in February showed mixed findings. She was restarted on Prolia in April. Will continue to monitor.    Continue with 6 sterling surveillance visits.   All questions answered at this time.       JOSE Boswell  Oncology/Hematology  [facility]          Visit today included increased complexity associated with the care of AI related side effects and montioring, also addressed and managed the longitudinal care of the patient's breast cancer diagnosis.    Prior to the patient's arrival on the same day, I spent (5) minutes reviewing chart. Once in the exam room with the patient, I spent (15) minutes in the room with the member performing a history and exam as well as reviewing the test results and recommendations with the patient. After leaving the exam room, I spent an additional (5) minutes completing the electronic health record. The total time spent that day caring for the member is (25) minutes, and this time - including the breakdown - is documented in the medical record.

## 2025-07-30 ENCOUNTER — LAB VISIT (OUTPATIENT)
Dept: LAB | Facility: HOSPITAL | Age: 65
End: 2025-07-30
Attending: NURSE PRACTITIONER
Payer: MEDICARE

## 2025-07-30 DIAGNOSIS — Z17.0 MALIGNANT NEOPLASM OF LOWER-INNER QUADRANT OF RIGHT BREAST OF FEMALE, ESTROGEN RECEPTOR POSITIVE: ICD-10-CM

## 2025-07-30 DIAGNOSIS — D64.9 ANEMIA, UNSPECIFIED TYPE: ICD-10-CM

## 2025-07-30 DIAGNOSIS — C50.311 MALIGNANT NEOPLASM OF LOWER-INNER QUADRANT OF RIGHT BREAST OF FEMALE, ESTROGEN RECEPTOR POSITIVE: ICD-10-CM

## 2025-07-30 DIAGNOSIS — M85.89 OSTEOPENIA OF MULTIPLE SITES: ICD-10-CM

## 2025-07-30 LAB
ALBUMIN SERPL-MCNC: 4.2 G/DL (ref 3.4–4.8)
ALBUMIN/GLOB SERPL: 1.1 RATIO (ref 1.1–2)
ALP SERPL-CCNC: 90 UNIT/L (ref 40–150)
ALT SERPL-CCNC: 19 UNIT/L (ref 0–55)
ANION GAP SERPL CALC-SCNC: 8 MEQ/L
AST SERPL-CCNC: 24 UNIT/L (ref 11–45)
BASOPHILS # BLD AUTO: 0.03 X10(3)/MCL
BASOPHILS NFR BLD AUTO: 0.7 %
BILIRUB SERPL-MCNC: 0.4 MG/DL
BUN SERPL-MCNC: 12.5 MG/DL (ref 9.8–20.1)
CALCIUM SERPL-MCNC: 9.7 MG/DL (ref 8.4–10.2)
CHLORIDE SERPL-SCNC: 104 MMOL/L (ref 98–107)
CO2 SERPL-SCNC: 30 MMOL/L (ref 23–31)
CREAT SERPL-MCNC: 0.86 MG/DL (ref 0.55–1.02)
CREAT/UREA NIT SERPL: 15
EOSINOPHIL # BLD AUTO: 0.17 X10(3)/MCL (ref 0–0.9)
EOSINOPHIL NFR BLD AUTO: 3.7 %
ERYTHROCYTE [DISTWIDTH] IN BLOOD BY AUTOMATED COUNT: 11.9 % (ref 11.5–17)
FERRITIN SERPL-MCNC: 267.56 NG/ML (ref 4.63–204)
FOLATE SERPL-MCNC: 16.8 NG/ML (ref 7–31.4)
GFR SERPLBLD CREATININE-BSD FMLA CKD-EPI: >60 ML/MIN/1.73/M2
GLOBULIN SER-MCNC: 3.8 GM/DL (ref 2.4–3.5)
GLUCOSE SERPL-MCNC: 125 MG/DL (ref 82–115)
HCT VFR BLD AUTO: 34.9 % (ref 37–47)
HGB BLD-MCNC: 11.5 G/DL (ref 12–16)
IMM GRANULOCYTES # BLD AUTO: 0 X10(3)/MCL (ref 0–0.04)
IMM GRANULOCYTES NFR BLD AUTO: 0 %
IRON SATN MFR SERPL: 22 % (ref 20–50)
IRON SERPL-MCNC: 68 UG/DL (ref 50–170)
LYMPHOCYTES # BLD AUTO: 1.99 X10(3)/MCL (ref 0.6–4.6)
LYMPHOCYTES NFR BLD AUTO: 43.5 %
MCH RBC QN AUTO: 33 PG (ref 27–31)
MCHC RBC AUTO-ENTMCNC: 33 G/DL (ref 33–36)
MCV RBC AUTO: 100.3 FL (ref 80–94)
MONOCYTES # BLD AUTO: 0.34 X10(3)/MCL (ref 0.1–1.3)
MONOCYTES NFR BLD AUTO: 7.4 %
NEUTROPHILS # BLD AUTO: 2.04 X10(3)/MCL (ref 2.1–9.2)
NEUTROPHILS NFR BLD AUTO: 44.7 %
PLATELET # BLD AUTO: 204 X10(3)/MCL (ref 130–400)
PMV BLD AUTO: 10.7 FL (ref 7.4–10.4)
POTASSIUM SERPL-SCNC: 4.3 MMOL/L (ref 3.5–5.1)
PROT SERPL-MCNC: 8 GM/DL (ref 5.8–7.6)
RBC # BLD AUTO: 3.48 X10(6)/MCL (ref 4.2–5.4)
SODIUM SERPL-SCNC: 142 MMOL/L (ref 136–145)
TIBC SERPL-MCNC: 237 UG/DL (ref 70–310)
TIBC SERPL-MCNC: 305 UG/DL (ref 250–450)
TRANSFERRIN SERPL-MCNC: 270 MG/DL (ref 173–360)
VIT B12 SERPL-MCNC: >2000 PG/ML (ref 213–816)
WBC # BLD AUTO: 4.57 X10(3)/MCL (ref 4.5–11.5)

## 2025-07-30 PROCEDURE — 83540 ASSAY OF IRON: CPT

## 2025-07-30 PROCEDURE — 80053 COMPREHEN METABOLIC PANEL: CPT

## 2025-07-30 PROCEDURE — 36415 COLL VENOUS BLD VENIPUNCTURE: CPT

## 2025-07-30 PROCEDURE — 82607 VITAMIN B-12: CPT

## 2025-07-30 PROCEDURE — 82728 ASSAY OF FERRITIN: CPT

## 2025-07-30 PROCEDURE — 82746 ASSAY OF FOLIC ACID SERUM: CPT

## 2025-07-30 PROCEDURE — 85025 COMPLETE CBC W/AUTO DIFF WBC: CPT

## 2025-08-04 ENCOUNTER — OFFICE VISIT (OUTPATIENT)
Dept: HEMATOLOGY/ONCOLOGY | Facility: CLINIC | Age: 65
End: 2025-08-04
Payer: MEDICARE

## 2025-08-04 VITALS
BODY MASS INDEX: 28.14 KG/M2 | DIASTOLIC BLOOD PRESSURE: 69 MMHG | WEIGHT: 152.88 LBS | SYSTOLIC BLOOD PRESSURE: 107 MMHG | HEART RATE: 69 BPM | HEIGHT: 62 IN | RESPIRATION RATE: 14 BRPM | TEMPERATURE: 98 F | OXYGEN SATURATION: 99 %

## 2025-08-04 DIAGNOSIS — Z17.0 MALIGNANT NEOPLASM OF LOWER-INNER QUADRANT OF RIGHT BREAST OF FEMALE, ESTROGEN RECEPTOR POSITIVE: ICD-10-CM

## 2025-08-04 DIAGNOSIS — Z85.3 HISTORY OF BREAST CANCER: Primary | ICD-10-CM

## 2025-08-04 DIAGNOSIS — C50.311 MALIGNANT NEOPLASM OF LOWER-INNER QUADRANT OF RIGHT BREAST OF FEMALE, ESTROGEN RECEPTOR POSITIVE: ICD-10-CM

## 2025-08-04 PROCEDURE — 99999 PR PBB SHADOW E&M-EST. PATIENT-LVL V: CPT | Mod: PBBFAC,,, | Performed by: NURSE PRACTITIONER

## 2025-08-04 PROCEDURE — 99215 OFFICE O/P EST HI 40 MIN: CPT | Mod: PBBFAC | Performed by: NURSE PRACTITIONER

## 2025-08-05 DIAGNOSIS — Z17.0 MALIGNANT NEOPLASM OF LOWER-INNER QUADRANT OF RIGHT BREAST OF FEMALE, ESTROGEN RECEPTOR POSITIVE: ICD-10-CM

## 2025-08-05 DIAGNOSIS — C50.311 MALIGNANT NEOPLASM OF LOWER-INNER QUADRANT OF RIGHT BREAST OF FEMALE, ESTROGEN RECEPTOR POSITIVE: ICD-10-CM

## 2025-08-05 RX ORDER — EXEMESTANE 25 MG/1
25 TABLET ORAL
Qty: 30 TABLET | Refills: 0 | Status: SHIPPED | OUTPATIENT
Start: 2025-08-05

## 2025-09-02 DIAGNOSIS — C50.311 MALIGNANT NEOPLASM OF LOWER-INNER QUADRANT OF RIGHT BREAST OF FEMALE, ESTROGEN RECEPTOR POSITIVE: ICD-10-CM

## 2025-09-02 DIAGNOSIS — Z17.0 MALIGNANT NEOPLASM OF LOWER-INNER QUADRANT OF RIGHT BREAST OF FEMALE, ESTROGEN RECEPTOR POSITIVE: ICD-10-CM

## 2025-09-02 RX ORDER — EXEMESTANE 25 MG/1
25 TABLET ORAL
Qty: 30 TABLET | Refills: 5 | Status: SHIPPED | OUTPATIENT
Start: 2025-09-02

## (undated) DEVICE — SYR ONLY LUER LOCK 20CC

## (undated) DEVICE — GAUZE VISTEC XR DTECT 16 4X4IN

## (undated) DEVICE — CLIP LIGATING HEMOCLP SMALL

## (undated) DEVICE — SET FLUID TRANSFER ASEPTIC

## (undated) DEVICE — SUT VICRYL PLUS 0 CT1 18IN

## (undated) DEVICE — CATH IV JELCO 22GAX1

## (undated) DEVICE — BRA MAMMARY SUPPORT LARGE

## (undated) DEVICE — GAUZE SPONGE BULKEE 6X6.75IN

## (undated) DEVICE — DRESSING ANTIMICROBIAL 1 INCH

## (undated) DEVICE — NDL 27G X 1 1/4

## (undated) DEVICE — ELECTRODE BLADE E-Z CLEAN 4IN

## (undated) DEVICE — DRAPE FULL SHEET 70X100IN

## (undated) DEVICE — DRAPE INCISE IOBAN 2 23X23IN

## (undated) DEVICE — TIP SUCTION YANKAUER

## (undated) DEVICE — SOL ELECTROLUBE ANTI-STIC

## (undated) DEVICE — VEST SURGICAL SZ 2

## (undated) DEVICE — ELECTRODE PATIENT RETURN DISP

## (undated) DEVICE — SUT SILK 2.0 BLK 18

## (undated) DEVICE — CLAMP BIOVER VEN MIC SGL 15G

## (undated) DEVICE — Device

## (undated) DEVICE — DRESSING XEROFORM FOIL PK 1X8

## (undated) DEVICE — BLANKET HYPER ADULT 24X60IN

## (undated) DEVICE — ADHESIVE DERMABOND ADVANCED

## (undated) DEVICE — SUT BONE WAX 2.5 GRMS 12/BX

## (undated) DEVICE — DRAPE STERI INSTRUMENT 1018

## (undated) DEVICE — SUT PDS PLUS 2-0 SH 27IN

## (undated) DEVICE — DRAIN SURG HUBLESS 30CM 15FR

## (undated) DEVICE — DRESSING XEROFORM GAUZE 5X9

## (undated) DEVICE — CLAMP BIOVER VEN SM SGL 20G/MM

## (undated) DEVICE — POWDER ARISTA AH 3G

## (undated) DEVICE — GLOVE PROTEXIS LTX MICRO 8

## (undated) DEVICE — SUPPORT ULNA NERVE PROTECTOR

## (undated) DEVICE — SOL NACL IRR 1000ML BTL

## (undated) DEVICE — BANDAGE KERLIX AMD

## (undated) DEVICE — NDL SYR 10ML 18X1.5 LL BLUNT

## (undated) DEVICE — TRAY CATH FOL SIL DRN BAG 16FR

## (undated) DEVICE — ELECTRODE BLADE INSULATED 1 IN

## (undated) DEVICE — GLOVE PROTEXIS HYDROGEL SZ6.5

## (undated) DEVICE — SYR 10CC LUER LOCK

## (undated) DEVICE — GLOVE PROTEXIS LTX MICRO 6.5

## (undated) DEVICE — TOWEL OR DISP STRL BLUE 4/PK

## (undated) DEVICE — SYR 50CC LL

## (undated) DEVICE — TUBING SILICON CLR 3/16IN 10FT

## (undated) DEVICE — RESERVOIR JACKSON-PRATT 100CC

## (undated) DEVICE — CONTAINER SPECIMEN SCREW 4OZ

## (undated) DEVICE — SOL BETADINE 5%

## (undated) DEVICE — BLADE EZ CLEAN 2 1/2

## (undated) DEVICE — GLOVE PROTEXIS HYDROGEL SZ8.5

## (undated) DEVICE — BLADE SURG STAINLESS STEEL #10

## (undated) DEVICE — GLOVE PROTEXIS BLUE LATEX 7

## (undated) DEVICE — EXPANDER NATRELLE FH EP 450CC
Type: IMPLANTABLE DEVICE | Site: BREAST | Status: NON-FUNCTIONAL
Removed: 2023-05-01

## (undated) DEVICE — CORD BIPOLAR 12 FOOT

## (undated) DEVICE — CARTRIDGE MICROCLIP SFINE BLUE

## (undated) DEVICE — DRAPE FLUID WARMER ORS 44X44IN

## (undated) DEVICE — SEE MEDLINE ITEM 146322

## (undated) DEVICE — NDL EDGE JELCO 22GAX1IN 3ML

## (undated) DEVICE — CLIP LIGATING MEDIUM

## (undated) DEVICE — PAD ABD 8X10 STERILE

## (undated) DEVICE — PENCIL ELECSURG ROCKER 15FT

## (undated) DEVICE — APPLIER CLIP LIAGCLIP 9.375IN

## (undated) DEVICE — DRESSING TELFA N ADH 3X8IN

## (undated) DEVICE — COVER PROBE US 5.5X58L NON LTX

## (undated) DEVICE — SYR 30CC LUER LOCK

## (undated) DEVICE — DRAPE STERI U-SHAPED 47X51IN

## (undated) DEVICE — SUT STRATAFIX MCRYL 27IN 2-0

## (undated) DEVICE — COVER PROXIMA MAYO STAND

## (undated) DEVICE — SUT 3-0 12-18IN SILK

## (undated) DEVICE — SUT CTD VICRYL 0 UND BR

## (undated) DEVICE — SPONGE LAP STRL 18X18IN

## (undated) DEVICE — BRA MAMMARY SUPPORT XLARGE

## (undated) DEVICE — GAUZE SPONGE 4X4 12PLY

## (undated) DEVICE — DRAPE INCISE IOBAN 2 13X13IN

## (undated) DEVICE — SPONGE KERLIX ANTIMIC 6X6.75IN

## (undated) DEVICE — PACK SCROTO-PAK LONE STAR

## (undated) DEVICE — KIT SURGICAL TURNOVER

## (undated) DEVICE — SEE MEDLINE ITEM 157196

## (undated) DEVICE — DRAPE OPMI STERILE

## (undated) DEVICE — DRAPE ORTH SPLIT 77X108IN

## (undated) DEVICE — BOWL STERILE LARGE 32OZ

## (undated) DEVICE — SEE MEDLINE ITEM 146345

## (undated) DEVICE — CLAMP BIOVER VEN MIC DLB 15G

## (undated) DEVICE — GOWN X-LG STERILE BACK

## (undated) DEVICE — SUT MCRYL PLUS 3-0 PS2 27IN

## (undated) DEVICE — DRESSING TRANS 4X4 TEGADERM

## (undated) DEVICE — SUT CHROMIC 3-0 SH 27IN GUT

## (undated) DEVICE — SPONGE IV DRAIN 4X4 STERILE

## (undated) DEVICE — BRA MAMMARY SUPPORT MED

## (undated) DEVICE — PACK SPY-PHI DRUG DRAPE

## (undated) DEVICE — BLADE SURG STAINLESS STEEL #15

## (undated) DEVICE — TOWEL OR DLX BLUE 17X27IN 4/PK

## (undated) DEVICE — SUT VICRYL + 2-0 36IN CP-1

## (undated) DEVICE — SUT CTD VICRYL 3-0 CR/SH

## (undated) DEVICE — NDL HYPO 22GX1 1/2 SYR 10ML LL

## (undated) DEVICE — ELECTRODE BLD EXT 6.50 ST DISP

## (undated) DEVICE — BLANKET SNUGGLE WARM LOWER BDY

## (undated) DEVICE — STAPLER SKIN PROXIMATE WIDE

## (undated) DEVICE — SUT 2/0 30IN SILK BLK BRAI

## (undated) DEVICE — DRAPE UTILITY W/ TAPE 20X30IN

## (undated) DEVICE — NDL HYPO REG 25G X 1 1/2

## (undated) DEVICE — PIN STERILE SAFETY LARGE

## (undated) DEVICE — BLADE SURG STAINLESS STEEL #11

## (undated) DEVICE — SOL IRR NACL .9% 1000CC

## (undated) DEVICE — SUT 2-0 12-18IN SILK

## (undated) DEVICE — GEL AQUASONIC 100 STERILE20GM

## (undated) DEVICE — GLOVE PROTEXIS HYDROGEL SZ7.5

## (undated) DEVICE — GLOVE PROTEXIS LTX MICRO  7

## (undated) DEVICE — BAG MEDI-PLAST DECANTER C-FLOW

## (undated) DEVICE — SUT VICRYL PLUS 3-0 SH 18IN

## (undated) DEVICE — NDL SAFETY 22G X 1.5 ECLIPSE

## (undated) DEVICE — DRAPE MEDIUM SHEET 40X70IN

## (undated) DEVICE — SUT MCRYL PLUS 4-0 PS2 27IN

## (undated) DEVICE — DRESSING GAUZE 6PLY 4X4

## (undated) DEVICE — SUT PROLENE 5-0 PS-2 18

## (undated) DEVICE — TRAY SKIN SCRUB WET PREMIUM

## (undated) DEVICE — COVER TABLE HVY DTY 60X90IN

## (undated) DEVICE — CLIP HORIZON LIG TI MED-LG

## (undated) DEVICE — GLOVE PROTEXIS LTX MICRO 6

## (undated) DEVICE — CLAMP MICROVASCULAR DOUBLE

## (undated) DEVICE — APPLICATOR CHLORAPREP ORN 26ML

## (undated) DEVICE — PROBE TRUNODE GAMMA HAND PIECE

## (undated) DEVICE — MICRO CLIP

## (undated) DEVICE — TUBE SUCTION MEDI-VAC STERILE

## (undated) DEVICE — LIGATING CLIP LARGE

## (undated) DEVICE — SUT PDS PLUS 1 TP1 96IN

## (undated) DEVICE — ELECTRODE REM PLYHSV RETURN 9

## (undated) DEVICE — SUT 9/0 5IN ETHILON BLK MON